# Patient Record
Sex: MALE | Race: WHITE | Employment: OTHER | URBAN - METROPOLITAN AREA
[De-identification: names, ages, dates, MRNs, and addresses within clinical notes are randomized per-mention and may not be internally consistent; named-entity substitution may affect disease eponyms.]

---

## 2018-05-30 ENCOUNTER — OFFICE VISIT (OUTPATIENT)
Dept: PHYSICAL MEDICINE AND REHAB | Age: 83
End: 2018-05-30
Payer: MEDICARE

## 2018-05-30 VITALS — HEIGHT: 66 IN | WEIGHT: 154 LBS | BODY MASS INDEX: 24.75 KG/M2

## 2018-05-30 DIAGNOSIS — R20.0 NUMBNESS AND TINGLING OF BOTH LOWER EXTREMITIES: Primary | ICD-10-CM

## 2018-05-30 DIAGNOSIS — R20.2 NUMBNESS AND TINGLING OF BOTH LOWER EXTREMITIES: Primary | ICD-10-CM

## 2018-05-30 PROCEDURE — 95886 MUSC TEST DONE W/N TEST COMP: CPT | Performed by: PHYSICAL MEDICINE & REHABILITATION

## 2018-05-30 PROCEDURE — 95911 NRV CNDJ TEST 9-10 STUDIES: CPT | Performed by: PHYSICAL MEDICINE & REHABILITATION

## 2018-05-30 PROCEDURE — 99202 OFFICE O/P NEW SF 15 MIN: CPT | Performed by: PHYSICAL MEDICINE & REHABILITATION

## 2018-05-30 RX ORDER — LEVOTHYROXINE SODIUM 0.07 MG/1
TABLET ORAL
Refills: 0 | COMMUNITY
Start: 2018-05-19 | End: 2022-01-01 | Stop reason: SDUPTHER

## 2018-05-30 RX ORDER — LANCETS 33 GAUGE
EACH MISCELLANEOUS
Refills: 3 | COMMUNITY
Start: 2018-03-12 | End: 2020-01-08 | Stop reason: ALTCHOICE

## 2018-05-31 ENCOUNTER — HOSPITAL ENCOUNTER (OUTPATIENT)
Age: 83
Discharge: HOME | End: 2018-05-31
Payer: MEDICARE

## 2018-05-31 DIAGNOSIS — R20.0 NUMBNESS AND TINGLING OF BOTH LOWER EXTREMITIES: ICD-10-CM

## 2018-05-31 DIAGNOSIS — R20.2 NUMBNESS AND TINGLING OF BOTH LOWER EXTREMITIES: ICD-10-CM

## 2018-05-31 LAB
ALBUMIN SERPL-MCNC: 4 G/DL (ref 3.5–5.2)
ALP BLD-CCNC: 41 U/L (ref 40–129)
ALT SERPL-CCNC: 19 U/L (ref 0–40)
ANION GAP SERPL CALCULATED.3IONS-SCNC: 11 MMOL/L (ref 7–16)
AST SERPL-CCNC: 27 U/L (ref 0–39)
BILIRUB SERPL-MCNC: 0.5 MG/DL (ref 0–1.2)
BILIRUBIN URINE: NEGATIVE
BLOOD, URINE: NEGATIVE
BUN BLDV-MCNC: 21 MG/DL (ref 8–23)
CALCIUM SERPL-MCNC: 9.2 MG/DL (ref 8.6–10.2)
CHLORIDE BLD-SCNC: 103 MMOL/L (ref 98–107)
CHOLESTEROL, TOTAL: 139 MG/DL (ref 0–199)
CLARITY: CLEAR
CO2: 27 MMOL/L (ref 22–29)
COLOR: YELLOW
CREAT SERPL-MCNC: 1.2 MG/DL (ref 0.7–1.2)
GFR AFRICAN AMERICAN: >60
GFR NON-AFRICAN AMERICAN: 58 ML/MIN/1.73
GLUCOSE BLD-MCNC: 143 MG/DL (ref 74–109)
GLUCOSE URINE: NEGATIVE MG/DL
HBA1C MFR BLD: 6.9 % (ref 4.8–5.9)
HCT VFR BLD CALC: 38.3 % (ref 37–54)
HDLC SERPL-MCNC: 48 MG/DL
HEMOGLOBIN: 12.7 G/DL (ref 12.5–16.5)
KETONES, URINE: NEGATIVE MG/DL
LDL CHOLESTEROL CALCULATED: 76 MG/DL (ref 0–99)
LEUKOCYTE ESTERASE, URINE: NEGATIVE
MCH RBC QN AUTO: 32.5 PG (ref 26–35)
MCHC RBC AUTO-ENTMCNC: 33.2 % (ref 32–34.5)
MCV RBC AUTO: 98 FL (ref 80–99.9)
MICROALBUMIN UR-MCNC: <12 MG/L
NITRITE, URINE: NEGATIVE
PDW BLD-RTO: 13 FL (ref 11.5–15)
PH UA: 6 (ref 5–9)
PLATELET # BLD: 222 E9/L (ref 130–450)
PMV BLD AUTO: 9.7 FL (ref 7–12)
POTASSIUM SERPL-SCNC: 4.4 MMOL/L (ref 3.5–5)
PROTEIN UA: NEGATIVE MG/DL
RBC # BLD: 3.91 E12/L (ref 3.8–5.8)
SODIUM BLD-SCNC: 141 MMOL/L (ref 132–146)
SPECIFIC GRAVITY UA: 1.01 (ref 1–1.03)
TOTAL PROTEIN: 7.5 G/DL (ref 6.4–8.3)
TRIGL SERPL-MCNC: 75 MG/DL (ref 0–149)
UROBILINOGEN, URINE: 0.2 E.U./DL
VLDLC SERPL CALC-MCNC: 15 MG/DL
WBC # BLD: 7.1 E9/L (ref 4.5–11.5)

## 2018-05-31 PROCEDURE — 82044 UR ALBUMIN SEMIQUANTITATIVE: CPT

## 2018-05-31 PROCEDURE — 85027 COMPLETE CBC AUTOMATED: CPT

## 2018-05-31 PROCEDURE — 36415 COLL VENOUS BLD VENIPUNCTURE: CPT

## 2018-05-31 PROCEDURE — 80061 LIPID PANEL: CPT

## 2018-05-31 PROCEDURE — 83036 HEMOGLOBIN GLYCOSYLATED A1C: CPT

## 2018-05-31 PROCEDURE — 80053 COMPREHEN METABOLIC PANEL: CPT

## 2018-05-31 PROCEDURE — 81003 URINALYSIS AUTO W/O SCOPE: CPT

## 2018-09-06 ENCOUNTER — HOSPITAL ENCOUNTER (OUTPATIENT)
Age: 83
Discharge: HOME | End: 2018-09-06
Payer: MEDICARE

## 2018-09-06 LAB
ANION GAP SERPL CALCULATED.3IONS-SCNC: 9 MMOL/L (ref 7–16)
BUN BLDV-MCNC: 24 MG/DL (ref 8–23)
CALCIUM SERPL-MCNC: 9.2 MG/DL (ref 8.6–10.2)
CHLORIDE BLD-SCNC: 105 MMOL/L (ref 98–107)
CO2: 29 MMOL/L (ref 22–29)
CREAT SERPL-MCNC: 1.4 MG/DL (ref 0.7–1.2)
GFR AFRICAN AMERICAN: 58
GFR NON-AFRICAN AMERICAN: 48 ML/MIN/1.73
GLUCOSE FASTING: 149 MG/DL (ref 74–109)
HBA1C MFR BLD: 6.8 % (ref 4–5.6)
POTASSIUM SERPL-SCNC: 4.9 MMOL/L (ref 3.5–5)
SODIUM BLD-SCNC: 143 MMOL/L (ref 132–146)

## 2018-09-06 PROCEDURE — 80048 BASIC METABOLIC PNL TOTAL CA: CPT

## 2018-09-06 PROCEDURE — 83036 HEMOGLOBIN GLYCOSYLATED A1C: CPT

## 2018-09-06 PROCEDURE — 36415 COLL VENOUS BLD VENIPUNCTURE: CPT

## 2018-12-04 ENCOUNTER — HOSPITAL ENCOUNTER (OUTPATIENT)
Age: 83
Discharge: HOME | End: 2018-12-04
Payer: MEDICARE

## 2018-12-04 LAB
ALBUMIN SERPL-MCNC: 4.2 G/DL (ref 3.5–5.2)
ALP BLD-CCNC: 44 U/L (ref 40–129)
ALT SERPL-CCNC: 17 U/L (ref 0–40)
ANION GAP SERPL CALCULATED.3IONS-SCNC: 11 MMOL/L (ref 7–16)
AST SERPL-CCNC: 25 U/L (ref 0–39)
BILIRUB SERPL-MCNC: 0.5 MG/DL (ref 0–1.2)
BUN BLDV-MCNC: 24 MG/DL (ref 8–23)
CALCIUM SERPL-MCNC: 9.4 MG/DL (ref 8.6–10.2)
CHLORIDE BLD-SCNC: 102 MMOL/L (ref 98–107)
CHOLESTEROL, TOTAL: 149 MG/DL (ref 0–199)
CO2: 27 MMOL/L (ref 22–29)
CREAT SERPL-MCNC: 1.4 MG/DL (ref 0.7–1.2)
GFR AFRICAN AMERICAN: 58
GFR NON-AFRICAN AMERICAN: 48 ML/MIN/1.73
GLUCOSE BLD-MCNC: 126 MG/DL (ref 74–99)
HBA1C MFR BLD: 6.7 % (ref 4–5.6)
HCT VFR BLD CALC: 37.1 % (ref 37–54)
HDLC SERPL-MCNC: 50 MG/DL
HEMOGLOBIN: 12.5 G/DL (ref 12.5–16.5)
LDL CHOLESTEROL CALCULATED: 87 MG/DL (ref 0–99)
MCH RBC QN AUTO: 32.8 PG (ref 26–35)
MCHC RBC AUTO-ENTMCNC: 33.7 % (ref 32–34.5)
MCV RBC AUTO: 97.4 FL (ref 80–99.9)
PDW BLD-RTO: 13.3 FL (ref 11.5–15)
PLATELET # BLD: 223 E9/L (ref 130–450)
PMV BLD AUTO: 9.7 FL (ref 7–12)
POTASSIUM SERPL-SCNC: 4.9 MMOL/L (ref 3.5–5)
RBC # BLD: 3.81 E12/L (ref 3.8–5.8)
SODIUM BLD-SCNC: 140 MMOL/L (ref 132–146)
TOTAL PROTEIN: 7.8 G/DL (ref 6.4–8.3)
TRIGL SERPL-MCNC: 59 MG/DL (ref 0–149)
TSH SERPL DL<=0.05 MIU/L-ACNC: 1.72 UIU/ML (ref 0.27–4.2)
VLDLC SERPL CALC-MCNC: 12 MG/DL
WBC # BLD: 7.7 E9/L (ref 4.5–11.5)

## 2018-12-04 PROCEDURE — 80053 COMPREHEN METABOLIC PANEL: CPT

## 2018-12-04 PROCEDURE — 85027 COMPLETE CBC AUTOMATED: CPT

## 2018-12-04 PROCEDURE — 83036 HEMOGLOBIN GLYCOSYLATED A1C: CPT

## 2018-12-04 PROCEDURE — 84443 ASSAY THYROID STIM HORMONE: CPT

## 2018-12-04 PROCEDURE — 80061 LIPID PANEL: CPT

## 2018-12-04 PROCEDURE — 36415 COLL VENOUS BLD VENIPUNCTURE: CPT

## 2019-03-29 ENCOUNTER — HOSPITAL ENCOUNTER (OUTPATIENT)
Age: 84
Discharge: HOME | End: 2019-03-31
Payer: MEDICARE

## 2019-03-29 ENCOUNTER — HOSPITAL ENCOUNTER (OUTPATIENT)
Dept: GENERAL RADIOLOGY | Age: 84
Discharge: HOME | End: 2019-03-31
Payer: MEDICARE

## 2019-03-29 ENCOUNTER — APPOINTMENT (OUTPATIENT)
Dept: GENERAL RADIOLOGY | Age: 84
End: 2019-03-29
Payer: MEDICARE

## 2019-03-29 DIAGNOSIS — R52 PAIN: ICD-10-CM

## 2019-03-29 PROCEDURE — 72050 X-RAY EXAM NECK SPINE 4/5VWS: CPT

## 2019-03-29 PROCEDURE — 71046 X-RAY EXAM CHEST 2 VIEWS: CPT

## 2019-03-29 PROCEDURE — 72072 X-RAY EXAM THORAC SPINE 3VWS: CPT

## 2019-05-02 ENCOUNTER — HOSPITAL ENCOUNTER (OUTPATIENT)
Age: 84
Discharge: HOME | End: 2019-05-02
Payer: MEDICARE

## 2019-05-02 LAB
ALBUMIN SERPL-MCNC: 3.9 G/DL (ref 3.5–5.2)
ALP BLD-CCNC: 37 U/L (ref 40–129)
ALT SERPL-CCNC: 19 U/L (ref 0–40)
ANION GAP SERPL CALCULATED.3IONS-SCNC: 9 MMOL/L (ref 7–16)
AST SERPL-CCNC: 25 U/L (ref 0–39)
BILIRUB SERPL-MCNC: 0.4 MG/DL (ref 0–1.2)
BUN BLDV-MCNC: 22 MG/DL (ref 8–23)
CALCIUM SERPL-MCNC: 9.3 MG/DL (ref 8.6–10.2)
CHLORIDE BLD-SCNC: 102 MMOL/L (ref 98–107)
CHOLESTEROL, TOTAL: 150 MG/DL (ref 0–199)
CO2: 28 MMOL/L (ref 22–29)
CREAT SERPL-MCNC: 1.3 MG/DL (ref 0.7–1.2)
GFR AFRICAN AMERICAN: >60
GFR NON-AFRICAN AMERICAN: 52 ML/MIN/1.73
GLUCOSE BLD-MCNC: 146 MG/DL (ref 74–99)
HBA1C MFR BLD: 7 % (ref 4–5.6)
HCT VFR BLD CALC: 37.1 % (ref 37–54)
HDLC SERPL-MCNC: 48 MG/DL
HEMOGLOBIN: 12.4 G/DL (ref 12.5–16.5)
LDL CHOLESTEROL CALCULATED: 86 MG/DL (ref 0–99)
MCH RBC QN AUTO: 33.1 PG (ref 26–35)
MCHC RBC AUTO-ENTMCNC: 33.4 % (ref 32–34.5)
MCV RBC AUTO: 98.9 FL (ref 80–99.9)
PDW BLD-RTO: 13.2 FL (ref 11.5–15)
PLATELET # BLD: 202 E9/L (ref 130–450)
PMV BLD AUTO: 9.5 FL (ref 7–12)
POTASSIUM SERPL-SCNC: 4.3 MMOL/L (ref 3.5–5)
RBC # BLD: 3.75 E12/L (ref 3.8–5.8)
SODIUM BLD-SCNC: 139 MMOL/L (ref 132–146)
TOTAL PROTEIN: 7.2 G/DL (ref 6.4–8.3)
TRIGL SERPL-MCNC: 79 MG/DL (ref 0–149)
VLDLC SERPL CALC-MCNC: 16 MG/DL
WBC # BLD: 6.3 E9/L (ref 4.5–11.5)

## 2019-05-02 PROCEDURE — 85027 COMPLETE CBC AUTOMATED: CPT

## 2019-05-02 PROCEDURE — 80061 LIPID PANEL: CPT

## 2019-05-02 PROCEDURE — 80053 COMPREHEN METABOLIC PANEL: CPT

## 2019-05-02 PROCEDURE — 83036 HEMOGLOBIN GLYCOSYLATED A1C: CPT

## 2019-05-02 PROCEDURE — 36415 COLL VENOUS BLD VENIPUNCTURE: CPT

## 2019-08-15 ENCOUNTER — HOSPITAL ENCOUNTER (OUTPATIENT)
Age: 84
Discharge: HOME | End: 2019-08-15
Payer: MEDICARE

## 2019-08-15 LAB
ALBUMIN SERPL-MCNC: 4.1 G/DL (ref 3.5–5.2)
ALP BLD-CCNC: 42 U/L (ref 40–129)
ALT SERPL-CCNC: 18 U/L (ref 0–40)
ANION GAP SERPL CALCULATED.3IONS-SCNC: 11 MMOL/L (ref 7–16)
AST SERPL-CCNC: 25 U/L (ref 0–39)
BILIRUB SERPL-MCNC: 0.4 MG/DL (ref 0–1.2)
BUN BLDV-MCNC: 33 MG/DL (ref 8–23)
CALCIUM SERPL-MCNC: 9.5 MG/DL (ref 8.6–10.2)
CHLORIDE BLD-SCNC: 106 MMOL/L (ref 98–107)
CO2: 26 MMOL/L (ref 22–29)
CREAT SERPL-MCNC: 1.4 MG/DL (ref 0.7–1.2)
FOLATE: >20 NG/ML (ref 4.8–24.2)
GFR AFRICAN AMERICAN: 58
GFR NON-AFRICAN AMERICAN: 48 ML/MIN/1.73
GLUCOSE BLD-MCNC: 151 MG/DL (ref 74–99)
HBA1C MFR BLD: 7.1 % (ref 4–5.6)
HCT VFR BLD CALC: 38.8 % (ref 37–54)
HEMOGLOBIN: 12.8 G/DL (ref 12.5–16.5)
MCH RBC QN AUTO: 33 PG (ref 26–35)
MCHC RBC AUTO-ENTMCNC: 33 % (ref 32–34.5)
MCV RBC AUTO: 100 FL (ref 80–99.9)
PDW BLD-RTO: 13.1 FL (ref 11.5–15)
PLATELET # BLD: 229 E9/L (ref 130–450)
PMV BLD AUTO: 9.9 FL (ref 7–12)
POTASSIUM SERPL-SCNC: 4.8 MMOL/L (ref 3.5–5)
RBC # BLD: 3.88 E12/L (ref 3.8–5.8)
SODIUM BLD-SCNC: 143 MMOL/L (ref 132–146)
TOTAL PROTEIN: 7.6 G/DL (ref 6.4–8.3)
VITAMIN B-12: 673 PG/ML (ref 211–946)
WBC # BLD: 6.1 E9/L (ref 4.5–11.5)

## 2019-08-15 PROCEDURE — 85027 COMPLETE CBC AUTOMATED: CPT

## 2019-08-15 PROCEDURE — 82746 ASSAY OF FOLIC ACID SERUM: CPT

## 2019-08-15 PROCEDURE — 82607 VITAMIN B-12: CPT

## 2019-08-15 PROCEDURE — 80053 COMPREHEN METABOLIC PANEL: CPT

## 2019-08-15 PROCEDURE — 83036 HEMOGLOBIN GLYCOSYLATED A1C: CPT

## 2019-08-15 PROCEDURE — 36415 COLL VENOUS BLD VENIPUNCTURE: CPT

## 2020-01-08 ENCOUNTER — OFFICE VISIT (OUTPATIENT)
Dept: ORTHOPEDIC SURGERY | Age: 85
End: 2020-01-08
Payer: MEDICARE

## 2020-01-08 VITALS — WEIGHT: 155 LBS | BODY MASS INDEX: 24.91 KG/M2 | HEIGHT: 66 IN

## 2020-01-08 PROCEDURE — 99203 OFFICE O/P NEW LOW 30 MIN: CPT | Performed by: ORTHOPAEDIC SURGERY

## 2020-01-08 NOTE — PROGRESS NOTES
Ximena Al is a 80 y.o. male, who presents   Chief Complaint   Patient presents with    New Patient     new patient for right shoulder pain, he denies any injury, PT or injections. He states he has this issue for years, he states the pain is a dull ache. HPI[de-identified] Shoulder pain is been present for quite some time. To some degree is been present for several years. Is been more bothersome in the past several months. Patient had difficulty this summer playing corn hole and toss and the bags forward. Any elevation of his arm causes him pain around the acromial arch area. He has difficulty putting on his jacket. There is been no targeted treatment recently. He had injection in the past that helped some. Allergies; medications; past medical, surgical, family, and social history; and problem list have been reviewed today and updated as indicated in this encounter - see below following Ortho specifics. Musculoskeletal: Skin condition and gross neurovascular function are good in the right upper extremity. The shoulder and AC joints are both stable. Range of motion is guarded above about 90 degrees of elevation. Rotation causes discomfort as well. Manual muscle testing causes discomfort. The cuff seems to be grossly intact but there is pain with resisted testing. This is certainly suggestive of impingement as well as his symptoms. He does have a little pain on palpation anterior adjacent to the long head biceps tendon. Radiologic Studies: Imaging of the right shoulder and 2 views showed significant anterior inferior acromial prominence. The subacromial space otherwise looked good in the glenohumeral contours and spaces were good as well as the [facility] joint. ASSESSMENT:  Javier Hunt was seen today for new patient. Diagnoses and all orders for this visit:    Acute pain of right shoulder  -     XR SHOULDER RIGHT (MIN 2 VIEWS);  Future    Bursitis of right shoulder    Impingement syndrome of right shoulder     Treatment alternatives were reviewed including medical and physical therapies, injections, and surgical options, expected risks benefits and likely outcome of each were discussed in detail, questions asked and answered and understood. We discussed treatment options including physical therapy, injection and surgical debridement. PLAN: Mr. Hawkins Argue to take care of this in a definitive fashion. The surgery would do this. That would be arthroscopic evaluation and treatment. The surgery risk prognosis limitations and any rehab issues were discussed in detail with parent good understanding. We will schedule this as an outpatient procedure pending any necessary approvals. There is no problem list on file for this patient. Past Medical History:   Diagnosis Date    Hypertension        Past Surgical History:   Procedure Laterality Date    BACK SURGERY      JOINT REPLACEMENT      LITHOTRIPSY      TONSILLECTOMY         Current Outpatient Medications   Medication Sig Dispense Refill    levothyroxine (SYNTHROID) 75 MCG tablet TAKE ONE TABLET BY MOUTH EVERY DAY  0    metFORMIN (GLUCOPHAGE) 500 MG tablet TAKE ONE TABLET BY MOUTH EVERY DAY  3    finasteride (PROSCAR) 5 MG tablet Take 5 mg by mouth once a week.  pravastatin (PRAVACHOL) 20 MG tablet Take 20 mg by mouth daily.  potassium citrate (UROCIT-K) 5 MEQ (540 MG) SR tablet Take 5 mEq by mouth 3 times daily (with meals).  aspirin 81 MG EC tablet Take 81 mg by mouth daily.  therapeutic multivitamin-minerals (THERAGRAN-M) tablet Take 1 tablet by mouth daily. No current facility-administered medications for this visit.         No Known Allergies    Social History     Socioeconomic History    Marital status:      Spouse name: None    Number of children: None    Years of education: None    Highest education level: None   Occupational History    None   Social Needs    Financial resource strain: None

## 2020-01-15 ENCOUNTER — ANESTHESIA EVENT (OUTPATIENT)
Dept: OPERATING ROOM | Age: 85
End: 2020-01-15
Payer: MEDICARE

## 2020-01-17 ENCOUNTER — HOSPITAL ENCOUNTER (OUTPATIENT)
Age: 85
Discharge: HOME | End: 2020-01-17
Payer: MEDICARE

## 2020-01-17 LAB
ALBUMIN SERPL-MCNC: 4.1 G/DL (ref 3.5–5.2)
ALP BLD-CCNC: 43 U/L (ref 40–129)
ALT SERPL-CCNC: 20 U/L (ref 0–40)
ANION GAP SERPL CALCULATED.3IONS-SCNC: 10 MMOL/L (ref 7–16)
AST SERPL-CCNC: 23 U/L (ref 0–39)
BILIRUB SERPL-MCNC: 0.4 MG/DL (ref 0–1.2)
BUN BLDV-MCNC: 20 MG/DL (ref 8–23)
CALCIUM SERPL-MCNC: 9.5 MG/DL (ref 8.6–10.2)
CHLORIDE BLD-SCNC: 106 MMOL/L (ref 98–107)
CHOLESTEROL, TOTAL: 143 MG/DL (ref 0–199)
CO2: 27 MMOL/L (ref 22–29)
CREAT SERPL-MCNC: 1.5 MG/DL (ref 0.7–1.2)
GFR AFRICAN AMERICAN: 54
GFR NON-AFRICAN AMERICAN: 44 ML/MIN/1.73
GLUCOSE BLD-MCNC: 153 MG/DL (ref 74–99)
HBA1C MFR BLD: 7.1 % (ref 4–5.6)
HCT VFR BLD CALC: 38.2 % (ref 37–54)
HDLC SERPL-MCNC: 44 MG/DL
HEMOGLOBIN: 12.7 G/DL (ref 12.5–16.5)
LDL CHOLESTEROL CALCULATED: 86 MG/DL (ref 0–99)
MCH RBC QN AUTO: 32.9 PG (ref 26–35)
MCHC RBC AUTO-ENTMCNC: 33.2 % (ref 32–34.5)
MCV RBC AUTO: 99 FL (ref 80–99.9)
PDW BLD-RTO: 13.2 FL (ref 11.5–15)
PLATELET # BLD: 211 E9/L (ref 130–450)
PMV BLD AUTO: 9.8 FL (ref 7–12)
POTASSIUM SERPL-SCNC: 4.9 MMOL/L (ref 3.5–5)
RBC # BLD: 3.86 E12/L (ref 3.8–5.8)
SODIUM BLD-SCNC: 143 MMOL/L (ref 132–146)
TOTAL PROTEIN: 7.6 G/DL (ref 6.4–8.3)
TRIGL SERPL-MCNC: 66 MG/DL (ref 0–149)
VLDLC SERPL CALC-MCNC: 13 MG/DL
WBC # BLD: 7.3 E9/L (ref 4.5–11.5)

## 2020-01-17 PROCEDURE — 85027 COMPLETE CBC AUTOMATED: CPT

## 2020-01-17 PROCEDURE — 83036 HEMOGLOBIN GLYCOSYLATED A1C: CPT

## 2020-01-17 PROCEDURE — 80053 COMPREHEN METABOLIC PANEL: CPT

## 2020-01-17 PROCEDURE — 36415 COLL VENOUS BLD VENIPUNCTURE: CPT

## 2020-01-17 PROCEDURE — 80061 LIPID PANEL: CPT

## 2020-01-23 RX ORDER — ASPIRIN 325 MG
325 TABLET ORAL DAILY
Status: ON HOLD | COMMUNITY
End: 2022-01-01 | Stop reason: HOSPADM

## 2020-01-29 NOTE — ANESTHESIA PRE PROCEDURE
Department of Anesthesiology  Preprocedure Note       Name:  Jennifer Bermeo   Age:  80 y.o.  :  1933                                          MRN:  [id number]         Date:  2020      Surgeon: Daniella Echavarria):  [place] Wilhelm DO    Procedure: ARTHROSCOPIC EXAM AND TREATMENT RIGHT SHOULDER (CPT 68171,99786) (Right )    Medications prior to admission:   Prior to Admission medications    Medication Sig Start Date End Date Taking? Authorizing Provider   aspirin 325 MG tablet Take 325 mg by mouth daily   Yes Historical Provider, MD   levothyroxine (SYNTHROID) 75 MCG tablet TAKE ONE TABLET BY MOUTH EVERY DAY 18   Historical Provider, MD   metFORMIN (GLUCOPHAGE) 500 MG tablet TAKE ONE TABLET BY MOUTH EVERY DAY 18   Historical Provider, MD   finasteride (PROSCAR) 5 MG tablet Take 5 mg by mouth once a week. Historical Provider, MD   pravastatin (PRAVACHOL) 20 MG tablet Take 20 mg by mouth daily. Historical Provider, MD   potassium citrate (UROCIT-K) 5 MEQ (540 MG) SR tablet Take 5 mEq by mouth daily     Historical Provider, MD   therapeutic multivitamin-minerals (THERAGRAN-M) tablet Take 1 tablet by mouth daily. Historical Provider, MD       Current medications:    No current facility-administered medications for this encounter. Current Outpatient Medications   Medication Sig Dispense Refill    aspirin 325 MG tablet Take 325 mg by mouth daily      levothyroxine (SYNTHROID) 75 MCG tablet TAKE ONE TABLET BY MOUTH EVERY DAY  0    metFORMIN (GLUCOPHAGE) 500 MG tablet TAKE ONE TABLET BY MOUTH EVERY DAY  3    finasteride (PROSCAR) 5 MG tablet Take 5 mg by mouth once a week.  pravastatin (PRAVACHOL) 20 MG tablet Take 20 mg by mouth daily.  potassium citrate (UROCIT-K) 5 MEQ (540 MG) SR tablet Take 5 mEq by mouth daily       therapeutic multivitamin-minerals (THERAGRAN-M) tablet Take 1 tablet by mouth daily.            Allergies:  No Known Allergies    Problem List:  There is no problem list on file for this patient. Past Medical History:        Diagnosis Date    Diabetes mellitus ([address]     Hypertension     Thyroid disease        Past Surgical History:        Procedure Laterality Date    BACK SURGERY  2000    fusion  lumbar area,      JOINT REPLACEMENT Bilateral [id number]    knee     LITHOTRIPSY      TONSILLECTOMY         Social History:    Social History     Tobacco Use    Smoking status: Never Smoker    Smokeless tobacco: Never Used   Substance Use Topics    Alcohol use: No                                Counseling given: Not Answered      Vital Signs (Current):   Vitals:    01/23/20 1205   Weight: 155 lb (70.3 kg)   Height: 5' 6\" (1.676 m)                                              BP Readings from Last 3 Encounters:   05/16/16 154/84       NPO Status:  >8.H                                                                               BMI:   Wt Readings from Last 3 Encounters:   01/08/20 155 lb (70.3 kg)   05/30/18 154 lb (69.9 kg)   05/16/16 167 lb 9.6 oz (76 kg)     Body mass index is 25.02 kg/m². CBC:   Lab Results   Component Value Date    WBC 7.3 01/17/2020    RBC 3.86 01/17/2020    HGB 12.7 01/17/2020    HCT 38.2 01/17/2020    MCV 99.0 01/17/2020    RDW 13.2 01/17/2020     01/17/2020       CMP:   Lab Results   Component Value Date     01/17/2020    K 4.9 01/17/2020     01/17/2020    CO2 27 01/17/2020    BUN 20 01/17/2020    CREATININE 1.5 01/17/2020    GFRAA 54 01/17/2020    LABGLOM 44 01/17/2020    GLUCOSE 153 01/17/2020    GLUCOSE 111 03/29/2012    PROT 7.6 01/17/2020    CALCIUM 9.5 01/17/2020    BILITOT 0.4 01/17/2020    ALKPHOS 43 01/17/2020    AST 23 01/17/2020    ALT 20 01/17/2020       POC Tests: No results for input(s): POCGLU, POCNA, POCK, POCCL, POCBUN, POCHEMO, POCHCT in the last 72 hours.     Coags:   Lab Results   Component Value Date    PROTIME 11.8 05/16/2016    INR 1.1 05/16/2016    APTT 29.5 05/16/2016       HCG (If Applicable): No results found for: PREGTESTUR, PREGSERUM, HCG, HCGQUANT     ABGs: No results found for: PHART, PO2ART, MQR0VLX, BOL2LTO, BEART, F3FBQOIB     Type & Screen (If Applicable):  No results found for: LABABO, [address]    Anesthesia Evaluation  Patient summary reviewed no history of anesthetic complications:   Airway: Mallampati: III  TM distance: >3 FB     Mouth opening: > = 3 FB Dental:          Pulmonary: breath sounds clear to auscultation      (-) not a current smoker                           Cardiovascular:    (+) hypertension:, hyperlipidemia      ECG reviewed  Rhythm: regular  Rate: normal           Beta Blocker:  Not on Beta Blocker         Neuro/Psych:   Negative Neuro/Psych ROS              GI/Hepatic/Renal:        (-) no morbid obesity       Endo/Other:    (+) Diabetes, hypothyroidism::., .                 Abdominal:         (-) obese     Vascular: negative vascular ROS. Anesthesia Plan      general and regional     ASA 2     (Medical clearance on chart-reviewed  Desires IScB---explained, consents)  Induction: intravenous. MIPS: Postoperative opioids intended and Prophylactic antiemetics administered. Anesthetic plan and risks discussed with patient. Plan discussed with CRNA. PAT Chart Review:  Chart reviewed per routine on January 29, 2020 at 7:05 AM by Madina Andrea MD.  Above represents information available via shared medical record including previous anesthesia history, drug and allergy history.   Confirmation of above and final plan per Day of Surgery (DOS) anesthesiologist.        Madina Andrea MD   1/29/2020

## 2020-01-30 ENCOUNTER — ANESTHESIA (OUTPATIENT)
Dept: OPERATING ROOM | Age: 85
End: 2020-01-30
Payer: MEDICARE

## 2020-01-30 ENCOUNTER — HOSPITAL ENCOUNTER (OUTPATIENT)
Age: 85
Setting detail: OUTPATIENT SURGERY
Discharge: HOME | End: 2020-01-30
Attending: ORTHOPAEDIC SURGERY | Admitting: ORTHOPAEDIC SURGERY
Payer: MEDICARE

## 2020-01-30 VITALS
HEART RATE: 87 BPM | OXYGEN SATURATION: 97 % | SYSTOLIC BLOOD PRESSURE: 147 MMHG | WEIGHT: 160 LBS | HEIGHT: 66 IN | RESPIRATION RATE: 16 BRPM | BODY MASS INDEX: 25.71 KG/M2 | TEMPERATURE: 97 F | DIASTOLIC BLOOD PRESSURE: 76 MMHG

## 2020-01-30 VITALS
TEMPERATURE: 98.6 F | RESPIRATION RATE: 1 BRPM | DIASTOLIC BLOOD PRESSURE: 91 MMHG | OXYGEN SATURATION: 99 % | SYSTOLIC BLOOD PRESSURE: 155 MMHG

## 2020-01-30 PROBLEM — M75.51 BURSITIS OF RIGHT SHOULDER: Chronic | Status: ACTIVE | Noted: 2020-01-30

## 2020-01-30 PROBLEM — S43.431A TEAR OF RIGHT GLENOID LABRUM: Chronic | Status: ACTIVE | Noted: 2020-01-30

## 2020-01-30 PROBLEM — M75.41 IMPINGEMENT SYNDROME OF RIGHT SHOULDER: Status: ACTIVE | Noted: 2020-01-30

## 2020-01-30 PROBLEM — M75.111 INCOMPLETE TEAR OF RIGHT ROTATOR CUFF: Chronic | Status: ACTIVE | Noted: 2020-01-30

## 2020-01-30 PROBLEM — S46.101A INJURY OF TENDON OF LONG HEAD OF RIGHT BICEPS: Chronic | Status: ACTIVE | Noted: 2020-01-30

## 2020-01-30 LAB — METER GLUCOSE: 135 MG/DL (ref 74–99)

## 2020-01-30 PROCEDURE — 7100000000 HC PACU RECOVERY - FIRST 15 MIN: Performed by: ORTHOPAEDIC SURGERY

## 2020-01-30 PROCEDURE — 2580000003 HC RX 258: Performed by: ANESTHESIOLOGY

## 2020-01-30 PROCEDURE — 3600000014 HC SURGERY LEVEL 4 ADDTL 15MIN: Performed by: ORTHOPAEDIC SURGERY

## 2020-01-30 PROCEDURE — 6360000002 HC RX W HCPCS: Performed by: NURSE ANESTHETIST, CERTIFIED REGISTERED

## 2020-01-30 PROCEDURE — 3600000004 HC SURGERY LEVEL 4 BASE: Performed by: ORTHOPAEDIC SURGERY

## 2020-01-30 PROCEDURE — 7100000011 HC PHASE II RECOVERY - ADDTL 15 MIN: Performed by: ORTHOPAEDIC SURGERY

## 2020-01-30 PROCEDURE — 7100000010 HC PHASE II RECOVERY - FIRST 15 MIN: Performed by: ORTHOPAEDIC SURGERY

## 2020-01-30 PROCEDURE — 29827 SHO ARTHRS SRG RT8TR CUF RPR: CPT | Performed by: ORTHOPAEDIC SURGERY

## 2020-01-30 PROCEDURE — 2720000010 HC SURG SUPPLY STERILE: Performed by: ORTHOPAEDIC SURGERY

## 2020-01-30 PROCEDURE — 3700000001 HC ADD 15 MINUTES (ANESTHESIA): Performed by: ORTHOPAEDIC SURGERY

## 2020-01-30 PROCEDURE — 29826 SHO ARTHRS SRG DECOMPRESSION: CPT | Performed by: ORTHOPAEDIC SURGERY

## 2020-01-30 PROCEDURE — 2580000003 HC RX 258: Performed by: ORTHOPAEDIC SURGERY

## 2020-01-30 PROCEDURE — 6360000002 HC RX W HCPCS: Performed by: ORTHOPAEDIC SURGERY

## 2020-01-30 PROCEDURE — C1713 ANCHOR/SCREW BN/BN,TIS/BN: HCPCS | Performed by: ORTHOPAEDIC SURGERY

## 2020-01-30 PROCEDURE — 82962 GLUCOSE BLOOD TEST: CPT

## 2020-01-30 PROCEDURE — 2709999900 HC NON-CHARGEABLE SUPPLY: Performed by: ORTHOPAEDIC SURGERY

## 2020-01-30 PROCEDURE — 2500000003 HC RX 250 WO HCPCS: Performed by: NURSE ANESTHETIST, CERTIFIED REGISTERED

## 2020-01-30 PROCEDURE — 3700000000 HC ANESTHESIA ATTENDED CARE: Performed by: ORTHOPAEDIC SURGERY

## 2020-01-30 PROCEDURE — 7100000001 HC PACU RECOVERY - ADDTL 15 MIN: Performed by: ORTHOPAEDIC SURGERY

## 2020-01-30 PROCEDURE — 2500000003 HC RX 250 WO HCPCS: Performed by: ORTHOPAEDIC SURGERY

## 2020-01-30 DEVICE — ANCHOR SUT W/ ORTHOCORD KNOTLESS FOR ROT CUF REP VERSALOK: Type: IMPLANTABLE DEVICE | Status: FUNCTIONAL

## 2020-01-30 RX ORDER — GLYCOPYRROLATE 0.2 MG/ML
INJECTION INTRAMUSCULAR; INTRAVENOUS PRN
Status: DISCONTINUED | OUTPATIENT
Start: 2020-01-30 | End: 2020-01-30 | Stop reason: SDUPTHER

## 2020-01-30 RX ORDER — NEOSTIGMINE METHYLSULFATE 1 MG/ML
INJECTION, SOLUTION INTRAVENOUS PRN
Status: DISCONTINUED | OUTPATIENT
Start: 2020-01-30 | End: 2020-01-30 | Stop reason: SDUPTHER

## 2020-01-30 RX ORDER — SODIUM CHLORIDE, SODIUM LACTATE, POTASSIUM CHLORIDE, CALCIUM CHLORIDE 600; 310; 30; 20 MG/100ML; MG/100ML; MG/100ML; MG/100ML
INJECTION, SOLUTION INTRAVENOUS CONTINUOUS
Status: DISCONTINUED | OUTPATIENT
Start: 2020-01-30 | End: 2020-01-30 | Stop reason: HOSPADM

## 2020-01-30 RX ORDER — PROMETHAZINE HYDROCHLORIDE 25 MG/ML
25 INJECTION, SOLUTION INTRAMUSCULAR; INTRAVENOUS
Status: DISCONTINUED | OUTPATIENT
Start: 2020-01-30 | End: 2020-01-30 | Stop reason: HOSPADM

## 2020-01-30 RX ORDER — HYDROMORPHONE HYDROCHLORIDE 1 MG/ML
0.25 INJECTION, SOLUTION INTRAMUSCULAR; INTRAVENOUS; SUBCUTANEOUS EVERY 5 MIN PRN
Status: DISCONTINUED | OUTPATIENT
Start: 2020-01-30 | End: 2020-01-30 | Stop reason: HOSPADM

## 2020-01-30 RX ORDER — ROCURONIUM BROMIDE 10 MG/ML
INJECTION, SOLUTION INTRAVENOUS PRN
Status: DISCONTINUED | OUTPATIENT
Start: 2020-01-30 | End: 2020-01-30 | Stop reason: SDUPTHER

## 2020-01-30 RX ORDER — DEXAMETHASONE SODIUM PHOSPHATE 10 MG/ML
INJECTION, SOLUTION INTRAMUSCULAR; INTRAVENOUS PRN
Status: DISCONTINUED | OUTPATIENT
Start: 2020-01-30 | End: 2020-01-30 | Stop reason: SDUPTHER

## 2020-01-30 RX ORDER — HYDROCODONE BITARTRATE AND ACETAMINOPHEN 5; 325 MG/1; MG/1
2 TABLET ORAL PRN
Status: DISCONTINUED | OUTPATIENT
Start: 2020-01-30 | End: 2020-01-30 | Stop reason: HOSPADM

## 2020-01-30 RX ORDER — MORPHINE SULFATE 2 MG/ML
1 INJECTION, SOLUTION INTRAMUSCULAR; INTRAVENOUS EVERY 5 MIN PRN
Status: DISCONTINUED | OUTPATIENT
Start: 2020-01-30 | End: 2020-01-30 | Stop reason: HOSPADM

## 2020-01-30 RX ORDER — HYDROCODONE BITARTRATE AND ACETAMINOPHEN 5; 325 MG/1; MG/1
1 TABLET ORAL PRN
Status: DISCONTINUED | OUTPATIENT
Start: 2020-01-30 | End: 2020-01-30 | Stop reason: HOSPADM

## 2020-01-30 RX ORDER — FENTANYL CITRATE 50 UG/ML
50 INJECTION, SOLUTION INTRAMUSCULAR; INTRAVENOUS EVERY 5 MIN PRN
Status: DISCONTINUED | OUTPATIENT
Start: 2020-01-30 | End: 2020-01-30 | Stop reason: HOSPADM

## 2020-01-30 RX ORDER — HYDRALAZINE HYDROCHLORIDE 20 MG/ML
5 INJECTION INTRAMUSCULAR; INTRAVENOUS EVERY 10 MIN PRN
Status: DISCONTINUED | OUTPATIENT
Start: 2020-01-30 | End: 2020-01-30 | Stop reason: HOSPADM

## 2020-01-30 RX ORDER — DIPHENHYDRAMINE HYDROCHLORIDE 50 MG/ML
12.5 INJECTION INTRAMUSCULAR; INTRAVENOUS
Status: DISCONTINUED | OUTPATIENT
Start: 2020-01-30 | End: 2020-01-30 | Stop reason: HOSPADM

## 2020-01-30 RX ORDER — LABETALOL HYDROCHLORIDE 5 MG/ML
5 INJECTION, SOLUTION INTRAVENOUS EVERY 10 MIN PRN
Status: DISCONTINUED | OUTPATIENT
Start: 2020-01-30 | End: 2020-01-30 | Stop reason: HOSPADM

## 2020-01-30 RX ORDER — HYDROCODONE BITARTRATE AND ACETAMINOPHEN 5; 325 MG/1; MG/1
1 TABLET ORAL EVERY 4 HOURS PRN
Qty: 40 TABLET | Refills: 0 | Status: SHIPPED | OUTPATIENT
Start: 2020-01-30 | End: 2020-02-06

## 2020-01-30 RX ORDER — FENTANYL CITRATE 50 UG/ML
INJECTION, SOLUTION INTRAMUSCULAR; INTRAVENOUS PRN
Status: DISCONTINUED | OUTPATIENT
Start: 2020-01-30 | End: 2020-01-30 | Stop reason: SDUPTHER

## 2020-01-30 RX ORDER — LIDOCAINE HYDROCHLORIDE 20 MG/ML
INJECTION, SOLUTION INTRAVENOUS PRN
Status: DISCONTINUED | OUTPATIENT
Start: 2020-01-30 | End: 2020-01-30 | Stop reason: SDUPTHER

## 2020-01-30 RX ORDER — ONDANSETRON 2 MG/ML
INJECTION INTRAMUSCULAR; INTRAVENOUS PRN
Status: DISCONTINUED | OUTPATIENT
Start: 2020-01-30 | End: 2020-01-30 | Stop reason: SDUPTHER

## 2020-01-30 RX ORDER — EPHEDRINE SULFATE/0.9% NACL/PF 50 MG/5 ML
SYRINGE (ML) INTRAVENOUS PRN
Status: DISCONTINUED | OUTPATIENT
Start: 2020-01-30 | End: 2020-01-30 | Stop reason: SDUPTHER

## 2020-01-30 RX ORDER — MEPERIDINE HYDROCHLORIDE 50 MG/ML
12.5 INJECTION INTRAMUSCULAR; INTRAVENOUS; SUBCUTANEOUS EVERY 5 MIN PRN
Status: DISCONTINUED | OUTPATIENT
Start: 2020-01-30 | End: 2020-01-30 | Stop reason: HOSPADM

## 2020-01-30 RX ORDER — PROPOFOL 10 MG/ML
INJECTION, EMULSION INTRAVENOUS PRN
Status: DISCONTINUED | OUTPATIENT
Start: 2020-01-30 | End: 2020-01-30 | Stop reason: SDUPTHER

## 2020-01-30 RX ORDER — BUPIVACAINE HYDROCHLORIDE AND EPINEPHRINE 2.5; 5 MG/ML; UG/ML
INJECTION, SOLUTION EPIDURAL; INFILTRATION; INTRACAUDAL; PERINEURAL PRN
Status: DISCONTINUED | OUTPATIENT
Start: 2020-01-30 | End: 2020-01-30 | Stop reason: ALTCHOICE

## 2020-01-30 RX ORDER — OXYCODONE HYDROCHLORIDE AND ACETAMINOPHEN 5; 325 MG/1; MG/1
1 TABLET ORAL EVERY 4 HOURS PRN
Status: DISCONTINUED | OUTPATIENT
Start: 2020-01-30 | End: 2020-01-30 | Stop reason: HOSPADM

## 2020-01-30 RX ADMIN — GLYCOPYRROLATE 0.6 MG: 0.2 INJECTION INTRAMUSCULAR; INTRAVENOUS at 08:35

## 2020-01-30 RX ADMIN — Medication 40 MG: at 07:36

## 2020-01-30 RX ADMIN — Medication 3 MG: at 08:35

## 2020-01-30 RX ADMIN — DEXAMETHASONE SODIUM PHOSPHATE 10 MG: 10 INJECTION, SOLUTION INTRAMUSCULAR; INTRAVENOUS at 07:57

## 2020-01-30 RX ADMIN — FENTANYL CITRATE 50 MCG: 50 INJECTION, SOLUTION INTRAMUSCULAR; INTRAVENOUS at 08:27

## 2020-01-30 RX ADMIN — FENTANYL CITRATE 50 MCG: 50 INJECTION, SOLUTION INTRAMUSCULAR; INTRAVENOUS at 07:33

## 2020-01-30 RX ADMIN — Medication 10 MG: at 07:47

## 2020-01-30 RX ADMIN — CEFAZOLIN 1 G: 1 INJECTION, POWDER, FOR SOLUTION INTRAMUSCULAR; INTRAVENOUS at 07:22

## 2020-01-30 RX ADMIN — SODIUM CHLORIDE, POTASSIUM CHLORIDE, SODIUM LACTATE AND CALCIUM CHLORIDE: 600; 310; 30; 20 INJECTION, SOLUTION INTRAVENOUS at 07:13

## 2020-01-30 RX ADMIN — Medication 10 MG: at 08:07

## 2020-01-30 RX ADMIN — PROPOFOL 180 MG: 10 INJECTION, EMULSION INTRAVENOUS at 07:35

## 2020-01-30 RX ADMIN — FENTANYL CITRATE 50 MCG: 50 INJECTION, SOLUTION INTRAMUSCULAR; INTRAVENOUS at 07:23

## 2020-01-30 RX ADMIN — ONDANSETRON HYDROCHLORIDE 4 MG: 2 INJECTION, SOLUTION INTRAMUSCULAR; INTRAVENOUS at 07:57

## 2020-01-30 RX ADMIN — LIDOCAINE HYDROCHLORIDE 50 MG: 20 INJECTION, SOLUTION INTRAVENOUS at 07:36

## 2020-01-30 ASSESSMENT — PULMONARY FUNCTION TESTS
PIF_VALUE: 20
PIF_VALUE: 20
PIF_VALUE: 19
PIF_VALUE: 19
PIF_VALUE: 0
PIF_VALUE: 1
PIF_VALUE: 2
PIF_VALUE: 2
PIF_VALUE: 20
PIF_VALUE: 21
PIF_VALUE: 20
PIF_VALUE: 20
PIF_VALUE: 19
PIF_VALUE: 20
PIF_VALUE: 2
PIF_VALUE: 20
PIF_VALUE: 19
PIF_VALUE: 20
PIF_VALUE: 21
PIF_VALUE: 18
PIF_VALUE: 20
PIF_VALUE: 20
PIF_VALUE: 19
PIF_VALUE: 20
PIF_VALUE: 20
PIF_VALUE: 0
PIF_VALUE: 20
PIF_VALUE: 4
PIF_VALUE: 20
PIF_VALUE: 20
PIF_VALUE: 21
PIF_VALUE: 20
PIF_VALUE: 19
PIF_VALUE: 19
PIF_VALUE: 0
PIF_VALUE: 20
PIF_VALUE: 0
PIF_VALUE: 5
PIF_VALUE: 20
PIF_VALUE: 0
PIF_VALUE: 20
PIF_VALUE: 17
PIF_VALUE: 20
PIF_VALUE: 0
PIF_VALUE: 20
PIF_VALUE: 19
PIF_VALUE: 0
PIF_VALUE: 0
PIF_VALUE: 19
PIF_VALUE: 13
PIF_VALUE: 21
PIF_VALUE: 0
PIF_VALUE: 19
PIF_VALUE: 20
PIF_VALUE: 0
PIF_VALUE: 21
PIF_VALUE: 17
PIF_VALUE: 18
PIF_VALUE: 19
PIF_VALUE: 2
PIF_VALUE: 6
PIF_VALUE: 20
PIF_VALUE: 20
PIF_VALUE: 19
PIF_VALUE: 20
PIF_VALUE: 19
PIF_VALUE: 1
PIF_VALUE: 19
PIF_VALUE: 2
PIF_VALUE: 0
PIF_VALUE: 20
PIF_VALUE: 37
PIF_VALUE: 20
PIF_VALUE: 19
PIF_VALUE: 18

## 2020-01-30 ASSESSMENT — PAIN SCALES - GENERAL
PAINLEVEL_OUTOF10: 0

## 2020-01-30 ASSESSMENT — LIFESTYLE VARIABLES: SMOKING_STATUS: 0

## 2020-01-30 ASSESSMENT — PAIN DESCRIPTION - DESCRIPTORS: DESCRIPTORS: ACHING;DISCOMFORT

## 2020-01-30 ASSESSMENT — PAIN - FUNCTIONAL ASSESSMENT: PAIN_FUNCTIONAL_ASSESSMENT: 0-10

## 2020-01-30 NOTE — ANESTHESIA POSTPROCEDURE EVALUATION
Department of Anesthesiology  Postprocedure Note    Patient: Reymundo Tanner  MRN: [id number]  YOB: 1933  Date of evaluation: 1/30/2020  Time:  12:29 PM     Procedure Summary     Date:  01/30/20 Room / Location:  [address] [place] [address] 01 / [address] [facility]    Anesthesia Start:  0649 Anesthesia Stop:  0848    Procedure:  ARTHROSCOPIC EXAM AND TREATMENT RIGHT SHOULDER (CPT 75309,63111) RIGHT ROTATOR CUFF REPAIR, SUBACHROMIAL DECOMPRESSION, DEBRIDEMENT OF HUMERAL (Right ) Diagnosis:  (BURSITIS/ IMPINGEMENT RIGHT SHOULDER)    Surgeon:  Dominick Turner DO Responsible Provider:  Zheng Abdul MD    Anesthesia Type:  general, regional ASA Status:  2          Anesthesia Type: general, regional    Tashi Phase I: Tashi Score: 10    Tashi Phase II: Tashi Score: 10    Last vitals: Reviewed and per EMR flowsheets. Anesthesia Post Evaluation    Patient location during evaluation: PACU  Patient participation: complete - patient participated  Level of consciousness: awake and alert  Airway patency: patent  Nausea & Vomiting: no nausea and no vomiting  Complications: no  Cardiovascular status: hemodynamically stable  Respiratory status: room air and spontaneous ventilation  Comments: Good analgesia  and had temporary episode of dizziness in PACU.  Post block precautions given to pt and wife

## 2020-01-30 NOTE — OP NOTE
Operative report        DATE OF PROCEDURE: 1/30/2020     SURGEON: Faisal Galindo    ASSISTANT: None    PREOPERATIVE DIAGNOSIS: Impingement and bursitis right shoulder    POSTOPERATIVE DIAGNOSIS: Same with partial thickness rotator cuff tear, degenerative labral tearing, partial tearing long head biceps tendon    OPERATION: Arthroscopic examination debridement labral tear. Debridement of long head biceps tears, subacromial bursectomy, anterior acromioplasty, rotator cuff repair    ANESTHESIA: General  interscalene block    ESTIMATED BLOOD LOSS: Less than 50 cc    COMPLICATIONS: None    SPECIMENS: Was sent to pathology    HISTORY: The patient is a 80y.o. year old male with history of above preop diagnosis. I explained the risk, benefits, expected outcome, and alternatives to the procedure. Patient understands and is in agreement. PROCEDURE: Patient was brought to the operating room after site side were notified. Adequate general anesthetic was given and an interscalene block was done by anesthesia. Patient was supine. He was then turned to his left lateral cubitus position on the Olymp backpack with careful axillary padding. Right upper extremity was placed in 10 pounds skin traction 45 degrees abduction 20 degrees forward flexion. The shoulder was prepped and draped in sterile fashion. Bony 90 wounds outlined with a skin marker. Posterior lateral portal areas were injected with local anesthetic with epinephrine. The posterior portal was used to place the scope in the glenohumeral joint. There is tearing in the glenoid labrum which appeared degenerative in nature. There is also some undersurface fraying of the long head biceps tendon which was less than 50% breadth in appearance. The subscapularis tendon looked good. There was softening and thinning of the anterior portion of the supraspinatus tendon. The glenohumeral articular surfaces look good and there were no free bodies.     An anterior portal was made after injection of this area. Jerie Ian was introduced and the labrum was debrided as was the long head biceps tendon to get down to more stable fibers. The undersurface of the rotator cuff was debrided slightly as well. A spinal needle was placed through the lateral portal area and through the area of weakening of the cuff and it was extremely thin with very little resistance to the needle prompting more aggressive treatment. The lateral portal was made and the soft area in the cuff was exploited to place a portal here. The shaver was then placed in the joint and the long head biceps was further debrided on the underside and the cuff tear was debrided and it was found to be extremely thin and friable. The footprint was cleaned up in this area as well. The scope was then placed subacromial.  The shaver was used to perform a bursectomy and the undersurface of the acromion was cleaned up as well. A tapered punch hole was made lateral to the tuberosity on the side of the humerus to create a good interference angle. A horizontal Orthocor mattress suture was then placed in the cuff tendon and it was pulled out lateral and the suture tacks threaded through the Versalok anchor which was then placed in the bursa area and then placed in the prepared bone hole. The cuff was pulled over to cover the footprint and the anchor was seated and then deployed with an excellent stable repair noted. Anterior acromioplasty was then performed raising up and flattening the undersurface to eliminate the impingement. The area was then thoroughly irrigated. No further pathology was identified. Excess fluid was expressed and instruments were withdrawn. The wounds were closed with Steri-Strips. Xeroform gauze was placed over the wound. A bulky dressing was then applied and the arm was placed in a sling and swath. Patient's anesthetic was reversed he was taken recovery in stable condition.     GROSS PATHOLOGY: Labral degenerative tearing right shoulder. Long head biceps tendon partial tearing. Partial-thickness tear anterior portion supraspinatus tendon. Subacromial bursal hypertrophy and hyperemia. Anterior inferior acromial prominence. COMPONENTS USED : Versalok anchor x1. Orthocor horizontal mattress suture x1.     Electronically signed by Belinda Mckeon DO on 1/30/20 at 9:12 AM

## 2020-01-30 NOTE — ANESTHESIA PROCEDURE NOTES
Peripheral Block    Patient location during procedure: OR  Start time: 1/30/2020 7:25 AM  End time: 1/30/2020 7:33 AM  Staffing  Anesthesiologist: Jm Norman MD  Performed: anesthesiologist   Preanesthetic Checklist  Completed: patient identified, site marked, surgical consent, pre-op evaluation, timeout performed, IV checked, risks and benefits discussed, monitors and equipment checked, anesthesia consent given, oxygen available and patient being monitored  Peripheral Block  Patient position: supine  Prep: ChloraPrep  Patient monitoring: cardiac monitor, continuous pulse ox, continuous capnometry, frequent blood pressure checks and IV access  Block type: Brachial plexus  Laterality: right  Injection technique: single-shot  Procedures: nerve stimulator  Local infiltration: lidocaine  Infiltration strength: 2 %  Dose: 1 mL  Interscalene  Provider prep: mask and sterile gloves  Local infiltration: lidocaine  Needle  Needle type: combined needle/nerve stimulator   Needle gauge: 22 G  Needle length: 5 cm  Needle localization: nerve stimulator  Needle insertion depth: 1.5 cm  Test dose: negative  Assessment  Injection assessment: negative aspiration for heme and no paresthesia on injection  Paresthesia pain: none  Slow fractionated injection: yes  Hemodynamics: stable  Additional Notes  Ropivacaine 20cc 0.5%  Reason for block: post-op pain management

## 2020-01-30 NOTE — H&P
History and Physical      CHIEF COMPLAINT: Older pain weakness  HISTORY OF PRESENT ILLNESS:        Past Medical History:        Diagnosis Date    Diabetes mellitus ([address]     Hypertension     Thyroid disease      Past Surgical History:        Procedure Laterality Date    BACK SURGERY  2000    fusion  lumbar area,      JOINT REPLACEMENT Bilateral [id number]    knee     LITHOTRIPSY      TONSILLECTOMY       Social History:    TOBACCO:   reports that he has never smoked. He has never used smokeless tobacco.  ETOH:   reports no history of alcohol use. DRUGS:   reports no history of drug use. Family History:   History reviewed. No pertinent family history. Medications Prior to Admission:  Medications Prior to Admission: aspirin 325 MG tablet, Take 325 mg by mouth daily  levothyroxine (SYNTHROID) 75 MCG tablet, TAKE ONE TABLET BY MOUTH EVERY DAY  metFORMIN (GLUCOPHAGE) 500 MG tablet, TAKE ONE TABLET BY MOUTH EVERY DAY  finasteride (PROSCAR) 5 MG tablet, Take 5 mg by mouth once a week. pravastatin (PRAVACHOL) 20 MG tablet, Take 20 mg by mouth daily. potassium citrate (UROCIT-K) 5 MEQ (540 MG) SR tablet, Take 5 mEq by mouth daily   therapeutic multivitamin-minerals (THERAGRAN-M) tablet, Take 1 tablet by mouth daily. Allergies:  Patient has no known allergies.     CONSTITUTIONAL:  negative for  chills and anorexia  HEENT:  negative for  tinnitus  RESPIRATORY:  negative for  dyspnea and cyanosis  CARDIOVASCULAR:  negative for  palpitations, syncope  GASTROINTESTINAL:  negative for vomiting and hematemesis  GENITOURINARY:  negative for hematuria  ENDOCRINE:  negative for tremor  MUSCULOSKELETAL:  positive for  decreased range of motion and muscle weakness  NEUROLOGICAL:  negative for seizures and syncope  BEHAVIOR/PSYCH:  negative for agitated and anxiety    PHYSICAL EXAM:  BP (!) 142/64   Pulse 76   Temp 98 °F (36.7 °C)   Resp 16   Ht 5' 6\" (1.676 m)   Wt 160 lb (72.6 kg)   SpO2 95%   BMI 25.82 kg/m² General appearance:  awake, alert, cooperative, no apparent distress, and appears stated age  Neurologic:  Awake, alert, oriented to name, place and time. Cranial nerves II-XII are grossly intact. Motor is 5 out of 5 bilaterally. Cerebellar finger to nose, heel to shin intact. Sensory is intact.   Babinski down going, Romberg negative, and gait is normal.  Lungs:  No increased work of breathing, good air exchange, clear to auscultation bilaterally, no crackles or wheezing  Heart:  Normal apical impulse, regular rate and rhythm, normal S1 and S2, no S3 or S4, and no murmur noted  Abdomen:  normal bowel sounds  Skin: warm and dry, no rash or erythema  ENT: tympanic membrane, external ear and ear canal normal bilaterally, oropharynx clear and moist with normal mucous membranes  Musculoskeletal: Limited range of motion strength right shoulder    General Labs:  CBC:   Lab Results   Component Value Date    WBC 7.3 01/17/2020    RBC 3.86 01/17/2020    HGB 12.7 01/17/2020    HCT 38.2 01/17/2020    MCV 99.0 01/17/2020    RDW 13.2 01/17/2020     01/17/2020     CMP:    Lab Results   Component Value Date     01/17/2020    K 4.9 01/17/2020     01/17/2020    CO2 27 01/17/2020    BUN 20 01/17/2020    PROT 7.6 01/17/2020     U/A:  No components found for: [place], USPGRAV, UP, Marcela Carbon, UKETONE, UBILI, UBLOOD, Jace, [facility], [place], [facility], [place], [facility], [place], [facility], [place]    Radiology: Impingement    ASSESSMENT AND PLAN:    Arthroscopic exam and treatment      Electronically signed by Tricia Ware DO on 1/30/2020 at 7:07 AM

## 2020-02-10 ENCOUNTER — OFFICE VISIT (OUTPATIENT)
Dept: ORTHOPEDIC SURGERY | Age: 85
End: 2020-02-10

## 2020-02-10 VITALS — WEIGHT: 155 LBS | BODY MASS INDEX: 24.91 KG/M2 | HEIGHT: 66 IN

## 2020-02-10 PROCEDURE — 99024 POSTOP FOLLOW-UP VISIT: CPT | Performed by: ORTHOPAEDIC SURGERY

## 2020-02-10 NOTE — PROGRESS NOTES
multivitamin-minerals (THERAGRAN-M) tablet Take 1 tablet by mouth daily. No current facility-administered medications for this visit.         Patient Active Problem List   Diagnosis    Incomplete tear of right rotator cuff    Long biceps tear    Injury of tendon of long head of right biceps    Tear of right glenoid labrum    Bursitis of right shoulder       Past Medical History:   Diagnosis Date    Diabetes mellitus ([address]     Hypertension     Thyroid disease        Past Surgical History:   Procedure Laterality Date    BACK SURGERY  2000    fusion  lumbar area,      JOINT REPLACEMENT Bilateral [id number]    knee     LITHOTRIPSY      SHOULDER ARTHROSCOPY Right 01/30/2020    with treatment    SHOULDER ARTHROSCOPY Right 1/30/2020    ARTHROSCOPIC EXAM AND TREATMENT RIGHT SHOULDER (CPT 86516,29177) RIGHT ROTATOR CUFF REPAIR, SUBACHROMIAL DECOMPRESSION, DEBRIDEMENT OF HUMERAL performed by Jayda Silva DO at [facility]         No Known Allergies    Social History     Socioeconomic History    Marital status:      Spouse name: None    Number of children: None    Years of education: None    Highest education level: None   Occupational History    None   Social Needs    Financial resource strain: None    Food insecurity:     Worry: None     Inability: None    Transportation needs:     Medical: None     Non-medical: None   Tobacco Use    Smoking status: Never Smoker    Smokeless tobacco: Never Used   Substance and Sexual Activity    Alcohol use: No    Drug use: No    Sexual activity: None   Lifestyle    Physical activity:     Days per week: None     Minutes per session: None    Stress: None   Relationships    Social connections:     Talks on phone: None     Gets together: None     Attends [employer] service: None     Active member of club or organization: None     Attends meetings of clubs or organizations: None     Relationship status: None    Intimate

## 2020-03-02 ENCOUNTER — OFFICE VISIT (OUTPATIENT)
Dept: ORTHOPEDIC SURGERY | Age: 85
End: 2020-03-02

## 2020-03-02 VITALS — HEIGHT: 66 IN | BODY MASS INDEX: 25.23 KG/M2 | WEIGHT: 157 LBS

## 2020-03-02 PROCEDURE — 99024 POSTOP FOLLOW-UP VISIT: CPT | Performed by: ORTHOPAEDIC SURGERY

## 2020-03-02 NOTE — PROGRESS NOTES
for this visit.         Patient Active Problem List   Diagnosis    Incomplete tear of right rotator cuff    Long biceps tear    Injury of tendon of long head of right biceps    Tear of right glenoid labrum    Bursitis of right shoulder       Past Medical History:   Diagnosis Date    Diabetes mellitus ([address]     Hypertension     Thyroid disease        Past Surgical History:   Procedure Laterality Date    BACK SURGERY  2000    fusion  lumbar area,      JOINT REPLACEMENT Bilateral [id number]    knee     LITHOTRIPSY      SHOULDER ARTHROSCOPY Right 01/30/2020    with treatment    SHOULDER ARTHROSCOPY Right 1/30/2020    ARTHROSCOPIC EXAM AND TREATMENT RIGHT SHOULDER (CPT 09451,61728) RIGHT ROTATOR CUFF REPAIR, SUBACHROMIAL DECOMPRESSION, DEBRIDEMENT OF HUMERAL performed by Jayda Silva DO at [facility]         No Known Allergies    Social History     Socioeconomic History    Marital status:      Spouse name: None    Number of children: None    Years of education: None    Highest education level: None   Occupational History    None   Social Needs    Financial resource strain: None    Food insecurity:     Worry: None     Inability: None    Transportation needs:     Medical: None     Non-medical: None   Tobacco Use    Smoking status: Never Smoker    Smokeless tobacco: Never Used   Substance and Sexual Activity    Alcohol use: No    Drug use: No    Sexual activity: None   Lifestyle    Physical activity:     Days per week: None     Minutes per session: None    Stress: None   Relationships    Social connections:     Talks on phone: None     Gets together: None     Attends [employer]al service: None     Active member of club or organization: None     Attends meetings of clubs or organizations: None     Relationship status: None    Intimate partner violence:     Fear of current or ex partner: None     Emotionally abused: None     Physically abused: None     Forced sexual

## 2020-05-01 ENCOUNTER — TELEPHONE (OUTPATIENT)
Dept: ORTHOPEDIC SURGERY | Age: 85
End: 2020-05-01

## 2020-05-15 ENCOUNTER — HOSPITAL ENCOUNTER (OUTPATIENT)
Age: 85
Discharge: HOME | End: 2020-05-17
Payer: MEDICARE

## 2020-05-15 LAB
ANION GAP SERPL CALCULATED.3IONS-SCNC: 16 MMOL/L (ref 7–16)
BUN BLDV-MCNC: 23 MG/DL (ref 8–23)
CALCIUM SERPL-MCNC: 9.7 MG/DL (ref 8.6–10.2)
CHLORIDE BLD-SCNC: 104 MMOL/L (ref 98–107)
CO2: 21 MMOL/L (ref 22–29)
CREAT SERPL-MCNC: 1.4 MG/DL (ref 0.7–1.2)
GFR AFRICAN AMERICAN: 58
GFR NON-AFRICAN AMERICAN: 48 ML/MIN/1.73
GLUCOSE BLD-MCNC: 151 MG/DL (ref 74–99)
HBA1C MFR BLD: 7.2 % (ref 4–5.6)
PARATHYROID HORMONE INTACT: 32 PG/ML (ref 15–65)
POTASSIUM SERPL-SCNC: 4.6 MMOL/L (ref 3.5–5)
SODIUM BLD-SCNC: 141 MMOL/L (ref 132–146)

## 2020-05-15 PROCEDURE — 80048 BASIC METABOLIC PNL TOTAL CA: CPT

## 2020-05-15 PROCEDURE — 83970 ASSAY OF PARATHORMONE: CPT

## 2020-05-15 PROCEDURE — 83036 HEMOGLOBIN GLYCOSYLATED A1C: CPT

## 2020-05-29 ENCOUNTER — OFFICE VISIT (OUTPATIENT)
Dept: ORTHOPEDIC SURGERY | Age: 85
End: 2020-05-29
Payer: MEDICARE

## 2020-05-29 VITALS — BODY MASS INDEX: 25.39 KG/M2 | HEIGHT: 66 IN | WEIGHT: 158 LBS | TEMPERATURE: 98 F

## 2020-05-29 PROCEDURE — 99213 OFFICE O/P EST LOW 20 MIN: CPT | Performed by: ORTHOPAEDIC SURGERY

## 2020-05-29 NOTE — PROGRESS NOTES
Chief Complaint:   Chief Complaint   Patient presents with    Shoulder Pain     f/u right shoulder. patient states hes doing well       Patel Davalos is 4 months postop arthroscopic right shoulder surgery including rotator cuff repair. He is doing very well. He has been doing a lot of exercises at home and also goes to a maintenance exercise facility [place]. He has good range of motion in the shoulder and no pain at this time. Allergies; medications; past medical, surgical, family, and social history; and problem list have been reviewed today and updated as indicated in this encounter seen below. Exam: Active range of motion is excellent in the right shoulder. His glenohumeral and AC joints are stable. Elbow hand and wrist function are good as well. There is no pain on palpation. Passive range of motion is excellent with no pain no apprehension. Radiographs: None    ASSESSMENT:    Ester Keyes was seen today for shoulder pain. Diagnoses and all orders for this visit:    Nontraumatic tear of right rotator cuff, unspecified tear extent    Impingement syndrome of right shoulder    Bursitis of right shoulder        PLAN: Continue with home exercise. He also has been swinging a golf club a little bit which is fine to progress. Will follow as needed. Return if symptoms worsen or fail to improve. Current Outpatient Medications   Medication Sig Dispense Refill    aspirin 325 MG tablet Take 325 mg by mouth daily      levothyroxine (SYNTHROID) 75 MCG tablet TAKE ONE TABLET BY MOUTH EVERY DAY  0    metFORMIN (GLUCOPHAGE) 500 MG tablet TAKE ONE TABLET BY MOUTH EVERY DAY  3    finasteride (PROSCAR) 5 MG tablet Take 5 mg by mouth once a week.  pravastatin (PRAVACHOL) 20 MG tablet Take 20 mg by mouth daily.  potassium citrate (UROCIT-K) 5 MEQ (540 MG) SR tablet Take 5 mEq by mouth daily       therapeutic multivitamin-minerals (THERAGRAN-M) tablet Take 1 tablet by mouth daily.

## 2020-08-13 ENCOUNTER — HOSPITAL ENCOUNTER (OUTPATIENT)
Age: 85
Discharge: HOME | End: 2020-08-15
Payer: MEDICARE

## 2020-08-13 LAB
ALBUMIN SERPL-MCNC: 4.1 G/DL (ref 3.5–5.2)
ALP BLD-CCNC: 39 U/L (ref 40–129)
ALT SERPL-CCNC: 18 U/L (ref 0–40)
ANION GAP SERPL CALCULATED.3IONS-SCNC: 13 MMOL/L (ref 7–16)
AST SERPL-CCNC: 24 U/L (ref 0–39)
BILIRUB SERPL-MCNC: 0.5 MG/DL (ref 0–1.2)
BUN BLDV-MCNC: 27 MG/DL (ref 8–23)
CALCIUM SERPL-MCNC: 9.6 MG/DL (ref 8.6–10.2)
CHLORIDE BLD-SCNC: 102 MMOL/L (ref 98–107)
CHOLESTEROL, TOTAL: 141 MG/DL (ref 0–199)
CO2: 24 MMOL/L (ref 22–29)
CREAT SERPL-MCNC: 1.4 MG/DL (ref 0.7–1.2)
GFR AFRICAN AMERICAN: 58
GFR NON-AFRICAN AMERICAN: 48 ML/MIN/1.73
GLUCOSE BLD-MCNC: 140 MG/DL (ref 74–99)
HBA1C MFR BLD: 7.3 % (ref 4–5.6)
HCT VFR BLD CALC: 38.2 % (ref 37–54)
HDLC SERPL-MCNC: 44 MG/DL
HEMOGLOBIN: 12.5 G/DL (ref 12.5–16.5)
LDL CHOLESTEROL CALCULATED: 82 MG/DL (ref 0–99)
MCH RBC QN AUTO: 32.7 PG (ref 26–35)
MCHC RBC AUTO-ENTMCNC: 32.7 % (ref 32–34.5)
MCV RBC AUTO: 100 FL (ref 80–99.9)
PDW BLD-RTO: 13.4 FL (ref 11.5–15)
PHOSPHORUS: 3.2 MG/DL (ref 2.5–4.5)
PLATELET # BLD: 229 E9/L (ref 130–450)
PMV BLD AUTO: 10.9 FL (ref 7–12)
POTASSIUM SERPL-SCNC: 4.5 MMOL/L (ref 3.5–5)
RBC # BLD: 3.82 E12/L (ref 3.8–5.8)
SODIUM BLD-SCNC: 139 MMOL/L (ref 132–146)
TOTAL PROTEIN: 7.4 G/DL (ref 6.4–8.3)
TRIGL SERPL-MCNC: 75 MG/DL (ref 0–149)
TSH SERPL DL<=0.05 MIU/L-ACNC: 1.5 UIU/ML (ref 0.27–4.2)
VLDLC SERPL CALC-MCNC: 15 MG/DL
WBC # BLD: 6.9 E9/L (ref 4.5–11.5)

## 2020-08-13 PROCEDURE — 84443 ASSAY THYROID STIM HORMONE: CPT

## 2020-08-13 PROCEDURE — 83036 HEMOGLOBIN GLYCOSYLATED A1C: CPT

## 2020-08-13 PROCEDURE — 36415 COLL VENOUS BLD VENIPUNCTURE: CPT

## 2020-08-13 PROCEDURE — 80053 COMPREHEN METABOLIC PANEL: CPT

## 2020-08-13 PROCEDURE — 80061 LIPID PANEL: CPT

## 2020-08-13 PROCEDURE — 85027 COMPLETE CBC AUTOMATED: CPT

## 2020-08-13 PROCEDURE — 84100 ASSAY OF PHOSPHORUS: CPT

## 2021-01-01 ENCOUNTER — HOSPITAL ENCOUNTER (OUTPATIENT)
Age: 86
Discharge: HOME | End: 2021-11-26
Payer: MEDICARE

## 2021-01-01 ENCOUNTER — HOSPITAL ENCOUNTER (OUTPATIENT)
Dept: NUCLEAR MEDICINE | Age: 86
Discharge: HOME | End: 2021-12-01
Payer: MEDICARE

## 2021-01-01 ENCOUNTER — HOSPITAL ENCOUNTER (OUTPATIENT)
Dept: CT IMAGING | Age: 86
Discharge: HOME | End: 2021-11-26
Payer: MEDICARE

## 2021-01-01 ENCOUNTER — HOSPITAL ENCOUNTER (OUTPATIENT)
Dept: NON INVASIVE DIAGNOSTICS | Age: 86
Discharge: HOME | End: 2021-12-01
Payer: MEDICARE

## 2021-01-01 ENCOUNTER — TELEPHONE (OUTPATIENT)
Dept: INTERNAL MEDICINE CLINIC | Age: 86
End: 2021-01-01

## 2021-01-01 DIAGNOSIS — D53.9 MACROCYTIC ANEMIA: ICD-10-CM

## 2021-01-01 DIAGNOSIS — D53.9 MACROCYTIC ANEMIA: Primary | ICD-10-CM

## 2021-01-01 DIAGNOSIS — D64.9 NORMOCYTIC ANEMIA: ICD-10-CM

## 2021-01-01 DIAGNOSIS — R93.89 ABNORMAL CXR: ICD-10-CM

## 2021-01-01 DIAGNOSIS — E11.9 TYPE 2 DIABETES MELLITUS WITHOUT COMPLICATION, WITHOUT LONG-TERM CURRENT USE OF INSULIN (HCC): ICD-10-CM

## 2021-01-01 DIAGNOSIS — R07.9 CHEST PAIN, UNSPECIFIED TYPE: ICD-10-CM

## 2021-01-01 DIAGNOSIS — R06.02 SOB (SHORTNESS OF BREATH): ICD-10-CM

## 2021-01-01 DIAGNOSIS — I10 ESSENTIAL HYPERTENSION: ICD-10-CM

## 2021-01-01 DIAGNOSIS — E03.9 ACQUIRED HYPOTHYROIDISM: ICD-10-CM

## 2021-01-01 DIAGNOSIS — E78.5 DYSLIPIDEMIA: ICD-10-CM

## 2021-01-01 LAB
ALBUMIN SERPL-MCNC: 4.1 G/DL (ref 3.5–5.2)
ALP BLD-CCNC: 40 U/L (ref 40–129)
ALT SERPL-CCNC: 17 U/L (ref 0–40)
ANION GAP SERPL CALCULATED.3IONS-SCNC: 13 MMOL/L (ref 7–16)
AST SERPL-CCNC: 28 U/L (ref 0–39)
BASOPHILS ABSOLUTE: 0 E9/L (ref 0–0.2)
BASOPHILS ABSOLUTE: 0.01 E9/L (ref 0–0.2)
BASOPHILS ABSOLUTE: 0.02 E9/L (ref 0–0.2)
BASOPHILS RELATIVE PERCENT: 0.3 % (ref 0–2)
BASOPHILS RELATIVE PERCENT: 0.3 % (ref 0–2)
BASOPHILS RELATIVE PERCENT: 0.6 % (ref 0–2)
BILIRUB SERPL-MCNC: 0.4 MG/DL (ref 0–1.2)
BUN BLDV-MCNC: 31 MG/DL (ref 6–23)
CALCIUM SERPL-MCNC: 9.6 MG/DL (ref 8.6–10.2)
CHLORIDE BLD-SCNC: 101 MMOL/L (ref 98–107)
CHOLESTEROL, TOTAL: 126 MG/DL (ref 0–199)
CO2: 23 MMOL/L (ref 22–29)
CREAT SERPL-MCNC: 1.5 MG/DL (ref 0.7–1.2)
EOSINOPHILS ABSOLUTE: 0.06 E9/L (ref 0.05–0.5)
EOSINOPHILS ABSOLUTE: 0.06 E9/L (ref 0.05–0.5)
EOSINOPHILS ABSOLUTE: 0.08 E9/L (ref 0.05–0.5)
EOSINOPHILS RELATIVE PERCENT: 1.7 % (ref 0–6)
EOSINOPHILS RELATIVE PERCENT: 1.9 % (ref 0–6)
EOSINOPHILS RELATIVE PERCENT: 2.6 % (ref 0–6)
FERRITIN: 412 NG/ML
FOLATE: >20 NG/ML (ref 4.8–24.2)
GFR AFRICAN AMERICAN: 53
GFR NON-AFRICAN AMERICAN: 44 ML/MIN/1.73
GLUCOSE BLD-MCNC: 182 MG/DL (ref 74–99)
HBA1C MFR BLD: 7.1 % (ref 4–5.6)
HCT VFR BLD CALC: 32.5 % (ref 37–54)
HCT VFR BLD CALC: 32.7 % (ref 37–54)
HCT VFR BLD CALC: 33.3 % (ref 37–54)
HDLC SERPL-MCNC: 39 MG/DL
HEMOGLOBIN: 11 G/DL (ref 12.5–16.5)
HEMOGLOBIN: 11 G/DL (ref 12.5–16.5)
HEMOGLOBIN: 11.3 G/DL (ref 12.5–16.5)
IMMATURE GRANULOCYTES #: 0.01 E9/L
IMMATURE GRANULOCYTES #: 0.01 E9/L
IMMATURE GRANULOCYTES %: 0.3 % (ref 0–5)
IMMATURE GRANULOCYTES %: 0.3 % (ref 0–5)
IMMATURE RETIC FRACT: 15.2 % (ref 2.3–13.4)
IRON SATURATION: 46 % (ref 20–55)
IRON: 121 MCG/DL (ref 59–158)
LDL CHOLESTEROL CALCULATED: 72 MG/DL (ref 0–99)
LV EF: 65 %
LVEF MODALITY: NORMAL
LYMPHOCYTES ABSOLUTE: 1.45 E9/L (ref 1.5–4)
LYMPHOCYTES ABSOLUTE: 1.54 E9/L (ref 1.5–4)
LYMPHOCYTES ABSOLUTE: 1.6 E9/L (ref 1.5–4)
LYMPHOCYTES RELATIVE PERCENT: 40.8 % (ref 20–42)
LYMPHOCYTES RELATIVE PERCENT: 47.7 % (ref 20–42)
LYMPHOCYTES RELATIVE PERCENT: 49.6 % (ref 20–42)
MCH RBC QN AUTO: 35.5 PG (ref 26–35)
MCH RBC QN AUTO: 36.1 PG (ref 26–35)
MCH RBC QN AUTO: 36.6 PG (ref 26–35)
MCHC RBC AUTO-ENTMCNC: 33.6 % (ref 32–34.5)
MCHC RBC AUTO-ENTMCNC: 33.8 % (ref 32–34.5)
MCHC RBC AUTO-ENTMCNC: 33.9 % (ref 32–34.5)
MCV RBC AUTO: 105.5 FL (ref 80–99.9)
MCV RBC AUTO: 106.6 FL (ref 80–99.9)
MCV RBC AUTO: 107.8 FL (ref 80–99.9)
MONOCYTES ABSOLUTE: 0.03 E9/L (ref 0.1–0.95)
MONOCYTES ABSOLUTE: 0.21 E9/L (ref 0.1–0.95)
MONOCYTES ABSOLUTE: 0.22 E9/L (ref 0.1–0.95)
MONOCYTES RELATIVE PERCENT: 0.9 % (ref 2–12)
MONOCYTES RELATIVE PERCENT: 5.9 % (ref 2–12)
MONOCYTES RELATIVE PERCENT: 6.8 % (ref 2–12)
NEUTROPHILS ABSOLUTE: 1.39 E9/L (ref 1.8–7.3)
NEUTROPHILS ABSOLUTE: 1.5 E9/L (ref 1.8–7.3)
NEUTROPHILS ABSOLUTE: 1.8 E9/L (ref 1.8–7.3)
NEUTROPHILS RELATIVE PERCENT: 43 % (ref 43–80)
NEUTROPHILS RELATIVE PERCENT: 47 % (ref 43–80)
NEUTROPHILS RELATIVE PERCENT: 50.7 % (ref 43–80)
PATHOLOGIST REVIEW: NORMAL
PDW BLD-RTO: 13.3 FL (ref 11.5–15)
PDW BLD-RTO: 13.7 FL (ref 11.5–15)
PDW BLD-RTO: 13.8 FL (ref 11.5–15)
PLATELET # BLD: 171 E9/L (ref 130–450)
PLATELET # BLD: 179 E9/L (ref 130–450)
PLATELET # BLD: 192 E9/L (ref 130–450)
PMV BLD AUTO: 10.4 FL (ref 7–12)
PMV BLD AUTO: 10.4 FL (ref 7–12)
PMV BLD AUTO: 9.8 FL (ref 7–12)
POTASSIUM SERPL-SCNC: 4.6 MMOL/L (ref 3.5–5)
RBC # BLD: 3.05 E12/L (ref 3.8–5.8)
RBC # BLD: 3.09 E12/L (ref 3.8–5.8)
RBC # BLD: 3.1 E12/L (ref 3.8–5.8)
RETIC HGB EQUIVALENT: 41.7 PG (ref 28.2–36.6)
RETICULOCYTE ABSOLUTE COUNT: 0.05 E12/L
RETICULOCYTE COUNT PCT: 1.5 % (ref 0.4–1.9)
SARS-COV-2 ANTIBODY, TOTAL: NORMAL
SODIUM BLD-SCNC: 137 MMOL/L (ref 132–146)
T4 FREE: 1.44 NG/DL (ref 0.93–1.7)
TOTAL IRON BINDING CAPACITY: 265 MCG/DL (ref 250–450)
TOTAL PROTEIN: 7.5 G/DL (ref 6.4–8.3)
TRIGL SERPL-MCNC: 77 MG/DL (ref 0–149)
TSH SERPL DL<=0.05 MIU/L-ACNC: 2.78 UIU/ML (ref 0.27–4.2)
VITAMIN B-12: 587 PG/ML (ref 211–946)
VLDLC SERPL CALC-MCNC: 15 MG/DL
WBC # BLD: 3.2 E9/L (ref 4.5–11.5)
WBC # BLD: 3.2 E9/L (ref 4.5–11.5)
WBC # BLD: 3.6 E9/L (ref 4.5–11.5)

## 2021-01-01 PROCEDURE — 3430000000 HC RX DIAGNOSTIC RADIOPHARMACEUTICAL: Performed by: RADIOLOGY

## 2021-01-01 PROCEDURE — 36415 COLL VENOUS BLD VENIPUNCTURE: CPT

## 2021-01-01 PROCEDURE — 6360000002 HC RX W HCPCS: Performed by: FAMILY MEDICINE

## 2021-01-01 PROCEDURE — 78452 HT MUSCLE IMAGE SPECT MULT: CPT

## 2021-01-01 PROCEDURE — 93017 CV STRESS TEST TRACING ONLY: CPT

## 2021-01-01 PROCEDURE — 71250 CT THORAX DX C-: CPT

## 2021-01-01 PROCEDURE — A9500 TC99M SESTAMIBI: HCPCS | Performed by: RADIOLOGY

## 2021-01-01 PROCEDURE — 78452 HT MUSCLE IMAGE SPECT MULT: CPT | Performed by: INTERNAL MEDICINE

## 2021-01-01 PROCEDURE — 86769 SARS-COV-2 COVID-19 ANTIBODY: CPT

## 2021-01-01 PROCEDURE — 85025 COMPLETE CBC W/AUTO DIFF WBC: CPT

## 2021-01-01 RX ORDER — OMEPRAZOLE 20 MG/1
20 CAPSULE, DELAYED RELEASE ORAL
Qty: 30 CAPSULE | Refills: 2 | Status: SHIPPED | OUTPATIENT
Start: 2021-01-01 | End: 2022-01-01 | Stop reason: SDUPTHER

## 2021-01-01 RX ADMIN — REGADENOSON 0.4 MG: 0.08 INJECTION, SOLUTION INTRAVENOUS at 09:26

## 2021-01-01 RX ADMIN — Medication 30 MILLICURIE: at 09:33

## 2021-01-01 RX ADMIN — Medication 10 MILLICURIE: at 07:55

## 2021-02-03 ENCOUNTER — IMMUNIZATION (OUTPATIENT)
Dept: PRIMARY CARE CLINIC | Age: 86
End: 2021-02-03
Payer: MEDICARE

## 2021-02-03 PROCEDURE — 91301 COVID-19, MODERNA VACCINE 100MCG/0.5ML DOSE: CPT | Performed by: NURSE PRACTITIONER

## 2021-02-03 PROCEDURE — 0011A COVID-19, MODERNA VACCINE 100MCG/0.5ML DOSE: CPT | Performed by: NURSE PRACTITIONER

## 2021-03-03 ENCOUNTER — IMMUNIZATION (OUTPATIENT)
Dept: PRIMARY CARE CLINIC | Age: 86
End: 2021-03-03
Payer: MEDICARE

## 2021-03-03 PROCEDURE — 91301 COVID-19, MODERNA VACCINE 100MCG/0.5ML DOSE: CPT | Performed by: NURSE PRACTITIONER

## 2021-03-03 PROCEDURE — 0012A COVID-19, MODERNA VACCINE 100MCG/0.5ML DOSE: CPT | Performed by: NURSE PRACTITIONER

## 2021-08-17 PROBLEM — E11.9 TYPE 2 DIABETES MELLITUS WITHOUT COMPLICATION, WITHOUT LONG-TERM CURRENT USE OF INSULIN (HCC): Status: ACTIVE | Noted: 2021-01-01

## 2021-08-17 PROBLEM — E03.9 ACQUIRED HYPOTHYROIDISM: Status: ACTIVE | Noted: 2021-01-01

## 2021-08-17 PROBLEM — D64.9 NORMOCYTIC ANEMIA: Status: ACTIVE | Noted: 2021-01-01

## 2021-08-17 PROBLEM — I10 ESSENTIAL HYPERTENSION: Status: ACTIVE | Noted: 2021-01-01

## 2021-08-17 PROBLEM — E78.5 DYSLIPIDEMIA: Status: ACTIVE | Noted: 2021-01-01

## 2021-12-01 NOTE — PROCEDURES
[address]                                 PROCEDURE NOTE    PATIENT NAME: Rona Dubon                    :        1933  MED REC NO:   [id number]                            ROOM:  ACCOUNT NO:   [de-identified]                           ADMIT DATE: 2021  PROVIDER:     Kamar Rodriguez DO    DATE OF PROCEDURE:  2021    PROCEDURE PERFORMED:  Lexiscan stress test.    INDICATIONS:  The patient is a very polite and pleasant 80year-old  patient who follows with Dr. Chelo Cameron. Please see the copy of his report. He has been having intermittent chest pressure as well as shortness of  breath. PROCEDURE IN DETAIL:  The patient was brought to the Cardiac Stress Lab  and connected to continuous cardiac monitoring. Resting EKG indicated  normal sinus rhythm. He was administered Lexiscan over 10-15 seconds  followed by injection of Cardiolite. He was placed in recovery at 1  minute. Throughout the examination, he had no unusual symptomatology. There was no ectopy or dysrhythmia identified. There were no ST or  T-wave abnormalities identified. He had physiologic response to both  blood pressure and heart rate. He tolerated the procedure well. There  was no evidence of Lexiscan-induced ischemia and he will now be  transferred to the Imaging Lab for final stress pictures.         Demarcus Franklin DO    D: 2021 9:31:53       T: 2021 12:25:22     [id number]  Job#: [id number]     Doc#: [id number]    CC:

## 2022-01-01 ENCOUNTER — ANESTHESIA (OUTPATIENT)
Dept: ENDOSCOPY | Age: 87
DRG: 280 | End: 2022-01-01
Payer: MEDICARE

## 2022-01-01 ENCOUNTER — APPOINTMENT (OUTPATIENT)
Dept: CT IMAGING | Age: 87
DRG: 280 | End: 2022-01-01
Payer: MEDICARE

## 2022-01-01 ENCOUNTER — HOSPITAL ENCOUNTER (OUTPATIENT)
Dept: CT IMAGING | Age: 87
Discharge: HOME | End: 2022-01-13
Payer: COMMERCIAL

## 2022-01-01 ENCOUNTER — APPOINTMENT (OUTPATIENT)
Dept: GENERAL RADIOLOGY | Age: 87
DRG: 312 | End: 2022-01-01
Attending: INTERNAL MEDICINE
Payer: MEDICARE

## 2022-01-01 ENCOUNTER — ANESTHESIA (OUTPATIENT)
Dept: ENDOSCOPY | Age: 87
DRG: 312 | End: 2022-01-01
Payer: MEDICARE

## 2022-01-01 ENCOUNTER — HOSPITAL ENCOUNTER (OUTPATIENT)
Age: 87
Discharge: HOME | End: 2022-01-13
Payer: COMMERCIAL

## 2022-01-01 ENCOUNTER — HOSPITAL ENCOUNTER (OUTPATIENT)
Dept: INFUSION THERAPY | Age: 87
Discharge: HOME | End: 2022-06-13
Payer: MEDICARE

## 2022-01-01 ENCOUNTER — APPOINTMENT (OUTPATIENT)
Dept: ULTRASOUND IMAGING | Age: 87
DRG: 280 | End: 2022-01-01
Payer: MEDICARE

## 2022-01-01 ENCOUNTER — HOSPITAL ENCOUNTER (OUTPATIENT)
Age: 87
Discharge: HOME | End: 2022-02-11
Payer: MEDICARE

## 2022-01-01 ENCOUNTER — HOSPITAL ENCOUNTER (OUTPATIENT)
Dept: INFUSION THERAPY | Age: 87
Discharge: HOME | DRG: 812 | End: 2022-06-17
Payer: MEDICARE

## 2022-01-01 ENCOUNTER — APPOINTMENT (OUTPATIENT)
Dept: GENERAL RADIOLOGY | Age: 87
DRG: 280 | End: 2022-01-01
Payer: MEDICARE

## 2022-01-01 ENCOUNTER — HOSPITAL ENCOUNTER (OUTPATIENT)
Age: 87
Discharge: HOME | End: 2022-03-28
Payer: MEDICARE

## 2022-01-01 ENCOUNTER — APPOINTMENT (OUTPATIENT)
Dept: INTERVENTIONAL RADIOLOGY/VASCULAR | Age: 87
DRG: 280 | End: 2022-01-01
Payer: MEDICARE

## 2022-01-01 ENCOUNTER — HOSPITAL ENCOUNTER (INPATIENT)
Age: 87
LOS: 1 days | DRG: 812 | End: 2022-06-18
Attending: EMERGENCY MEDICINE
Payer: MEDICARE

## 2022-01-01 ENCOUNTER — APPOINTMENT (OUTPATIENT)
Dept: CT IMAGING | Age: 87
DRG: 312 | End: 2022-01-01
Attending: INTERNAL MEDICINE
Payer: MEDICARE

## 2022-01-01 ENCOUNTER — HOSPITAL ENCOUNTER (OUTPATIENT)
Dept: INFUSION THERAPY | Age: 87
Discharge: HOME | End: 2022-05-19
Payer: MEDICARE

## 2022-01-01 ENCOUNTER — HOSPITAL ENCOUNTER (OUTPATIENT)
Dept: CARDIAC CATH/INVASIVE PROCEDURES | Age: 87
Discharge: HOME | End: 2022-04-19
Payer: MEDICARE

## 2022-01-01 ENCOUNTER — HOSPITAL ENCOUNTER (INPATIENT)
Age: 87
LOS: 6 days | Discharge: HOME | DRG: 312 | End: 2022-06-01
Attending: INTERNAL MEDICINE | Admitting: INTERNAL MEDICINE
Payer: MEDICARE

## 2022-01-01 ENCOUNTER — HOSPITAL ENCOUNTER (OUTPATIENT)
Age: 87
Discharge: HOME | End: 2022-03-26
Payer: MEDICARE

## 2022-01-01 ENCOUNTER — HOSPITAL ENCOUNTER (OUTPATIENT)
Age: 87
Discharge: HOME | End: 2022-01-15
Payer: COMMERCIAL

## 2022-01-01 ENCOUNTER — ANESTHESIA EVENT (OUTPATIENT)
Dept: ENDOSCOPY | Age: 87
DRG: 280 | End: 2022-01-01
Payer: MEDICARE

## 2022-01-01 ENCOUNTER — HOSPITAL ENCOUNTER (OUTPATIENT)
Dept: INFUSION THERAPY | Age: 87
Discharge: HOME | DRG: 812 | End: 2022-06-16
Payer: MEDICARE

## 2022-01-01 ENCOUNTER — OFFICE VISIT (OUTPATIENT)
Dept: ONCOLOGY | Age: 87
End: 2022-01-01

## 2022-01-01 ENCOUNTER — HOSPITAL ENCOUNTER (OUTPATIENT)
Dept: GENERAL RADIOLOGY | Age: 87
Discharge: HOME | End: 2022-02-13
Payer: MEDICARE

## 2022-01-01 ENCOUNTER — HOSPITAL ENCOUNTER (OUTPATIENT)
Dept: INFUSION THERAPY | Age: 87
Discharge: HOME | End: 2022-06-14
Payer: MEDICARE

## 2022-01-01 ENCOUNTER — HOSPITAL ENCOUNTER (OUTPATIENT)
Dept: INFUSION THERAPY | Age: 87
Discharge: HOME | End: 2022-05-26

## 2022-01-01 ENCOUNTER — HOSPITAL ENCOUNTER (OUTPATIENT)
Dept: PULMONOLOGY | Age: 87
Discharge: HOME | End: 2022-01-26
Payer: MEDICARE

## 2022-01-01 ENCOUNTER — HOSPITAL ENCOUNTER (EMERGENCY)
Age: 87
Discharge: HOME | DRG: 312 | End: 2022-05-24
Attending: EMERGENCY MEDICINE
Payer: MEDICARE

## 2022-01-01 ENCOUNTER — ANESTHESIA EVENT (OUTPATIENT)
Dept: ENDOSCOPY | Age: 87
DRG: 312 | End: 2022-01-01
Payer: MEDICARE

## 2022-01-01 ENCOUNTER — HOSPITAL ENCOUNTER (OUTPATIENT)
Dept: NON INVASIVE DIAGNOSTICS | Age: 87
Discharge: HOME | End: 2022-02-11
Payer: MEDICARE

## 2022-01-01 ENCOUNTER — HOSPITAL ENCOUNTER (OUTPATIENT)
Dept: NON INVASIVE DIAGNOSTICS | Age: 87
Discharge: HOME | End: 2022-01-26
Payer: MEDICARE

## 2022-01-01 ENCOUNTER — APPOINTMENT (OUTPATIENT)
Dept: CT IMAGING | Age: 87
DRG: 312 | End: 2022-01-01
Payer: MEDICARE

## 2022-01-01 ENCOUNTER — OFFICE VISIT (OUTPATIENT)
Dept: ONCOLOGY | Age: 87
End: 2022-01-01
Payer: MEDICARE

## 2022-01-01 ENCOUNTER — HOSPITAL ENCOUNTER (OUTPATIENT)
Dept: ULTRASOUND IMAGING | Age: 87
Discharge: HOME | End: 2022-06-10
Payer: MEDICARE

## 2022-01-01 ENCOUNTER — APPOINTMENT (OUTPATIENT)
Dept: CT IMAGING | Age: 87
End: 2022-01-01
Payer: MEDICARE

## 2022-01-01 ENCOUNTER — TELEPHONE (OUTPATIENT)
Dept: OTHER | Facility: CLINIC | Age: 87
End: 2022-01-01

## 2022-01-01 ENCOUNTER — APPOINTMENT (OUTPATIENT)
Dept: GENERAL RADIOLOGY | Age: 87
DRG: 812 | End: 2022-01-01
Payer: MEDICARE

## 2022-01-01 ENCOUNTER — HOSPITAL ENCOUNTER (OUTPATIENT)
Dept: GENERAL RADIOLOGY | Age: 87
Discharge: HOME | End: 2022-01-15
Payer: COMMERCIAL

## 2022-01-01 ENCOUNTER — HOSPITAL ENCOUNTER (OUTPATIENT)
Age: 87
Discharge: HOME | End: 2022-02-13
Payer: MEDICARE

## 2022-01-01 ENCOUNTER — APPOINTMENT (OUTPATIENT)
Dept: MRI IMAGING | Age: 87
DRG: 280 | End: 2022-01-01
Payer: MEDICARE

## 2022-01-01 ENCOUNTER — HOSPITAL ENCOUNTER (OUTPATIENT)
Dept: INFUSION THERAPY | Age: 87
Discharge: HOME | End: 2022-06-09

## 2022-01-01 ENCOUNTER — HOSPITAL ENCOUNTER (OUTPATIENT)
Dept: INFUSION THERAPY | Age: 87
Discharge: HOME | End: 2022-06-15
Payer: MEDICARE

## 2022-01-01 ENCOUNTER — HOSPITAL ENCOUNTER (INPATIENT)
Age: 87
LOS: 9 days | Discharge: NURSING, REHAB OR LONG-TERM CARE | DRG: 280 | End: 2022-04-22
Attending: EMERGENCY MEDICINE | Admitting: INTERNAL MEDICINE
Payer: MEDICARE

## 2022-01-01 ENCOUNTER — APPOINTMENT (OUTPATIENT)
Dept: GENERAL RADIOLOGY | Age: 87
DRG: 312 | End: 2022-01-01
Payer: MEDICARE

## 2022-01-01 ENCOUNTER — HOSPITAL ENCOUNTER (OUTPATIENT)
Dept: GENERAL RADIOLOGY | Age: 87
Discharge: HOME | End: 2022-03-28
Payer: MEDICARE

## 2022-01-01 ENCOUNTER — APPOINTMENT (OUTPATIENT)
Dept: CT IMAGING | Age: 87
DRG: 812 | End: 2022-01-01
Payer: MEDICARE

## 2022-01-01 VITALS
WEIGHT: 161 LBS | HEART RATE: 118 BPM | DIASTOLIC BLOOD PRESSURE: 47 MMHG | HEIGHT: 66 IN | TEMPERATURE: 97.7 F | SYSTOLIC BLOOD PRESSURE: 89 MMHG | OXYGEN SATURATION: 91 % | BODY MASS INDEX: 25.88 KG/M2

## 2022-01-01 VITALS
TEMPERATURE: 97 F | SYSTOLIC BLOOD PRESSURE: 128 MMHG | BODY MASS INDEX: 25.71 KG/M2 | HEART RATE: 102 BPM | HEIGHT: 66 IN | RESPIRATION RATE: 21 BRPM | WEIGHT: 160 LBS | DIASTOLIC BLOOD PRESSURE: 69 MMHG | OXYGEN SATURATION: 95 %

## 2022-01-01 VITALS
TEMPERATURE: 97.6 F | BODY MASS INDEX: 23.3 KG/M2 | SYSTOLIC BLOOD PRESSURE: 118 MMHG | OXYGEN SATURATION: 100 % | WEIGHT: 145 LBS | HEART RATE: 102 BPM | HEIGHT: 66 IN | DIASTOLIC BLOOD PRESSURE: 62 MMHG | RESPIRATION RATE: 23 BRPM

## 2022-01-01 VITALS
SYSTOLIC BLOOD PRESSURE: 107 MMHG | DIASTOLIC BLOOD PRESSURE: 63 MMHG | WEIGHT: 155.8 LBS | HEART RATE: 115 BPM | HEIGHT: 66 IN | BODY MASS INDEX: 25.04 KG/M2 | TEMPERATURE: 98.7 F | OXYGEN SATURATION: 96 %

## 2022-01-01 VITALS
HEART RATE: 115 BPM | TEMPERATURE: 97.7 F | RESPIRATION RATE: 18 BRPM | DIASTOLIC BLOOD PRESSURE: 47 MMHG | SYSTOLIC BLOOD PRESSURE: 101 MMHG | OXYGEN SATURATION: 93 %

## 2022-01-01 VITALS
SYSTOLIC BLOOD PRESSURE: 124 MMHG | DIASTOLIC BLOOD PRESSURE: 61 MMHG | HEART RATE: 96 BPM | OXYGEN SATURATION: 93 % | TEMPERATURE: 98.1 F | RESPIRATION RATE: 16 BRPM

## 2022-01-01 VITALS
DIASTOLIC BLOOD PRESSURE: 57 MMHG | RESPIRATION RATE: 24 BRPM | OXYGEN SATURATION: 93 % | HEART RATE: 96 BPM | TEMPERATURE: 97.8 F | SYSTOLIC BLOOD PRESSURE: 78 MMHG

## 2022-01-01 VITALS
SYSTOLIC BLOOD PRESSURE: 126 MMHG | DIASTOLIC BLOOD PRESSURE: 58 MMHG | OXYGEN SATURATION: 98 % | RESPIRATION RATE: 20 BRPM | TEMPERATURE: 98.1 F | HEART RATE: 98 BPM

## 2022-01-01 VITALS
HEART RATE: 93 BPM | SYSTOLIC BLOOD PRESSURE: 111 MMHG | RESPIRATION RATE: 20 BRPM | DIASTOLIC BLOOD PRESSURE: 54 MMHG | TEMPERATURE: 98.7 F | OXYGEN SATURATION: 100 %

## 2022-01-01 VITALS
OXYGEN SATURATION: 91 % | DIASTOLIC BLOOD PRESSURE: 65 MMHG | RESPIRATION RATE: 17 BRPM | TEMPERATURE: 98 F | HEART RATE: 102 BPM | SYSTOLIC BLOOD PRESSURE: 137 MMHG

## 2022-01-01 VITALS
HEIGHT: 66 IN | BODY MASS INDEX: 25.22 KG/M2 | HEART RATE: 114 BPM | SYSTOLIC BLOOD PRESSURE: 101 MMHG | OXYGEN SATURATION: 92 % | WEIGHT: 156.9 LBS | DIASTOLIC BLOOD PRESSURE: 56 MMHG | TEMPERATURE: 98.7 F

## 2022-01-01 VITALS — DIASTOLIC BLOOD PRESSURE: 72 MMHG | OXYGEN SATURATION: 97 % | SYSTOLIC BLOOD PRESSURE: 133 MMHG

## 2022-01-01 DIAGNOSIS — D64.9 NORMOCYTIC ANEMIA: ICD-10-CM

## 2022-01-01 DIAGNOSIS — R06.02 SOB (SHORTNESS OF BREATH): ICD-10-CM

## 2022-01-01 DIAGNOSIS — R60.0 LOCALIZED EDEMA: Primary | ICD-10-CM

## 2022-01-01 DIAGNOSIS — E03.9 ACQUIRED HYPOTHYROIDISM: ICD-10-CM

## 2022-01-01 DIAGNOSIS — R55 SYNCOPE, UNSPECIFIED SYNCOPE TYPE: ICD-10-CM

## 2022-01-01 DIAGNOSIS — R39.12 BENIGN PROSTATIC HYPERPLASIA WITH WEAK URINARY STREAM: ICD-10-CM

## 2022-01-01 DIAGNOSIS — D69.6 THROMBOCYTOPENIA, UNSPECIFIED (HCC): ICD-10-CM

## 2022-01-01 DIAGNOSIS — D46.9 MDS (MYELODYSPLASTIC SYNDROME) (HCC): Primary | ICD-10-CM

## 2022-01-01 DIAGNOSIS — M25.50 PAIN IN JOINT, MULTIPLE SITES: ICD-10-CM

## 2022-01-01 DIAGNOSIS — D46.9 MDS (MYELODYSPLASTIC SYNDROME) (HCC): ICD-10-CM

## 2022-01-01 DIAGNOSIS — R53.83 FATIGUE, UNSPECIFIED TYPE: ICD-10-CM

## 2022-01-01 DIAGNOSIS — R50.9 FEBRILE ILLNESS: ICD-10-CM

## 2022-01-01 DIAGNOSIS — E27.40 ADRENAL INSUFFICIENCY (HCC): ICD-10-CM

## 2022-01-01 DIAGNOSIS — N18.30 STAGE 3 CHRONIC KIDNEY DISEASE, UNSPECIFIED WHETHER STAGE 3A OR 3B CKD (HCC): ICD-10-CM

## 2022-01-01 DIAGNOSIS — R07.89 CHEST TIGHTNESS: ICD-10-CM

## 2022-01-01 DIAGNOSIS — R89.9 ABNORMAL LABORATORY TEST RESULT: ICD-10-CM

## 2022-01-01 DIAGNOSIS — R07.9 CHEST PAIN, UNSPECIFIED TYPE: ICD-10-CM

## 2022-01-01 DIAGNOSIS — E86.0 DEHYDRATION: Primary | ICD-10-CM

## 2022-01-01 DIAGNOSIS — J84.10 PULMONARY FIBROSIS (HCC): ICD-10-CM

## 2022-01-01 DIAGNOSIS — E11.9 TYPE 2 DIABETES MELLITUS WITHOUT COMPLICATION, WITHOUT LONG-TERM CURRENT USE OF INSULIN (HCC): ICD-10-CM

## 2022-01-01 DIAGNOSIS — R35.1 NOCTURIA: ICD-10-CM

## 2022-01-01 DIAGNOSIS — N40.1 BENIGN PROSTATIC HYPERPLASIA WITH WEAK URINARY STREAM: ICD-10-CM

## 2022-01-01 DIAGNOSIS — M54.50 CHRONIC BILATERAL LOW BACK PAIN WITHOUT SCIATICA: ICD-10-CM

## 2022-01-01 DIAGNOSIS — D53.9 MACROCYTIC ANEMIA: ICD-10-CM

## 2022-01-01 DIAGNOSIS — D61.818 PANCYTOPENIA (HCC): ICD-10-CM

## 2022-01-01 DIAGNOSIS — N17.9 AKI (ACUTE KIDNEY INJURY) (HCC): ICD-10-CM

## 2022-01-01 DIAGNOSIS — R06.02 SHORTNESS OF BREATH: ICD-10-CM

## 2022-01-01 DIAGNOSIS — E11.65 TYPE 2 DIABETES MELLITUS WITH HYPERGLYCEMIA, WITHOUT LONG-TERM CURRENT USE OF INSULIN (HCC): ICD-10-CM

## 2022-01-01 DIAGNOSIS — N17.9 ACUTE RENAL FAILURE, UNSPECIFIED ACUTE RENAL FAILURE TYPE (HCC): ICD-10-CM

## 2022-01-01 DIAGNOSIS — I21.4 NSTEMI (NON-ST ELEVATED MYOCARDIAL INFARCTION) (HCC): Primary | ICD-10-CM

## 2022-01-01 DIAGNOSIS — N18.30 TYPE 2 DIABETES MELLITUS WITH STAGE 3 CHRONIC KIDNEY DISEASE, WITHOUT LONG-TERM CURRENT USE OF INSULIN, UNSPECIFIED WHETHER STAGE 3A OR 3B CKD (HCC): ICD-10-CM

## 2022-01-01 DIAGNOSIS — I50.22 CHRONIC SYSTOLIC (CONGESTIVE) HEART FAILURE (HCC): ICD-10-CM

## 2022-01-01 DIAGNOSIS — E11.9 TYPE 2 DIABETES MELLITUS WITHOUT COMPLICATION, WITHOUT LONG-TERM CURRENT USE OF INSULIN (HCC): Primary | ICD-10-CM

## 2022-01-01 DIAGNOSIS — R74.01 TRANSAMINITIS: ICD-10-CM

## 2022-01-01 DIAGNOSIS — R06.02 SHORTNESS OF BREATH: Primary | ICD-10-CM

## 2022-01-01 DIAGNOSIS — I10 ESSENTIAL HYPERTENSION: Primary | ICD-10-CM

## 2022-01-01 DIAGNOSIS — U07.1 COVID: ICD-10-CM

## 2022-01-01 DIAGNOSIS — N18.1 CHRONIC RENAL INSUFFICIENCY, STAGE 1: ICD-10-CM

## 2022-01-01 DIAGNOSIS — G89.29 CHRONIC BILATERAL LOW BACK PAIN WITHOUT SCIATICA: ICD-10-CM

## 2022-01-01 DIAGNOSIS — M62.82 NON-TRAUMATIC RHABDOMYOLYSIS: ICD-10-CM

## 2022-01-01 DIAGNOSIS — R30.0 DYSURIA: ICD-10-CM

## 2022-01-01 DIAGNOSIS — R42 DIZZINESS: ICD-10-CM

## 2022-01-01 DIAGNOSIS — R53.81 MALAISE: ICD-10-CM

## 2022-01-01 DIAGNOSIS — E11.22 TYPE 2 DIABETES MELLITUS WITH STAGE 3 CHRONIC KIDNEY DISEASE, WITHOUT LONG-TERM CURRENT USE OF INSULIN, UNSPECIFIED WHETHER STAGE 3A OR 3B CKD (HCC): ICD-10-CM

## 2022-01-01 DIAGNOSIS — I25.10 CORONARY ARTERY DISEASE INVOLVING NATIVE CORONARY ARTERY OF NATIVE HEART, UNSPECIFIED WHETHER ANGINA PRESENT: ICD-10-CM

## 2022-01-01 DIAGNOSIS — E78.5 DYSLIPIDEMIA: ICD-10-CM

## 2022-01-01 DIAGNOSIS — E87.20 METABOLIC ACIDOSIS: ICD-10-CM

## 2022-01-01 DIAGNOSIS — Z12.5 SCREENING PSA (PROSTATE SPECIFIC ANTIGEN): ICD-10-CM

## 2022-01-01 DIAGNOSIS — I25.9 CARDIAC ISCHEMIA: ICD-10-CM

## 2022-01-01 DIAGNOSIS — R06.09 DYSPNEA ON EXERTION: ICD-10-CM

## 2022-01-01 DIAGNOSIS — R06.02 SOB (SHORTNESS OF BREATH): Primary | ICD-10-CM

## 2022-01-01 DIAGNOSIS — I10 ESSENTIAL HYPERTENSION: ICD-10-CM

## 2022-01-01 DIAGNOSIS — I25.119 CORONARY ARTERY DISEASE INVOLVING NATIVE CORONARY ARTERY OF NATIVE HEART WITH ANGINA PECTORIS (HCC): ICD-10-CM

## 2022-01-01 DIAGNOSIS — R57.9 SHOCK (HCC): Primary | ICD-10-CM

## 2022-01-01 LAB
ABO/RH: NORMAL
ACANTHOCYTES: ABNORMAL
ADENOVIRUS BY PCR: NOT DETECTED
ADENOVIRUS BY PCR: NOT DETECTED
ALBUMIN SERPL-MCNC: 2.3 G/DL (ref 3.5–5.2)
ALBUMIN SERPL-MCNC: 2.4 G/DL (ref 3.5–5.2)
ALBUMIN SERPL-MCNC: 2.5 G/DL (ref 3.5–5.2)
ALBUMIN SERPL-MCNC: 2.8 G/DL (ref 3.5–5.2)
ALBUMIN SERPL-MCNC: 2.9 G/DL (ref 3.5–5.2)
ALBUMIN SERPL-MCNC: 3 G/DL (ref 3.5–5.2)
ALBUMIN SERPL-MCNC: 3 G/DL (ref 3.5–5.2)
ALBUMIN SERPL-MCNC: 3.1 G/DL (ref 3.5–5.2)
ALBUMIN SERPL-MCNC: 3.2 G/DL (ref 3.5–5.2)
ALBUMIN SERPL-MCNC: 3.2 G/DL (ref 3.5–5.2)
ALBUMIN SERPL-MCNC: 3.3 G/DL (ref 3.5–4.7)
ALBUMIN SERPL-MCNC: 3.3 G/DL (ref 3.5–5.2)
ALBUMIN SERPL-MCNC: 3.4 G/DL (ref 3.5–5.2)
ALBUMIN SERPL-MCNC: 3.5 G/DL (ref 3.5–5.2)
ALBUMIN SERPL-MCNC: 3.5 G/DL (ref 3.5–5.2)
ALBUMIN SERPL-MCNC: 3.8 G/DL (ref 3.5–5.2)
ALBUMIN SERPL-MCNC: 3.8 G/DL (ref 3.5–5.2)
ALBUMIN SERPL-MCNC: 4 G/DL (ref 3.5–5.2)
ALBUMIN SERPL-MCNC: 4.1 G/DL (ref 3.5–5.2)
ALBUMIN SERPL-MCNC: 4.4 G/DL (ref 3.5–5.2)
ALLERGEN BARLEY IGE: 29.1 KU/L
ALLERGEN BEEF: 0.41 KU/L
ALLERGEN CABBAGE IGE: 32.6 KU/L
ALLERGEN CARROT IGE: 24.2 KU/L
ALLERGEN CHICKEN IGE: 0.38 KU/L
ALLERGEN CODFISH IGE: 0.81 KU/L
ALLERGEN CORN IGE: 21.1 KU/L
ALLERGEN COW MILK IGE: 0.58 KU/L
ALLERGEN CRAB IGE: 12.3 KU/L
ALLERGEN EGG WHITE IGE: 0.48 KU/L
ALLERGEN GRAPE IGE: 17.4 KU/L
ALLERGEN LETTUCE IGE: 23.5 KU/L
ALLERGEN NAVY BEAN: 25.6 KU/L
ALLERGEN OAT: 26.6 KU/L
ALLERGEN ORANGE IGE: 24.2 KU/L
ALLERGEN PEANUT (F13) IGE: 31.2 KU/L
ALLERGEN PEPPER C. ANNUUM IGE: 24.3 KU/L
ALLERGEN PORK: 0.39 KU/L
ALLERGEN RICE IGE: 34 KU/L
ALLERGEN RYE IGE: 30 KU/L
ALLERGEN SOYBEAN IGE: 24 KU/L
ALLERGEN TOMATO IGE: 30.3 KU/L
ALLERGEN TUNA IGE: 0.31 KU/L
ALLERGEN WHEAT IGE: 29.1 KU/L
ALP BLD-CCNC: 34 U/L (ref 40–129)
ALP BLD-CCNC: 41 U/L (ref 40–129)
ALP BLD-CCNC: 48 U/L (ref 40–129)
ALP BLD-CCNC: 48 U/L (ref 40–129)
ALP BLD-CCNC: 53 U/L (ref 40–129)
ALP BLD-CCNC: 54 U/L (ref 40–129)
ALP BLD-CCNC: 56 U/L (ref 40–129)
ALP BLD-CCNC: 57 U/L (ref 40–129)
ALP BLD-CCNC: 57 U/L (ref 40–129)
ALP BLD-CCNC: 58 U/L (ref 40–129)
ALP BLD-CCNC: 59 U/L (ref 40–129)
ALP BLD-CCNC: 60 U/L (ref 40–129)
ALP BLD-CCNC: 60 U/L (ref 40–129)
ALP BLD-CCNC: 61 U/L (ref 40–129)
ALP BLD-CCNC: 62 U/L (ref 40–129)
ALP BLD-CCNC: 65 U/L (ref 40–129)
ALP BLD-CCNC: 66 U/L (ref 40–129)
ALP BLD-CCNC: 70 U/L (ref 40–129)
ALP BLD-CCNC: 70 U/L (ref 40–129)
ALP BLD-CCNC: 71 U/L (ref 40–129)
ALPHA-1 ANTITRYPSIN: 185 MG/DL (ref 90–200)
ALPHA-1-GLOBULIN: 0.4 G/DL (ref 0.2–0.4)
ALPHA-2-GLOBULIN: 1 G/DL (ref 0.5–1)
ALT SERPL-CCNC: 101 U/L (ref 0–40)
ALT SERPL-CCNC: 115 U/L (ref 0–40)
ALT SERPL-CCNC: 17 U/L (ref 0–40)
ALT SERPL-CCNC: 19 U/L (ref 0–40)
ALT SERPL-CCNC: 20 U/L (ref 0–40)
ALT SERPL-CCNC: 20 U/L (ref 0–40)
ALT SERPL-CCNC: 21 U/L (ref 0–40)
ALT SERPL-CCNC: 22 U/L (ref 0–40)
ALT SERPL-CCNC: 23 U/L (ref 0–40)
ALT SERPL-CCNC: 24 U/L (ref 0–40)
ALT SERPL-CCNC: 27 U/L (ref 0–40)
ALT SERPL-CCNC: 27 U/L (ref 0–40)
ALT SERPL-CCNC: 33 U/L (ref 0–40)
ALT SERPL-CCNC: 34 U/L (ref 0–40)
ALT SERPL-CCNC: 3543 U/L (ref 0–40)
ALT SERPL-CCNC: 40 U/L (ref 0–40)
ALT SERPL-CCNC: 54 U/L (ref 0–40)
ALT SERPL-CCNC: 62 U/L (ref 0–40)
ALT SERPL-CCNC: 71 U/L (ref 0–40)
ALT SERPL-CCNC: 75 U/L (ref 0–40)
ALT SERPL-CCNC: 88 U/L (ref 0–40)
ANION GAP SERPL CALCULATED.3IONS-SCNC: 10 MMOL/L (ref 7–16)
ANION GAP SERPL CALCULATED.3IONS-SCNC: 11 MMOL/L (ref 7–16)
ANION GAP SERPL CALCULATED.3IONS-SCNC: 12 MMOL/L (ref 7–16)
ANION GAP SERPL CALCULATED.3IONS-SCNC: 13 MMOL/L (ref 7–16)
ANION GAP SERPL CALCULATED.3IONS-SCNC: 14 MMOL/L (ref 7–16)
ANION GAP SERPL CALCULATED.3IONS-SCNC: 17 MMOL/L (ref 7–16)
ANION GAP SERPL CALCULATED.3IONS-SCNC: 18 MMOL/L (ref 7–16)
ANION GAP SERPL CALCULATED.3IONS-SCNC: 20 MMOL/L (ref 7–16)
ANION GAP SERPL CALCULATED.3IONS-SCNC: 21 MMOL/L (ref 7–16)
ANION GAP SERPL CALCULATED.3IONS-SCNC: 26 MMOL/L (ref 7–16)
ANION GAP SERPL CALCULATED.3IONS-SCNC: 6 MMOL/L (ref 7–16)
ANION GAP SERPL CALCULATED.3IONS-SCNC: 6 MMOL/L (ref 7–16)
ANION GAP SERPL CALCULATED.3IONS-SCNC: 8 MMOL/L (ref 7–16)
ANION GAP SERPL CALCULATED.3IONS-SCNC: 9 MMOL/L (ref 7–16)
ANION GAP SERPL CALCULATED.3IONS-SCNC: 9 MMOL/L (ref 7–16)
ANISOCYTOSIS: ABNORMAL
ANTI-MITOCHONDRIAL AB, IFA: NEGATIVE
ANTI-NUCLEAR ANTIBODY (ANA): NEGATIVE
ANTIBODY SCREEN: NORMAL
APTT: 24.3 SEC (ref 24.5–35.1)
APTT: 27 SEC (ref 24.5–35.1)
ASPERGILLUS FUMIGATUS #1: NORMAL
ASPERGILLUS FUMIGATUS #6: NORMAL
AST SERPL-CCNC: 121 U/L (ref 0–39)
AST SERPL-CCNC: 14 U/L (ref 0–39)
AST SERPL-CCNC: 15 U/L (ref 0–39)
AST SERPL-CCNC: 15 U/L (ref 0–39)
AST SERPL-CCNC: 16 U/L (ref 0–39)
AST SERPL-CCNC: 17 U/L (ref 0–39)
AST SERPL-CCNC: 18 U/L (ref 0–39)
AST SERPL-CCNC: 19 U/L (ref 0–39)
AST SERPL-CCNC: 20 U/L (ref 0–39)
AST SERPL-CCNC: 21 U/L (ref 0–39)
AST SERPL-CCNC: 22 U/L (ref 0–39)
AST SERPL-CCNC: 28 U/L (ref 0–39)
AST SERPL-CCNC: 32 U/L (ref 0–39)
AST SERPL-CCNC: 3348 U/L (ref 0–39)
AST SERPL-CCNC: 43 U/L (ref 0–39)
AST SERPL-CCNC: 61 U/L (ref 0–39)
AST SERPL-CCNC: 92 U/L (ref 0–39)
ATYPICAL LYMPHOCYTE RELATIVE PERCENT: 2 % (ref 0–4)
ATYPICAL LYMPHOCYTE RELATIVE PERCENT: 2.6 % (ref 0–4)
AUREOBASIDIUM PULLULANS: NORMAL
B.E.: -11.5 MMOL/L (ref -3–3)
BACTERIA: ABNORMAL /HPF
BASOPHILIC STIPPLING: ABNORMAL
BASOPHILS ABSOLUTE: 0 E9/L (ref 0–0.2)
BASOPHILS ABSOLUTE: 0.01 E9/L (ref 0–0.2)
BASOPHILS ABSOLUTE: 0.04 E9/L (ref 0–0.2)
BASOPHILS RELATIVE PERCENT: 0 % (ref 0–2)
BASOPHILS RELATIVE PERCENT: 0.2 % (ref 0–2)
BASOPHILS RELATIVE PERCENT: 0.3 % (ref 0–2)
BASOPHILS RELATIVE PERCENT: 0.3 % (ref 0–2)
BASOPHILS RELATIVE PERCENT: 0.9 % (ref 0–2)
BETA GLOBULIN: 1.1 G/DL (ref 0.8–1.3)
BETA-2 MICROGLOBULIN: 2.8 MG/L (ref 0.6–2.4)
BETA-2 MICROGLOBULIN: 3.6 MG/L (ref 0.6–2.4)
BILIRUB SERPL-MCNC: 0.3 MG/DL (ref 0–1.2)
BILIRUB SERPL-MCNC: 0.4 MG/DL (ref 0–1.2)
BILIRUB SERPL-MCNC: 0.5 MG/DL (ref 0–1.2)
BILIRUB SERPL-MCNC: 0.6 MG/DL (ref 0–1.2)
BILIRUB SERPL-MCNC: 0.7 MG/DL (ref 0–1.2)
BILIRUB SERPL-MCNC: 0.9 MG/DL (ref 0–1.2)
BILIRUB SERPL-MCNC: 1.4 MG/DL (ref 0–1.2)
BILIRUB SERPL-MCNC: 1.9 MG/DL (ref 0–1.2)
BILIRUBIN URINE: NEGATIVE
BLASTS RELATIVE PERCENT: 0.9 % (ref 0–0)
BLOOD BANK DISPENSE STATUS: NORMAL
BLOOD BANK PRODUCT CODE: NORMAL
BLOOD CULTURE, ROUTINE: NORMAL
BLOOD, URINE: ABNORMAL
BLOOD, URINE: NEGATIVE
BLOOD, URINE: NEGATIVE
BORDETELLA PARAPERTUSSIS BY PCR: NOT DETECTED
BORDETELLA PARAPERTUSSIS BY PCR: NOT DETECTED
BORDETELLA PERTUSSIS BY PCR: NOT DETECTED
BORDETELLA PERTUSSIS BY PCR: NOT DETECTED
BPU ID: NORMAL
BUN BLDV-MCNC: 20 MG/DL (ref 6–23)
BUN BLDV-MCNC: 22 MG/DL (ref 6–23)
BUN BLDV-MCNC: 23 MG/DL (ref 6–23)
BUN BLDV-MCNC: 25 MG/DL (ref 6–23)
BUN BLDV-MCNC: 25 MG/DL (ref 6–23)
BUN BLDV-MCNC: 26 MG/DL (ref 6–23)
BUN BLDV-MCNC: 27 MG/DL (ref 6–23)
BUN BLDV-MCNC: 28 MG/DL (ref 6–23)
BUN BLDV-MCNC: 29 MG/DL (ref 6–23)
BUN BLDV-MCNC: 32 MG/DL (ref 6–23)
BUN BLDV-MCNC: 34 MG/DL (ref 6–23)
BUN BLDV-MCNC: 35 MG/DL (ref 6–23)
BUN BLDV-MCNC: 35 MG/DL (ref 6–23)
BUN BLDV-MCNC: 36 MG/DL (ref 6–23)
BUN BLDV-MCNC: 38 MG/DL (ref 6–23)
BUN BLDV-MCNC: 39 MG/DL (ref 6–23)
BUN BLDV-MCNC: 42 MG/DL (ref 6–23)
BUN BLDV-MCNC: 44 MG/DL (ref 6–23)
BUN BLDV-MCNC: 47 MG/DL (ref 6–23)
BUN BLDV-MCNC: 47 MG/DL (ref 6–23)
BUN BLDV-MCNC: 51 MG/DL (ref 6–23)
BUN BLDV-MCNC: 56 MG/DL (ref 6–23)
BURR CELLS: ABNORMAL
C-REACTIVE PROTEIN, HIGH SENSITIVITY: 0.6 MG/L (ref 0–3)
C-REACTIVE PROTEIN: 1.7 MG/DL (ref 0–0.4)
C-REACTIVE PROTEIN: 24.6 MG/DL (ref 0–0.4)
CALCIUM SERPL-MCNC: 10.6 MG/DL (ref 8.6–10.2)
CALCIUM SERPL-MCNC: 7.1 MG/DL (ref 8.6–10.2)
CALCIUM SERPL-MCNC: 7.1 MG/DL (ref 8.6–10.2)
CALCIUM SERPL-MCNC: 7.2 MG/DL (ref 8.6–10.2)
CALCIUM SERPL-MCNC: 7.3 MG/DL (ref 8.6–10.2)
CALCIUM SERPL-MCNC: 7.3 MG/DL (ref 8.6–10.2)
CALCIUM SERPL-MCNC: 7.5 MG/DL (ref 8.6–10.2)
CALCIUM SERPL-MCNC: 7.6 MG/DL (ref 8.6–10.2)
CALCIUM SERPL-MCNC: 7.8 MG/DL (ref 8.6–10.2)
CALCIUM SERPL-MCNC: 7.9 MG/DL (ref 8.6–10.2)
CALCIUM SERPL-MCNC: 8.1 MG/DL (ref 8.6–10.2)
CALCIUM SERPL-MCNC: 8.2 MG/DL (ref 8.6–10.2)
CALCIUM SERPL-MCNC: 8.2 MG/DL (ref 8.6–10.2)
CALCIUM SERPL-MCNC: 8.3 MG/DL (ref 8.6–10.2)
CALCIUM SERPL-MCNC: 8.4 MG/DL (ref 8.6–10.2)
CALCIUM SERPL-MCNC: 8.4 MG/DL (ref 8.6–10.2)
CALCIUM SERPL-MCNC: 8.5 MG/DL (ref 8.6–10.2)
CALCIUM SERPL-MCNC: 8.7 MG/DL (ref 8.6–10.2)
CALCIUM SERPL-MCNC: 8.8 MG/DL (ref 8.6–10.2)
CALCIUM SERPL-MCNC: 9.2 MG/DL (ref 8.6–10.2)
CALCIUM SERPL-MCNC: 9.4 MG/DL (ref 8.6–10.2)
CALCIUM SERPL-MCNC: 9.4 MG/DL (ref 8.6–10.2)
CEA: 12.1 NG/ML (ref 0–5.2)
CERULOPLASMIN: 16 MG/DL (ref 15–30)
CHLAMYDOPHILIA PNEUMONIAE BY PCR: NOT DETECTED
CHLAMYDOPHILIA PNEUMONIAE BY PCR: NOT DETECTED
CHLORIDE BLD-SCNC: 100 MMOL/L (ref 98–107)
CHLORIDE BLD-SCNC: 101 MMOL/L (ref 98–107)
CHLORIDE BLD-SCNC: 102 MMOL/L (ref 98–107)
CHLORIDE BLD-SCNC: 103 MMOL/L (ref 98–107)
CHLORIDE BLD-SCNC: 104 MMOL/L (ref 98–107)
CHLORIDE BLD-SCNC: 105 MMOL/L (ref 98–107)
CHLORIDE BLD-SCNC: 106 MMOL/L (ref 98–107)
CHLORIDE BLD-SCNC: 108 MMOL/L (ref 98–107)
CHLORIDE BLD-SCNC: 88 MMOL/L (ref 98–107)
CHLORIDE BLD-SCNC: 92 MMOL/L (ref 98–107)
CHLORIDE BLD-SCNC: 93 MMOL/L (ref 98–107)
CHLORIDE BLD-SCNC: 95 MMOL/L (ref 98–107)
CHLORIDE BLD-SCNC: 96 MMOL/L (ref 98–107)
CHLORIDE BLD-SCNC: 98 MMOL/L (ref 98–107)
CHLORIDE BLD-SCNC: 98 MMOL/L (ref 98–107)
CHLORIDE BLD-SCNC: 99 MMOL/L (ref 98–107)
CHLORIDE BLD-SCNC: 99 MMOL/L (ref 98–107)
CHOLESTEROL, TOTAL: 110 MG/DL (ref 0–199)
CHOLESTEROL, TOTAL: 70 MG/DL (ref 0–199)
CHOLESTEROL, TOTAL: 75 MG/DL (ref 0–199)
CLARITY: CLEAR
CO2: 14 MMOL/L (ref 22–29)
CO2: 17 MMOL/L (ref 22–29)
CO2: 21 MMOL/L (ref 22–29)
CO2: 22 MMOL/L (ref 22–29)
CO2: 23 MMOL/L (ref 22–29)
CO2: 25 MMOL/L (ref 22–29)
CO2: 25 MMOL/L (ref 22–29)
CO2: 26 MMOL/L (ref 22–29)
CO2: 27 MMOL/L (ref 22–29)
CO2: 28 MMOL/L (ref 22–29)
CO2: 29 MMOL/L (ref 22–29)
CO2: 30 MMOL/L (ref 22–29)
COLOR: YELLOW
COMMENT: NORMAL
CORONAVIRUS 229E BY PCR: NOT DETECTED
CORONAVIRUS 229E BY PCR: NOT DETECTED
CORONAVIRUS HKU1 BY PCR: NOT DETECTED
CORONAVIRUS HKU1 BY PCR: NOT DETECTED
CORONAVIRUS NL63 BY PCR: NOT DETECTED
CORONAVIRUS NL63 BY PCR: NOT DETECTED
CORONAVIRUS OC43 BY PCR: NOT DETECTED
CORONAVIRUS OC43 BY PCR: NOT DETECTED
CORTISOL TOTAL: 23.13 MCG/DL (ref 2.68–18.4)
CORTISOL TOTAL: 40.02 MCG/DL (ref 2.68–18.4)
CORTISOL TOTAL: 9.18 MCG/DL (ref 2.68–18.4)
CREAT SERPL-MCNC: 1.1 MG/DL (ref 0.7–1.2)
CREAT SERPL-MCNC: 1.2 MG/DL (ref 0.7–1.2)
CREAT SERPL-MCNC: 1.3 MG/DL (ref 0.7–1.2)
CREAT SERPL-MCNC: 1.3 MG/DL (ref 0.7–1.2)
CREAT SERPL-MCNC: 1.4 MG/DL (ref 0.7–1.2)
CREAT SERPL-MCNC: 1.5 MG/DL (ref 0.7–1.2)
CREAT SERPL-MCNC: 1.5 MG/DL (ref 0.7–1.2)
CREAT SERPL-MCNC: 1.6 MG/DL (ref 0.7–1.2)
CREAT SERPL-MCNC: 1.7 MG/DL (ref 0.7–1.2)
CREAT SERPL-MCNC: 1.7 MG/DL (ref 0.7–1.2)
CREAT SERPL-MCNC: 1.9 MG/DL (ref 0.7–1.2)
CREAT SERPL-MCNC: 2.3 MG/DL (ref 0.7–1.2)
CREAT SERPL-MCNC: 2.9 MG/DL (ref 0.7–1.2)
CULTURE, BLOOD 2: NORMAL
DELIVERY SYSTEMS: ABNORMAL
DESCRIPTION BLOOD BANK: NORMAL
DEVICE: ABNORMAL
DOHLE BODIES: ABNORMAL
EKG ATRIAL RATE: 102 BPM
EKG ATRIAL RATE: 109 BPM
EKG ATRIAL RATE: 88 BPM
EKG ATRIAL RATE: 95 BPM
EKG P AXIS: 18 DEGREES
EKG P AXIS: 22 DEGREES
EKG P AXIS: 40 DEGREES
EKG P AXIS: 50 DEGREES
EKG P-R INTERVAL: 152 MS
EKG P-R INTERVAL: 156 MS
EKG P-R INTERVAL: 158 MS
EKG P-R INTERVAL: 164 MS
EKG Q-T INTERVAL: 326 MS
EKG Q-T INTERVAL: 340 MS
EKG Q-T INTERVAL: 356 MS
EKG Q-T INTERVAL: 368 MS
EKG QRS DURATION: 88 MS
EKG QRS DURATION: 90 MS
EKG QRS DURATION: 94 MS
EKG QRS DURATION: 96 MS
EKG QTC CALCULATION (BAZETT): 439 MS
EKG QTC CALCULATION (BAZETT): 443 MS
EKG QTC CALCULATION (BAZETT): 445 MS
EKG QTC CALCULATION (BAZETT): 447 MS
EKG R AXIS: 14 DEGREES
EKG R AXIS: 17 DEGREES
EKG R AXIS: 22 DEGREES
EKG R AXIS: 34 DEGREES
EKG T AXIS: 124 DEGREES
EKG T AXIS: 132 DEGREES
EKG T AXIS: 58 DEGREES
EKG T AXIS: 76 DEGREES
EKG VENTRICULAR RATE: 102 BPM
EKG VENTRICULAR RATE: 109 BPM
EKG VENTRICULAR RATE: 88 BPM
EKG VENTRICULAR RATE: 95 BPM
ELECTROPHORESIS: ABNORMAL
EOSINOPHILS ABSOLUTE: 0 E9/L (ref 0.05–0.5)
EOSINOPHILS ABSOLUTE: 0.01 E9/L (ref 0.05–0.5)
EOSINOPHILS ABSOLUTE: 0.02 E9/L (ref 0.05–0.5)
EOSINOPHILS ABSOLUTE: 0.04 E9/L (ref 0.05–0.5)
EOSINOPHILS ABSOLUTE: 0.05 E9/L (ref 0.05–0.5)
EOSINOPHILS ABSOLUTE: 0.06 E9/L (ref 0.05–0.5)
EOSINOPHILS ABSOLUTE: 0.09 E9/L (ref 0.05–0.5)
EOSINOPHILS RELATIVE PERCENT: 0 % (ref 0–6)
EOSINOPHILS RELATIVE PERCENT: 0.2 % (ref 0–6)
EOSINOPHILS RELATIVE PERCENT: 0.3 % (ref 0–6)
EOSINOPHILS RELATIVE PERCENT: 0.6 % (ref 0–6)
EOSINOPHILS RELATIVE PERCENT: 0.9 % (ref 0–6)
EOSINOPHILS RELATIVE PERCENT: 1.7 % (ref 0–6)
EOSINOPHILS RELATIVE PERCENT: 3 % (ref 0–6)
EPITHELIAL CELLS, UA: ABNORMAL /HPF
EPITHELIAL CELLS, UA: ABNORMAL /HPF
FERRITIN: 3037 NG/ML
FIO2: 3
FOLATE: >20 NG/ML (ref 4.8–24.2)
FOLATE: >20 NG/ML (ref 4.8–24.2)
GAMMA GLOBULIN: 2 G/DL (ref 0.7–1.6)
GFR AFRICAN AMERICAN: 25
GFR AFRICAN AMERICAN: 33
GFR AFRICAN AMERICAN: 41
GFR AFRICAN AMERICAN: 46
GFR AFRICAN AMERICAN: 46
GFR AFRICAN AMERICAN: 50
GFR AFRICAN AMERICAN: 53
GFR AFRICAN AMERICAN: 53
GFR AFRICAN AMERICAN: 58
GFR AFRICAN AMERICAN: >60
GFR NON-AFRICAN AMERICAN: 21 ML/MIN/1.73
GFR NON-AFRICAN AMERICAN: 27 ML/MIN/1.73
GFR NON-AFRICAN AMERICAN: 34 ML/MIN/1.73
GFR NON-AFRICAN AMERICAN: 38 ML/MIN/1.73
GFR NON-AFRICAN AMERICAN: 38 ML/MIN/1.73
GFR NON-AFRICAN AMERICAN: 41 ML/MIN/1.73
GFR NON-AFRICAN AMERICAN: 44 ML/MIN/1.73
GFR NON-AFRICAN AMERICAN: 44 ML/MIN/1.73
GFR NON-AFRICAN AMERICAN: 48 ML/MIN/1.73
GFR NON-AFRICAN AMERICAN: 52 ML/MIN/1.73
GFR NON-AFRICAN AMERICAN: 52 ML/MIN/1.73
GFR NON-AFRICAN AMERICAN: 57 ML/MIN/1.73
GFR NON-AFRICAN AMERICAN: >60 ML/MIN/1.73
GLUCOSE BLD-MCNC: 129 MG/DL (ref 74–99)
GLUCOSE BLD-MCNC: 148 MG/DL (ref 74–99)
GLUCOSE BLD-MCNC: 148 MG/DL (ref 74–99)
GLUCOSE BLD-MCNC: 150 MG/DL (ref 74–99)
GLUCOSE BLD-MCNC: 155 MG/DL (ref 74–99)
GLUCOSE BLD-MCNC: 177 MG/DL (ref 74–99)
GLUCOSE BLD-MCNC: 180 MG/DL (ref 74–99)
GLUCOSE BLD-MCNC: 180 MG/DL (ref 74–99)
GLUCOSE BLD-MCNC: 197 MG/DL (ref 74–99)
GLUCOSE BLD-MCNC: 198 MG/DL (ref 74–99)
GLUCOSE BLD-MCNC: 219 MG/DL (ref 74–99)
GLUCOSE BLD-MCNC: 227 MG/DL (ref 74–99)
GLUCOSE BLD-MCNC: 235 MG/DL (ref 74–99)
GLUCOSE BLD-MCNC: 247 MG/DL (ref 74–99)
GLUCOSE BLD-MCNC: 254 MG/DL (ref 74–99)
GLUCOSE BLD-MCNC: 254 MG/DL (ref 74–99)
GLUCOSE BLD-MCNC: 273 MG/DL (ref 74–99)
GLUCOSE BLD-MCNC: 274 MG/DL (ref 74–99)
GLUCOSE BLD-MCNC: 286 MG/DL (ref 74–99)
GLUCOSE BLD-MCNC: 294 MG/DL (ref 74–99)
GLUCOSE BLD-MCNC: 319 MG/DL (ref 74–99)
GLUCOSE BLD-MCNC: 338 MG/DL (ref 74–99)
GLUCOSE BLD-MCNC: 341 MG/DL (ref 74–99)
GLUCOSE BLD-MCNC: 441 MG/DL (ref 74–99)
GLUCOSE BLD-MCNC: 506 MG/DL (ref 74–99)
GLUCOSE BLD-MCNC: 62 MG/DL (ref 74–99)
GLUCOSE BLD-MCNC: 76 MG/DL (ref 74–99)
GLUCOSE URINE: 100 MG/DL
GLUCOSE URINE: 100 MG/DL
GLUCOSE URINE: 250 MG/DL
HAPTOGLOBIN: 262 MG/DL (ref 30–200)
HAV IGM SER IA-ACNC: NORMAL
HBA1C MFR BLD: 7.6 % (ref 4–5.6)
HBA1C MFR BLD: 8.3 % (ref 4–5.6)
HBA1C MFR BLD: 8.5 % (ref 4–5.6)
HBA1C MFR BLD: 8.6 % (ref 4–5.6)
HBA1C MFR BLD: 8.8 % (ref 4–5.6)
HBA1C MFR BLD: 8.9 % (ref 4–5.6)
HBA1C MFR BLD: 8.9 % (ref 4–5.6)
HBA1C MFR BLD: 9.8 % (ref 4–5.6)
HCO3: 13.5 MMOL/L (ref 22–26)
HCT VFR BLD CALC: 21.5 % (ref 37–54)
HCT VFR BLD CALC: 21.6 % (ref 37–54)
HCT VFR BLD CALC: 21.8 % (ref 37–54)
HCT VFR BLD CALC: 22 % (ref 37–54)
HCT VFR BLD CALC: 22.3 % (ref 37–54)
HCT VFR BLD CALC: 22.7 % (ref 37–54)
HCT VFR BLD CALC: 22.8 % (ref 37–54)
HCT VFR BLD CALC: 22.9 % (ref 37–54)
HCT VFR BLD CALC: 23 % (ref 37–54)
HCT VFR BLD CALC: 23.2 % (ref 37–54)
HCT VFR BLD CALC: 23.5 % (ref 37–54)
HCT VFR BLD CALC: 23.6 % (ref 37–54)
HCT VFR BLD CALC: 24.1 % (ref 37–54)
HCT VFR BLD CALC: 24.1 % (ref 37–54)
HCT VFR BLD CALC: 24.3 % (ref 37–54)
HCT VFR BLD CALC: 24.3 % (ref 37–54)
HCT VFR BLD CALC: 24.4 % (ref 37–54)
HCT VFR BLD CALC: 24.4 % (ref 37–54)
HCT VFR BLD CALC: 24.5 % (ref 37–54)
HCT VFR BLD CALC: 24.5 % (ref 37–54)
HCT VFR BLD CALC: 24.7 % (ref 37–54)
HCT VFR BLD CALC: 25 % (ref 37–54)
HCT VFR BLD CALC: 25 % (ref 37–54)
HCT VFR BLD CALC: 25.4 % (ref 37–54)
HCT VFR BLD CALC: 25.5 % (ref 37–54)
HCT VFR BLD CALC: 25.8 % (ref 37–54)
HCT VFR BLD CALC: 25.8 % (ref 37–54)
HCT VFR BLD CALC: 26.3 % (ref 37–54)
HCT VFR BLD CALC: 26.8 % (ref 37–54)
HCT VFR BLD CALC: 26.8 % (ref 37–54)
HCT VFR BLD CALC: 27.8 % (ref 37–54)
HCT VFR BLD CALC: 28.5 % (ref 37–54)
HCT VFR BLD CALC: 28.6 % (ref 37–54)
HCT VFR BLD CALC: 29.6 % (ref 37–54)
HCT VFR BLD CALC: 32.6 % (ref 37–54)
HCT VFR BLD CALC: 33 % (ref 37–54)
HCT VFR BLD CALC: 33.4 % (ref 37–54)
HCT VFR BLD CALC: 35.1 % (ref 37–54)
HDLC SERPL-MCNC: 25 MG/DL
HDLC SERPL-MCNC: 38 MG/DL
HDLC SERPL-MCNC: 46 MG/DL
HEMOGLOBIN: 10.7 G/DL (ref 12.5–16.5)
HEMOGLOBIN: 10.8 G/DL (ref 12.5–16.5)
HEMOGLOBIN: 11 G/DL (ref 12.5–16.5)
HEMOGLOBIN: 11.5 G/DL (ref 12.5–16.5)
HEMOGLOBIN: 7.1 G/DL (ref 12.5–16.5)
HEMOGLOBIN: 7.2 G/DL (ref 12.5–16.5)
HEMOGLOBIN: 7.3 G/DL (ref 12.5–16.5)
HEMOGLOBIN: 7.3 G/DL (ref 12.5–16.5)
HEMOGLOBIN: 7.4 G/DL (ref 12.5–16.5)
HEMOGLOBIN: 7.4 G/DL (ref 12.5–16.5)
HEMOGLOBIN: 7.6 G/DL (ref 12.5–16.5)
HEMOGLOBIN: 7.6 G/DL (ref 12.5–16.5)
HEMOGLOBIN: 7.7 G/DL (ref 12.5–16.5)
HEMOGLOBIN: 7.7 G/DL (ref 12.5–16.5)
HEMOGLOBIN: 7.9 G/DL (ref 12.5–16.5)
HEMOGLOBIN: 7.9 G/DL (ref 12.5–16.5)
HEMOGLOBIN: 8 G/DL (ref 12.5–16.5)
HEMOGLOBIN: 8.1 G/DL (ref 12.5–16.5)
HEMOGLOBIN: 8.2 G/DL (ref 12.5–16.5)
HEMOGLOBIN: 8.3 G/DL (ref 12.5–16.5)
HEMOGLOBIN: 8.3 G/DL (ref 12.5–16.5)
HEMOGLOBIN: 8.4 G/DL (ref 12.5–16.5)
HEMOGLOBIN: 8.5 G/DL (ref 12.5–16.5)
HEMOGLOBIN: 8.5 G/DL (ref 12.5–16.5)
HEMOGLOBIN: 8.8 G/DL (ref 12.5–16.5)
HEMOGLOBIN: 9 G/DL (ref 12.5–16.5)
HEMOGLOBIN: 9.2 G/DL (ref 12.5–16.5)
HEMOGLOBIN: 9.4 G/DL (ref 12.5–16.5)
HEMOGLOBIN: 9.6 G/DL (ref 12.5–16.5)
HEMOGLOBIN: 9.7 G/DL (ref 12.5–16.5)
HEPARIN PF4 ANTIBODY: 0.09 OD
HEPATITIS B CORE IGM ANTIBODY: NORMAL
HEPATITIS B SURFACE ANTIGEN INTERPRETATION: NORMAL
HEPATITIS C ANTIBODY INTERPRETATION: NORMAL
HOMOCYSTEINE: 7.4 UMOL/L (ref 0–15)
HUMAN METAPNEUMOVIRUS BY PCR: NOT DETECTED
HUMAN METAPNEUMOVIRUS BY PCR: NOT DETECTED
HUMAN RHINOVIRUS/ENTEROVIRUS BY PCR: NOT DETECTED
HUMAN RHINOVIRUS/ENTEROVIRUS BY PCR: NOT DETECTED
HYALINE CASTS: ABNORMAL /LPF (ref 0–2)
HYPERSEGMENTED NEUTROPHILS: ABNORMAL
HYPOCHROMIA: ABNORMAL
IGE: 273 KU/L
IGE: 686 IU/ML
IGE: 908 KU/L
IMMATURE GRANULOCYTES #: 0.02 E9/L
IMMATURE GRANULOCYTES #: 0.03 E9/L
IMMATURE GRANULOCYTES #: 0.04 E9/L
IMMATURE GRANULOCYTES %: 0.6 % (ref 0–5)
IMMATURE GRANULOCYTES %: 0.8 % (ref 0–5)
IMMATURE GRANULOCYTES %: 0.9 % (ref 0–5)
IMMATURE RETIC FRACT: 17.5 % (ref 2.3–13.4)
INFLUENZA A BY PCR: NOT DETECTED
INFLUENZA A BY PCR: NOT DETECTED
INFLUENZA B BY PCR: NOT DETECTED
INFLUENZA B BY PCR: NOT DETECTED
INR BLD: 1.2
INR BLD: 1.9
INR BLD: 1.9
INR BLD: 2
INR BLD: 2.1
INR BLD: 2.3
IRON SATURATION: 87 % (ref 20–55)
IRON: 122 MCG/DL (ref 59–158)
KETONES, URINE: 15 MG/DL
KETONES, URINE: NEGATIVE MG/DL
KETONES, URINE: NEGATIVE MG/DL
LACTATE DEHYDROGENASE: 220 U/L (ref 135–225)
LACTATE DEHYDROGENASE: 385 U/L (ref 135–225)
LACTATE DEHYDROGENASE: 470 U/L (ref 135–225)
LACTIC ACID, SEPSIS: 14.9 MMOL/L (ref 0.5–1.9)
LACTIC ACID: 2.1 MMOL/L (ref 0.5–2.2)
LACTIC ACID: 2.5 MMOL/L (ref 0.5–2.2)
LACTIC ACID: 2.6 MMOL/L (ref 0.5–2.2)
LACTIC ACID: 3.1 MMOL/L (ref 0.5–2.2)
LACTIC ACID: 3.3 MMOL/L (ref 0.5–2.2)
LACTIC ACID: 3.5 MMOL/L (ref 0.5–2.2)
LACTIC ACID: 3.6 MMOL/L (ref 0.5–2.2)
LACTIC ACID: 3.9 MMOL/L (ref 0.5–2.2)
LDL CHOLESTEROL CALCULATED: 26 MG/DL (ref 0–99)
LDL CHOLESTEROL CALCULATED: 31 MG/DL (ref 0–99)
LDL CHOLESTEROL CALCULATED: 49 MG/DL (ref 0–99)
LEUKOCYTE ESTERASE, URINE: NEGATIVE
LV EF: 55 %
LV EF: 65 %
LVEF MODALITY: NORMAL
LVEF MODALITY: NORMAL
LYMPHOCYTES ABSOLUTE: 0.65 E9/L (ref 1.5–4)
LYMPHOCYTES ABSOLUTE: 0.71 E9/L (ref 1.5–4)
LYMPHOCYTES ABSOLUTE: 0.73 E9/L (ref 1.5–4)
LYMPHOCYTES ABSOLUTE: 0.78 E9/L (ref 1.5–4)
LYMPHOCYTES ABSOLUTE: 0.88 E9/L (ref 1.5–4)
LYMPHOCYTES ABSOLUTE: 0.99 E9/L (ref 1.5–4)
LYMPHOCYTES ABSOLUTE: 1.02 E9/L (ref 1.5–4)
LYMPHOCYTES ABSOLUTE: 1.03 E9/L (ref 1.5–4)
LYMPHOCYTES ABSOLUTE: 1.04 E9/L (ref 1.5–4)
LYMPHOCYTES ABSOLUTE: 1.04 E9/L (ref 1.5–4)
LYMPHOCYTES ABSOLUTE: 1.1 E9/L (ref 1.5–4)
LYMPHOCYTES ABSOLUTE: 1.13 E9/L (ref 1.5–4)
LYMPHOCYTES ABSOLUTE: 1.13 E9/L (ref 1.5–4)
LYMPHOCYTES ABSOLUTE: 1.17 E9/L (ref 1.5–4)
LYMPHOCYTES ABSOLUTE: 1.18 E9/L (ref 1.5–4)
LYMPHOCYTES ABSOLUTE: 1.26 E9/L (ref 1.5–4)
LYMPHOCYTES ABSOLUTE: 1.33 E9/L (ref 1.5–4)
LYMPHOCYTES ABSOLUTE: 1.35 E9/L (ref 1.5–4)
LYMPHOCYTES ABSOLUTE: 1.45 E9/L (ref 1.5–4)
LYMPHOCYTES ABSOLUTE: 1.48 E9/L (ref 1.5–4)
LYMPHOCYTES ABSOLUTE: 1.55 E9/L (ref 1.5–4)
LYMPHOCYTES ABSOLUTE: 1.56 E9/L (ref 1.5–4)
LYMPHOCYTES ABSOLUTE: 1.6 E9/L (ref 1.5–4)
LYMPHOCYTES ABSOLUTE: 1.63 E9/L (ref 1.5–4)
LYMPHOCYTES ABSOLUTE: 1.71 E9/L (ref 1.5–4)
LYMPHOCYTES ABSOLUTE: 1.85 E9/L (ref 1.5–4)
LYMPHOCYTES ABSOLUTE: 1.91 E9/L (ref 1.5–4)
LYMPHOCYTES ABSOLUTE: 1.93 E9/L (ref 1.5–4)
LYMPHOCYTES ABSOLUTE: 2.81 E9/L (ref 1.5–4)
LYMPHOCYTES RELATIVE PERCENT: 13.2 % (ref 20–42)
LYMPHOCYTES RELATIVE PERCENT: 15.8 % (ref 20–42)
LYMPHOCYTES RELATIVE PERCENT: 16.7 % (ref 20–42)
LYMPHOCYTES RELATIVE PERCENT: 17 % (ref 20–42)
LYMPHOCYTES RELATIVE PERCENT: 18 % (ref 20–42)
LYMPHOCYTES RELATIVE PERCENT: 18 % (ref 20–42)
LYMPHOCYTES RELATIVE PERCENT: 21.2 % (ref 20–42)
LYMPHOCYTES RELATIVE PERCENT: 22 % (ref 20–42)
LYMPHOCYTES RELATIVE PERCENT: 24 % (ref 20–42)
LYMPHOCYTES RELATIVE PERCENT: 24.3 % (ref 20–42)
LYMPHOCYTES RELATIVE PERCENT: 28.3 % (ref 20–42)
LYMPHOCYTES RELATIVE PERCENT: 29.2 % (ref 20–42)
LYMPHOCYTES RELATIVE PERCENT: 29.4 % (ref 20–42)
LYMPHOCYTES RELATIVE PERCENT: 30 % (ref 20–42)
LYMPHOCYTES RELATIVE PERCENT: 33.9 % (ref 20–42)
LYMPHOCYTES RELATIVE PERCENT: 42.1 % (ref 20–42)
LYMPHOCYTES RELATIVE PERCENT: 44 % (ref 20–42)
LYMPHOCYTES RELATIVE PERCENT: 45 % (ref 20–42)
LYMPHOCYTES RELATIVE PERCENT: 46 % (ref 20–42)
LYMPHOCYTES RELATIVE PERCENT: 46.5 % (ref 20–42)
LYMPHOCYTES RELATIVE PERCENT: 47 % (ref 20–42)
LYMPHOCYTES RELATIVE PERCENT: 50.1 % (ref 20–42)
LYMPHOCYTES RELATIVE PERCENT: 51 % (ref 20–42)
LYMPHOCYTES RELATIVE PERCENT: 51.3 % (ref 20–42)
LYMPHOCYTES RELATIVE PERCENT: 52.6 % (ref 20–42)
LYMPHOCYTES RELATIVE PERCENT: 52.7 % (ref 20–42)
LYMPHOCYTES RELATIVE PERCENT: 53.2 % (ref 20–42)
LYMPHOCYTES RELATIVE PERCENT: 55.3 % (ref 20–42)
LYMPHOCYTES RELATIVE PERCENT: 55.7 % (ref 20–42)
Lab: NORMAL
MAGNESIUM: 1.7 MG/DL (ref 1.6–2.6)
MAGNESIUM: 1.8 MG/DL (ref 1.6–2.6)
MAGNESIUM: 1.8 MG/DL (ref 1.6–2.6)
MAGNESIUM: 1.9 MG/DL (ref 1.6–2.6)
MCH RBC QN AUTO: 35.2 PG (ref 26–35)
MCH RBC QN AUTO: 35.3 PG (ref 26–35)
MCH RBC QN AUTO: 35.4 PG (ref 26–35)
MCH RBC QN AUTO: 35.5 PG (ref 26–35)
MCH RBC QN AUTO: 35.6 PG (ref 26–35)
MCH RBC QN AUTO: 35.6 PG (ref 26–35)
MCH RBC QN AUTO: 35.7 PG (ref 26–35)
MCH RBC QN AUTO: 35.8 PG (ref 26–35)
MCH RBC QN AUTO: 35.8 PG (ref 26–35)
MCH RBC QN AUTO: 36 PG (ref 26–35)
MCH RBC QN AUTO: 36.1 PG (ref 26–35)
MCH RBC QN AUTO: 36.4 PG (ref 26–35)
MCH RBC QN AUTO: 36.5 PG (ref 26–35)
MCH RBC QN AUTO: 36.7 PG (ref 26–35)
MCH RBC QN AUTO: 37.4 PG (ref 26–35)
MCH RBC QN AUTO: 37.4 PG (ref 26–35)
MCH RBC QN AUTO: 37.6 PG (ref 26–35)
MCH RBC QN AUTO: 37.7 PG (ref 26–35)
MCH RBC QN AUTO: 37.8 PG (ref 26–35)
MCH RBC QN AUTO: 38.2 PG (ref 26–35)
MCH RBC QN AUTO: 38.6 PG (ref 26–35)
MCH RBC QN AUTO: 40 PG (ref 26–35)
MCH RBC QN AUTO: 40 PG (ref 26–35)
MCHC RBC AUTO-ENTMCNC: 31.7 % (ref 32–34.5)
MCHC RBC AUTO-ENTMCNC: 32.2 % (ref 32–34.5)
MCHC RBC AUTO-ENTMCNC: 32.2 % (ref 32–34.5)
MCHC RBC AUTO-ENTMCNC: 32.4 % (ref 32–34.5)
MCHC RBC AUTO-ENTMCNC: 32.8 % (ref 32–34.5)
MCHC RBC AUTO-ENTMCNC: 32.9 % (ref 32–34.5)
MCHC RBC AUTO-ENTMCNC: 33.1 % (ref 32–34.5)
MCHC RBC AUTO-ENTMCNC: 33.2 % (ref 32–34.5)
MCHC RBC AUTO-ENTMCNC: 33.2 % (ref 32–34.5)
MCHC RBC AUTO-ENTMCNC: 33.5 % (ref 32–34.5)
MCHC RBC AUTO-ENTMCNC: 33.6 % (ref 32–34.5)
MCHC RBC AUTO-ENTMCNC: 33.6 % (ref 32–34.5)
MCHC RBC AUTO-ENTMCNC: 33.7 % (ref 32–34.5)
MCHC RBC AUTO-ENTMCNC: 33.8 % (ref 32–34.5)
MCHC RBC AUTO-ENTMCNC: 33.8 % (ref 32–34.5)
MCHC RBC AUTO-ENTMCNC: 33.9 % (ref 32–34.5)
MCHC RBC AUTO-ENTMCNC: 34.3 % (ref 32–34.5)
MCHC RBC AUTO-ENTMCNC: 35.3 % (ref 32–34.5)
MCV RBC AUTO: 102.1 FL (ref 80–99.9)
MCV RBC AUTO: 104.1 FL (ref 80–99.9)
MCV RBC AUTO: 106.1 FL (ref 80–99.9)
MCV RBC AUTO: 106.5 FL (ref 80–99.9)
MCV RBC AUTO: 107 FL (ref 80–99.9)
MCV RBC AUTO: 107.5 FL (ref 80–99.9)
MCV RBC AUTO: 107.8 FL (ref 80–99.9)
MCV RBC AUTO: 107.9 FL (ref 80–99.9)
MCV RBC AUTO: 108 FL (ref 80–99.9)
MCV RBC AUTO: 108.7 FL (ref 80–99.9)
MCV RBC AUTO: 109.3 FL (ref 80–99.9)
MCV RBC AUTO: 110 FL (ref 80–99.9)
MCV RBC AUTO: 110.1 FL (ref 80–99.9)
MCV RBC AUTO: 110.6 FL (ref 80–99.9)
MCV RBC AUTO: 110.7 FL (ref 80–99.9)
MCV RBC AUTO: 110.7 FL (ref 80–99.9)
MCV RBC AUTO: 110.9 FL (ref 80–99.9)
MCV RBC AUTO: 110.9 FL (ref 80–99.9)
MCV RBC AUTO: 111.2 FL (ref 80–99.9)
MCV RBC AUTO: 111.8 FL (ref 80–99.9)
MCV RBC AUTO: 111.9 FL (ref 80–99.9)
MCV RBC AUTO: 112.4 FL (ref 80–99.9)
MCV RBC AUTO: 112.6 FL (ref 80–99.9)
MCV RBC AUTO: 113 FL (ref 80–99.9)
MCV RBC AUTO: 113.5 FL (ref 80–99.9)
MCV RBC AUTO: 115.4 FL (ref 80–99.9)
MCV RBC AUTO: 117.9 FL (ref 80–99.9)
MCV RBC AUTO: 119.1 FL (ref 80–99.9)
MCV RBC AUTO: 122.1 FL (ref 80–99.9)
METAMYELOCYTES RELATIVE PERCENT: 0.9 % (ref 0–1)
METAMYELOCYTES RELATIVE PERCENT: 1.8 % (ref 0–1)
METAMYELOCYTES RELATIVE PERCENT: 4.4 % (ref 0–1)
METER GLUCOSE: 101 MG/DL (ref 74–99)
METER GLUCOSE: 108 MG/DL (ref 74–99)
METER GLUCOSE: 110 MG/DL (ref 74–99)
METER GLUCOSE: 112 MG/DL (ref 74–99)
METER GLUCOSE: 112 MG/DL (ref 74–99)
METER GLUCOSE: 134 MG/DL (ref 74–99)
METER GLUCOSE: 137 MG/DL (ref 74–99)
METER GLUCOSE: 143 MG/DL (ref 74–99)
METER GLUCOSE: 148 MG/DL (ref 74–99)
METER GLUCOSE: 148 MG/DL (ref 74–99)
METER GLUCOSE: 153 MG/DL (ref 74–99)
METER GLUCOSE: 155 MG/DL (ref 74–99)
METER GLUCOSE: 156 MG/DL (ref 74–99)
METER GLUCOSE: 158 MG/DL (ref 74–99)
METER GLUCOSE: 163 MG/DL (ref 74–99)
METER GLUCOSE: 175 MG/DL (ref 74–99)
METER GLUCOSE: 183 MG/DL (ref 74–99)
METER GLUCOSE: 189 MG/DL (ref 74–99)
METER GLUCOSE: 194 MG/DL (ref 74–99)
METER GLUCOSE: 199 MG/DL (ref 74–99)
METER GLUCOSE: 220 MG/DL (ref 74–99)
METER GLUCOSE: 223 MG/DL (ref 74–99)
METER GLUCOSE: 223 MG/DL (ref 74–99)
METER GLUCOSE: 231 MG/DL (ref 74–99)
METER GLUCOSE: 239 MG/DL (ref 74–99)
METER GLUCOSE: 239 MG/DL (ref 74–99)
METER GLUCOSE: 243 MG/DL (ref 74–99)
METER GLUCOSE: 244 MG/DL (ref 74–99)
METER GLUCOSE: 249 MG/DL (ref 74–99)
METER GLUCOSE: 250 MG/DL (ref 74–99)
METER GLUCOSE: 261 MG/DL (ref 74–99)
METER GLUCOSE: 262 MG/DL (ref 74–99)
METER GLUCOSE: 265 MG/DL (ref 74–99)
METER GLUCOSE: 265 MG/DL (ref 74–99)
METER GLUCOSE: 267 MG/DL (ref 74–99)
METER GLUCOSE: 275 MG/DL (ref 74–99)
METER GLUCOSE: 276 MG/DL (ref 74–99)
METER GLUCOSE: 292 MG/DL (ref 74–99)
METER GLUCOSE: 296 MG/DL (ref 74–99)
METER GLUCOSE: 307 MG/DL (ref 74–99)
METER GLUCOSE: 308 MG/DL (ref 74–99)
METER GLUCOSE: 313 MG/DL (ref 74–99)
METER GLUCOSE: 323 MG/DL (ref 74–99)
METER GLUCOSE: 331 MG/DL (ref 74–99)
METER GLUCOSE: 337 MG/DL (ref 74–99)
METER GLUCOSE: 339 MG/DL (ref 74–99)
METER GLUCOSE: 346 MG/DL (ref 74–99)
METER GLUCOSE: 347 MG/DL (ref 74–99)
METER GLUCOSE: 347 MG/DL (ref 74–99)
METER GLUCOSE: 352 MG/DL (ref 74–99)
METER GLUCOSE: 359 MG/DL (ref 74–99)
METER GLUCOSE: 361 MG/DL (ref 74–99)
METER GLUCOSE: 368 MG/DL (ref 74–99)
METER GLUCOSE: 369 MG/DL (ref 74–99)
METER GLUCOSE: 381 MG/DL (ref 74–99)
METER GLUCOSE: 385 MG/DL (ref 74–99)
METER GLUCOSE: 412 MG/DL (ref 74–99)
METER GLUCOSE: 446 MG/DL (ref 74–99)
METER GLUCOSE: 451 MG/DL (ref 74–99)
METER GLUCOSE: 70 MG/DL (ref 74–99)
METER GLUCOSE: 81 MG/DL (ref 74–99)
METER GLUCOSE: 88 MG/DL (ref 74–99)
METER GLUCOSE: 92 MG/DL (ref 74–99)
METER GLUCOSE: >500 MG/DL (ref 74–99)
METHYLMALONIC ACID: 0.24 UMOL/L (ref 0–0.4)
MICROPOLYSPORA FAENI: NORMAL
MONOCYTES ABSOLUTE: 0.12 E9/L (ref 0.1–0.95)
MONOCYTES ABSOLUTE: 0.14 E9/L (ref 0.1–0.95)
MONOCYTES ABSOLUTE: 0.17 E9/L (ref 0.1–0.95)
MONOCYTES ABSOLUTE: 0.22 E9/L (ref 0.1–0.95)
MONOCYTES ABSOLUTE: 0.23 E9/L (ref 0.1–0.95)
MONOCYTES ABSOLUTE: 0.24 E9/L (ref 0.1–0.95)
MONOCYTES ABSOLUTE: 0.25 E9/L (ref 0.1–0.95)
MONOCYTES ABSOLUTE: 0.26 E9/L (ref 0.1–0.95)
MONOCYTES ABSOLUTE: 0.27 E9/L (ref 0.1–0.95)
MONOCYTES ABSOLUTE: 0.32 E9/L (ref 0.1–0.95)
MONOCYTES ABSOLUTE: 0.36 E9/L (ref 0.1–0.95)
MONOCYTES ABSOLUTE: 0.37 E9/L (ref 0.1–0.95)
MONOCYTES ABSOLUTE: 0.37 E9/L (ref 0.1–0.95)
MONOCYTES ABSOLUTE: 0.38 E9/L (ref 0.1–0.95)
MONOCYTES ABSOLUTE: 0.39 E9/L (ref 0.1–0.95)
MONOCYTES ABSOLUTE: 0.4 E9/L (ref 0.1–0.95)
MONOCYTES ABSOLUTE: 0.44 E9/L (ref 0.1–0.95)
MONOCYTES ABSOLUTE: 0.47 E9/L (ref 0.1–0.95)
MONOCYTES ABSOLUTE: 0.5 E9/L (ref 0.1–0.95)
MONOCYTES ABSOLUTE: 0.51 E9/L (ref 0.1–0.95)
MONOCYTES ABSOLUTE: 0.54 E9/L (ref 0.1–0.95)
MONOCYTES ABSOLUTE: 0.6 E9/L (ref 0.1–0.95)
MONOCYTES ABSOLUTE: 0.61 E9/L (ref 0.1–0.95)
MONOCYTES ABSOLUTE: 0.68 E9/L (ref 0.1–0.95)
MONOCYTES ABSOLUTE: 0.68 E9/L (ref 0.1–0.95)
MONOCYTES ABSOLUTE: 0.7 E9/L (ref 0.1–0.95)
MONOCYTES ABSOLUTE: 0.7 E9/L (ref 0.1–0.95)
MONOCYTES ABSOLUTE: 0.77 E9/L (ref 0.1–0.95)
MONOCYTES ABSOLUTE: 1.25 E9/L (ref 0.1–0.95)
MONOCYTES RELATIVE PERCENT: 10 % (ref 2–12)
MONOCYTES RELATIVE PERCENT: 10.6 % (ref 2–12)
MONOCYTES RELATIVE PERCENT: 11 % (ref 2–12)
MONOCYTES RELATIVE PERCENT: 11.3 % (ref 2–12)
MONOCYTES RELATIVE PERCENT: 11.4 % (ref 2–12)
MONOCYTES RELATIVE PERCENT: 11.4 % (ref 2–12)
MONOCYTES RELATIVE PERCENT: 12 % (ref 2–12)
MONOCYTES RELATIVE PERCENT: 12 % (ref 2–12)
MONOCYTES RELATIVE PERCENT: 12.9 % (ref 2–12)
MONOCYTES RELATIVE PERCENT: 13 % (ref 2–12)
MONOCYTES RELATIVE PERCENT: 13 % (ref 2–12)
MONOCYTES RELATIVE PERCENT: 14 % (ref 2–12)
MONOCYTES RELATIVE PERCENT: 14 % (ref 2–12)
MONOCYTES RELATIVE PERCENT: 15 % (ref 2–12)
MONOCYTES RELATIVE PERCENT: 15 % (ref 2–12)
MONOCYTES RELATIVE PERCENT: 15.8 % (ref 2–12)
MONOCYTES RELATIVE PERCENT: 16.5 % (ref 2–12)
MONOCYTES RELATIVE PERCENT: 18 % (ref 2–12)
MONOCYTES RELATIVE PERCENT: 20.5 % (ref 2–12)
MONOCYTES RELATIVE PERCENT: 24.8 % (ref 2–12)
MONOCYTES RELATIVE PERCENT: 3 % (ref 2–12)
MONOCYTES RELATIVE PERCENT: 3.5 % (ref 2–12)
MONOCYTES RELATIVE PERCENT: 4 % (ref 2–12)
MONOCYTES RELATIVE PERCENT: 4.9 % (ref 2–12)
MONOCYTES RELATIVE PERCENT: 5.3 % (ref 2–12)
MONOCYTES RELATIVE PERCENT: 6.1 % (ref 2–12)
MONOCYTES RELATIVE PERCENT: 7 % (ref 2–12)
MONOCYTES RELATIVE PERCENT: 7.1 % (ref 2–12)
MONOCYTES RELATIVE PERCENT: 9.7 % (ref 2–12)
MRSA CULTURE ONLY: NORMAL
MYCOPLASMA PNEUMONIAE BY PCR: NOT DETECTED
MYCOPLASMA PNEUMONIAE BY PCR: NOT DETECTED
MYELOCYTE PERCENT: 0.9 % (ref 0–0)
MYELOCYTE PERCENT: 1 % (ref 0–0)
MYELOCYTE PERCENT: 1 % (ref 0–0)
MYELOCYTE PERCENT: 1.8 % (ref 0–0)
MYELOCYTE PERCENT: 2 % (ref 0–0)
MYELOPEROXIDASE AB: 6 AU/ML (ref 0–19)
NEUTROPHILS ABSOLUTE: 0.63 E9/L (ref 1.8–7.3)
NEUTROPHILS ABSOLUTE: 0.75 E9/L (ref 1.8–7.3)
NEUTROPHILS ABSOLUTE: 0.87 E9/L (ref 1.8–7.3)
NEUTROPHILS ABSOLUTE: 0.91 E9/L (ref 1.8–7.3)
NEUTROPHILS ABSOLUTE: 0.94 E9/L (ref 1.8–7.3)
NEUTROPHILS ABSOLUTE: 0.96 E9/L (ref 1.8–7.3)
NEUTROPHILS ABSOLUTE: 1.16 E9/L (ref 1.8–7.3)
NEUTROPHILS ABSOLUTE: 1.22 E9/L (ref 1.8–7.3)
NEUTROPHILS ABSOLUTE: 1.29 E9/L (ref 1.8–7.3)
NEUTROPHILS ABSOLUTE: 1.3 E9/L (ref 1.8–7.3)
NEUTROPHILS ABSOLUTE: 1.37 E9/L (ref 1.8–7.3)
NEUTROPHILS ABSOLUTE: 1.46 E9/L (ref 1.8–7.3)
NEUTROPHILS ABSOLUTE: 1.63 E9/L (ref 1.8–7.3)
NEUTROPHILS ABSOLUTE: 1.66 E9/L (ref 1.8–7.3)
NEUTROPHILS ABSOLUTE: 1.82 E9/L (ref 1.8–7.3)
NEUTROPHILS ABSOLUTE: 1.85 E9/L (ref 1.8–7.3)
NEUTROPHILS ABSOLUTE: 2.12 E9/L (ref 1.8–7.3)
NEUTROPHILS ABSOLUTE: 2.25 E9/L (ref 1.8–7.3)
NEUTROPHILS ABSOLUTE: 2.49 E9/L (ref 1.8–7.3)
NEUTROPHILS ABSOLUTE: 2.9 E9/L (ref 1.8–7.3)
NEUTROPHILS ABSOLUTE: 2.93 E9/L (ref 1.8–7.3)
NEUTROPHILS ABSOLUTE: 3.01 E9/L (ref 1.8–7.3)
NEUTROPHILS ABSOLUTE: 3.1 E9/L (ref 1.8–7.3)
NEUTROPHILS ABSOLUTE: 3.64 E9/L (ref 1.8–7.3)
NEUTROPHILS ABSOLUTE: 4.06 E9/L (ref 1.8–7.3)
NEUTROPHILS ABSOLUTE: 4.14 E9/L (ref 1.8–7.3)
NEUTROPHILS ABSOLUTE: 4.48 E9/L (ref 1.8–7.3)
NEUTROPHILS ABSOLUTE: 5.18 E9/L (ref 1.8–7.3)
NEUTROPHILS ABSOLUTE: 5.93 E9/L (ref 1.8–7.3)
NEUTROPHILS RELATIVE PERCENT: 28.2 % (ref 43–80)
NEUTROPHILS RELATIVE PERCENT: 30.4 % (ref 43–80)
NEUTROPHILS RELATIVE PERCENT: 32.5 % (ref 43–80)
NEUTROPHILS RELATIVE PERCENT: 36 % (ref 43–80)
NEUTROPHILS RELATIVE PERCENT: 36 % (ref 43–80)
NEUTROPHILS RELATIVE PERCENT: 37 % (ref 43–80)
NEUTROPHILS RELATIVE PERCENT: 37.2 % (ref 43–80)
NEUTROPHILS RELATIVE PERCENT: 38.4 % (ref 43–80)
NEUTROPHILS RELATIVE PERCENT: 39.3 % (ref 43–80)
NEUTROPHILS RELATIVE PERCENT: 41 % (ref 43–80)
NEUTROPHILS RELATIVE PERCENT: 42 % (ref 43–80)
NEUTROPHILS RELATIVE PERCENT: 42 % (ref 43–80)
NEUTROPHILS RELATIVE PERCENT: 43 % (ref 43–80)
NEUTROPHILS RELATIVE PERCENT: 43 % (ref 43–80)
NEUTROPHILS RELATIVE PERCENT: 44.2 % (ref 43–80)
NEUTROPHILS RELATIVE PERCENT: 45.2 % (ref 43–80)
NEUTROPHILS RELATIVE PERCENT: 55 % (ref 43–80)
NEUTROPHILS RELATIVE PERCENT: 56.9 % (ref 43–80)
NEUTROPHILS RELATIVE PERCENT: 57 % (ref 43–80)
NEUTROPHILS RELATIVE PERCENT: 62.6 % (ref 43–80)
NEUTROPHILS RELATIVE PERCENT: 64.6 % (ref 43–80)
NEUTROPHILS RELATIVE PERCENT: 66 % (ref 43–80)
NEUTROPHILS RELATIVE PERCENT: 69 % (ref 43–80)
NEUTROPHILS RELATIVE PERCENT: 70 % (ref 43–80)
NEUTROPHILS RELATIVE PERCENT: 70 % (ref 43–80)
NEUTROPHILS RELATIVE PERCENT: 72.8 % (ref 43–80)
NEUTROPHILS RELATIVE PERCENT: 79 % (ref 43–80)
NEUTROPHILS RELATIVE PERCENT: 79.8 % (ref 43–80)
NEUTROPHILS RELATIVE PERCENT: 80.7 % (ref 43–80)
NITRITE, URINE: NEGATIVE
NUCLEATED RED BLOOD CELLS: 0.9 /100 WBC
NUCLEATED RED BLOOD CELLS: 1 /100 WBC
NUCLEATED RED BLOOD CELLS: 1.7 /100 WBC
NUCLEATED RED BLOOD CELLS: 1.8 /100 WBC
NUCLEATED RED BLOOD CELLS: 1.8 /100 WBC
NUCLEATED RED BLOOD CELLS: 2 /100 WBC
NUCLEATED RED BLOOD CELLS: 2 /100 WBC
NUCLEATED RED BLOOD CELLS: 2.6 /100 WBC
NUCLEATED RED BLOOD CELLS: 2.6 /100 WBC
NUCLEATED RED BLOOD CELLS: 2.7 /100 WBC
NUCLEATED RED BLOOD CELLS: 3 /100 WBC
NUCLEATED RED BLOOD CELLS: 3.5 /100 WBC
NUCLEATED RED BLOOD CELLS: 3.5 /100 WBC
NUCLEATED RED BLOOD CELLS: 4.4 /100 WBC
NUCLEATED RED BLOOD CELLS: 40 /100 WBC
NUCLEATED RED BLOOD CELLS: 5 /100 WBC
NUCLEATED RED BLOOD CELLS: 5.3 /100 WBC
NUCLEATED RED BLOOD CELLS: 7 /100 WBC
NUCLEATED RED BLOOD CELLS: 8 /100 WBC
O2 SATURATION: 96.8 % (ref 92–98.5)
OCCULT BLOOD DIAGNOSTIC: NORMAL
OCCULT BLOOD SCREENING: NORMAL
OPERATOR ID: 3
OVALOCYTES: ABNORMAL
PARAINFLUENZA VIRUS 1 BY PCR: NOT DETECTED
PARAINFLUENZA VIRUS 1 BY PCR: NOT DETECTED
PARAINFLUENZA VIRUS 2 BY PCR: NOT DETECTED
PARAINFLUENZA VIRUS 2 BY PCR: NOT DETECTED
PARAINFLUENZA VIRUS 3 BY PCR: NOT DETECTED
PARAINFLUENZA VIRUS 3 BY PCR: NOT DETECTED
PARAINFLUENZA VIRUS 4 BY PCR: NOT DETECTED
PARAINFLUENZA VIRUS 4 BY PCR: NOT DETECTED
PATHOLOGIST REVIEW: NORMAL
PATHOLOGIST REVIEW: NORMAL
PCO2 37: 26 MMHG (ref 35–45)
PDW BLD-RTO: 13.2 FL (ref 11.5–15)
PDW BLD-RTO: 13.3 FL (ref 11.5–15)
PDW BLD-RTO: 14 FL (ref 11.5–15)
PDW BLD-RTO: 15 FL (ref 11.5–15)
PDW BLD-RTO: 16.2 FL (ref 11.5–15)
PDW BLD-RTO: 16.5 FL (ref 11.5–15)
PDW BLD-RTO: 17.3 FL (ref 11.5–15)
PDW BLD-RTO: 17.3 FL (ref 11.5–15)
PDW BLD-RTO: 17.8 FL (ref 11.5–15)
PDW BLD-RTO: 18.2 FL (ref 11.5–15)
PDW BLD-RTO: 20.4 FL (ref 11.5–15)
PDW BLD-RTO: 20.6 FL (ref 11.5–15)
PDW BLD-RTO: 20.7 FL (ref 11.5–15)
PDW BLD-RTO: 20.9 FL (ref 11.5–15)
PDW BLD-RTO: 21.2 FL (ref 11.5–15)
PDW BLD-RTO: 21.2 FL (ref 11.5–15)
PDW BLD-RTO: 21.4 FL (ref 11.5–15)
PDW BLD-RTO: 21.4 FL (ref 11.5–15)
PDW BLD-RTO: 21.8 FL (ref 11.5–15)
PDW BLD-RTO: 21.8 FL (ref 11.5–15)
PDW BLD-RTO: 22 FL (ref 11.5–15)
PDW BLD-RTO: 22.1 FL (ref 11.5–15)
PDW BLD-RTO: 22.2 FL (ref 11.5–15)
PDW BLD-RTO: 22.9 FL (ref 11.5–15)
PDW BLD-RTO: 22.9 FL (ref 11.5–15)
PDW BLD-RTO: 23.2 FL (ref 11.5–15)
PDW BLD-RTO: 23.4 FL (ref 11.5–15)
PDW BLD-RTO: ABNORMAL FL (ref 11.5–15)
PDW BLD-RTO: ABNORMAL FL (ref 11.5–15)
PH 37: 7.32 (ref 7.35–7.45)
PH UA: 5.5 (ref 5–9)
PH UA: 6 (ref 5–9)
PH UA: 7 (ref 5–9)
PHOSPHORUS: 1.6 MG/DL (ref 2.5–4.5)
PHOSPHORUS: 1.8 MG/DL (ref 2.5–4.5)
PHOSPHORUS: 2.3 MG/DL (ref 2.5–4.5)
PHOSPHORUS: 2.6 MG/DL (ref 2.5–4.5)
PHOSPHORUS: 2.8 MG/DL (ref 2.5–4.5)
PHOSPHORUS: 3.3 MG/DL (ref 2.5–4.5)
PIGEON SERUM ABS: NORMAL
PLATELET # BLD: 107 E9/L (ref 130–450)
PLATELET # BLD: 111 E9/L (ref 130–450)
PLATELET # BLD: 115 E9/L (ref 130–450)
PLATELET # BLD: 118 E9/L (ref 130–450)
PLATELET # BLD: 130 E9/L (ref 130–450)
PLATELET # BLD: 134 E9/L (ref 130–450)
PLATELET # BLD: 136 E9/L (ref 130–450)
PLATELET # BLD: 138 E9/L (ref 130–450)
PLATELET # BLD: 142 E9/L (ref 130–450)
PLATELET # BLD: 144 E9/L (ref 130–450)
PLATELET # BLD: 146 E9/L (ref 130–450)
PLATELET # BLD: 147 E9/L (ref 130–450)
PLATELET # BLD: 147 E9/L (ref 130–450)
PLATELET # BLD: 150 E9/L (ref 130–450)
PLATELET # BLD: 161 E9/L (ref 130–450)
PLATELET # BLD: 191 E9/L (ref 130–450)
PLATELET # BLD: 191 E9/L (ref 130–450)
PLATELET # BLD: 197 E9/L (ref 130–450)
PLATELET # BLD: 204 E9/L (ref 130–450)
PLATELET # BLD: 242 E9/L (ref 130–450)
PLATELET # BLD: 242 E9/L (ref 130–450)
PLATELET # BLD: 57 E9/L (ref 130–450)
PLATELET # BLD: 73 E9/L (ref 130–450)
PLATELET # BLD: 74 E9/L (ref 130–450)
PLATELET # BLD: 80 E9/L (ref 130–450)
PLATELET # BLD: 90 E9/L (ref 130–450)
PLATELET # BLD: 92 E9/L (ref 130–450)
PLATELET # BLD: 93 E9/L (ref 130–450)
PLATELET # BLD: 95 E9/L (ref 130–450)
PLATELET CONFIRMATION: NORMAL
PMV BLD AUTO: 10.2 FL (ref 7–12)
PMV BLD AUTO: 10.4 FL (ref 7–12)
PMV BLD AUTO: 10.4 FL (ref 7–12)
PMV BLD AUTO: 10.5 FL (ref 7–12)
PMV BLD AUTO: 10.6 FL (ref 7–12)
PMV BLD AUTO: 10.6 FL (ref 7–12)
PMV BLD AUTO: 10.7 FL (ref 7–12)
PMV BLD AUTO: 10.8 FL (ref 7–12)
PMV BLD AUTO: 10.8 FL (ref 7–12)
PMV BLD AUTO: 10.9 FL (ref 7–12)
PMV BLD AUTO: 11 FL (ref 7–12)
PMV BLD AUTO: 11.1 FL (ref 7–12)
PMV BLD AUTO: 11.4 FL (ref 7–12)
PMV BLD AUTO: 11.6 FL (ref 7–12)
PMV BLD AUTO: 11.7 FL (ref 7–12)
PMV BLD AUTO: 11.9 FL (ref 7–12)
PMV BLD AUTO: 11.9 FL (ref 7–12)
PMV BLD AUTO: 12 FL (ref 7–12)
PMV BLD AUTO: 12.2 FL (ref 7–12)
PMV BLD AUTO: 9.9 FL (ref 7–12)
PMV BLD AUTO: 9.9 FL (ref 7–12)
PO2 37: 93.7 MMHG (ref 60–80)
POC SOURCE: ABNORMAL
POIKILOCYTES: ABNORMAL
POLYCHROMASIA: ABNORMAL
POTASSIUM REFLEX MAGNESIUM: 3.5 MMOL/L (ref 3.5–5)
POTASSIUM REFLEX MAGNESIUM: 4.4 MMOL/L (ref 3.5–5)
POTASSIUM REFLEX MAGNESIUM: 5.5 MMOL/L (ref 3.5–5)
POTASSIUM SERPL-SCNC: 3 MMOL/L (ref 3.5–5)
POTASSIUM SERPL-SCNC: 3.3 MMOL/L (ref 3.5–5)
POTASSIUM SERPL-SCNC: 3.4 MMOL/L (ref 3.5–5)
POTASSIUM SERPL-SCNC: 3.5 MMOL/L (ref 3.5–5)
POTASSIUM SERPL-SCNC: 3.6 MMOL/L (ref 3.5–5)
POTASSIUM SERPL-SCNC: 3.6 MMOL/L (ref 3.5–5)
POTASSIUM SERPL-SCNC: 3.7 MMOL/L (ref 3.5–5)
POTASSIUM SERPL-SCNC: 3.7 MMOL/L (ref 3.5–5)
POTASSIUM SERPL-SCNC: 3.9 MMOL/L (ref 3.5–5)
POTASSIUM SERPL-SCNC: 4.1 MMOL/L (ref 3.5–5)
POTASSIUM SERPL-SCNC: 4.2 MMOL/L (ref 3.5–5)
POTASSIUM SERPL-SCNC: 4.4 MMOL/L (ref 3.5–5)
POTASSIUM SERPL-SCNC: 4.5 MMOL/L (ref 3.5–5)
POTASSIUM SERPL-SCNC: 4.6 MMOL/L (ref 3.5–5)
POTASSIUM SERPL-SCNC: 4.7 MMOL/L (ref 3.5–5)
POTASSIUM SERPL-SCNC: 4.8 MMOL/L (ref 3.5–5)
POTASSIUM SERPL-SCNC: 4.8 MMOL/L (ref 3.5–5)
POTASSIUM SERPL-SCNC: 4.9 MMOL/L (ref 3.5–5)
POTASSIUM SERPL-SCNC: 5 MMOL/L (ref 3.5–5)
POTATO, IGE: 29.9 KU/L
PRO-BNP: 1289 PG/ML (ref 0–450)
PRO-BNP: 2708 PG/ML (ref 0–450)
PRO-BNP: 2949 PG/ML (ref 0–450)
PRO-BNP: 408 PG/ML (ref 0–450)
PRO-BNP: 956 PG/ML (ref 0–450)
PROCALCITONIN: 2.9 NG/ML (ref 0–0.08)
PROCALCITONIN: 9.46 NG/ML (ref 0–0.08)
PROSTATE SPECIFIC ANTIGEN: 0.18 NG/ML (ref 0–4)
PROTEIN UA: 100 MG/DL
PROTEIN UA: ABNORMAL MG/DL
PROTEIN UA: NEGATIVE MG/DL
PROTHROMBIN TIME: 13.5 SEC (ref 9.3–12.4)
PROTHROMBIN TIME: 21.7 SEC (ref 9.3–12.4)
PROTHROMBIN TIME: 22.4 SEC (ref 9.3–12.4)
PROTHROMBIN TIME: 23.4 SEC (ref 9.3–12.4)
PROTHROMBIN TIME: 24.1 SEC (ref 9.3–12.4)
PROTHROMBIN TIME: 27 SEC (ref 9.3–12.4)
RBC # BLD: 1.9 E12/L (ref 3.8–5.8)
RBC # BLD: 1.93 E12/L (ref 3.8–5.8)
RBC # BLD: 1.97 E12/L (ref 3.8–5.8)
RBC # BLD: 1.98 E12/L (ref 3.8–5.8)
RBC # BLD: 2.02 E12/L (ref 3.8–5.8)
RBC # BLD: 2.04 E12/L (ref 3.8–5.8)
RBC # BLD: 2.08 E12/L (ref 3.8–5.8)
RBC # BLD: 2.21 E12/L (ref 3.8–5.8)
RBC # BLD: 2.25 E12/L (ref 3.8–5.8)
RBC # BLD: 2.26 E12/L (ref 3.8–5.8)
RBC # BLD: 2.27 E12/L (ref 3.8–5.8)
RBC # BLD: 2.28 E12/L (ref 3.8–5.8)
RBC # BLD: 2.28 E12/L (ref 3.8–5.8)
RBC # BLD: 2.3 E12/L (ref 3.8–5.8)
RBC # BLD: 2.3 E12/L (ref 3.8–5.8)
RBC # BLD: 2.32 E12/L (ref 3.8–5.8)
RBC # BLD: 2.32 E12/L (ref 3.8–5.8)
RBC # BLD: 2.33 E12/L (ref 3.8–5.8)
RBC # BLD: 2.36 E12/L (ref 3.8–5.8)
RBC # BLD: 2.41 E12/L (ref 3.8–5.8)
RBC # BLD: 2.46 E12/L (ref 3.8–5.8)
RBC # BLD: 2.51 E12/L (ref 3.8–5.8)
RBC # BLD: 2.63 E12/L (ref 3.8–5.8)
RBC # BLD: 2.94 E12/L (ref 3.8–5.8)
RBC # BLD: 3 E12/L (ref 3.8–5.8)
RBC # BLD: 3.02 E12/L (ref 3.8–5.8)
RBC # BLD: 3.21 E12/L (ref 3.8–5.8)
RBC UA: ABNORMAL /HPF (ref 0–2)
REPORT: NORMAL
REPORT: NORMAL
RESPIRATORY SYNCYTIAL VIRUS BY PCR: NOT DETECTED
RESPIRATORY SYNCYTIAL VIRUS BY PCR: NOT DETECTED
RETIC HGB EQUIVALENT: 36 PG (ref 28.2–36.6)
RETICULOCYTE ABSOLUTE COUNT: 0.04 E12/L
RETICULOCYTE COUNT PCT: 1.7 % (ref 0.4–1.9)
SARS-COV-2 ANTIBODY, TOTAL: NORMAL
SARS-COV-2, NAAT: NOT DETECTED
SARS-COV-2, NAAT: NOT DETECTED
SARS-COV-2, PCR: NOT DETECTED
SCHISTOCYTES: ABNORMAL
SEDIMENTATION RATE, ERYTHROCYTE: 103 MM/HR (ref 0–15)
SEDIMENTATION RATE, ERYTHROCYTE: 112 MM/HR (ref 0–15)
SEDIMENTATION RATE, ERYTHROCYTE: 48 MM/HR (ref 0–15)
SEDIMENTATION RATE, ERYTHROCYTE: 84 MM/HR (ref 0–15)
SERINE PROTEASE 3 AB: 2 AU/ML (ref 0–19)
SHRIMP: 4.61 KU/L
SMOOTH MUSCLE ANTIBODY: NEGATIVE
SODIUM BLD-SCNC: 129 MMOL/L (ref 132–146)
SODIUM BLD-SCNC: 132 MMOL/L (ref 132–146)
SODIUM BLD-SCNC: 135 MMOL/L (ref 132–146)
SODIUM BLD-SCNC: 136 MMOL/L (ref 132–146)
SODIUM BLD-SCNC: 137 MMOL/L (ref 132–146)
SODIUM BLD-SCNC: 138 MMOL/L (ref 132–146)
SODIUM BLD-SCNC: 139 MMOL/L (ref 132–146)
SODIUM BLD-SCNC: 140 MMOL/L (ref 132–146)
SODIUM BLD-SCNC: 141 MMOL/L (ref 132–146)
SODIUM BLD-SCNC: 143 MMOL/L (ref 132–146)
SODIUM URINE: <20 MMOL/L
SPECIFIC GRAVITY UA: 1.01 (ref 1–1.03)
SPECIFIC GRAVITY UA: 1.02 (ref 1–1.03)
SPECIFIC GRAVITY UA: <=1.005 (ref 1–1.03)
SPHEROCYTES: ABNORMAL
T4 FREE: 1 NG/DL (ref 0.93–1.7)
T4 FREE: 1.11 NG/DL (ref 0.93–1.7)
T4 TOTAL: 4.6 MCG/DL (ref 4.5–11.7)
T4 TOTAL: 5.6 MCG/DL (ref 4.5–11.7)
TARGET CELLS: ABNORMAL
TEAR DROP CELLS: ABNORMAL
THERMOACTINOMYCES VULGARIS #1: NORMAL
THIS TEST SENT TO: NORMAL
TOTAL CK: 1038 U/L (ref 20–200)
TOTAL CK: 1143 U/L (ref 20–200)
TOTAL CK: 256 U/L (ref 20–200)
TOTAL CK: 360 U/L (ref 20–200)
TOTAL IRON BINDING CAPACITY: 140 MCG/DL (ref 250–450)
TOTAL PROTEIN: 5.3 G/DL (ref 6.4–8.3)
TOTAL PROTEIN: 5.5 G/DL (ref 6.4–8.3)
TOTAL PROTEIN: 5.5 G/DL (ref 6.4–8.3)
TOTAL PROTEIN: 5.8 G/DL (ref 6.4–8.3)
TOTAL PROTEIN: 5.8 G/DL (ref 6.4–8.3)
TOTAL PROTEIN: 5.9 G/DL (ref 6.4–8.3)
TOTAL PROTEIN: 6 G/DL (ref 6.4–8.3)
TOTAL PROTEIN: 6.1 G/DL (ref 6.4–8.3)
TOTAL PROTEIN: 6.2 G/DL (ref 6.4–8.3)
TOTAL PROTEIN: 6.2 G/DL (ref 6.4–8.3)
TOTAL PROTEIN: 6.4 G/DL (ref 6.4–8.3)
TOTAL PROTEIN: 6.6 G/DL (ref 6.4–8.3)
TOTAL PROTEIN: 6.9 G/DL (ref 6.4–8.3)
TOTAL PROTEIN: 7.3 G/DL (ref 6.4–8.3)
TOTAL PROTEIN: 7.4 G/DL (ref 6.4–8.3)
TOTAL PROTEIN: 7.6 G/DL (ref 6.4–8.3)
TOTAL PROTEIN: 7.8 G/DL (ref 6.4–8.3)
TOTAL PROTEIN: 8 G/DL (ref 6.4–8.3)
TOTAL PROTEIN: 8 G/DL (ref 6.4–8.3)
TOXIC GRANULATION: ABNORMAL
TOXIC GRANULATION: ABNORMAL
TRIGL SERPL-MCNC: 55 MG/DL (ref 0–149)
TRIGL SERPL-MCNC: 70 MG/DL (ref 0–149)
TRIGL SERPL-MCNC: 77 MG/DL (ref 0–149)
TROPONIN, HIGH SENSITIVITY: 105 NG/L (ref 0–11)
TROPONIN, HIGH SENSITIVITY: 114 NG/L (ref 0–11)
TROPONIN, HIGH SENSITIVITY: 131 NG/L (ref 0–11)
TROPONIN, HIGH SENSITIVITY: 226 NG/L (ref 0–11)
TROPONIN, HIGH SENSITIVITY: 268 NG/L (ref 0–11)
TROPONIN, HIGH SENSITIVITY: 308 NG/L (ref 0–11)
TROPONIN, HIGH SENSITIVITY: 313 NG/L (ref 0–11)
TROPONIN, HIGH SENSITIVITY: 358 NG/L (ref 0–11)
TROPONIN, HIGH SENSITIVITY: 366 NG/L (ref 0–11)
TROPONIN, HIGH SENSITIVITY: 39 NG/L (ref 0–11)
TROPONIN, HIGH SENSITIVITY: 40 NG/L (ref 0–11)
TROPONIN, HIGH SENSITIVITY: 432 NG/L (ref 0–11)
TROPONIN, HIGH SENSITIVITY: 551 NG/L (ref 0–11)
TROPONIN, HIGH SENSITIVITY: 87 NG/L (ref 0–11)
TROPONIN, HIGH SENSITIVITY: 89 NG/L (ref 0–11)
TROPONIN, HIGH SENSITIVITY: 89 NG/L (ref 0–11)
TSH SERPL DL<=0.05 MIU/L-ACNC: 0.69 UIU/ML (ref 0.27–4.2)
TSH SERPL DL<=0.05 MIU/L-ACNC: 0.98 UIU/ML (ref 0.27–4.2)
TSH SERPL DL<=0.05 MIU/L-ACNC: 1.1 UIU/ML (ref 0.27–4.2)
TSH SERPL DL<=0.05 MIU/L-ACNC: 1.68 UIU/ML (ref 0.27–4.2)
TSH SERPL DL<=0.05 MIU/L-ACNC: 2.53 UIU/ML (ref 0.27–4.2)
URINE CULTURE, ROUTINE: NORMAL
URINE CULTURE, ROUTINE: NORMAL
UROBILINOGEN, URINE: 0.2 E.U./DL
VACUOLATED NEUTROPHILS: ABNORMAL
VANCOMYCIN RANDOM: 6.3 MCG/ML (ref 5–40)
VITAMIN B-12: 1165 PG/ML (ref 211–946)
VITAMIN B-12: 914 PG/ML (ref 211–946)
VLDLC SERPL CALC-MCNC: 11 MG/DL
VLDLC SERPL CALC-MCNC: 14 MG/DL
VLDLC SERPL CALC-MCNC: 15 MG/DL
WBC # BLD: 1.7 E9/L (ref 4.5–11.5)
WBC # BLD: 2.1 E9/L (ref 4.5–11.5)
WBC # BLD: 2.3 E9/L (ref 4.5–11.5)
WBC # BLD: 2.3 E9/L (ref 4.5–11.5)
WBC # BLD: 2.4 E9/L (ref 4.5–11.5)
WBC # BLD: 2.8 E9/L (ref 4.5–11.5)
WBC # BLD: 2.9 E9/L (ref 4.5–11.5)
WBC # BLD: 3.3 E9/L (ref 4.5–11.5)
WBC # BLD: 3.4 E9/L (ref 4.5–11.5)
WBC # BLD: 3.5 E9/L (ref 4.5–11.5)
WBC # BLD: 3.5 E9/L (ref 4.5–11.5)
WBC # BLD: 3.6 E9/L (ref 4.5–11.5)
WBC # BLD: 3.6 E9/L (ref 4.5–11.5)
WBC # BLD: 3.7 E9/L (ref 4.5–11.5)
WBC # BLD: 3.8 E9/L (ref 4.5–11.5)
WBC # BLD: 4.3 E9/L (ref 4.5–11.5)
WBC # BLD: 4.6 E9/L (ref 4.5–11.5)
WBC # BLD: 4.7 E9/L (ref 4.5–11.5)
WBC # BLD: 5 E9/L (ref 4.5–11.5)
WBC # BLD: 5.2 E9/L (ref 4.5–11.5)
WBC # BLD: 5.3 E9/L (ref 4.5–11.5)
WBC # BLD: 5.3 E9/L (ref 4.5–11.5)
WBC # BLD: 5.6 E9/L (ref 4.5–11.5)
WBC # BLD: 5.8 E9/L (ref 4.5–11.5)
WBC # BLD: 6 E9/L (ref 4.5–11.5)
WBC # BLD: 6.4 E9/L (ref 4.5–11.5)
WBC # BLD: 7.5 E9/L (ref 4.5–11.5)
WBC UA: ABNORMAL /HPF (ref 0–5)

## 2022-01-01 PROCEDURE — 82962 GLUCOSE BLOOD TEST: CPT

## 2022-01-01 PROCEDURE — 80053 COMPREHEN METABOLIC PANEL: CPT

## 2022-01-01 PROCEDURE — 20220 BONE BIOPSY TROCAR/NDL SUPFC: CPT

## 2022-01-01 PROCEDURE — 2580000003 HC RX 258: Performed by: INTERNAL MEDICINE

## 2022-01-01 PROCEDURE — 72125 CT NECK SPINE W/O DYE: CPT

## 2022-01-01 PROCEDURE — 6370000000 HC RX 637 (ALT 250 FOR IP): Performed by: INTERNAL MEDICINE

## 2022-01-01 PROCEDURE — 99212 OFFICE O/P EST SF 10 MIN: CPT

## 2022-01-01 PROCEDURE — 88184 FLOWCYTOMETRY/ TC 1 MARKER: CPT

## 2022-01-01 PROCEDURE — P9016 RBC LEUKOCYTES REDUCED: HCPCS

## 2022-01-01 PROCEDURE — 97161 PT EVAL LOW COMPLEX 20 MIN: CPT | Performed by: PHYSICAL THERAPIST

## 2022-01-01 PROCEDURE — 36415 COLL VENOUS BLD VENIPUNCTURE: CPT

## 2022-01-01 PROCEDURE — 93306 TTE W/DOPPLER COMPLETE: CPT

## 2022-01-01 PROCEDURE — 6360000002 HC RX W HCPCS: Performed by: INTERNAL MEDICINE

## 2022-01-01 PROCEDURE — 86900 BLOOD TYPING SEROLOGIC ABO: CPT

## 2022-01-01 PROCEDURE — 2580000003 HC RX 258: Performed by: NURSE PRACTITIONER

## 2022-01-01 PROCEDURE — 99232 SBSQ HOSP IP/OBS MODERATE 35: CPT | Performed by: INTERNAL MEDICINE

## 2022-01-01 PROCEDURE — 6370000000 HC RX 637 (ALT 250 FOR IP): Performed by: NURSE PRACTITIONER

## 2022-01-01 PROCEDURE — 85025 COMPLETE CBC W/AUTO DIFF WBC: CPT

## 2022-01-01 PROCEDURE — 83735 ASSAY OF MAGNESIUM: CPT

## 2022-01-01 PROCEDURE — 7100000011 HC PHASE II RECOVERY - ADDTL 15 MIN: Performed by: INTERNAL MEDICINE

## 2022-01-01 PROCEDURE — 82728 ASSAY OF FERRITIN: CPT

## 2022-01-01 PROCEDURE — 99285 EMERGENCY DEPT VISIT HI MDM: CPT

## 2022-01-01 PROCEDURE — 82607 VITAMIN B-12: CPT

## 2022-01-01 PROCEDURE — 82533 TOTAL CORTISOL: CPT

## 2022-01-01 PROCEDURE — 86331 IMMUNODIFFUSION OUCHTERLONY: CPT

## 2022-01-01 PROCEDURE — 71046 X-RAY EXAM CHEST 2 VIEWS: CPT

## 2022-01-01 PROCEDURE — 84484 ASSAY OF TROPONIN QUANT: CPT

## 2022-01-01 PROCEDURE — 93005 ELECTROCARDIOGRAM TRACING: CPT | Performed by: PHYSICIAN ASSISTANT

## 2022-01-01 PROCEDURE — 84443 ASSAY THYROID STIM HORMONE: CPT

## 2022-01-01 PROCEDURE — P9047 ALBUMIN (HUMAN), 25%, 50ML: HCPCS | Performed by: INTERNAL MEDICINE

## 2022-01-01 PROCEDURE — 86022 PLATELET ANTIBODIES: CPT

## 2022-01-01 PROCEDURE — 71045 X-RAY EXAM CHEST 1 VIEW: CPT

## 2022-01-01 PROCEDURE — 83550 IRON BINDING TEST: CPT

## 2022-01-01 PROCEDURE — 07DR3ZX EXTRACTION OF ILIAC BONE MARROW, PERCUTANEOUS APPROACH, DIAGNOSTIC: ICD-10-PCS | Performed by: RADIOLOGY

## 2022-01-01 PROCEDURE — 83615 LACTATE (LD) (LDH) ENZYME: CPT

## 2022-01-01 PROCEDURE — 97116 GAIT TRAINING THERAPY: CPT

## 2022-01-01 PROCEDURE — 86901 BLOOD TYPING SEROLOGIC RH(D): CPT

## 2022-01-01 PROCEDURE — 86850 RBC ANTIBODY SCREEN: CPT

## 2022-01-01 PROCEDURE — 6360000002 HC RX W HCPCS: Performed by: NURSE PRACTITIONER

## 2022-01-01 PROCEDURE — 1200000000 HC SEMI PRIVATE

## 2022-01-01 PROCEDURE — 70450 CT HEAD/BRAIN W/O DYE: CPT

## 2022-01-01 PROCEDURE — 6370000000 HC RX 637 (ALT 250 FOR IP)

## 2022-01-01 PROCEDURE — 0202U NFCT DS 22 TRGT SARS-COV-2: CPT

## 2022-01-01 PROCEDURE — 86481 TB AG RESPONSE T-CELL SUSP: CPT

## 2022-01-01 PROCEDURE — C9113 INJ PANTOPRAZOLE SODIUM, VIA: HCPCS | Performed by: INTERNAL MEDICINE

## 2022-01-01 PROCEDURE — 2580000003 HC RX 258: Performed by: SURGERY

## 2022-01-01 PROCEDURE — 86255 FLUORESCENT ANTIBODY SCREEN: CPT

## 2022-01-01 PROCEDURE — 93010 ELECTROCARDIOGRAM REPORT: CPT | Performed by: INTERNAL MEDICINE

## 2022-01-01 PROCEDURE — 86923 COMPATIBILITY TEST ELECTRIC: CPT

## 2022-01-01 PROCEDURE — 71250 CT THORAX DX C-: CPT

## 2022-01-01 PROCEDURE — C1894 INTRO/SHEATH, NON-LASER: HCPCS

## 2022-01-01 PROCEDURE — 84165 PROTEIN E-PHORESIS SERUM: CPT

## 2022-01-01 PROCEDURE — 76705 ECHO EXAM OF ABDOMEN: CPT

## 2022-01-01 PROCEDURE — 2000000000 HC ICU R&B

## 2022-01-01 PROCEDURE — 0DJD8ZZ INSPECTION OF LOWER INTESTINAL TRACT, VIA NATURAL OR ARTIFICIAL OPENING ENDOSCOPIC: ICD-10-PCS | Performed by: INTERNAL MEDICINE

## 2022-01-01 PROCEDURE — 82232 ASSAY OF BETA-2 PROTEIN: CPT

## 2022-01-01 PROCEDURE — 83605 ASSAY OF LACTIC ACID: CPT

## 2022-01-01 PROCEDURE — 84100 ASSAY OF PHOSPHORUS: CPT

## 2022-01-01 PROCEDURE — 3609017100 HC EGD: Performed by: INTERNAL MEDICINE

## 2022-01-01 PROCEDURE — 85651 RBC SED RATE NONAUTOMATED: CPT

## 2022-01-01 PROCEDURE — 02HV33Z INSERTION OF INFUSION DEVICE INTO SUPERIOR VENA CAVA, PERCUTANEOUS APPROACH: ICD-10-PCS | Performed by: INTERNAL MEDICINE

## 2022-01-01 PROCEDURE — 97530 THERAPEUTIC ACTIVITIES: CPT | Performed by: PHYSICAL THERAPIST

## 2022-01-01 PROCEDURE — 81001 URINALYSIS AUTO W/SCOPE: CPT

## 2022-01-01 PROCEDURE — 76775 US EXAM ABDO BACK WALL LIM: CPT

## 2022-01-01 PROCEDURE — 6370000000 HC RX 637 (ALT 250 FOR IP): Performed by: HOSPITALIST

## 2022-01-01 PROCEDURE — 93005 ELECTROCARDIOGRAM TRACING: CPT | Performed by: INTERNAL MEDICINE

## 2022-01-01 PROCEDURE — 2580000003 HC RX 258: Performed by: HOSPITALIST

## 2022-01-01 PROCEDURE — 6370000000 HC RX 637 (ALT 250 FOR IP): Performed by: UROLOGY

## 2022-01-01 PROCEDURE — 96401 CHEMO ANTI-NEOPL SQ/IM: CPT

## 2022-01-01 PROCEDURE — 82270 OCCULT BLOOD FECES: CPT

## 2022-01-01 PROCEDURE — 85610 PROTHROMBIN TIME: CPT

## 2022-01-01 PROCEDURE — 85018 HEMOGLOBIN: CPT

## 2022-01-01 PROCEDURE — 6360000002 HC RX W HCPCS: Performed by: NURSE ANESTHETIST, CERTIFIED REGISTERED

## 2022-01-01 PROCEDURE — 97116 GAIT TRAINING THERAPY: CPT | Performed by: PHYSICAL THERAPIST

## 2022-01-01 PROCEDURE — 36430 TRANSFUSION BLD/BLD COMPNT: CPT

## 2022-01-01 PROCEDURE — 85014 HEMATOCRIT: CPT

## 2022-01-01 PROCEDURE — 6370000000 HC RX 637 (ALT 250 FOR IP): Performed by: EMERGENCY MEDICINE

## 2022-01-01 PROCEDURE — 2580000003 HC RX 258: Performed by: EMERGENCY MEDICINE

## 2022-01-01 PROCEDURE — 74176 CT ABD & PELVIS W/O CONTRAST: CPT

## 2022-01-01 PROCEDURE — 83540 ASSAY OF IRON: CPT

## 2022-01-01 PROCEDURE — 83036 HEMOGLOBIN GLYCOSYLATED A1C: CPT

## 2022-01-01 PROCEDURE — 72148 MRI LUMBAR SPINE W/O DYE: CPT

## 2022-01-01 PROCEDURE — 87635 SARS-COV-2 COVID-19 AMP PRB: CPT

## 2022-01-01 PROCEDURE — 80061 LIPID PANEL: CPT

## 2022-01-01 PROCEDURE — 2709999900 HC NON-CHARGEABLE SUPPLY

## 2022-01-01 PROCEDURE — 88185 FLOWCYTOMETRY/TC ADD-ON: CPT

## 2022-01-01 PROCEDURE — 82803 BLOOD GASES ANY COMBINATION: CPT

## 2022-01-01 PROCEDURE — 2060000000 HC ICU INTERMEDIATE R&B

## 2022-01-01 PROCEDURE — 36592 COLLECT BLOOD FROM PICC: CPT

## 2022-01-01 PROCEDURE — 94060 EVALUATION OF WHEEZING: CPT

## 2022-01-01 PROCEDURE — 2500000003 HC RX 250 WO HCPCS

## 2022-01-01 PROCEDURE — 97110 THERAPEUTIC EXERCISES: CPT | Performed by: PHYSICAL THERAPIST

## 2022-01-01 PROCEDURE — 3609027000 HC COLONOSCOPY: Performed by: INTERNAL MEDICINE

## 2022-01-01 PROCEDURE — 0DJ08ZZ INSPECTION OF UPPER INTESTINAL TRACT, VIA NATURAL OR ARTIFICIAL OPENING ENDOSCOPIC: ICD-10-PCS | Performed by: INTERNAL MEDICINE

## 2022-01-01 PROCEDURE — 6360000002 HC RX W HCPCS: Performed by: EMERGENCY MEDICINE

## 2022-01-01 PROCEDURE — C9113 INJ PANTOPRAZOLE SODIUM, VIA: HCPCS | Performed by: HOSPITALIST

## 2022-01-01 PROCEDURE — 2500000003 HC RX 250 WO HCPCS: Performed by: NURSE PRACTITIONER

## 2022-01-01 PROCEDURE — G0328 FECAL BLOOD SCRN IMMUNOASSAY: HCPCS

## 2022-01-01 PROCEDURE — 84145 PROCALCITONIN (PCT): CPT

## 2022-01-01 PROCEDURE — 2700000000 HC OXYGEN THERAPY PER DAY

## 2022-01-01 PROCEDURE — A4216 STERILE WATER/SALINE, 10 ML: HCPCS | Performed by: NURSE PRACTITIONER

## 2022-01-01 PROCEDURE — 2709999900 HC NON-CHARGEABLE SUPPLY: Performed by: INTERNAL MEDICINE

## 2022-01-01 PROCEDURE — 82746 ASSAY OF FOLIC ACID SERUM: CPT

## 2022-01-01 PROCEDURE — 82390 ASSAY OF CERULOPLASMIN: CPT

## 2022-01-01 PROCEDURE — 80202 ASSAY OF VANCOMYCIN: CPT

## 2022-01-01 PROCEDURE — 6360000004 HC RX CONTRAST MEDICATION: Performed by: RADIOLOGY

## 2022-01-01 PROCEDURE — 74240 X-RAY XM UPR GI TRC 1CNTRST: CPT

## 2022-01-01 PROCEDURE — 80048 BASIC METABOLIC PNL TOTAL CA: CPT

## 2022-01-01 PROCEDURE — C1713 ANCHOR/SCREW BN/BN,TIS/BN: HCPCS

## 2022-01-01 PROCEDURE — 86769 SARS-COV-2 COVID-19 ANTIBODY: CPT

## 2022-01-01 PROCEDURE — 82550 ASSAY OF CK (CPK): CPT

## 2022-01-01 PROCEDURE — 99214 OFFICE O/P EST MOD 30 MIN: CPT

## 2022-01-01 PROCEDURE — 76942 ECHO GUIDE FOR BIOPSY: CPT

## 2022-01-01 PROCEDURE — A4216 STERILE WATER/SALINE, 10 ML: HCPCS | Performed by: HOSPITALIST

## 2022-01-01 PROCEDURE — 86606 ASPERGILLUS ANTIBODY: CPT

## 2022-01-01 PROCEDURE — 87088 URINE BACTERIA CULTURE: CPT

## 2022-01-01 PROCEDURE — 99233 SBSQ HOSP IP/OBS HIGH 50: CPT | Performed by: INTERNAL MEDICINE

## 2022-01-01 PROCEDURE — 97530 THERAPEUTIC ACTIVITIES: CPT | Performed by: OCCUPATIONAL THERAPIST

## 2022-01-01 PROCEDURE — 83880 ASSAY OF NATRIURETIC PEPTIDE: CPT

## 2022-01-01 PROCEDURE — 96360 HYDRATION IV INFUSION INIT: CPT

## 2022-01-01 PROCEDURE — 2500000003 HC RX 250 WO HCPCS: Performed by: INTERNAL MEDICINE

## 2022-01-01 PROCEDURE — 83090 ASSAY OF HOMOCYSTEINE: CPT

## 2022-01-01 PROCEDURE — 7100000010 HC PHASE II RECOVERY - FIRST 15 MIN: Performed by: INTERNAL MEDICINE

## 2022-01-01 PROCEDURE — 6360000002 HC RX W HCPCS: Performed by: HOSPITALIST

## 2022-01-01 PROCEDURE — 6360000002 HC RX W HCPCS: Performed by: RADIOLOGY

## 2022-01-01 PROCEDURE — 2580000003 HC RX 258: Performed by: NURSE ANESTHETIST, CERTIFIED REGISTERED

## 2022-01-01 PROCEDURE — U0005 INFEC AGEN DETEC AMPLI PROBE: HCPCS

## 2022-01-01 PROCEDURE — 77012 CT SCAN FOR NEEDLE BIOPSY: CPT

## 2022-01-01 PROCEDURE — 96374 THER/PROPH/DIAG INJ IV PUSH: CPT

## 2022-01-01 PROCEDURE — 51798 US URINE CAPACITY MEASURE: CPT

## 2022-01-01 PROCEDURE — 84439 ASSAY OF FREE THYROXINE: CPT

## 2022-01-01 PROCEDURE — 93225 XTRNL ECG REC<48 HRS REC: CPT

## 2022-01-01 PROCEDURE — 82378 CARCINOEMBRYONIC ANTIGEN: CPT

## 2022-01-01 PROCEDURE — A4216 STERILE WATER/SALINE, 10 ML: HCPCS | Performed by: INTERNAL MEDICINE

## 2022-01-01 PROCEDURE — 82272 OCCULT BLD FECES 1-3 TESTS: CPT

## 2022-01-01 PROCEDURE — 85730 THROMBOPLASTIN TIME PARTIAL: CPT

## 2022-01-01 PROCEDURE — 97535 SELF CARE MNGMENT TRAINING: CPT

## 2022-01-01 PROCEDURE — 3700000000 HC ANESTHESIA ATTENDED CARE: Performed by: INTERNAL MEDICINE

## 2022-01-01 PROCEDURE — 97535 SELF CARE MNGMENT TRAINING: CPT | Performed by: OCCUPATIONAL THERAPIST

## 2022-01-01 PROCEDURE — C1769 GUIDE WIRE: HCPCS

## 2022-01-01 PROCEDURE — 84300 ASSAY OF URINE SODIUM: CPT

## 2022-01-01 PROCEDURE — 83921 ORGANIC ACID SINGLE QUANT: CPT

## 2022-01-01 PROCEDURE — 6360000004 HC RX CONTRAST MEDICATION: Performed by: INTERNAL MEDICINE

## 2022-01-01 PROCEDURE — 10009 FNA BX W/CT GDN 1ST LES: CPT

## 2022-01-01 PROCEDURE — 3700000001 HC ADD 15 MINUTES (ANESTHESIA): Performed by: INTERNAL MEDICINE

## 2022-01-01 PROCEDURE — 80074 ACUTE HEPATITIS PANEL: CPT

## 2022-01-01 PROCEDURE — 93454 CORONARY ARTERY ANGIO S&I: CPT

## 2022-01-01 PROCEDURE — 82785 ASSAY OF IGE: CPT

## 2022-01-01 PROCEDURE — 73560 X-RAY EXAM OF KNEE 1 OR 2: CPT

## 2022-01-01 PROCEDURE — 99222 1ST HOSP IP/OBS MODERATE 55: CPT | Performed by: INTERNAL MEDICINE

## 2022-01-01 PROCEDURE — 70486 CT MAXILLOFACIAL W/O DYE: CPT

## 2022-01-01 PROCEDURE — U0003 INFECTIOUS AGENT DETECTION BY NUCLEIC ACID (DNA OR RNA); SEVERE ACUTE RESPIRATORY SYNDROME CORONAVIRUS 2 (SARS-COV-2) (CORONAVIRUS DISEASE [COVID-19]), AMPLIFIED PROBE TECHNIQUE, MAKING USE OF HIGH THROUGHPUT TECHNOLOGIES AS DESCRIBED BY CMS-2020-01-R: HCPCS

## 2022-01-01 PROCEDURE — 97530 THERAPEUTIC ACTIVITIES: CPT

## 2022-01-01 PROCEDURE — 96361 HYDRATE IV INFUSION ADD-ON: CPT

## 2022-01-01 PROCEDURE — 97165 OT EVAL LOW COMPLEX 30 MIN: CPT

## 2022-01-01 PROCEDURE — 87081 CULTURE SCREEN ONLY: CPT

## 2022-01-01 PROCEDURE — 82103 ALPHA-1-ANTITRYPSIN TOTAL: CPT

## 2022-01-01 PROCEDURE — 93005 ELECTROCARDIOGRAM TRACING: CPT | Performed by: EMERGENCY MEDICINE

## 2022-01-01 PROCEDURE — 85045 AUTOMATED RETICULOCYTE COUNT: CPT

## 2022-01-01 PROCEDURE — 84436 ASSAY OF TOTAL THYROXINE: CPT

## 2022-01-01 PROCEDURE — 87040 BLOOD CULTURE FOR BACTERIA: CPT

## 2022-01-01 PROCEDURE — 82947 ASSAY GLUCOSE BLOOD QUANT: CPT

## 2022-01-01 PROCEDURE — 83010 ASSAY OF HAPTOGLOBIN QUANT: CPT

## 2022-01-01 PROCEDURE — 97165 OT EVAL LOW COMPLEX 30 MIN: CPT | Performed by: OCCUPATIONAL THERAPIST

## 2022-01-01 PROCEDURE — 93454 CORONARY ARTERY ANGIO S&I: CPT | Performed by: INTERNAL MEDICINE

## 2022-01-01 PROCEDURE — 36556 INSERT NON-TUNNEL CV CATH: CPT

## 2022-01-01 PROCEDURE — 94729 DIFFUSING CAPACITY: CPT

## 2022-01-01 PROCEDURE — 93226 XTRNL ECG REC<48 HR SCAN A/R: CPT

## 2022-01-01 PROCEDURE — 6360000002 HC RX W HCPCS

## 2022-01-01 PROCEDURE — 86140 C-REACTIVE PROTEIN: CPT

## 2022-01-01 PROCEDURE — 94726 PLETHYSMOGRAPHY LUNG VOLUMES: CPT

## 2022-01-01 PROCEDURE — 86038 ANTINUCLEAR ANTIBODIES: CPT

## 2022-01-01 RX ORDER — ONDANSETRON 2 MG/ML
8 INJECTION INTRAMUSCULAR; INTRAVENOUS
Status: CANCELLED | OUTPATIENT
Start: 2022-01-01

## 2022-01-01 RX ORDER — BLOOD SUGAR DIAGNOSTIC
1 STRIP MISCELLANEOUS 4 TIMES DAILY
Qty: 360 STRIP | Refills: 3 | Status: SHIPPED | OUTPATIENT
Start: 2022-01-01 | End: 2022-01-01

## 2022-01-01 RX ORDER — 0.9 % SODIUM CHLORIDE 0.9 %
1000 INTRAVENOUS SOLUTION INTRAVENOUS ONCE
Status: COMPLETED | OUTPATIENT
Start: 2022-01-01 | End: 2022-01-01

## 2022-01-01 RX ORDER — PREDNISONE 10 MG/1
10 TABLET ORAL EVERY MORNING
Qty: 90 TABLET | Refills: 1 | Status: ON HOLD | OUTPATIENT
Start: 2022-01-01 | End: 2022-01-01 | Stop reason: HOSPADM

## 2022-01-01 RX ORDER — NOREPINEPHRINE BIT/0.9 % NACL 16MG/250ML
1-100 INFUSION BOTTLE (ML) INTRAVENOUS CONTINUOUS
Status: DISCONTINUED | OUTPATIENT
Start: 2022-01-01 | End: 2022-01-01 | Stop reason: HOSPADM

## 2022-01-01 RX ORDER — CARVEDILOL 6.25 MG/1
6.25 TABLET ORAL 2 TIMES DAILY WITH MEALS
Qty: 180 TABLET | Refills: 1 | Status: ON HOLD | OUTPATIENT
Start: 2022-01-01 | End: 2022-01-01 | Stop reason: HOSPADM

## 2022-01-01 RX ORDER — DEXAMETHASONE 6 MG/1
12 TABLET ORAL ONCE
Status: CANCELLED | OUTPATIENT
Start: 2022-01-01 | End: 2022-01-01

## 2022-01-01 RX ORDER — SODIUM CHLORIDE 9 MG/ML
INJECTION, SOLUTION INTRAVENOUS CONTINUOUS PRN
Status: DISCONTINUED | OUTPATIENT
Start: 2022-01-01 | End: 2022-01-01 | Stop reason: SDUPTHER

## 2022-01-01 RX ORDER — MEPERIDINE HYDROCHLORIDE 25 MG/ML
12.5 INJECTION INTRAMUSCULAR; INTRAVENOUS; SUBCUTANEOUS PRN
Status: CANCELLED | OUTPATIENT
Start: 2022-06-20

## 2022-01-01 RX ORDER — COLCHICINE 0.6 MG/1
0.6 TABLET ORAL DAILY
Status: DISCONTINUED | OUTPATIENT
Start: 2022-01-01 | End: 2022-01-01

## 2022-01-01 RX ORDER — HEPARIN SODIUM 5000 [USP'U]/ML
5000 INJECTION, SOLUTION INTRAVENOUS; SUBCUTANEOUS 2 TIMES DAILY
Status: DISCONTINUED | OUTPATIENT
Start: 2022-01-01 | End: 2022-01-01 | Stop reason: HOSPADM

## 2022-01-01 RX ORDER — PANTOPRAZOLE SODIUM 40 MG/1
40 TABLET, DELAYED RELEASE ORAL
Status: DISCONTINUED | OUTPATIENT
Start: 2022-01-01 | End: 2022-01-01 | Stop reason: HOSPADM

## 2022-01-01 RX ORDER — ONDANSETRON 4 MG/1
8 TABLET, FILM COATED ORAL ONCE
Status: CANCELLED | OUTPATIENT
Start: 2022-01-01 | End: 2022-01-01

## 2022-01-01 RX ORDER — DEXAMETHASONE 4 MG/1
12 TABLET ORAL ONCE
Status: DISCONTINUED | OUTPATIENT
Start: 2022-01-01 | End: 2022-01-01

## 2022-01-01 RX ORDER — DEXAMETHASONE 4 MG/1
12 TABLET ORAL ONCE
Status: CANCELLED | OUTPATIENT
Start: 2022-01-01 | End: 2022-01-01

## 2022-01-01 RX ORDER — ONDANSETRON 4 MG/1
4 TABLET, ORALLY DISINTEGRATING ORAL EVERY 6 HOURS PRN
Status: DISCONTINUED | OUTPATIENT
Start: 2022-01-01 | End: 2022-01-01 | Stop reason: HOSPADM

## 2022-01-01 RX ORDER — EPINEPHRINE 1 MG/ML
0.3 INJECTION, SOLUTION, CONCENTRATE INTRAVENOUS PRN
Status: CANCELLED | OUTPATIENT
Start: 2022-01-01

## 2022-01-01 RX ORDER — PREDNISONE 1 MG/1
15 TABLET ORAL EVERY EVENING
Qty: 90 TABLET | Refills: 0 | Status: SHIPPED | OUTPATIENT
Start: 2022-01-01 | End: 2022-01-01

## 2022-01-01 RX ORDER — LEVOTHYROXINE SODIUM 0.07 MG/1
75 TABLET ORAL DAILY
Qty: 30 TABLET | Refills: 3 | Status: SHIPPED | OUTPATIENT
Start: 2022-01-01 | End: 2022-01-01 | Stop reason: SDUPTHER

## 2022-01-01 RX ORDER — DEXTROSE MONOHYDRATE 50 MG/ML
100 INJECTION, SOLUTION INTRAVENOUS PRN
Status: DISCONTINUED | OUTPATIENT
Start: 2022-01-01 | End: 2022-01-01 | Stop reason: HOSPADM

## 2022-01-01 RX ORDER — PANTOPRAZOLE SODIUM 40 MG/10ML
40 INJECTION, POWDER, LYOPHILIZED, FOR SOLUTION INTRAVENOUS 2 TIMES DAILY
Status: DISCONTINUED | OUTPATIENT
Start: 2022-01-01 | End: 2022-01-01

## 2022-01-01 RX ORDER — ALBUTEROL SULFATE 90 UG/1
4 AEROSOL, METERED RESPIRATORY (INHALATION) PRN
Status: CANCELLED | OUTPATIENT
Start: 2022-01-01

## 2022-01-01 RX ORDER — ACETAMINOPHEN 325 MG/1
650 TABLET ORAL
Status: CANCELLED | OUTPATIENT
Start: 2022-01-01

## 2022-01-01 RX ORDER — RANOLAZINE 500 MG/1
500 TABLET, EXTENDED RELEASE ORAL 2 TIMES DAILY
Qty: 60 TABLET | Refills: 3 | Status: SHIPPED | OUTPATIENT
Start: 2022-01-01 | End: 2022-01-01 | Stop reason: SINTOL

## 2022-01-01 RX ORDER — POLYETHYLENE GLYCOL 3350 17 G/17G
17 POWDER, FOR SOLUTION ORAL DAILY PRN
Status: DISCONTINUED | OUTPATIENT
Start: 2022-01-01 | End: 2022-01-01 | Stop reason: HOSPADM

## 2022-01-01 RX ORDER — INSULIN GLARGINE 100 [IU]/ML
25 INJECTION, SOLUTION SUBCUTANEOUS NIGHTLY
Qty: 10 ML | Refills: 3 | COMMUNITY
Start: 2022-01-01

## 2022-01-01 RX ORDER — INSULIN LISPRO 100 [IU]/ML
2 INJECTION, SOLUTION INTRAVENOUS; SUBCUTANEOUS ONCE
Status: COMPLETED | OUTPATIENT
Start: 2022-01-01 | End: 2022-01-01

## 2022-01-01 RX ORDER — SODIUM CHLORIDE 9 MG/ML
5-250 INJECTION, SOLUTION INTRAVENOUS PRN
Status: CANCELLED | OUTPATIENT
Start: 2022-06-20

## 2022-01-01 RX ORDER — POLYETHYLENE GLYCOL 3350, SODIUM CHLORIDE, SODIUM BICARBONATE, POTASSIUM CHLORIDE 420; 11.2; 5.72; 1.48 G/4L; G/4L; G/4L; G/4L
4000 POWDER, FOR SOLUTION ORAL ONCE
Status: COMPLETED | OUTPATIENT
Start: 2022-01-01 | End: 2022-01-01

## 2022-01-01 RX ORDER — INSULIN LISPRO 100 [IU]/ML
8 INJECTION, SOLUTION INTRAVENOUS; SUBCUTANEOUS ONCE
Status: COMPLETED | OUTPATIENT
Start: 2022-01-01 | End: 2022-01-01

## 2022-01-01 RX ORDER — MECOBALAMIN 5000 MCG
10 TABLET,DISINTEGRATING ORAL NIGHTLY
Status: DISCONTINUED | OUTPATIENT
Start: 2022-01-01 | End: 2022-01-01 | Stop reason: HOSPADM

## 2022-01-01 RX ORDER — NICOTINE POLACRILEX 4 MG
15 LOZENGE BUCCAL PRN
Status: DISCONTINUED | OUTPATIENT
Start: 2022-01-01 | End: 2022-01-01 | Stop reason: HOSPADM

## 2022-01-01 RX ORDER — ACETAMINOPHEN 325 MG/1
650 TABLET ORAL ONCE
Status: COMPLETED | OUTPATIENT
Start: 2022-01-01 | End: 2022-01-01

## 2022-01-01 RX ORDER — SODIUM CHLORIDE 9 MG/ML
INJECTION, SOLUTION INTRAVENOUS CONTINUOUS
Status: DISCONTINUED | OUTPATIENT
Start: 2022-01-01 | End: 2022-01-01

## 2022-01-01 RX ORDER — INSULIN GLARGINE 100 [IU]/ML
10 INJECTION, SOLUTION SUBCUTANEOUS NIGHTLY
Status: DISCONTINUED | OUTPATIENT
Start: 2022-01-01 | End: 2022-01-01 | Stop reason: HOSPADM

## 2022-01-01 RX ORDER — MEPERIDINE HYDROCHLORIDE 25 MG/ML
12.5 INJECTION INTRAMUSCULAR; INTRAVENOUS; SUBCUTANEOUS PRN
Status: CANCELLED | OUTPATIENT
Start: 2022-01-01

## 2022-01-01 RX ORDER — MIDODRINE HYDROCHLORIDE 5 MG/1
10 TABLET ORAL ONCE
Status: COMPLETED | OUTPATIENT
Start: 2022-01-01 | End: 2022-01-01

## 2022-01-01 RX ORDER — DIPHENHYDRAMINE HYDROCHLORIDE 50 MG/ML
50 INJECTION INTRAMUSCULAR; INTRAVENOUS
Status: CANCELLED | OUTPATIENT
Start: 2022-01-01

## 2022-01-01 RX ORDER — FAMOTIDINE 10 MG/ML
20 INJECTION, SOLUTION INTRAVENOUS
Status: CANCELLED | OUTPATIENT
Start: 2022-06-21

## 2022-01-01 RX ORDER — INSULIN LISPRO 100 [IU]/ML
INJECTION, SOLUTION INTRAVENOUS; SUBCUTANEOUS
Qty: 5 PEN | Refills: 0 | Status: SHIPPED | OUTPATIENT
Start: 2022-01-01 | End: 2022-01-01

## 2022-01-01 RX ORDER — SODIUM CHLORIDE 0.9 % (FLUSH) 0.9 %
5-40 SYRINGE (ML) INJECTION PRN
Status: CANCELLED | OUTPATIENT
Start: 2022-01-01

## 2022-01-01 RX ORDER — 0.9 % SODIUM CHLORIDE 0.9 %
500 INTRAVENOUS SOLUTION INTRAVENOUS ONCE
Status: COMPLETED | OUTPATIENT
Start: 2022-01-01 | End: 2022-01-01

## 2022-01-01 RX ORDER — FINASTERIDE 5 MG/1
5 TABLET, FILM COATED ORAL DAILY
Status: DISCONTINUED | OUTPATIENT
Start: 2022-01-01 | End: 2022-01-01 | Stop reason: HOSPADM

## 2022-01-01 RX ORDER — SODIUM CHLORIDE 9 MG/ML
INJECTION, SOLUTION INTRAVENOUS CONTINUOUS
Status: CANCELLED | OUTPATIENT
Start: 2022-01-01

## 2022-01-01 RX ORDER — ONDANSETRON 4 MG/1
8 TABLET, FILM COATED ORAL ONCE
Status: CANCELLED | OUTPATIENT
Start: 2022-06-21 | End: 2022-06-21

## 2022-01-01 RX ORDER — CARVEDILOL 6.25 MG/1
6.25 TABLET ORAL 2 TIMES DAILY WITH MEALS
Qty: 60 TABLET | Refills: 3 | Status: SHIPPED | OUTPATIENT
Start: 2022-01-01 | End: 2022-01-01 | Stop reason: SDUPTHER

## 2022-01-01 RX ORDER — HEPARIN SODIUM (PORCINE) LOCK FLUSH IV SOLN 100 UNIT/ML 100 UNIT/ML
500 SOLUTION INTRAVENOUS PRN
Status: CANCELLED | OUTPATIENT
Start: 2022-06-21

## 2022-01-01 RX ORDER — DIPHENHYDRAMINE HYDROCHLORIDE 50 MG/ML
50 INJECTION INTRAMUSCULAR; INTRAVENOUS
Status: CANCELLED | OUTPATIENT
Start: 2022-06-21

## 2022-01-01 RX ORDER — HEPARIN SODIUM 5000 [USP'U]/ML
5000 INJECTION, SOLUTION INTRAVENOUS; SUBCUTANEOUS EVERY 12 HOURS SCHEDULED
Status: DISCONTINUED | OUTPATIENT
Start: 2022-01-01 | End: 2022-01-01 | Stop reason: CLARIF

## 2022-01-01 RX ORDER — MECOBALAMIN 5000 MCG
5 TABLET,DISINTEGRATING ORAL NIGHTLY
Status: DISCONTINUED | OUTPATIENT
Start: 2022-01-01 | End: 2022-01-01

## 2022-01-01 RX ORDER — CARVEDILOL 25 MG/1
25 TABLET ORAL 2 TIMES DAILY WITH MEALS
Status: DISCONTINUED | OUTPATIENT
Start: 2022-01-01 | End: 2022-01-01

## 2022-01-01 RX ORDER — SODIUM CHLORIDE 9 MG/ML
INJECTION, SOLUTION INTRAVENOUS PRN
Status: DISCONTINUED | OUTPATIENT
Start: 2022-01-01 | End: 2022-01-01 | Stop reason: HOSPADM

## 2022-01-01 RX ORDER — PREDNISONE 1 MG/1
5 TABLET ORAL DAILY
Qty: 90 TABLET | Refills: 1 | Status: ON HOLD | OUTPATIENT
Start: 2022-01-01 | End: 2022-01-01 | Stop reason: HOSPADM

## 2022-01-01 RX ORDER — ACETAMINOPHEN 325 MG/1
650 TABLET ORAL EVERY 6 HOURS PRN
Status: DISCONTINUED | OUTPATIENT
Start: 2022-01-01 | End: 2022-01-01 | Stop reason: HOSPADM

## 2022-01-01 RX ORDER — METOPROLOL SUCCINATE 25 MG/1
12.5 TABLET, EXTENDED RELEASE ORAL DAILY
Qty: 30 TABLET | Refills: 2 | Status: SHIPPED | OUTPATIENT
Start: 2022-01-01 | End: 2022-01-01 | Stop reason: SDUPTHER

## 2022-01-01 RX ORDER — PREDNISONE 1 MG/1
TABLET ORAL
Qty: 180 TABLET | Refills: 0 | Status: SHIPPED | OUTPATIENT
Start: 2022-01-01 | End: 2022-01-01 | Stop reason: HOSPADM

## 2022-01-01 RX ORDER — INSULIN GLARGINE 100 [IU]/ML
8 INJECTION, SOLUTION SUBCUTANEOUS 2 TIMES DAILY
Status: DISCONTINUED | OUTPATIENT
Start: 2022-01-01 | End: 2022-01-01

## 2022-01-01 RX ORDER — FAMOTIDINE 10 MG/ML
20 INJECTION, SOLUTION INTRAVENOUS
Status: CANCELLED | OUTPATIENT
Start: 2022-01-01

## 2022-01-01 RX ORDER — PREDNISONE 20 MG/1
20 TABLET ORAL EVERY 24 HOURS
Status: DISCONTINUED | OUTPATIENT
Start: 2022-01-01 | End: 2022-01-01 | Stop reason: HOSPADM

## 2022-01-01 RX ORDER — SODIUM CHLORIDE 9 MG/ML
25 INJECTION, SOLUTION INTRAVENOUS PRN
Status: DISCONTINUED | OUTPATIENT
Start: 2022-01-01 | End: 2022-01-01 | Stop reason: HOSPADM

## 2022-01-01 RX ORDER — ONDANSETRON 4 MG/1
TABLET, FILM COATED ORAL
Status: COMPLETED
Start: 2022-01-01 | End: 2022-01-01

## 2022-01-01 RX ORDER — PANTOPRAZOLE SODIUM 40 MG/1
40 TABLET, DELAYED RELEASE ORAL
Qty: 30 TABLET | Refills: 0 | Status: SHIPPED | OUTPATIENT
Start: 2022-01-01 | End: 2022-01-01 | Stop reason: HOSPADM

## 2022-01-01 RX ORDER — ISOSORBIDE MONONITRATE 30 MG/1
30 TABLET, EXTENDED RELEASE ORAL DAILY
Status: DISCONTINUED | OUTPATIENT
Start: 2022-01-01 | End: 2022-01-01 | Stop reason: HOSPADM

## 2022-01-01 RX ORDER — SODIUM CHLORIDE 0.9 % (FLUSH) 0.9 %
5-40 SYRINGE (ML) INJECTION PRN
Status: CANCELLED | OUTPATIENT
Start: 2022-06-21

## 2022-01-01 RX ORDER — ONDANSETRON 4 MG/1
8 TABLET, FILM COATED ORAL ONCE
Status: COMPLETED | OUTPATIENT
Start: 2022-01-01 | End: 2022-01-01

## 2022-01-01 RX ORDER — SODIUM CHLORIDE 9 MG/ML
5-250 INJECTION, SOLUTION INTRAVENOUS PRN
Status: CANCELLED | OUTPATIENT
Start: 2022-01-01

## 2022-01-01 RX ORDER — FENTANYL CITRATE 0.05 MG/ML
INJECTION, SOLUTION INTRAMUSCULAR; INTRAVENOUS
Status: COMPLETED | OUTPATIENT
Start: 2022-01-01 | End: 2022-01-01

## 2022-01-01 RX ORDER — POTASSIUM CITRATE 10 MEQ/1
10 TABLET, EXTENDED RELEASE ORAL DAILY
Qty: 180 TABLET | Refills: 1 | Status: SHIPPED | OUTPATIENT
Start: 2022-01-01

## 2022-01-01 RX ORDER — POTASSIUM CHLORIDE 20 MEQ/1
10 TABLET, EXTENDED RELEASE ORAL 3 TIMES DAILY
Status: DISCONTINUED | OUTPATIENT
Start: 2022-01-01 | End: 2022-01-01 | Stop reason: HOSPADM

## 2022-01-01 RX ORDER — INSULIN GLARGINE 100 [IU]/ML
15 INJECTION, SOLUTION SUBCUTANEOUS NIGHTLY
Status: DISCONTINUED | OUTPATIENT
Start: 2022-01-01 | End: 2022-01-01

## 2022-01-01 RX ORDER — POTASSIUM CHLORIDE 29.8 MG/ML
20 INJECTION INTRAVENOUS ONCE
Status: COMPLETED | OUTPATIENT
Start: 2022-01-01 | End: 2022-01-01

## 2022-01-01 RX ORDER — PREDNISONE 1 MG/1
5 TABLET ORAL
Status: DISCONTINUED | OUTPATIENT
Start: 2022-01-01 | End: 2022-01-01 | Stop reason: HOSPADM

## 2022-01-01 RX ORDER — DEXAMETHASONE 4 MG/1
12 TABLET ORAL ONCE
Status: COMPLETED | OUTPATIENT
Start: 2022-01-01 | End: 2022-01-01

## 2022-01-01 RX ORDER — SODIUM CHLORIDE 0.9 % (FLUSH) 0.9 %
5-40 SYRINGE (ML) INJECTION EVERY 12 HOURS SCHEDULED
Status: DISCONTINUED | OUTPATIENT
Start: 2022-01-01 | End: 2022-01-01 | Stop reason: HOSPADM

## 2022-01-01 RX ORDER — ATORVASTATIN CALCIUM 20 MG/1
20 TABLET, FILM COATED ORAL DAILY
Qty: 90 TABLET | Refills: 1 | Status: SHIPPED | OUTPATIENT
Start: 2022-01-01 | End: 2022-01-01

## 2022-01-01 RX ORDER — SODIUM CHLORIDE 9 MG/ML
5-40 INJECTION INTRAVENOUS PRN
Status: CANCELLED | OUTPATIENT
Start: 2022-01-01

## 2022-01-01 RX ORDER — HEPARIN SODIUM (PORCINE) LOCK FLUSH IV SOLN 100 UNIT/ML 100 UNIT/ML
500 SOLUTION INTRAVENOUS PRN
Status: CANCELLED | OUTPATIENT
Start: 2022-01-01

## 2022-01-01 RX ORDER — ACETAMINOPHEN 325 MG/1
650 TABLET ORAL
Status: CANCELLED | OUTPATIENT
Start: 2022-06-21

## 2022-01-01 RX ORDER — POLYETHYLENE GLYCOL 3350 17 G/17G
17 POWDER, FOR SOLUTION ORAL DAILY
Status: DISCONTINUED | OUTPATIENT
Start: 2022-01-01 | End: 2022-01-01 | Stop reason: HOSPADM

## 2022-01-01 RX ORDER — LEVOTHYROXINE SODIUM 0.07 MG/1
75 TABLET ORAL DAILY
Status: DISCONTINUED | OUTPATIENT
Start: 2022-01-01 | End: 2022-01-01 | Stop reason: HOSPADM

## 2022-01-01 RX ORDER — FUROSEMIDE 10 MG/ML
20 INJECTION INTRAMUSCULAR; INTRAVENOUS ONCE
Status: COMPLETED | OUTPATIENT
Start: 2022-01-01 | End: 2022-01-01

## 2022-01-01 RX ORDER — SODIUM CHLORIDE 9 MG/ML
INJECTION, SOLUTION INTRAVENOUS PRN
Status: DISCONTINUED | OUTPATIENT
Start: 2022-01-01 | End: 2022-01-01

## 2022-01-01 RX ORDER — POTASSIUM CITRATE 10 MEQ/1
10 TABLET, EXTENDED RELEASE ORAL 2 TIMES DAILY
Qty: 180 TABLET | Refills: 1 | Status: ON HOLD | OUTPATIENT
Start: 2022-01-01 | End: 2022-01-01 | Stop reason: SDUPTHER

## 2022-01-01 RX ORDER — DIPHENHYDRAMINE HYDROCHLORIDE 50 MG/ML
50 INJECTION INTRAMUSCULAR; INTRAVENOUS
Status: CANCELLED | OUTPATIENT
Start: 2022-06-20

## 2022-01-01 RX ORDER — ALBUTEROL SULFATE 90 UG/1
4 AEROSOL, METERED RESPIRATORY (INHALATION) PRN
Status: CANCELLED | OUTPATIENT
Start: 2022-06-20

## 2022-01-01 RX ORDER — ONDANSETRON HYDROCHLORIDE 8 MG/1
8 TABLET, FILM COATED ORAL ONCE
Status: CANCELLED | OUTPATIENT
Start: 2022-01-01 | End: 2022-01-01

## 2022-01-01 RX ORDER — ALBUMIN (HUMAN) 12.5 G/50ML
25 SOLUTION INTRAVENOUS ONCE
Status: COMPLETED | OUTPATIENT
Start: 2022-01-01 | End: 2022-01-01

## 2022-01-01 RX ORDER — DEXTROSE MONOHYDRATE 25 G/50ML
12.5 INJECTION, SOLUTION INTRAVENOUS PRN
Status: DISCONTINUED | OUTPATIENT
Start: 2022-01-01 | End: 2022-01-01 | Stop reason: CLARIF

## 2022-01-01 RX ORDER — SODIUM CHLORIDE 9 MG/ML
INJECTION, SOLUTION INTRAVENOUS EVERY 12 HOURS
Status: DISCONTINUED | OUTPATIENT
Start: 2022-01-01 | End: 2022-01-01 | Stop reason: ALTCHOICE

## 2022-01-01 RX ORDER — PHENAZOPYRIDINE HYDROCHLORIDE 100 MG/1
100 TABLET, FILM COATED ORAL EVERY 6 HOURS PRN
Status: DISCONTINUED | OUTPATIENT
Start: 2022-01-01 | End: 2022-01-01 | Stop reason: HOSPADM

## 2022-01-01 RX ORDER — ATORVASTATIN CALCIUM 40 MG/1
40 TABLET, FILM COATED ORAL NIGHTLY
Status: DISCONTINUED | OUTPATIENT
Start: 2022-01-01 | End: 2022-01-01 | Stop reason: HOSPADM

## 2022-01-01 RX ORDER — INSULIN LISPRO 100 [IU]/ML
0-9 INJECTION, SOLUTION INTRAVENOUS; SUBCUTANEOUS NIGHTLY
Status: DISCONTINUED | OUTPATIENT
Start: 2022-01-01 | End: 2022-01-01 | Stop reason: HOSPADM

## 2022-01-01 RX ORDER — MIDODRINE HYDROCHLORIDE 5 MG/1
10 TABLET ORAL
Status: DISCONTINUED | OUTPATIENT
Start: 2022-01-01 | End: 2022-01-01

## 2022-01-01 RX ORDER — ATORVASTATIN CALCIUM 40 MG/1
40 TABLET, FILM COATED ORAL NIGHTLY
Qty: 30 TABLET | Refills: 3 | Status: SHIPPED | OUTPATIENT
Start: 2022-01-01

## 2022-01-01 RX ORDER — LANCETS 30 GAUGE
1 EACH MISCELLANEOUS 4 TIMES DAILY
Qty: 360 EACH | Refills: 3 | Status: SHIPPED | OUTPATIENT
Start: 2022-01-01 | End: 2022-01-01

## 2022-01-01 RX ORDER — ACETAMINOPHEN 325 MG/1
650 TABLET ORAL EVERY 4 HOURS PRN
Status: CANCELLED | OUTPATIENT
Start: 2022-01-01

## 2022-01-01 RX ORDER — DEXAMETHASONE 4 MG/1
12 TABLET ORAL ONCE
Status: DISCONTINUED | OUTPATIENT
Start: 2022-01-01 | End: 2022-01-01 | Stop reason: HOSPADM

## 2022-01-01 RX ORDER — INSULIN LISPRO 100 [IU]/ML
0-18 INJECTION, SOLUTION INTRAVENOUS; SUBCUTANEOUS
Status: DISCONTINUED | OUTPATIENT
Start: 2022-01-01 | End: 2022-01-01 | Stop reason: HOSPADM

## 2022-01-01 RX ORDER — PROPOFOL 10 MG/ML
INJECTION, EMULSION INTRAVENOUS PRN
Status: DISCONTINUED | OUTPATIENT
Start: 2022-01-01 | End: 2022-01-01 | Stop reason: SDUPTHER

## 2022-01-01 RX ORDER — MIDODRINE HYDROCHLORIDE 5 MG/1
5 TABLET ORAL
Qty: 90 TABLET | Refills: 1 | Status: SHIPPED | OUTPATIENT
Start: 2022-01-01

## 2022-01-01 RX ORDER — SODIUM CHLORIDE 0.9 % (FLUSH) 0.9 %
5-40 SYRINGE (ML) INJECTION PRN
Status: CANCELLED | OUTPATIENT
Start: 2022-06-20

## 2022-01-01 RX ORDER — CARVEDILOL 6.25 MG/1
6.25 TABLET ORAL 2 TIMES DAILY WITH MEALS
Qty: 60 TABLET | Refills: 3 | Status: SHIPPED | OUTPATIENT
Start: 2022-01-01 | End: 2022-01-01 | Stop reason: HOSPADM

## 2022-01-01 RX ORDER — SODIUM CHLORIDE 0.9 % (FLUSH) 0.9 %
5-40 SYRINGE (ML) INJECTION PRN
Status: DISCONTINUED | OUTPATIENT
Start: 2022-01-01 | End: 2022-01-01 | Stop reason: HOSPADM

## 2022-01-01 RX ORDER — ISOSORBIDE MONONITRATE 30 MG/1
30 TABLET, EXTENDED RELEASE ORAL DAILY
Qty: 30 TABLET | Refills: 3 | Status: SHIPPED | OUTPATIENT
Start: 2022-01-01 | End: 2022-01-01

## 2022-01-01 RX ORDER — FUROSEMIDE 40 MG/1
40 TABLET ORAL DAILY
Qty: 90 TABLET | Refills: 1 | Status: ON HOLD | OUTPATIENT
Start: 2022-01-01 | End: 2022-01-01 | Stop reason: HOSPADM

## 2022-01-01 RX ORDER — SODIUM CHLORIDE 9 MG/ML
5-40 INJECTION INTRAVENOUS PRN
Status: CANCELLED | OUTPATIENT
Start: 2022-06-21

## 2022-01-01 RX ORDER — CARVEDILOL 6.25 MG/1
6.25 TABLET ORAL 2 TIMES DAILY WITH MEALS
Status: DISCONTINUED | OUTPATIENT
Start: 2022-01-01 | End: 2022-01-01 | Stop reason: HOSPADM

## 2022-01-01 RX ORDER — NICOTINE POLACRILEX 4 MG
15 LOZENGE BUCCAL PRN
Status: DISCONTINUED | OUTPATIENT
Start: 2022-01-01 | End: 2022-01-01 | Stop reason: SDUPTHER

## 2022-01-01 RX ORDER — MEPERIDINE HYDROCHLORIDE 25 MG/ML
12.5 INJECTION INTRAMUSCULAR; INTRAVENOUS; SUBCUTANEOUS PRN
Status: CANCELLED | OUTPATIENT
Start: 2022-06-21

## 2022-01-01 RX ORDER — ATROPINE SULFATE 0.1 MG/ML
INJECTION INTRAVENOUS
Status: DISCONTINUED
Start: 2022-01-01 | End: 2022-01-01 | Stop reason: HOSPADM

## 2022-01-01 RX ORDER — SODIUM CHLORIDE, SODIUM LACTATE, POTASSIUM CHLORIDE, CALCIUM CHLORIDE 600; 310; 30; 20 MG/100ML; MG/100ML; MG/100ML; MG/100ML
INJECTION, SOLUTION INTRAVENOUS CONTINUOUS
Status: DISCONTINUED | OUTPATIENT
Start: 2022-01-01 | End: 2022-01-01 | Stop reason: ALTCHOICE

## 2022-01-01 RX ORDER — HEPARIN SODIUM (PORCINE) LOCK FLUSH IV SOLN 100 UNIT/ML 100 UNIT/ML
500 SOLUTION INTRAVENOUS PRN
Status: CANCELLED | OUTPATIENT
Start: 2022-06-20

## 2022-01-01 RX ORDER — SODIUM CHLORIDE 0.9 % (FLUSH) 0.9 %
5-40 SYRINGE (ML) INJECTION EVERY 12 HOURS SCHEDULED
Status: CANCELLED | OUTPATIENT
Start: 2022-01-01

## 2022-01-01 RX ORDER — EPINEPHRINE 1 MG/ML
0.3 INJECTION, SOLUTION, CONCENTRATE INTRAVENOUS PRN
Status: CANCELLED | OUTPATIENT
Start: 2022-06-20

## 2022-01-01 RX ORDER — INSULIN LISPRO 100 [IU]/ML
10 INJECTION, SOLUTION INTRAVENOUS; SUBCUTANEOUS ONCE
Status: COMPLETED | OUTPATIENT
Start: 2022-01-01 | End: 2022-01-01

## 2022-01-01 RX ORDER — MIDODRINE HYDROCHLORIDE 5 MG/1
5 TABLET ORAL
Status: DISCONTINUED | OUTPATIENT
Start: 2022-01-01 | End: 2022-01-01

## 2022-01-01 RX ORDER — ALBUTEROL SULFATE 90 UG/1
4 AEROSOL, METERED RESPIRATORY (INHALATION) PRN
Status: CANCELLED | OUTPATIENT
Start: 2022-06-21

## 2022-01-01 RX ORDER — ALBUMIN (HUMAN) 12.5 G/50ML
12.5 SOLUTION INTRAVENOUS ONCE
Status: COMPLETED | OUTPATIENT
Start: 2022-01-01 | End: 2022-01-01

## 2022-01-01 RX ORDER — PANTOPRAZOLE SODIUM 40 MG/1
40 TABLET, DELAYED RELEASE ORAL
Status: DISCONTINUED | OUTPATIENT
Start: 2022-01-01 | End: 2022-01-01

## 2022-01-01 RX ORDER — SODIUM CHLORIDE 9 MG/ML
5-250 INJECTION, SOLUTION INTRAVENOUS PRN
Status: CANCELLED | OUTPATIENT
Start: 2022-06-21

## 2022-01-01 RX ORDER — MORPHINE SULFATE 10 MG/ML
10 INJECTION INTRAVENOUS ONCE
Status: COMPLETED | OUTPATIENT
Start: 2022-01-01 | End: 2022-01-01

## 2022-01-01 RX ORDER — PREDNISONE 1 MG/1
5 TABLET ORAL EVERY 24 HOURS
Status: DISCONTINUED | OUTPATIENT
Start: 2022-01-01 | End: 2022-01-01

## 2022-01-01 RX ORDER — PREDNISONE 10 MG/1
20 TABLET ORAL EVERY 24 HOURS
Status: DISCONTINUED | OUTPATIENT
Start: 2022-01-01 | End: 2022-01-01

## 2022-01-01 RX ORDER — DEXTROSE MONOHYDRATE 50 MG/ML
100 INJECTION, SOLUTION INTRAVENOUS PRN
Status: DISCONTINUED | OUTPATIENT
Start: 2022-01-01 | End: 2022-01-01 | Stop reason: SDUPTHER

## 2022-01-01 RX ORDER — INSULIN GLARGINE 100 [IU]/ML
5 INJECTION, SOLUTION SUBCUTANEOUS ONCE
Status: COMPLETED | OUTPATIENT
Start: 2022-01-01 | End: 2022-01-01

## 2022-01-01 RX ORDER — INSULIN GLARGINE 100 [IU]/ML
20 INJECTION, SOLUTION SUBCUTANEOUS NIGHTLY
Status: DISCONTINUED | OUTPATIENT
Start: 2022-01-01 | End: 2022-01-01 | Stop reason: HOSPADM

## 2022-01-01 RX ORDER — INSULIN GLARGINE 100 [IU]/ML
25 INJECTION, SOLUTION SUBCUTANEOUS NIGHTLY
Status: DISCONTINUED | OUTPATIENT
Start: 2022-01-01 | End: 2022-01-01 | Stop reason: HOSPADM

## 2022-01-01 RX ORDER — INSULIN GLARGINE 100 [IU]/ML
10 INJECTION, SOLUTION SUBCUTANEOUS NIGHTLY
Qty: 5 PEN | Refills: 3 | Status: ON HOLD | OUTPATIENT
Start: 2022-01-01 | End: 2022-01-01 | Stop reason: HOSPADM

## 2022-01-01 RX ORDER — MIDODRINE HYDROCHLORIDE 5 MG/1
5 TABLET ORAL
Status: DISCONTINUED | OUTPATIENT
Start: 2022-01-01 | End: 2022-01-01 | Stop reason: HOSPADM

## 2022-01-01 RX ORDER — ATORVASTATIN CALCIUM 40 MG/1
80 TABLET, FILM COATED ORAL NIGHTLY
Status: DISCONTINUED | OUTPATIENT
Start: 2022-01-01 | End: 2022-01-01

## 2022-01-01 RX ORDER — ASPIRIN 81 MG/1
81 TABLET, CHEWABLE ORAL DAILY
Qty: 30 TABLET | Refills: 3 | Status: SHIPPED | OUTPATIENT
Start: 2022-01-01

## 2022-01-01 RX ORDER — NOREPINEPHRINE BIT/0.9 % NACL 16MG/250ML
1-70 INFUSION BOTTLE (ML) INTRAVENOUS CONTINUOUS
Status: DISCONTINUED | OUTPATIENT
Start: 2022-01-01 | End: 2022-01-01

## 2022-01-01 RX ORDER — INSULIN GLARGINE 100 [IU]/ML
18 INJECTION, SOLUTION SUBCUTANEOUS NIGHTLY
Status: DISCONTINUED | OUTPATIENT
Start: 2022-01-01 | End: 2022-01-01

## 2022-01-01 RX ORDER — METOCLOPRAMIDE HYDROCHLORIDE 5 MG/ML
10 INJECTION INTRAMUSCULAR; INTRAVENOUS ONCE
Status: COMPLETED | OUTPATIENT
Start: 2022-01-01 | End: 2022-01-01

## 2022-01-01 RX ORDER — ACETAMINOPHEN 325 MG/1
650 TABLET ORAL EVERY 4 HOURS PRN
Status: DISCONTINUED | OUTPATIENT
Start: 2022-01-01 | End: 2022-01-01 | Stop reason: HOSPADM

## 2022-01-01 RX ORDER — ONDANSETRON 2 MG/ML
8 INJECTION INTRAMUSCULAR; INTRAVENOUS
Status: CANCELLED | OUTPATIENT
Start: 2022-06-21

## 2022-01-01 RX ORDER — INSULIN GLARGINE 100 [IU]/ML
4 INJECTION, SOLUTION SUBCUTANEOUS 2 TIMES DAILY
Status: DISCONTINUED | OUTPATIENT
Start: 2022-01-01 | End: 2022-01-01

## 2022-01-01 RX ORDER — FAMOTIDINE 10 MG/ML
20 INJECTION, SOLUTION INTRAVENOUS
Status: CANCELLED | OUTPATIENT
Start: 2022-06-20

## 2022-01-01 RX ORDER — INSULIN LISPRO 100 [IU]/ML
15 INJECTION, SOLUTION INTRAVENOUS; SUBCUTANEOUS ONCE
Status: COMPLETED | OUTPATIENT
Start: 2022-01-01 | End: 2022-01-01

## 2022-01-01 RX ORDER — PREDNISONE 1 MG/1
5 TABLET ORAL DAILY
Status: DISCONTINUED | OUTPATIENT
Start: 2022-01-01 | End: 2022-01-01 | Stop reason: HOSPADM

## 2022-01-01 RX ORDER — ACETAMINOPHEN 650 MG/1
650 SUPPOSITORY RECTAL EVERY 6 HOURS PRN
Status: DISCONTINUED | OUTPATIENT
Start: 2022-01-01 | End: 2022-01-01 | Stop reason: HOSPADM

## 2022-01-01 RX ORDER — M-VIT,TX,IRON,MINS/CALC/FOLIC 27MG-0.4MG
1 TABLET ORAL DAILY
Status: DISCONTINUED | OUTPATIENT
Start: 2022-06-19 | End: 2022-01-01 | Stop reason: HOSPADM

## 2022-01-01 RX ORDER — ASPIRIN 81 MG/1
81 TABLET, CHEWABLE ORAL DAILY
Status: DISCONTINUED | OUTPATIENT
Start: 2022-01-01 | End: 2022-01-01 | Stop reason: HOSPADM

## 2022-01-01 RX ORDER — SODIUM CHLORIDE, SODIUM LACTATE, POTASSIUM CHLORIDE, AND CALCIUM CHLORIDE .6; .31; .03; .02 G/100ML; G/100ML; G/100ML; G/100ML
500 INJECTION, SOLUTION INTRAVENOUS ONCE
Status: COMPLETED | OUTPATIENT
Start: 2022-01-01 | End: 2022-01-01

## 2022-01-01 RX ORDER — ONDANSETRON 4 MG/1
4 TABLET, ORALLY DISINTEGRATING ORAL EVERY 8 HOURS PRN
Status: DISCONTINUED | OUTPATIENT
Start: 2022-01-01 | End: 2022-01-01 | Stop reason: HOSPADM

## 2022-01-01 RX ORDER — DEXAMETHASONE 4 MG/1
12 TABLET ORAL ONCE
Status: CANCELLED | OUTPATIENT
Start: 2022-06-20 | End: 2022-06-20

## 2022-01-01 RX ORDER — INSULIN GLARGINE 100 [IU]/ML
10 INJECTION, SOLUTION SUBCUTANEOUS NIGHTLY
Status: DISCONTINUED | OUTPATIENT
Start: 2022-01-01 | End: 2022-01-01

## 2022-01-01 RX ORDER — PANTOPRAZOLE SODIUM 40 MG/10ML
40 INJECTION, POWDER, LYOPHILIZED, FOR SOLUTION INTRAVENOUS DAILY
Status: DISCONTINUED | OUTPATIENT
Start: 2022-01-01 | End: 2022-01-01

## 2022-01-01 RX ORDER — FUROSEMIDE 20 MG/1
20 TABLET ORAL ONCE
Status: COMPLETED | OUTPATIENT
Start: 2022-01-01 | End: 2022-01-01

## 2022-01-01 RX ORDER — INSULIN GLARGINE 100 [IU]/ML
20 INJECTION, SOLUTION SUBCUTANEOUS NIGHTLY
Status: DISCONTINUED | OUTPATIENT
Start: 2022-01-01 | End: 2022-01-01

## 2022-01-01 RX ORDER — SENNA PLUS 8.6 MG/1
1 TABLET ORAL NIGHTLY PRN
Status: DISCONTINUED | OUTPATIENT
Start: 2022-01-01 | End: 2022-01-01 | Stop reason: HOSPADM

## 2022-01-01 RX ORDER — SODIUM CHLORIDE, SODIUM LACTATE, POTASSIUM CHLORIDE, CALCIUM CHLORIDE 600; 310; 30; 20 MG/100ML; MG/100ML; MG/100ML; MG/100ML
INJECTION, SOLUTION INTRAVENOUS CONTINUOUS
Status: DISCONTINUED | OUTPATIENT
Start: 2022-01-01 | End: 2022-01-01

## 2022-01-01 RX ORDER — ASPIRIN 81 MG/1
324 TABLET, CHEWABLE ORAL ONCE
Status: COMPLETED | OUTPATIENT
Start: 2022-01-01 | End: 2022-01-01

## 2022-01-01 RX ORDER — M-VIT,TX,IRON,MINS/CALC/FOLIC 27MG-0.4MG
1 TABLET ORAL DAILY
Status: DISCONTINUED | OUTPATIENT
Start: 2022-01-01 | End: 2022-01-01 | Stop reason: HOSPADM

## 2022-01-01 RX ORDER — INSULIN LISPRO 100 [IU]/ML
0-3 INJECTION, SOLUTION INTRAVENOUS; SUBCUTANEOUS NIGHTLY
Status: DISCONTINUED | OUTPATIENT
Start: 2022-01-01 | End: 2022-01-01

## 2022-01-01 RX ORDER — ONDANSETRON 2 MG/ML
8 INJECTION INTRAMUSCULAR; INTRAVENOUS
Status: CANCELLED | OUTPATIENT
Start: 2022-06-20

## 2022-01-01 RX ORDER — ASPIRIN 325 MG
325 TABLET ORAL ONCE
Status: COMPLETED | OUTPATIENT
Start: 2022-01-01 | End: 2022-01-01

## 2022-01-01 RX ORDER — MIDODRINE HYDROCHLORIDE 5 MG/1
15 TABLET ORAL
Status: DISCONTINUED | OUTPATIENT
Start: 2022-01-01 | End: 2022-01-01

## 2022-01-01 RX ORDER — POTASSIUM BICARBONATE 25 MEQ/1
50 TABLET, EFFERVESCENT ORAL ONCE
Status: DISCONTINUED | OUTPATIENT
Start: 2022-01-01 | End: 2022-01-01 | Stop reason: CLARIF

## 2022-01-01 RX ORDER — PANTOPRAZOLE SODIUM 40 MG/1
40 TABLET, DELAYED RELEASE ORAL
Qty: 30 TABLET | Refills: 1 | Status: SHIPPED | OUTPATIENT
Start: 2022-01-01

## 2022-01-01 RX ORDER — PREDNISONE 1 MG/1
15 TABLET ORAL
Qty: 90 TABLET | Refills: 1 | Status: SHIPPED | OUTPATIENT
Start: 2022-01-01

## 2022-01-01 RX ORDER — SODIUM CHLORIDE 9 MG/ML
INJECTION, SOLUTION INTRAVENOUS CONTINUOUS
Status: CANCELLED | OUTPATIENT
Start: 2022-06-21

## 2022-01-01 RX ORDER — ONDANSETRON 4 MG/1
8 TABLET, FILM COATED ORAL ONCE
Status: CANCELLED | OUTPATIENT
Start: 2022-06-20 | End: 2022-06-20

## 2022-01-01 RX ORDER — MIDODRINE HYDROCHLORIDE 5 MG/1
10 TABLET ORAL
Status: DISCONTINUED | OUTPATIENT
Start: 2022-01-01 | End: 2022-01-01 | Stop reason: HOSPADM

## 2022-01-01 RX ORDER — SODIUM CHLORIDE 9 MG/ML
25 INJECTION, SOLUTION INTRAVENOUS PRN
Status: CANCELLED | OUTPATIENT
Start: 2022-01-01

## 2022-01-01 RX ORDER — RANOLAZINE 500 MG/1
500 TABLET, EXTENDED RELEASE ORAL 2 TIMES DAILY
Status: DISCONTINUED | OUTPATIENT
Start: 2022-01-01 | End: 2022-01-01 | Stop reason: HOSPADM

## 2022-01-01 RX ORDER — SODIUM CHLORIDE 9 MG/ML
5-40 INJECTION INTRAVENOUS PRN
Status: CANCELLED | OUTPATIENT
Start: 2022-06-20

## 2022-01-01 RX ORDER — ISOSORBIDE MONONITRATE 30 MG/1
30 TABLET, EXTENDED RELEASE ORAL DAILY
Qty: 90 TABLET | Refills: 1 | Status: SHIPPED | OUTPATIENT
Start: 2022-01-01

## 2022-01-01 RX ORDER — 0.9 % SODIUM CHLORIDE 0.9 %
1000 INTRAVENOUS SOLUTION INTRAVENOUS ONCE
Status: DISCONTINUED | OUTPATIENT
Start: 2022-01-01 | End: 2022-01-01

## 2022-01-01 RX ORDER — MIDAZOLAM HYDROCHLORIDE 1 MG/ML
INJECTION INTRAMUSCULAR; INTRAVENOUS
Status: COMPLETED | OUTPATIENT
Start: 2022-01-01 | End: 2022-01-01

## 2022-01-01 RX ORDER — ACETAMINOPHEN 500 MG
1000 TABLET ORAL ONCE
Status: COMPLETED | OUTPATIENT
Start: 2022-01-01 | End: 2022-01-01

## 2022-01-01 RX ORDER — FINASTERIDE 5 MG/1
5 TABLET, FILM COATED ORAL DAILY
Qty: 90 TABLET | Refills: 1 | Status: SHIPPED | OUTPATIENT
Start: 2022-01-01

## 2022-01-01 RX ORDER — METOLAZONE 5 MG/1
5 TABLET ORAL DAILY
Qty: 90 TABLET | Refills: 1 | Status: ON HOLD | OUTPATIENT
Start: 2022-01-01 | End: 2022-01-01

## 2022-01-01 RX ORDER — METOPROLOL SUCCINATE 25 MG/1
12.5 TABLET, EXTENDED RELEASE ORAL DAILY
Status: DISCONTINUED | OUTPATIENT
Start: 2022-01-01 | End: 2022-01-01 | Stop reason: HOSPADM

## 2022-01-01 RX ORDER — FINASTERIDE 5 MG/1
5 TABLET, FILM COATED ORAL WEEKLY
Qty: 30 TABLET | Refills: 3 | Status: SHIPPED | OUTPATIENT
Start: 2022-01-01 | End: 2022-01-01 | Stop reason: SDUPTHER

## 2022-01-01 RX ORDER — PREDNISONE 1 MG/1
15 TABLET ORAL
Status: DISCONTINUED | OUTPATIENT
Start: 2022-01-01 | End: 2022-01-01 | Stop reason: HOSPADM

## 2022-01-01 RX ORDER — PREDNISONE 1 MG/1
5 TABLET ORAL EVERY MORNING
Qty: 30 TABLET | Refills: 0 | Status: SHIPPED | OUTPATIENT
Start: 2022-01-01 | End: 2022-01-01

## 2022-01-01 RX ORDER — CARVEDILOL 3.12 MG/1
3.12 TABLET ORAL 2 TIMES DAILY WITH MEALS
Status: DISCONTINUED | OUTPATIENT
Start: 2022-01-01 | End: 2022-01-01

## 2022-01-01 RX ORDER — PRAVASTATIN SODIUM 20 MG
20 TABLET ORAL DAILY
COMMUNITY

## 2022-01-01 RX ORDER — LEVOTHYROXINE SODIUM 0.07 MG/1
75 TABLET ORAL DAILY
Qty: 90 TABLET | Refills: 1 | Status: SHIPPED | OUTPATIENT
Start: 2022-01-01

## 2022-01-01 RX ORDER — INSULIN LISPRO 100 [IU]/ML
5 INJECTION, SOLUTION INTRAVENOUS; SUBCUTANEOUS
Status: DISCONTINUED | OUTPATIENT
Start: 2022-01-01 | End: 2022-01-01 | Stop reason: HOSPADM

## 2022-01-01 RX ORDER — ONDANSETRON 2 MG/ML
4 INJECTION INTRAMUSCULAR; INTRAVENOUS EVERY 6 HOURS PRN
Status: DISCONTINUED | OUTPATIENT
Start: 2022-01-01 | End: 2022-01-01 | Stop reason: HOSPADM

## 2022-01-01 RX ORDER — INSULIN LISPRO 100 [IU]/ML
0-6 INJECTION, SOLUTION INTRAVENOUS; SUBCUTANEOUS
Status: DISCONTINUED | OUTPATIENT
Start: 2022-01-01 | End: 2022-01-01

## 2022-01-01 RX ORDER — INSULIN LISPRO 100 [IU]/ML
5 INJECTION, SOLUTION INTRAVENOUS; SUBCUTANEOUS
COMMUNITY
Start: 2022-01-01

## 2022-01-01 RX ORDER — FUROSEMIDE 20 MG/1
20 TABLET ORAL DAILY
Status: DISCONTINUED | OUTPATIENT
Start: 2022-01-01 | End: 2022-01-01

## 2022-01-01 RX ORDER — FINASTERIDE 5 MG/1
5 TABLET, FILM COATED ORAL DAILY
Status: DISCONTINUED | OUTPATIENT
Start: 2022-06-19 | End: 2022-01-01 | Stop reason: HOSPADM

## 2022-01-01 RX ORDER — INSULIN LISPRO 100 [IU]/ML
0-3 INJECTION, SOLUTION INTRAVENOUS; SUBCUTANEOUS NIGHTLY
Status: DISCONTINUED | OUTPATIENT
Start: 2022-01-01 | End: 2022-01-01 | Stop reason: HOSPADM

## 2022-01-01 RX ORDER — PREDNISONE 10 MG/1
10 TABLET ORAL DAILY
Status: DISCONTINUED | OUTPATIENT
Start: 2022-01-01 | End: 2022-01-01

## 2022-01-01 RX ORDER — PREDNISONE 1 MG/1
5 TABLET ORAL
Qty: 30 TABLET | Refills: 1 | Status: SHIPPED | OUTPATIENT
Start: 2022-01-01

## 2022-01-01 RX ORDER — DEXAMETHASONE 4 MG/1
12 TABLET ORAL ONCE
Status: CANCELLED | OUTPATIENT
Start: 2022-06-21 | End: 2022-06-21

## 2022-01-01 RX ORDER — INSULIN LISPRO 100 [IU]/ML
0-6 INJECTION, SOLUTION INTRAVENOUS; SUBCUTANEOUS
Status: DISCONTINUED | OUTPATIENT
Start: 2022-01-01 | End: 2022-01-01 | Stop reason: HOSPADM

## 2022-01-01 RX ORDER — SODIUM CHLORIDE 9 MG/ML
INJECTION, SOLUTION INTRAVENOUS CONTINUOUS
Status: CANCELLED | OUTPATIENT
Start: 2022-06-20

## 2022-01-01 RX ORDER — ACETAMINOPHEN 325 MG/1
650 TABLET ORAL
Status: CANCELLED | OUTPATIENT
Start: 2022-06-20

## 2022-01-01 RX ORDER — ISOSORBIDE MONONITRATE 30 MG/1
30 TABLET, EXTENDED RELEASE ORAL DAILY
Qty: 30 TABLET | Refills: 3 | Status: SHIPPED | OUTPATIENT
Start: 2022-01-01 | End: 2022-01-01 | Stop reason: SDUPTHER

## 2022-01-01 RX ORDER — MORPHINE SULFATE 4 MG/ML
4 INJECTION, SOLUTION INTRAMUSCULAR; INTRAVENOUS
Status: DISCONTINUED | OUTPATIENT
Start: 2022-01-01 | End: 2022-01-01 | Stop reason: HOSPADM

## 2022-01-01 RX ORDER — INSULIN GLARGINE 100 [IU]/ML
15 INJECTION, SOLUTION SUBCUTANEOUS NIGHTLY
Status: DISCONTINUED | OUTPATIENT
Start: 2022-01-01 | End: 2022-01-01 | Stop reason: HOSPADM

## 2022-01-01 RX ORDER — PREDNISONE 1 MG/1
5 TABLET ORAL EVERY 24 HOURS
Status: DISCONTINUED | OUTPATIENT
Start: 2022-01-01 | End: 2022-01-01 | Stop reason: HOSPADM

## 2022-01-01 RX ORDER — PANTOPRAZOLE SODIUM 40 MG/1
40 TABLET, DELAYED RELEASE ORAL
Qty: 30 TABLET | Refills: 3 | Status: SHIPPED | OUTPATIENT
Start: 2022-01-01 | End: 2022-01-01 | Stop reason: ALTCHOICE

## 2022-01-01 RX ORDER — ASPIRIN 81 MG/1
81 TABLET ORAL DAILY
Qty: 30 TABLET | Refills: 3 | Status: SHIPPED | OUTPATIENT
Start: 2022-01-01 | End: 2022-01-01

## 2022-01-01 RX ORDER — MORPHINE SULFATE 10 MG/ML
INJECTION, SOLUTION INTRAMUSCULAR; INTRAVENOUS
Status: COMPLETED
Start: 2022-01-01 | End: 2022-01-01

## 2022-01-01 RX ORDER — INSULIN GLARGINE 100 [IU]/ML
30 INJECTION, SOLUTION SUBCUTANEOUS ONCE
Status: COMPLETED | OUTPATIENT
Start: 2022-01-01 | End: 2022-01-01

## 2022-01-01 RX ORDER — EPINEPHRINE 1 MG/ML
0.3 INJECTION, SOLUTION, CONCENTRATE INTRAVENOUS PRN
Status: CANCELLED | OUTPATIENT
Start: 2022-06-21

## 2022-01-01 RX ORDER — POLYETHYLENE GLYCOL 3350 17 G/17G
17 POWDER, FOR SOLUTION ORAL DAILY
Qty: 527 G | Refills: 1 | Status: SHIPPED | OUTPATIENT
Start: 2022-01-01

## 2022-01-01 RX ORDER — HEPARIN SODIUM 5000 [USP'U]/ML
5000 INJECTION, SOLUTION INTRAVENOUS; SUBCUTANEOUS EVERY 12 HOURS SCHEDULED
Status: DISCONTINUED | OUTPATIENT
Start: 2022-01-01 | End: 2022-01-01

## 2022-01-01 RX ADMIN — POTASSIUM CHLORIDE 20 MEQ: 29.8 INJECTION, SOLUTION INTRAVENOUS at 10:10

## 2022-01-01 RX ADMIN — INSULIN GLARGINE 10 UNITS: 100 INJECTION, SOLUTION SUBCUTANEOUS at 20:42

## 2022-01-01 RX ADMIN — Medication 15 G: at 05:42

## 2022-01-01 RX ADMIN — INSULIN GLARGINE 20 UNITS: 100 INJECTION, SOLUTION SUBCUTANEOUS at 21:24

## 2022-01-01 RX ADMIN — INSULIN GLARGINE 20 UNITS: 100 INJECTION, SOLUTION SUBCUTANEOUS at 20:22

## 2022-01-01 RX ADMIN — INSULIN GLARGINE 15 UNITS: 100 INJECTION, SOLUTION SUBCUTANEOUS at 20:35

## 2022-01-01 RX ADMIN — SODIUM CHLORIDE 1000 ML: 9 INJECTION, SOLUTION INTRAVENOUS at 11:46

## 2022-01-01 RX ADMIN — FINASTERIDE 5 MG: 5 TABLET, FILM COATED ORAL at 09:25

## 2022-01-01 RX ADMIN — MIDODRINE HYDROCHLORIDE 5 MG: 5 TABLET ORAL at 12:00

## 2022-01-01 RX ADMIN — AZITHROMYCIN MONOHYDRATE 500 MG: 500 INJECTION, POWDER, LYOPHILIZED, FOR SOLUTION INTRAVENOUS at 06:42

## 2022-01-01 RX ADMIN — PREDNISONE 5 MG: 5 TABLET ORAL at 16:43

## 2022-01-01 RX ADMIN — SODIUM CHLORIDE: 9 INJECTION, SOLUTION INTRAVENOUS at 04:31

## 2022-01-01 RX ADMIN — POLYETHYLENE GLYCOL 3350 17 G: 17 POWDER, FOR SOLUTION ORAL at 12:55

## 2022-01-01 RX ADMIN — ASPIRIN 81 MG 81 MG: 81 TABLET ORAL at 11:07

## 2022-01-01 RX ADMIN — PROPOFOL 20 MG: 10 INJECTION, EMULSION INTRAVENOUS at 10:21

## 2022-01-01 RX ADMIN — SODIUM CHLORIDE, PRESERVATIVE FREE 10 ML: 5 INJECTION INTRAVENOUS at 20:22

## 2022-01-01 RX ADMIN — Medication 10 ML: at 09:39

## 2022-01-01 RX ADMIN — INSULIN LISPRO 6 UNITS: 100 INJECTION, SOLUTION INTRAVENOUS; SUBCUTANEOUS at 17:24

## 2022-01-01 RX ADMIN — MIDODRINE HYDROCHLORIDE 5 MG: 5 TABLET ORAL at 18:18

## 2022-01-01 RX ADMIN — SODIUM CHLORIDE 500 ML: 9 INJECTION, SOLUTION INTRAVENOUS at 03:30

## 2022-01-01 RX ADMIN — INSULIN GLARGINE 8 UNITS: 100 INJECTION, SOLUTION SUBCUTANEOUS at 09:54

## 2022-01-01 RX ADMIN — POLYETHYLENE GLYCOL 3350 17 G: 17 POWDER, FOR SOLUTION ORAL at 09:00

## 2022-01-01 RX ADMIN — MIDODRINE HYDROCHLORIDE 5 MG: 5 TABLET ORAL at 16:59

## 2022-01-01 RX ADMIN — PREDNISONE 15 MG: 5 TABLET ORAL at 09:25

## 2022-01-01 RX ADMIN — INSULIN LISPRO 3 UNITS: 100 INJECTION, SOLUTION INTRAVENOUS; SUBCUTANEOUS at 21:13

## 2022-01-01 RX ADMIN — PANTOPRAZOLE SODIUM 40 MG: 40 TABLET, DELAYED RELEASE ORAL at 09:01

## 2022-01-01 RX ADMIN — CARVEDILOL 6.25 MG: 6.25 TABLET, FILM COATED ORAL at 08:38

## 2022-01-01 RX ADMIN — RANOLAZINE 500 MG: 500 TABLET, FILM COATED, EXTENDED RELEASE ORAL at 20:19

## 2022-01-01 RX ADMIN — SODIUM CHLORIDE 1000 ML: 9 INJECTION, SOLUTION INTRAVENOUS at 12:35

## 2022-01-01 RX ADMIN — ONDANSETRON HYDROCHLORIDE 8 MG: 4 TABLET, FILM COATED ORAL at 15:24

## 2022-01-01 RX ADMIN — SODIUM CHLORIDE, POTASSIUM CHLORIDE, SODIUM LACTATE AND CALCIUM CHLORIDE 500 ML: 600; 310; 30; 20 INJECTION, SOLUTION INTRAVENOUS at 10:03

## 2022-01-01 RX ADMIN — CARVEDILOL 6.25 MG: 6.25 TABLET, FILM COATED ORAL at 17:10

## 2022-01-01 RX ADMIN — SODIUM CHLORIDE, PRESERVATIVE FREE 10 ML: 5 INJECTION INTRAVENOUS at 21:06

## 2022-01-01 RX ADMIN — LEVOTHYROXINE SODIUM 75 MCG: 0.07 TABLET ORAL at 06:27

## 2022-01-01 RX ADMIN — MIDODRINE HYDROCHLORIDE 5 MG: 5 TABLET ORAL at 08:50

## 2022-01-01 RX ADMIN — ATORVASTATIN CALCIUM 80 MG: 40 TABLET, FILM COATED ORAL at 20:47

## 2022-01-01 RX ADMIN — INSULIN LISPRO 5 UNITS: 100 INJECTION, SOLUTION INTRAVENOUS; SUBCUTANEOUS at 11:54

## 2022-01-01 RX ADMIN — AZACITIDINE 135 MG: 100 INJECTION, POWDER, LYOPHILIZED, FOR SOLUTION INTRAVENOUS; SUBCUTANEOUS at 15:48

## 2022-01-01 RX ADMIN — ISOSORBIDE MONONITRATE 30 MG: 30 TABLET, EXTENDED RELEASE ORAL at 09:25

## 2022-01-01 RX ADMIN — ATORVASTATIN CALCIUM 40 MG: 40 TABLET, FILM COATED ORAL at 21:10

## 2022-01-01 RX ADMIN — MIDODRINE HYDROCHLORIDE 5 MG: 5 TABLET ORAL at 09:25

## 2022-01-01 RX ADMIN — HYDROCORTISONE SODIUM SUCCINATE 50 MG: 100 INJECTION, POWDER, FOR SOLUTION INTRAMUSCULAR; INTRAVENOUS at 17:05

## 2022-01-01 RX ADMIN — ATORVASTATIN CALCIUM 40 MG: 40 TABLET, FILM COATED ORAL at 21:22

## 2022-01-01 RX ADMIN — SODIUM CHLORIDE 40 MG: 9 INJECTION INTRAMUSCULAR; INTRAVENOUS; SUBCUTANEOUS at 16:45

## 2022-01-01 RX ADMIN — MIDODRINE HYDROCHLORIDE 5 MG: 5 TABLET ORAL at 12:30

## 2022-01-01 RX ADMIN — WATER 1000 MG: 1 INJECTION INTRAMUSCULAR; INTRAVENOUS; SUBCUTANEOUS at 14:30

## 2022-01-01 RX ADMIN — SODIUM CHLORIDE, POTASSIUM CHLORIDE, SODIUM LACTATE AND CALCIUM CHLORIDE: 600; 310; 30; 20 INJECTION, SOLUTION INTRAVENOUS at 12:09

## 2022-01-01 RX ADMIN — PREDNISONE 20 MG: 20 TABLET ORAL at 08:32

## 2022-01-01 RX ADMIN — PREDNISONE 20 MG: 20 TABLET ORAL at 09:06

## 2022-01-01 RX ADMIN — HYDROCORTISONE SODIUM SUCCINATE 50 MG: 100 INJECTION, POWDER, FOR SOLUTION INTRAMUSCULAR; INTRAVENOUS at 13:30

## 2022-01-01 RX ADMIN — INSULIN LISPRO 5 UNITS: 100 INJECTION, SOLUTION INTRAVENOUS; SUBCUTANEOUS at 21:11

## 2022-01-01 RX ADMIN — INSULIN LISPRO 5 UNITS: 100 INJECTION, SOLUTION INTRAVENOUS; SUBCUTANEOUS at 08:02

## 2022-01-01 RX ADMIN — INSULIN LISPRO 3 UNITS: 100 INJECTION, SOLUTION INTRAVENOUS; SUBCUTANEOUS at 16:56

## 2022-01-01 RX ADMIN — SODIUM CHLORIDE 25 ML: 9 INJECTION, SOLUTION INTRAVENOUS at 14:21

## 2022-01-01 RX ADMIN — CEFEPIME HYDROCHLORIDE 1000 MG: 1 INJECTION, POWDER, FOR SOLUTION INTRAMUSCULAR; INTRAVENOUS at 19:34

## 2022-01-01 RX ADMIN — INSULIN LISPRO 7 UNITS: 100 INJECTION, SOLUTION INTRAVENOUS; SUBCUTANEOUS at 20:23

## 2022-01-01 RX ADMIN — INSULIN LISPRO 5 UNITS: 100 INJECTION, SOLUTION INTRAVENOUS; SUBCUTANEOUS at 11:59

## 2022-01-01 RX ADMIN — INSULIN LISPRO 2 UNITS: 100 INJECTION, SOLUTION INTRAVENOUS; SUBCUTANEOUS at 01:30

## 2022-01-01 RX ADMIN — POTASSIUM CHLORIDE 10 MEQ: 20 TABLET, EXTENDED RELEASE ORAL at 12:27

## 2022-01-01 RX ADMIN — MIDODRINE HYDROCHLORIDE 15 MG: 5 TABLET ORAL at 16:44

## 2022-01-01 RX ADMIN — Medication 10 ML: at 21:33

## 2022-01-01 RX ADMIN — INSULIN GLARGINE 20 UNITS: 100 INJECTION, SOLUTION SUBCUTANEOUS at 23:22

## 2022-01-01 RX ADMIN — SODIUM CHLORIDE, PRESERVATIVE FREE 10 ML: 5 INJECTION INTRAVENOUS at 20:36

## 2022-01-01 RX ADMIN — HYDROCORTISONE SODIUM SUCCINATE 50 MG: 100 INJECTION, POWDER, FOR SOLUTION INTRAMUSCULAR; INTRAVENOUS at 18:59

## 2022-01-01 RX ADMIN — INSULIN GLARGINE 25 UNITS: 100 INJECTION, SOLUTION SUBCUTANEOUS at 21:00

## 2022-01-01 RX ADMIN — MIDODRINE HYDROCHLORIDE 5 MG: 5 TABLET ORAL at 09:03

## 2022-01-01 RX ADMIN — INSULIN LISPRO 3 UNITS: 100 INJECTION, SOLUTION INTRAVENOUS; SUBCUTANEOUS at 17:01

## 2022-01-01 RX ADMIN — PROPOFOL 50 MG: 10 INJECTION, EMULSION INTRAVENOUS at 08:21

## 2022-01-01 RX ADMIN — LEVOTHYROXINE SODIUM 75 MCG: 25 TABLET ORAL at 05:40

## 2022-01-01 RX ADMIN — MIDODRINE HYDROCHLORIDE 5 MG: 5 TABLET ORAL at 12:15

## 2022-01-01 RX ADMIN — PERFLUTREN 0.32 MG: 6.52 INJECTION, SUSPENSION INTRAVENOUS at 14:09

## 2022-01-01 RX ADMIN — Medication 10 ML: at 08:50

## 2022-01-01 RX ADMIN — INSULIN LISPRO 15 UNITS: 100 INJECTION, SOLUTION INTRAVENOUS; SUBCUTANEOUS at 13:23

## 2022-01-01 RX ADMIN — PREDNISONE 5 MG: 5 TABLET ORAL at 15:13

## 2022-01-01 RX ADMIN — MORPHINE SULFATE 4 MG: 4 INJECTION, SOLUTION INTRAMUSCULAR; INTRAVENOUS at 14:13

## 2022-01-01 RX ADMIN — ACETAMINOPHEN 650 MG: 325 TABLET ORAL at 15:17

## 2022-01-01 RX ADMIN — INSULIN LISPRO 3 UNITS: 100 INJECTION, SOLUTION INTRAVENOUS; SUBCUTANEOUS at 20:51

## 2022-01-01 RX ADMIN — ONDANSETRON HYDROCHLORIDE 8 MG: 4 TABLET, FILM COATED ORAL at 14:41

## 2022-01-01 RX ADMIN — ONDANSETRON HYDROCHLORIDE 8 MG: 4 TABLET, FILM COATED ORAL at 14:35

## 2022-01-01 RX ADMIN — ONDANSETRON HYDROCHLORIDE 8 MG: 4 TABLET, FILM COATED ORAL at 14:22

## 2022-01-01 RX ADMIN — POTASSIUM & SODIUM PHOSPHATES POWDER PACK 280-160-250 MG 500 MG: 280-160-250 PACK at 11:48

## 2022-01-01 RX ADMIN — SODIUM CHLORIDE, PRESERVATIVE FREE 10 ML: 5 INJECTION INTRAVENOUS at 17:03

## 2022-01-01 RX ADMIN — SODIUM CHLORIDE, PRESERVATIVE FREE 10 ML: 5 INJECTION INTRAVENOUS at 20:47

## 2022-01-01 RX ADMIN — INSULIN LISPRO 3 UNITS: 100 INJECTION, SOLUTION INTRAVENOUS; SUBCUTANEOUS at 08:07

## 2022-01-01 RX ADMIN — SODIUM CHLORIDE, PRESERVATIVE FREE 10 ML: 5 INJECTION INTRAVENOUS at 21:05

## 2022-01-01 RX ADMIN — ACETAMINOPHEN 650 MG: 325 TABLET ORAL at 17:02

## 2022-01-01 RX ADMIN — INSULIN GLARGINE 25 UNITS: 100 INJECTION, SOLUTION SUBCUTANEOUS at 21:24

## 2022-01-01 RX ADMIN — SODIUM CHLORIDE 1000 ML: 9 INJECTION, SOLUTION INTRAVENOUS at 22:41

## 2022-01-01 RX ADMIN — INSULIN LISPRO 5 UNITS: 100 INJECTION, SOLUTION INTRAVENOUS; SUBCUTANEOUS at 08:28

## 2022-01-01 RX ADMIN — DEXAMETHASONE 12 MG: 4 TABLET ORAL at 14:09

## 2022-01-01 RX ADMIN — ATORVASTATIN CALCIUM 40 MG: 40 TABLET, FILM COATED ORAL at 21:03

## 2022-01-01 RX ADMIN — METOPROLOL SUCCINATE 12.5 MG: 25 TABLET, EXTENDED RELEASE ORAL at 18:18

## 2022-01-01 RX ADMIN — INSULIN LISPRO 5 UNITS: 100 INJECTION, SOLUTION INTRAVENOUS; SUBCUTANEOUS at 09:08

## 2022-01-01 RX ADMIN — PROPOFOL 40 MG: 10 INJECTION, EMULSION INTRAVENOUS at 10:07

## 2022-01-01 RX ADMIN — CARVEDILOL 6.25 MG: 6.25 TABLET, FILM COATED ORAL at 08:31

## 2022-01-01 RX ADMIN — LEVOTHYROXINE SODIUM 75 MCG: 0.07 TABLET ORAL at 06:00

## 2022-01-01 RX ADMIN — CEFTRIAXONE SODIUM 1000 MG: 1 INJECTION, POWDER, FOR SOLUTION INTRAMUSCULAR; INTRAVENOUS at 20:35

## 2022-01-01 RX ADMIN — POTASSIUM CHLORIDE 10 MEQ: 20 TABLET, EXTENDED RELEASE ORAL at 12:03

## 2022-01-01 RX ADMIN — INSULIN GLARGINE 25 UNITS: 100 INJECTION, SOLUTION SUBCUTANEOUS at 21:11

## 2022-01-01 RX ADMIN — MIDODRINE HYDROCHLORIDE 15 MG: 5 TABLET ORAL at 12:25

## 2022-01-01 RX ADMIN — INSULIN LISPRO 6 UNITS: 100 INJECTION, SOLUTION INTRAVENOUS; SUBCUTANEOUS at 20:22

## 2022-01-01 RX ADMIN — ISOSORBIDE MONONITRATE 30 MG: 30 TABLET, EXTENDED RELEASE ORAL at 08:50

## 2022-01-01 RX ADMIN — ASPIRIN 325 MG ORAL TABLET 325 MG: 325 PILL ORAL at 13:38

## 2022-01-01 RX ADMIN — INSULIN LISPRO 3 UNITS: 100 INJECTION, SOLUTION INTRAVENOUS; SUBCUTANEOUS at 07:58

## 2022-01-01 RX ADMIN — POTASSIUM CHLORIDE 10 MEQ: 20 TABLET, EXTENDED RELEASE ORAL at 16:56

## 2022-01-01 RX ADMIN — INSULIN GLARGINE 30 UNITS: 100 INJECTION, SOLUTION SUBCUTANEOUS at 21:58

## 2022-01-01 RX ADMIN — CARVEDILOL 6.25 MG: 6.25 TABLET, FILM COATED ORAL at 08:49

## 2022-01-01 RX ADMIN — ISOSORBIDE MONONITRATE 30 MG: 30 TABLET, EXTENDED RELEASE ORAL at 09:03

## 2022-01-01 RX ADMIN — HEPARIN SODIUM 5000 UNITS: 5000 INJECTION INTRAVENOUS; SUBCUTANEOUS at 08:49

## 2022-01-01 RX ADMIN — FINASTERIDE 5 MG: 5 TABLET, FILM COATED ORAL at 09:26

## 2022-01-01 RX ADMIN — SODIUM CHLORIDE 40 MG: 9 INJECTION INTRAMUSCULAR; INTRAVENOUS; SUBCUTANEOUS at 06:11

## 2022-01-01 RX ADMIN — PANTOPRAZOLE SODIUM 40 MG: 40 INJECTION, POWDER, FOR SOLUTION INTRAVENOUS at 08:49

## 2022-01-01 RX ADMIN — METOPROLOL SUCCINATE 12.5 MG: 25 TABLET, EXTENDED RELEASE ORAL at 09:26

## 2022-01-01 RX ADMIN — INSULIN GLARGINE 10 UNITS: 100 INJECTION, SOLUTION SUBCUTANEOUS at 20:23

## 2022-01-01 RX ADMIN — PREDNISONE 15 MG: 5 TABLET ORAL at 09:01

## 2022-01-01 RX ADMIN — PROPOFOL 20 MG: 10 INJECTION, EMULSION INTRAVENOUS at 10:17

## 2022-01-01 RX ADMIN — Medication 10 ML: at 21:05

## 2022-01-01 RX ADMIN — ALBUMIN (HUMAN) 25 G: 0.25 INJECTION, SOLUTION INTRAVENOUS at 09:29

## 2022-01-01 RX ADMIN — ASPIRIN 81 MG 81 MG: 81 TABLET ORAL at 08:12

## 2022-01-01 RX ADMIN — ASPIRIN 81 MG 81 MG: 81 TABLET ORAL at 08:50

## 2022-01-01 RX ADMIN — INSULIN LISPRO 9 UNITS: 100 INJECTION, SOLUTION INTRAVENOUS; SUBCUTANEOUS at 16:58

## 2022-01-01 RX ADMIN — PREDNISONE 5 MG: 5 TABLET ORAL at 18:18

## 2022-01-01 RX ADMIN — FINASTERIDE 5 MG: 5 TABLET, FILM COATED ORAL at 08:50

## 2022-01-01 RX ADMIN — SODIUM CHLORIDE 40 MG: 9 INJECTION INTRAMUSCULAR; INTRAVENOUS; SUBCUTANEOUS at 18:43

## 2022-01-01 RX ADMIN — INSULIN LISPRO 8 UNITS: 100 INJECTION, SOLUTION INTRAVENOUS; SUBCUTANEOUS at 19:08

## 2022-01-01 RX ADMIN — METOPROLOL SUCCINATE 12.5 MG: 25 TABLET, EXTENDED RELEASE ORAL at 07:57

## 2022-01-01 RX ADMIN — AZACITIDINE 135 MG: 100 INJECTION, POWDER, LYOPHILIZED, FOR SOLUTION INTRAVENOUS; SUBCUTANEOUS at 14:51

## 2022-01-01 RX ADMIN — VANCOMYCIN HYDROCHLORIDE 1000 MG: 1 INJECTION, POWDER, LYOPHILIZED, FOR SOLUTION INTRAVENOUS at 09:47

## 2022-01-01 RX ADMIN — SODIUM CHLORIDE, PRESERVATIVE FREE 10 ML: 5 INJECTION INTRAVENOUS at 17:05

## 2022-01-01 RX ADMIN — INSULIN LISPRO 5 UNITS: 100 INJECTION, SOLUTION INTRAVENOUS; SUBCUTANEOUS at 12:21

## 2022-01-01 RX ADMIN — PREDNISONE 5 MG: 5 TABLET ORAL at 20:47

## 2022-01-01 RX ADMIN — ISOSORBIDE MONONITRATE 30 MG: 30 TABLET, EXTENDED RELEASE ORAL at 07:56

## 2022-01-01 RX ADMIN — Medication 10 MG: at 20:19

## 2022-01-01 RX ADMIN — INSULIN LISPRO 6 UNITS: 100 INJECTION, SOLUTION INTRAVENOUS; SUBCUTANEOUS at 21:25

## 2022-01-01 RX ADMIN — Medication 10 ML: at 10:09

## 2022-01-01 RX ADMIN — METOPROLOL SUCCINATE 12.5 MG: 25 TABLET, EXTENDED RELEASE ORAL at 08:50

## 2022-01-01 RX ADMIN — Medication 5 MG: at 23:42

## 2022-01-01 RX ADMIN — HYDROCORTISONE SODIUM SUCCINATE 100 MG: 100 INJECTION, POWDER, FOR SOLUTION INTRAMUSCULAR; INTRAVENOUS at 05:38

## 2022-01-01 RX ADMIN — ONDANSETRON HYDROCHLORIDE 8 MG: 4 TABLET, FILM COATED ORAL at 14:09

## 2022-01-01 RX ADMIN — POTASSIUM CHLORIDE 10 MEQ: 20 TABLET, EXTENDED RELEASE ORAL at 21:02

## 2022-01-01 RX ADMIN — ALBUMIN (HUMAN) 12.5 G: 0.25 INJECTION, SOLUTION INTRAVENOUS at 02:33

## 2022-01-01 RX ADMIN — POTASSIUM CHLORIDE 10 MEQ: 20 TABLET, EXTENDED RELEASE ORAL at 19:53

## 2022-01-01 RX ADMIN — INSULIN LISPRO 5 UNITS: 100 INJECTION, SOLUTION INTRAVENOUS; SUBCUTANEOUS at 17:08

## 2022-01-01 RX ADMIN — SODIUM CHLORIDE: 9 INJECTION, SOLUTION INTRAVENOUS at 10:03

## 2022-01-01 RX ADMIN — INSULIN LISPRO 6 UNITS: 100 INJECTION, SOLUTION INTRAVENOUS; SUBCUTANEOUS at 12:30

## 2022-01-01 RX ADMIN — SODIUM CHLORIDE, POTASSIUM CHLORIDE, SODIUM LACTATE AND CALCIUM CHLORIDE: 600; 310; 30; 20 INJECTION, SOLUTION INTRAVENOUS at 02:47

## 2022-01-01 RX ADMIN — HYDROCORTISONE SODIUM SUCCINATE 25 MG: 100 INJECTION, POWDER, FOR SOLUTION INTRAMUSCULAR; INTRAVENOUS at 18:04

## 2022-01-01 RX ADMIN — MULTIPLE VITAMINS W/ MINERALS TAB 1 TABLET: TAB at 08:50

## 2022-01-01 RX ADMIN — PROPOFOL 20 MG: 10 INJECTION, EMULSION INTRAVENOUS at 10:13

## 2022-01-01 RX ADMIN — Medication 10 ML: at 10:37

## 2022-01-01 RX ADMIN — RANOLAZINE 500 MG: 500 TABLET, FILM COATED, EXTENDED RELEASE ORAL at 11:05

## 2022-01-01 RX ADMIN — SODIUM CHLORIDE, PRESERVATIVE FREE 10 ML: 5 INJECTION INTRAVENOUS at 10:38

## 2022-01-01 RX ADMIN — POTASSIUM CHLORIDE 10 MEQ: 20 TABLET, EXTENDED RELEASE ORAL at 21:10

## 2022-01-01 RX ADMIN — PANTOPRAZOLE SODIUM 40 MG: 40 TABLET, DELAYED RELEASE ORAL at 09:06

## 2022-01-01 RX ADMIN — INSULIN LISPRO 3 UNITS: 100 INJECTION, SOLUTION INTRAVENOUS; SUBCUTANEOUS at 17:08

## 2022-01-01 RX ADMIN — MIDODRINE HYDROCHLORIDE 10 MG: 5 TABLET ORAL at 11:46

## 2022-01-01 RX ADMIN — SODIUM CHLORIDE, PRESERVATIVE FREE 10 ML: 5 INJECTION INTRAVENOUS at 21:17

## 2022-01-01 RX ADMIN — POTASSIUM CHLORIDE 10 MEQ: 20 TABLET, EXTENDED RELEASE ORAL at 23:21

## 2022-01-01 RX ADMIN — INSULIN LISPRO 6 UNITS: 100 INJECTION, SOLUTION INTRAVENOUS; SUBCUTANEOUS at 12:16

## 2022-01-01 RX ADMIN — ISOSORBIDE MONONITRATE 30 MG: 30 TABLET, EXTENDED RELEASE ORAL at 11:05

## 2022-01-01 RX ADMIN — MORPHINE SULFATE 10 MG: 10 INJECTION INTRAVENOUS at 13:30

## 2022-01-01 RX ADMIN — SODIUM CHLORIDE, POTASSIUM CHLORIDE, SODIUM LACTATE AND CALCIUM CHLORIDE: 600; 310; 30; 20 INJECTION, SOLUTION INTRAVENOUS at 03:37

## 2022-01-01 RX ADMIN — INSULIN LISPRO 15 UNITS: 100 INJECTION, SOLUTION INTRAVENOUS; SUBCUTANEOUS at 16:58

## 2022-01-01 RX ADMIN — ACETAMINOPHEN 650 MG: 325 TABLET ORAL at 23:42

## 2022-01-01 RX ADMIN — PROPOFOL 20 MG: 10 INJECTION, EMULSION INTRAVENOUS at 10:10

## 2022-01-01 RX ADMIN — SODIUM CHLORIDE, POTASSIUM CHLORIDE, SODIUM LACTATE AND CALCIUM CHLORIDE: 600; 310; 30; 20 INJECTION, SOLUTION INTRAVENOUS at 13:29

## 2022-01-01 RX ADMIN — WATER 1000 MG: 1 INJECTION INTRAMUSCULAR; INTRAVENOUS; SUBCUTANEOUS at 15:00

## 2022-01-01 RX ADMIN — SODIUM CHLORIDE, POTASSIUM CHLORIDE, SODIUM LACTATE AND CALCIUM CHLORIDE: 600; 310; 30; 20 INJECTION, SOLUTION INTRAVENOUS at 12:36

## 2022-01-01 RX ADMIN — LEVOTHYROXINE SODIUM 75 MCG: 25 TABLET ORAL at 09:37

## 2022-01-01 RX ADMIN — ATORVASTATIN CALCIUM 40 MG: 40 TABLET, FILM COATED ORAL at 23:21

## 2022-01-01 RX ADMIN — MULTIPLE VITAMINS W/ MINERALS TAB 1 TABLET: TAB at 09:25

## 2022-01-01 RX ADMIN — Medication 10 ML: at 09:06

## 2022-01-01 RX ADMIN — Medication 5 MCG/MIN: at 13:16

## 2022-01-01 RX ADMIN — SODIUM CHLORIDE: 9 INJECTION, SOLUTION INTRAVENOUS at 00:21

## 2022-01-01 RX ADMIN — PREDNISONE 15 MG: 5 TABLET ORAL at 07:57

## 2022-01-01 RX ADMIN — PREDNISONE 15 MG: 5 TABLET ORAL at 08:50

## 2022-01-01 RX ADMIN — POTASSIUM CHLORIDE 10 MEQ: 20 TABLET, EXTENDED RELEASE ORAL at 16:44

## 2022-01-01 RX ADMIN — MIDODRINE HYDROCHLORIDE 10 MG: 5 TABLET ORAL at 08:30

## 2022-01-01 RX ADMIN — CARVEDILOL 3.12 MG: 3.12 TABLET, FILM COATED ORAL at 16:54

## 2022-01-01 RX ADMIN — HEPARIN SODIUM 5000 UNITS: 5000 INJECTION INTRAVENOUS; SUBCUTANEOUS at 20:35

## 2022-01-01 RX ADMIN — PREDNISONE 5 MG: 5 TABLET ORAL at 15:00

## 2022-01-01 RX ADMIN — FUROSEMIDE 20 MG: 20 TABLET ORAL at 09:25

## 2022-01-01 RX ADMIN — INSULIN LISPRO 2 UNITS: 100 INJECTION, SOLUTION INTRAVENOUS; SUBCUTANEOUS at 18:04

## 2022-01-01 RX ADMIN — ONDANSETRON 8 MG: 4 TABLET, FILM COATED ORAL at 15:24

## 2022-01-01 RX ADMIN — SODIUM CHLORIDE, PRESERVATIVE FREE 10 ML: 5 INJECTION INTRAVENOUS at 08:15

## 2022-01-01 RX ADMIN — WATER 1000 MG: 1 INJECTION INTRAMUSCULAR; INTRAVENOUS; SUBCUTANEOUS at 14:34

## 2022-01-01 RX ADMIN — ATORVASTATIN CALCIUM 40 MG: 40 TABLET, FILM COATED ORAL at 21:58

## 2022-01-01 RX ADMIN — POTASSIUM CHLORIDE 10 MEQ: 20 TABLET, EXTENDED RELEASE ORAL at 18:43

## 2022-01-01 RX ADMIN — CARVEDILOL 6.25 MG: 6.25 TABLET, FILM COATED ORAL at 17:33

## 2022-01-01 RX ADMIN — POLYETHYLENE GLYCOL 3350, SODIUM CHLORIDE, SODIUM BICARBONATE, POTASSIUM CHLORIDE 4000 ML: 420; 11.2; 5.72; 1.48 POWDER, FOR SOLUTION ORAL at 14:07

## 2022-01-01 RX ADMIN — CEFEPIME HYDROCHLORIDE 1000 MG: 1 INJECTION, POWDER, FOR SOLUTION INTRAMUSCULAR; INTRAVENOUS at 13:15

## 2022-01-01 RX ADMIN — ENOXAPARIN SODIUM 40 MG: 100 INJECTION SUBCUTANEOUS at 00:24

## 2022-01-01 RX ADMIN — LEVOTHYROXINE SODIUM 75 MCG: 0.07 TABLET ORAL at 05:49

## 2022-01-01 RX ADMIN — HEPARIN SODIUM 5000 UNITS: 5000 INJECTION INTRAVENOUS; SUBCUTANEOUS at 10:22

## 2022-01-01 RX ADMIN — SODIUM CHLORIDE, PRESERVATIVE FREE 10 ML: 5 INJECTION INTRAVENOUS at 08:35

## 2022-01-01 RX ADMIN — MIDODRINE HYDROCHLORIDE 5 MG: 5 TABLET ORAL at 12:03

## 2022-01-01 RX ADMIN — SODIUM CHLORIDE, POTASSIUM CHLORIDE, SODIUM LACTATE AND CALCIUM CHLORIDE: 600; 310; 30; 20 INJECTION, SOLUTION INTRAVENOUS at 16:40

## 2022-01-01 RX ADMIN — Medication 5 ML: at 23:18

## 2022-01-01 RX ADMIN — PHENAZOPYRIDINE HYDROCHLORIDE 100 MG: 100 TABLET ORAL at 17:19

## 2022-01-01 RX ADMIN — INSULIN LISPRO 3 UNITS: 100 INJECTION, SOLUTION INTRAVENOUS; SUBCUTANEOUS at 08:17

## 2022-01-01 RX ADMIN — POTASSIUM CHLORIDE 10 MEQ: 20 TABLET, EXTENDED RELEASE ORAL at 16:59

## 2022-01-01 RX ADMIN — FINASTERIDE 5 MG: 5 TABLET, FILM COATED ORAL at 09:01

## 2022-01-01 RX ADMIN — POTASSIUM CHLORIDE 10 MEQ: 20 TABLET, EXTENDED RELEASE ORAL at 11:45

## 2022-01-01 RX ADMIN — INSULIN LISPRO 15 UNITS: 100 INJECTION, SOLUTION INTRAVENOUS; SUBCUTANEOUS at 11:57

## 2022-01-01 RX ADMIN — Medication 10 MG: at 22:28

## 2022-01-01 RX ADMIN — INSULIN LISPRO 12 UNITS: 100 INJECTION, SOLUTION INTRAVENOUS; SUBCUTANEOUS at 16:52

## 2022-01-01 RX ADMIN — INSULIN LISPRO 6 UNITS: 100 INJECTION, SOLUTION INTRAVENOUS; SUBCUTANEOUS at 09:54

## 2022-01-01 RX ADMIN — SODIUM CHLORIDE 500 ML: 9 INJECTION, SOLUTION INTRAVENOUS at 17:01

## 2022-01-01 RX ADMIN — INSULIN LISPRO 5 UNITS: 100 INJECTION, SOLUTION INTRAVENOUS; SUBCUTANEOUS at 17:09

## 2022-01-01 RX ADMIN — MIDODRINE HYDROCHLORIDE 10 MG: 5 TABLET ORAL at 12:36

## 2022-01-01 RX ADMIN — INSULIN LISPRO 5 UNITS: 100 INJECTION, SOLUTION INTRAVENOUS; SUBCUTANEOUS at 11:58

## 2022-01-01 RX ADMIN — CARVEDILOL 6.25 MG: 6.25 TABLET, FILM COATED ORAL at 08:12

## 2022-01-01 RX ADMIN — PREDNISONE 20 MG: 20 TABLET ORAL at 08:50

## 2022-01-01 RX ADMIN — POTASSIUM CHLORIDE 10 MEQ: 20 TABLET, EXTENDED RELEASE ORAL at 12:20

## 2022-01-01 RX ADMIN — PREDNISONE 20 MG: 20 TABLET ORAL at 08:30

## 2022-01-01 RX ADMIN — INSULIN LISPRO 6 UNITS: 100 INJECTION, SOLUTION INTRAVENOUS; SUBCUTANEOUS at 21:28

## 2022-01-01 RX ADMIN — IOHEXOL 50 ML: 240 INJECTION, SOLUTION INTRATHECAL; INTRAVASCULAR; INTRAVENOUS; ORAL at 20:31

## 2022-01-01 RX ADMIN — LEVOTHYROXINE SODIUM 75 MCG: 25 TABLET ORAL at 08:39

## 2022-01-01 RX ADMIN — SODIUM CHLORIDE, POTASSIUM CHLORIDE, SODIUM LACTATE AND CALCIUM CHLORIDE: 600; 310; 30; 20 INJECTION, SOLUTION INTRAVENOUS at 23:30

## 2022-01-01 RX ADMIN — SODIUM CHLORIDE, POTASSIUM CHLORIDE, SODIUM LACTATE AND CALCIUM CHLORIDE: 600; 310; 30; 20 INJECTION, SOLUTION INTRAVENOUS at 06:41

## 2022-01-01 RX ADMIN — ONDANSETRON 8 MG: 4 TABLET, FILM COATED ORAL at 14:35

## 2022-01-01 RX ADMIN — PREDNISONE 5 MG: 5 TABLET ORAL at 16:59

## 2022-01-01 RX ADMIN — LEVOTHYROXINE SODIUM 75 MCG: 25 TABLET ORAL at 08:30

## 2022-01-01 RX ADMIN — INSULIN LISPRO 10 UNITS: 100 INJECTION, SOLUTION INTRAVENOUS; SUBCUTANEOUS at 21:58

## 2022-01-01 RX ADMIN — SODIUM CHLORIDE, PRESERVATIVE FREE 10 ML: 5 INJECTION INTRAVENOUS at 10:19

## 2022-01-01 RX ADMIN — SODIUM CHLORIDE 40 MG: 9 INJECTION INTRAMUSCULAR; INTRAVENOUS; SUBCUTANEOUS at 06:27

## 2022-01-01 RX ADMIN — CARVEDILOL 6.25 MG: 6.25 TABLET, FILM COATED ORAL at 17:16

## 2022-01-01 RX ADMIN — SODIUM CHLORIDE, PRESERVATIVE FREE 10 ML: 5 INJECTION INTRAVENOUS at 09:05

## 2022-01-01 RX ADMIN — INSULIN LISPRO 6 UNITS: 100 INJECTION, SOLUTION INTRAVENOUS; SUBCUTANEOUS at 17:34

## 2022-01-01 RX ADMIN — ATORVASTATIN CALCIUM 40 MG: 40 TABLET, FILM COATED ORAL at 19:53

## 2022-01-01 RX ADMIN — POTASSIUM CHLORIDE 10 MEQ: 20 TABLET, EXTENDED RELEASE ORAL at 11:59

## 2022-01-01 RX ADMIN — COLCHICINE 0.6 MG: 0.6 TABLET, FILM COATED ORAL at 11:44

## 2022-01-01 RX ADMIN — LEVOTHYROXINE SODIUM 75 MCG: 0.07 TABLET ORAL at 06:11

## 2022-01-01 RX ADMIN — HYDROCORTISONE SODIUM SUCCINATE 100 MG: 100 INJECTION, POWDER, FOR SOLUTION INTRAMUSCULAR; INTRAVENOUS at 11:47

## 2022-01-01 RX ADMIN — INSULIN LISPRO 6 UNITS: 100 INJECTION, SOLUTION INTRAVENOUS; SUBCUTANEOUS at 20:42

## 2022-01-01 RX ADMIN — PREDNISONE 5 MG: 5 TABLET ORAL at 17:32

## 2022-01-01 RX ADMIN — SODIUM CHLORIDE, PRESERVATIVE FREE 10 ML: 5 INJECTION INTRAVENOUS at 21:11

## 2022-01-01 RX ADMIN — INSULIN LISPRO 9 UNITS: 100 INJECTION, SOLUTION INTRAVENOUS; SUBCUTANEOUS at 11:57

## 2022-01-01 RX ADMIN — PREDNISONE 5 MG: 5 TABLET ORAL at 17:03

## 2022-01-01 RX ADMIN — MULTIPLE VITAMINS W/ MINERALS TAB 1 TABLET: TAB at 12:30

## 2022-01-01 RX ADMIN — SODIUM CHLORIDE 40 MG: 9 INJECTION INTRAMUSCULAR; INTRAVENOUS; SUBCUTANEOUS at 17:10

## 2022-01-01 RX ADMIN — MIDAZOLAM 0.5 MG: 1 INJECTION INTRAMUSCULAR; INTRAVENOUS at 08:46

## 2022-01-01 RX ADMIN — POLYETHYLENE GLYCOL 3350 17 G: 17 POWDER, FOR SOLUTION ORAL at 10:19

## 2022-01-01 RX ADMIN — ATORVASTATIN CALCIUM 40 MG: 40 TABLET, FILM COATED ORAL at 20:19

## 2022-01-01 RX ADMIN — SODIUM CHLORIDE, PRESERVATIVE FREE 10 ML: 5 INJECTION INTRAVENOUS at 20:19

## 2022-01-01 RX ADMIN — MIDODRINE HYDROCHLORIDE 15 MG: 5 TABLET ORAL at 12:16

## 2022-01-01 RX ADMIN — ASPIRIN 81 MG 324 MG: 81 TABLET ORAL at 23:38

## 2022-01-01 RX ADMIN — HEPARIN SODIUM 5000 UNITS: 5000 INJECTION INTRAVENOUS; SUBCUTANEOUS at 21:05

## 2022-01-01 RX ADMIN — LEVOTHYROXINE SODIUM 75 MCG: 0.07 TABLET ORAL at 06:14

## 2022-01-01 RX ADMIN — INSULIN GLARGINE 18 UNITS: 100 INJECTION, SOLUTION SUBCUTANEOUS at 20:51

## 2022-01-01 RX ADMIN — FUROSEMIDE 20 MG: 10 INJECTION, SOLUTION INTRAMUSCULAR; INTRAVENOUS at 12:15

## 2022-01-01 RX ADMIN — LEVOTHYROXINE SODIUM 75 MCG: 0.07 TABLET ORAL at 06:07

## 2022-01-01 RX ADMIN — LEVOTHYROXINE SODIUM 75 MCG: 25 TABLET ORAL at 09:07

## 2022-01-01 RX ADMIN — INSULIN LISPRO 3 UNITS: 100 INJECTION, SOLUTION INTRAVENOUS; SUBCUTANEOUS at 23:21

## 2022-01-01 RX ADMIN — POLYETHYLENE GLYCOL 3350 17 G: 17 POWDER, FOR SOLUTION ORAL at 07:57

## 2022-01-01 RX ADMIN — CEFTRIAXONE SODIUM 1000 MG: 1 INJECTION, POWDER, FOR SOLUTION INTRAMUSCULAR; INTRAVENOUS at 05:40

## 2022-01-01 RX ADMIN — INSULIN LISPRO 6 UNITS: 100 INJECTION, SOLUTION INTRAVENOUS; SUBCUTANEOUS at 08:04

## 2022-01-01 RX ADMIN — POTASSIUM CHLORIDE 10 MEQ: 20 TABLET, EXTENDED RELEASE ORAL at 21:58

## 2022-01-01 RX ADMIN — INSULIN LISPRO 9 UNITS: 100 INJECTION, SOLUTION INTRAVENOUS; SUBCUTANEOUS at 17:13

## 2022-01-01 RX ADMIN — INSULIN LISPRO 5 UNITS: 100 INJECTION, SOLUTION INTRAVENOUS; SUBCUTANEOUS at 16:56

## 2022-01-01 RX ADMIN — PANTOPRAZOLE SODIUM 40 MG: 40 INJECTION, POWDER, FOR SOLUTION INTRAVENOUS at 09:36

## 2022-01-01 RX ADMIN — POTASSIUM CHLORIDE 10 MEQ: 20 TABLET, EXTENDED RELEASE ORAL at 12:16

## 2022-01-01 RX ADMIN — ISOSORBIDE MONONITRATE 30 MG: 30 TABLET, EXTENDED RELEASE ORAL at 08:12

## 2022-01-01 RX ADMIN — INSULIN LISPRO 3 UNITS: 100 INJECTION, SOLUTION INTRAVENOUS; SUBCUTANEOUS at 09:04

## 2022-01-01 RX ADMIN — INSULIN LISPRO 4 UNITS: 100 INJECTION, SOLUTION INTRAVENOUS; SUBCUTANEOUS at 15:59

## 2022-01-01 RX ADMIN — BARIUM SULFATE 176 G: 960 POWDER, FOR SUSPENSION ORAL at 11:03

## 2022-01-01 RX ADMIN — LEVOTHYROXINE SODIUM 75 MCG: 0.07 TABLET ORAL at 08:13

## 2022-01-01 RX ADMIN — CARVEDILOL 3.12 MG: 3.12 TABLET, FILM COATED ORAL at 09:25

## 2022-01-01 RX ADMIN — PHYTONADIONE 2 MG: 1 INJECTION, EMULSION INTRAMUSCULAR; INTRAVENOUS; SUBCUTANEOUS at 04:08

## 2022-01-01 RX ADMIN — RANOLAZINE 500 MG: 500 TABLET, FILM COATED, EXTENDED RELEASE ORAL at 08:12

## 2022-01-01 RX ADMIN — AZACITIDINE 135 MG: 100 INJECTION, POWDER, LYOPHILIZED, FOR SOLUTION INTRAVENOUS; SUBCUTANEOUS at 15:22

## 2022-01-01 RX ADMIN — POLYETHYLENE GLYCOL 3350 17 G: 17 POWDER, FOR SOLUTION ORAL at 09:25

## 2022-01-01 RX ADMIN — INSULIN LISPRO 18 UNITS: 100 INJECTION, SOLUTION INTRAVENOUS; SUBCUTANEOUS at 12:00

## 2022-01-01 RX ADMIN — RANOLAZINE 500 MG: 500 TABLET, FILM COATED, EXTENDED RELEASE ORAL at 20:47

## 2022-01-01 RX ADMIN — METOPROLOL SUCCINATE 12.5 MG: 25 TABLET, EXTENDED RELEASE ORAL at 09:00

## 2022-01-01 RX ADMIN — CARVEDILOL 6.25 MG: 6.25 TABLET, FILM COATED ORAL at 20:47

## 2022-01-01 RX ADMIN — PREDNISONE 20 MG: 20 TABLET ORAL at 08:38

## 2022-01-01 RX ADMIN — SODIUM CHLORIDE, PRESERVATIVE FREE 10 ML: 5 INJECTION INTRAVENOUS at 20:35

## 2022-01-01 RX ADMIN — MIDODRINE HYDROCHLORIDE 5 MG: 5 TABLET ORAL at 11:46

## 2022-01-01 RX ADMIN — INSULIN LISPRO 3 UNITS: 100 INJECTION, SOLUTION INTRAVENOUS; SUBCUTANEOUS at 09:08

## 2022-01-01 RX ADMIN — MIDODRINE HYDROCHLORIDE 5 MG: 5 TABLET ORAL at 16:56

## 2022-01-01 RX ADMIN — LEVOTHYROXINE SODIUM 75 MCG: 0.07 TABLET ORAL at 06:42

## 2022-01-01 RX ADMIN — FENTANYL CITRATE 25 MCG: 50 INJECTION INTRAMUSCULAR; INTRAVENOUS at 08:46

## 2022-01-01 RX ADMIN — INSULIN LISPRO 6 UNITS: 100 INJECTION, SOLUTION INTRAVENOUS; SUBCUTANEOUS at 11:52

## 2022-01-01 RX ADMIN — FINASTERIDE 5 MG: 5 TABLET, FILM COATED ORAL at 07:56

## 2022-01-01 RX ADMIN — PREDNISONE 5 MG: 5 TABLET ORAL at 16:56

## 2022-01-01 RX ADMIN — AZITHROMYCIN MONOHYDRATE 500 MG: 500 INJECTION, POWDER, LYOPHILIZED, FOR SOLUTION INTRAVENOUS at 09:50

## 2022-01-01 RX ADMIN — HYDROCORTISONE SODIUM SUCCINATE 50 MG: 100 INJECTION, POWDER, FOR SOLUTION INTRAMUSCULAR; INTRAVENOUS at 12:05

## 2022-01-01 RX ADMIN — PANTOPRAZOLE SODIUM 40 MG: 40 TABLET, DELAYED RELEASE ORAL at 06:00

## 2022-01-01 RX ADMIN — ACETAMINOPHEN 1000 MG: 500 TABLET ORAL at 22:48

## 2022-01-01 RX ADMIN — AZACITIDINE 135 MG: 100 INJECTION, POWDER, LYOPHILIZED, FOR SOLUTION INTRAVENOUS; SUBCUTANEOUS at 14:29

## 2022-01-01 RX ADMIN — Medication 5 MCG/MIN: at 10:07

## 2022-01-01 RX ADMIN — Medication 10 MG: at 22:29

## 2022-01-01 RX ADMIN — INSULIN LISPRO 2 UNITS: 100 INJECTION, SOLUTION INTRAVENOUS; SUBCUTANEOUS at 02:15

## 2022-01-01 RX ADMIN — AZACITIDINE 135 MG: 100 INJECTION, POWDER, LYOPHILIZED, FOR SOLUTION INTRAVENOUS; SUBCUTANEOUS at 14:35

## 2022-01-01 RX ADMIN — RANOLAZINE 500 MG: 500 TABLET, FILM COATED, EXTENDED RELEASE ORAL at 08:50

## 2022-01-01 RX ADMIN — VANCOMYCIN HYDROCHLORIDE 1250 MG: 10 INJECTION, POWDER, LYOPHILIZED, FOR SOLUTION INTRAVENOUS at 10:00

## 2022-01-01 RX ADMIN — INSULIN LISPRO 5 UNITS: 100 INJECTION, SOLUTION INTRAVENOUS; SUBCUTANEOUS at 21:25

## 2022-01-01 RX ADMIN — PANTOPRAZOLE SODIUM 40 MG: 40 TABLET, DELAYED RELEASE ORAL at 06:14

## 2022-01-01 RX ADMIN — INSULIN LISPRO 5 UNITS: 100 INJECTION, SOLUTION INTRAVENOUS; SUBCUTANEOUS at 05:06

## 2022-01-01 RX ADMIN — ACETAMINOPHEN 650 MG: 325 TABLET ORAL at 09:23

## 2022-01-01 RX ADMIN — SODIUM CHLORIDE: 9 INJECTION, SOLUTION INTRAVENOUS at 08:07

## 2022-01-01 RX ADMIN — Medication 10 MG: at 23:50

## 2022-01-01 RX ADMIN — CEFEPIME HYDROCHLORIDE 1000 MG: 1 INJECTION, POWDER, FOR SOLUTION INTRAMUSCULAR; INTRAVENOUS at 09:51

## 2022-01-01 RX ADMIN — MIDODRINE HYDROCHLORIDE 5 MG: 5 TABLET ORAL at 16:44

## 2022-01-01 RX ADMIN — PANTOPRAZOLE SODIUM 40 MG: 40 TABLET, DELAYED RELEASE ORAL at 08:13

## 2022-01-01 RX ADMIN — MULTIPLE VITAMINS W/ MINERALS TAB 1 TABLET: TAB at 09:01

## 2022-01-01 RX ADMIN — MIDODRINE HYDROCHLORIDE 5 MG: 5 TABLET ORAL at 07:57

## 2022-01-01 RX ADMIN — SODIUM PHOSPHATE, MONOBASIC, MONOHYDRATE 30 MMOL: 276; 142 INJECTION, SOLUTION INTRAVENOUS at 06:08

## 2022-01-01 RX ADMIN — HYDROCORTISONE SODIUM SUCCINATE 25 MG: 100 INJECTION, POWDER, FOR SOLUTION INTRAMUSCULAR; INTRAVENOUS at 06:29

## 2022-01-01 RX ADMIN — LEVOTHYROXINE SODIUM 75 MCG: 25 TABLET ORAL at 10:14

## 2022-01-01 RX ADMIN — INSULIN LISPRO 15 UNITS: 100 INJECTION, SOLUTION INTRAVENOUS; SUBCUTANEOUS at 07:53

## 2022-01-01 RX ADMIN — MIDODRINE HYDROCHLORIDE 15 MG: 5 TABLET ORAL at 09:36

## 2022-01-01 RX ADMIN — INSULIN LISPRO 4 UNITS: 100 INJECTION, SOLUTION INTRAVENOUS; SUBCUTANEOUS at 08:52

## 2022-01-01 RX ADMIN — POTASSIUM CHLORIDE 20 MEQ: 29.8 INJECTION, SOLUTION INTRAVENOUS at 09:11

## 2022-01-01 RX ADMIN — INSULIN LISPRO 1 UNITS: 100 INJECTION, SOLUTION INTRAVENOUS; SUBCUTANEOUS at 20:34

## 2022-01-01 RX ADMIN — INSULIN LISPRO 4 UNITS: 100 INJECTION, SOLUTION INTRAVENOUS; SUBCUTANEOUS at 12:00

## 2022-01-01 RX ADMIN — INSULIN GLARGINE 10 UNITS: 100 INJECTION, SOLUTION SUBCUTANEOUS at 21:24

## 2022-01-01 RX ADMIN — SODIUM CHLORIDE, PRESERVATIVE FREE 10 ML: 5 INJECTION INTRAVENOUS at 20:38

## 2022-01-01 RX ADMIN — MIDODRINE HYDROCHLORIDE 15 MG: 5 TABLET ORAL at 10:19

## 2022-01-01 RX ADMIN — INSULIN LISPRO 8 UNITS: 100 INJECTION, SOLUTION INTRAVENOUS; SUBCUTANEOUS at 16:45

## 2022-01-01 RX ADMIN — MIDODRINE HYDROCHLORIDE 10 MG: 5 TABLET ORAL at 03:59

## 2022-01-01 RX ADMIN — MULTIPLE VITAMINS W/ MINERALS TAB 1 TABLET: TAB at 07:56

## 2022-01-01 RX ADMIN — INSULIN LISPRO 5 UNITS: 100 INJECTION, SOLUTION INTRAVENOUS; SUBCUTANEOUS at 11:33

## 2022-01-01 RX ADMIN — METOCLOPRAMIDE 10 MG: 5 INJECTION, SOLUTION INTRAMUSCULAR; INTRAVENOUS at 21:11

## 2022-01-01 RX ADMIN — PREDNISONE 5 MG: 5 TABLET ORAL at 14:39

## 2022-01-01 RX ADMIN — Medication 10 ML: at 20:36

## 2022-01-01 RX ADMIN — INSULIN LISPRO 15 UNITS: 100 INJECTION, SOLUTION INTRAVENOUS; SUBCUTANEOUS at 12:16

## 2022-01-01 RX ADMIN — PANTOPRAZOLE SODIUM 40 MG: 40 TABLET, DELAYED RELEASE ORAL at 08:38

## 2022-01-01 RX ADMIN — MIDODRINE HYDROCHLORIDE 5 MG: 5 TABLET ORAL at 12:20

## 2022-01-01 RX ADMIN — PREDNISONE 5 MG: 5 TABLET ORAL at 20:35

## 2022-01-01 RX ADMIN — INSULIN LISPRO 3 UNITS: 100 INJECTION, SOLUTION INTRAVENOUS; SUBCUTANEOUS at 08:52

## 2022-01-01 RX ADMIN — MIDODRINE HYDROCHLORIDE 5 MG: 5 TABLET ORAL at 12:27

## 2022-01-01 RX ADMIN — HYDROCORTISONE SODIUM SUCCINATE 50 MG: 100 INJECTION, POWDER, FOR SOLUTION INTRAMUSCULAR; INTRAVENOUS at 00:15

## 2022-01-01 RX ADMIN — INSULIN LISPRO 1 UNITS: 100 INJECTION, SOLUTION INTRAVENOUS; SUBCUTANEOUS at 12:17

## 2022-01-01 RX ADMIN — MIDODRINE HYDROCHLORIDE 15 MG: 5 TABLET ORAL at 17:05

## 2022-01-01 RX ADMIN — HEPARIN SODIUM 5000 UNITS: 5000 INJECTION INTRAVENOUS; SUBCUTANEOUS at 21:31

## 2022-01-01 RX ADMIN — MIDODRINE HYDROCHLORIDE 15 MG: 5 TABLET ORAL at 18:01

## 2022-01-01 RX ADMIN — MORPHINE SULFATE 10 MG: 10 INJECTION, SOLUTION INTRAMUSCULAR; INTRAVENOUS at 13:30

## 2022-01-01 RX ADMIN — INSULIN GLARGINE 5 UNITS: 100 INJECTION, SOLUTION SUBCUTANEOUS at 00:08

## 2022-01-01 RX ADMIN — PANTOPRAZOLE SODIUM 40 MG: 40 TABLET, DELAYED RELEASE ORAL at 06:07

## 2022-01-01 RX ADMIN — HYDROCORTISONE SODIUM SUCCINATE 50 MG: 100 INJECTION, POWDER, FOR SOLUTION INTRAMUSCULAR; INTRAVENOUS at 06:38

## 2022-01-01 RX ADMIN — INSULIN LISPRO 2 UNITS: 100 INJECTION, SOLUTION INTRAVENOUS; SUBCUTANEOUS at 21:02

## 2022-01-01 RX ADMIN — MIDODRINE HYDROCHLORIDE 5 MG: 5 TABLET ORAL at 16:54

## 2022-01-01 RX ADMIN — INSULIN LISPRO 3 UNITS: 100 INJECTION, SOLUTION INTRAVENOUS; SUBCUTANEOUS at 11:13

## 2022-01-01 RX ADMIN — PREDNISONE 5 MG: 5 TABLET ORAL at 08:12

## 2022-01-01 RX ADMIN — PANTOPRAZOLE SODIUM 40 MG: 40 TABLET, DELAYED RELEASE ORAL at 09:05

## 2022-01-01 RX ADMIN — CEFEPIME HYDROCHLORIDE 1000 MG: 1 INJECTION, POWDER, FOR SOLUTION INTRAMUSCULAR; INTRAVENOUS at 09:48

## 2022-01-01 RX ADMIN — INSULIN LISPRO 9 UNITS: 100 INJECTION, SOLUTION INTRAVENOUS; SUBCUTANEOUS at 08:28

## 2022-01-01 RX ADMIN — INSULIN LISPRO 2 UNITS: 100 INJECTION, SOLUTION INTRAVENOUS; SUBCUTANEOUS at 00:08

## 2022-01-01 RX ADMIN — MIDODRINE HYDROCHLORIDE 5 MG: 5 TABLET ORAL at 17:02

## 2022-01-01 RX ADMIN — Medication 16 G: at 05:29

## 2022-01-01 RX ADMIN — INSULIN GLARGINE 4 UNITS: 100 INJECTION, SOLUTION SUBCUTANEOUS at 21:02

## 2022-01-01 RX ADMIN — PANTOPRAZOLE SODIUM 40 MG: 40 INJECTION, POWDER, FOR SOLUTION INTRAVENOUS at 02:33

## 2022-01-01 RX ADMIN — POTASSIUM BICARBONATE 40 MEQ: 782 TABLET, EFFERVESCENT ORAL at 09:25

## 2022-01-01 RX ADMIN — POTASSIUM CHLORIDE 10 MEQ: 20 TABLET, EXTENDED RELEASE ORAL at 21:22

## 2022-01-01 RX ADMIN — SODIUM CHLORIDE, PRESERVATIVE FREE 10 ML: 5 INJECTION INTRAVENOUS at 08:55

## 2022-01-01 RX ADMIN — SODIUM CHLORIDE 1000 ML: 9 INJECTION, SOLUTION INTRAVENOUS at 08:46

## 2022-01-01 RX ADMIN — POTASSIUM CHLORIDE 10 MEQ: 20 TABLET, EXTENDED RELEASE ORAL at 17:03

## 2022-01-01 RX ADMIN — INSULIN LISPRO 3 UNITS: 100 INJECTION, SOLUTION INTRAVENOUS; SUBCUTANEOUS at 08:53

## 2022-01-01 RX ADMIN — INSULIN LISPRO 5 UNITS: 100 INJECTION, SOLUTION INTRAVENOUS; SUBCUTANEOUS at 17:03

## 2022-01-01 RX ADMIN — SODIUM CHLORIDE 1000 ML: 9 INJECTION, SOLUTION INTRAVENOUS at 20:06

## 2022-01-01 RX ADMIN — CARVEDILOL 6.25 MG: 6.25 TABLET, FILM COATED ORAL at 20:34

## 2022-01-01 RX ADMIN — PREDNISONE 15 MG: 5 TABLET ORAL at 08:17

## 2022-01-01 RX ADMIN — MIDODRINE HYDROCHLORIDE 15 MG: 5 TABLET ORAL at 08:48

## 2022-01-01 ASSESSMENT — PAIN SCALES - GENERAL
PAINLEVEL_OUTOF10: 0
PAINLEVEL_OUTOF10: 7
PAINLEVEL_OUTOF10: 0
PAINLEVEL_OUTOF10: 3
PAINLEVEL_OUTOF10: 0
PAINLEVEL_OUTOF10: 3
PAINLEVEL_OUTOF10: 0

## 2022-01-01 ASSESSMENT — ENCOUNTER SYMPTOMS
WHEEZING: 0
SHORTNESS OF BREATH: 0
VOMITING: 0
NAUSEA: 0
CHEST TIGHTNESS: 0
SORE THROAT: 0
TACHYPNEA: 1
COUGH: 0
BACK PAIN: 0
VOMITING: 0
NAUSEA: 0
EYE REDNESS: 0
ABDOMINAL PAIN: 0
SHORTNESS OF BREATH: 1
BACK PAIN: 1
DIARRHEA: 0
ABDOMINAL PAIN: 0

## 2022-01-01 ASSESSMENT — PAIN DESCRIPTION - DESCRIPTORS: DESCRIPTORS: DISCOMFORT;ACHING;DULL

## 2022-01-01 ASSESSMENT — LIFESTYLE VARIABLES
SMOKING_STATUS: 0
SMOKING_STATUS: 0

## 2022-01-01 ASSESSMENT — PAIN DESCRIPTION - LOCATION: LOCATION: FACE

## 2022-02-02 NOTE — PROCEDURES
[address]                               PULMONARY FUNCTION    PATIENT NAME: Cristy Stanley                    :        1933  MED REC NO:   [id number]                            ROOM:  ACCOUNT NO:   [de-identified]                           ADMIT DATE: 2022  PROVIDER:     Patrick Kc DO    DATE OF PROCEDURE:  2022    This is a pulmonary function interpretation by Noemi Welsh DO,  pulmonologist, interpreting an outpatient pulmonary function study  requested by the attending physician, Dr. Jefry Zayas. DEMOGRAPHICS:  The patient is 80years of age. He is a  male  and his height is 5 feet 6 inches(66 inches), weight 144 pounds. Body  mass index of 23, considered to be ideal.    REASON FOR THE PULMONARY FUNCTION STUDY:  To evaluate his exertional  dyspnea and this is with a history of pulmonary fibrosis. QUESTION:  Has he ever had a pulmonary function? ANSWER:  No.    MEDICATION ALLERGIES:  He has no known drug allergies. MEDICATIONS:  The medications that he is presently on; for his  cardiovascular disposition, he is on Tenormin, Pravachol; and for his  prostate, he is on Proscar; for his GI disposition, he is on Prilosec;  and with regards to his thyroid disorder, he is on Synthroid; and also  on colchicine and prednisone. He is on Janumet for his diabetes. QUESTION:  Was he in a chronic stable state? ANSWER:  Yes. He has had no previous community respiratory infections  over the last month that would influence the interpretation of this  study. PULMONARY SYMPTOMS:  COUGH:  He has a cough that is productive of a white character sputum. It is small amount and it has been noted chronically. No evidence of  hemoptysis with the cough. WHEEZING:  He denies wheezing.   SHORTNESS OF BREATH:  He states that he has had SOB primarily with  activities above a mild intensity nature or even mild intensity that is  extended will result in breathlessness. CONSTITUTIONAL SYMPTOMS:  He has had weight loss and that is important  along with his pulmonary disorder of the pulmonary fibrosis. SMOKING HISTORY:  He states he was never a smoker. ATOPIC HISTORY:  He is nonatopic. PET EXPOSURE:  He denies any pets that he is exposed to. ENVIRONMENTAL FACTORS:  Humidity wise, he denies that the cold weather,  hot weather or humid weather influences his breathing. OCCUPATIONAL HISTORY:  He has had a vocation as a teacher, not exposed to any  heightened inhalational dust.    REVIEW OF SYSTEMS:  The patient has had arthritis and probably  osteoarthritis, but one has to exclude connective tissue disorders with  this degree of pulmonary fibrosis. OTHER CONDITIONS:  Thyroid disease and diabetes in his history along  with cardiovascular history. Now he states that when he walks for a long period of time, his chest  does ache and he does notice his heartbeat increasing to a tachycardia  of about 137 beats per minute. One would be also looking at evaluating  his oxygen saturation with a personal pulse oximeter as well. HOME ENVIRONMENT:  He lives in a house with gas heat. SLEEP:  He sleeps on one pillow. FAMILY HISTORY:  There is no heart disease or lung disease that he is  aware of.    TB HISTORY:  Unremarkable. PULMONARY FUNCTION STUDY:  SPIROMETRY:  His predicted vital capacity for his age of 80 and his  height of 5 feet 6 inches would be a vital capacity on his forced  expiratory maneuver of 3.1 liters. He did 2.5 liters, 80% and after  bronchodilator in, maybe a 100 mL improvement, but there is some  borderline mild restrictive mechanics. Now the flow mechanics were  preserved and we can appreciate. CHEST CAGE MECHANICS:  They are relatively well preserved as well.   STATIC LUNG VOLUME:  We can appreciate a little reduction in his  inspiratory capacity to 2.3 liters, 80% of predicted, so we do have  restrictive mechanics and the gas transfer is moderately reduced down to  53%. AIRWAY RESISTANCE:  It is normal as is airway conductance normal.  FLOW VOLUME CURVE:  It is a normal flow curve and so this would indicate  no flow impairment and good quality reproducible study. IMPRESSION:  So looking at the patient here and with his chest CT  demonstrating considerable pulmonary fibrosis, initially starting in the  bases of the lungs and progressing up to the outer lung fields  bilaterally to the apices, this would be considered severe pulmonary  fibrosis. He does have mild restrictive mechanics and moderate gas  transfer, so this would be defined as underlying pulmonary fibrosis,  unspecified with mild ventilatory impairment and moderately severe gas  transfer impairment and under the category of interstitial lung disease  i.e. pulmonary fibrosis, unspecified. RECOMMENDATIONS:  My recommendations on [place] is the considerations of  antifibrotic therapy and that is a strong consideration here with  regards to his symptoms and the progression on chest CT noted here  today. Thank you for allowing me to co-participate with the patient here today.         Jonathan Fu DO    D: 02/01/2022 15:54:46       T: 02/01/2022 15:59:00     [id number]  Job#: [id number]     Doc#: [id number]    CC:

## 2022-02-19 NOTE — PROCEDURES
[facility] [address]                                 HOLTER MONITOR    PATIENT NAME: Lauro Hannon                    :        1933  MED REC NO:   [id number]                            ROOM:  ACCOUNT NO:   [de-identified]                           ADMIT DATE: 2022  PROVIDER:     Yared Maynard DO    HOLTER MONITOR 47-HOURS AND 59-MINUTES    DATE OF STUDY:  2022    DATE OF SCANNIN2022    Holter monitor was obtained for a total duration of 47 hours and 59  minutes, showed the underlying rhythm to be fairly regular. The MN  interval was normal.  QRS complex was not prolonged. Ventricular rate  was anywhere between  beats per minute with the average being a  sinus mechanism at 76 beats per minute. Noted throughout the tracing  was 7 PVCs. There were no bigeminy, couplets, or ventricular  tachycardia. 60 PACs were noted. There was no evidence of any atrial  fibrillation or flutter. There were no pauses. No first, second or  third-degree heart block noted. Baseline artifact was present. Nonspecific ST-T abnormality was present. The diary did have several  entries, but these do not correlate to any significant dysrhythmia.         [address],     D: 2022 17:54:25       T: 2022 17:56:52     [email or web address]  Job#: [id number]     Doc#: [id number]    CC:

## 2022-04-13 PROBLEM — I21.4 NSTEMI (NON-ST ELEVATED MYOCARDIAL INFARCTION) (HCC): Status: ACTIVE | Noted: 2022-01-01

## 2022-04-13 NOTE — LETTER
[place] [employer] Medicaid  CERTIFICATION OF NECESSITY  FOR NON-EMERGENCY TRANSPORTATION   BY [facility]      Individual Information   1. Name: John Mitchell 2. [place] Medicaid Billing Number:   3. Address: [address] [place] [address]      Transportation Provider Information   4. Provider Name: [facility]    5. [place] Medicaid Provider Number:  National Provider Identifier (NPI):      Certification  7. Criteria:  During transport, this individual requires:  [x] Medical treatment or continuous     supervision by an EMT. [x] The administration or regulation of oxygen by another person. [] Supervised protective restraint. 8. Period Beginning Date: 4/19/2022   9. Length  [x] Not more than 1 day(s)  [] One Year     Additional Information Relevant to Certification   10. Comments or Explanations, If Necessary or Appropriate     Transportation to [facility] for a heart catheterization. Hospital to hospital transportation. Certifying Practitioner Information   11. Name of Practitioner: Dr. Deng Coburn   12. [place] Medicaid Provider Number, If Applicable:  [address] Provider Identifier (NPI):      Signature Information   14. Date of Signature: 4/19/2022 15. Name of Person Signing: Josee Puente RN   75.  Signature and Professional Designation: Electronically signed by Josee Puente RN on 4/19/2022 at 9:32 AM       [facility] [id number]  Rev. 7/2015

## 2022-04-13 NOTE — ED PROVIDER NOTES
Chief complaint: Fatigue, shortness of breath, leg weakness and numbness      HPI:  4/14/22, Time: 12:01 AM EDT    HPI           Felicity Darby is a 80 y.o. male presenting to the ED for fatigue, shortness of breath, leg weakness and numbness. The history is obtained from the patient, patient's family as well as the patient's medical record. The patient is presenting to the emergency department the chief complaint of fatigue, shortness of breath, leg weakness and numbness. Since receiving the Covid booster the patient has had multiple chronic complaints such as worsening shortness of breath and fatigue. He has been noted to have increasing pulmonary fibrosis. He was started on prednisone with some improvement. He states that he still does have shortness of breath which is worse with activity and exertion. Over the last 2 days he has had increasing fatigue. He reports he was sitting at home in a chair was unable to get out of the chair for 2 days secondary to leg weakness and numbness. This is moderate in severity. Nothing makes it better. Nothing makes worse. No treatment prior to arrival.  He does have chronic lumbar back pain which he states has worsened over the last 2 days. He does admit to urinary incontinence and states he was not able to tell he was urinating while he was sitting in the chair. He denies any saddle anesthesias. ROS:   Review of Systems   Constitutional: Positive for fatigue. Negative for chills and fever. HENT: Negative for congestion. Eyes: Negative for redness. Respiratory: Positive for shortness of breath. Cardiovascular: Negative for chest pain. Gastrointestinal: Negative for abdominal pain, nausea and vomiting. Genitourinary: Negative for dysuria. Musculoskeletal: Positive for back pain. Negative for arthralgias. Skin: Negative for rash. Neurological: Positive for weakness and numbness. Negative for light-headedness.    Psychiatric/Behavioral: Negative for confusion. All other systems reviewed and are negative.      --------------------------------------------- PAST HISTORY ---------------------------------------------  Past Medical History:  has a past medical history of Diabetes mellitus ([facility] [address] H/O cardiovascular stress test, History of Holter monitoring, Hypertension, and Thyroid disease. Past Surgical History:  has a past surgical history that includes joint replacement (Bilateral, [id number]); back surgery (2000); Tonsillectomy; Lithotripsy; Shoulder arthroscopy (Right, 01/30/2020); and Shoulder arthroscopy (Right, 1/30/2020). Social History:  reports that he has never smoked. He has never used smokeless tobacco. He reports that he does not drink alcohol and does not use drugs. Family History: family history includes Diabetes in his father; No Known Problems in his mother. The patients home medications have been reviewed. Allergies: No known allergies    ---------------------------------------------------PHYSICAL EXAM--------------------------------------  Constitutional/General: Alert and oriented x3, well appearing, non toxic in NAD  Head: Normocephalic and atraumatic  Mouth: Oropharynx clear, handling secretions, no trismus  Neck: Supple, full ROM,  Pulmonary: Mildly coarse breath sounds, no rales, rhonchi or wheezing  Cardiovascular:  Regular rate. Regular rhythm. No murmurs  Chest: no chest wall tenderness  Abdomen: Soft. Non tender. Non distended. No rebound, guarding, or rigidity. No pulsatile masses appreciated. : Stool light brown guaiac negative  Musculoskeletal: Moves all extremities x 4. Warm and well perfused, no clubbing, cyanosis, or edema. Capillary refill <3 seconds  Skin: warm and dry. No rashes.    Neurologic: GCS 15, cranial nerves II to XII grossly intact bilaterally there are 5 out of 5 muscle strength of the bilateral upper and lower extremities  Psych: Normal Affect    -------------------------------------------------- RESULTS -------------------------------------------------  I have personally reviewed all laboratory and imaging results for this patient. Results are listed below.      LABS:  Results for orders placed or performed during the hospital encounter of 04/13/22   CBC with Auto Differential   Result Value Ref Range    WBC 7.5 4.5 - 11.5 E9/L    RBC 2.25 (L) 3.80 - 5.80 E12/L    Hemoglobin 9.0 (L) 12.5 - 16.5 g/dL    Hematocrit 26.8 (L) 37.0 - 54.0 %    .1 (H) 80.0 - 99.9 fL    MCH 40.0 (H) 26.0 - 35.0 pg    MCHC 33.6 32.0 - 34.5 %    RDW 16.5 (H) 11.5 - 15.0 fL    Platelets 503 423 - 280 E9/L    MPV 10.6 7.0 - 12.0 fL    Neutrophils % 79.0 43.0 - 80.0 %    Lymphocytes % 18.0 (L) 20.0 - 42.0 %    Monocytes % 3.0 2.0 - 12.0 %    Eosinophils % 0.0 0.0 - 6.0 %    Basophils % 0.0 0.0 - 2.0 %    Neutrophils Absolute 5.93 1.80 - 7.30 E9/L    Lymphocytes Absolute 1.35 (L) 1.50 - 4.00 E9/L    Monocytes Absolute 0.22 0.10 - 0.95 E9/L    Eosinophils Absolute 0.00 (L) 0.05 - 0.50 E9/L    Basophils Absolute 0.00 0.00 - 0.20 E9/L    nRBC 1.0 /100 WBC    Anisocytosis 1+    Comprehensive Metabolic Panel w/ Reflex to MG   Result Value Ref Range    Sodium 140 132 - 146 mmol/L    Potassium reflex Magnesium 4.4 3.5 - 5.0 mmol/L    Chloride 98 98 - 107 mmol/L    CO2 21 (L) 22 - 29 mmol/L    Anion Gap 21 (H) 7 - 16 mmol/L    Glucose 274 (H) 74 - 99 mg/dL    BUN 51 (H) 6 - 23 mg/dL    CREATININE 2.3 (H) 0.7 - 1.2 mg/dL    GFR Non-African American 27 >=60 mL/min/1.73    GFR African American 33     Calcium 8.8 8.6 - 10.2 mg/dL    Total Protein 7.4 6.4 - 8.3 g/dL    Albumin 3.5 3.5 - 5.2 g/dL    Total Bilirubin 1.4 (H) 0.0 - 1.2 mg/dL    Alkaline Phosphatase 48 40 - 129 U/L     (H) 0 - 40 U/L     (H) 0 - 39 U/L   Troponin   Result Value Ref Range    Troponin, High Sensitivity 551 (H) 0 - 11 ng/L   CK   Result Value Ref Range    Total CK 1,143 (H) 20 - 200 U/L Urinalysis   Result Value Ref Range    Color, UA Yellow Straw/Yellow    Clarity, UA Clear Clear    Glucose, Ur 250 (A) Negative mg/dL    Bilirubin Urine Negative Negative    Ketones, Urine 15 (A) Negative mg/dL    Specific Gravity, UA 1.025 1.005 - 1.030    Blood, Urine LARGE (A) Negative    pH, UA 5.5 5.0 - 9.0    Protein,  (A) Negative mg/dL    Urobilinogen, Urine 0.2 <2.0 E.U./dL    Nitrite, Urine Negative Negative    Leukocyte Esterase, Urine Negative Negative   Lactic Acid   Result Value Ref Range    Lactic Acid 3.9 (HH) 0.5 - 2.2 mmol/L   T4   Result Value Ref Range    T4, Total 5.6 4.5 - 11.7 mcg/dL   Brain Natriuretic Peptide   Result Value Ref Range    Pro-BNP 2,949 (H) 0 - 450 pg/mL   Troponin   Result Value Ref Range    Troponin, High Sensitivity 432 (H) 0 - 11 ng/L   Reticulocytes   Result Value Ref Range    Retic Ct Pct 1.7 0.4 - 1.9 %    Retic Ct Abs 0.035 E12/L    Immature Retic Fract 17.5 (H) 2.3 - 13.4 %    Hematocrit 24.1 (L) 37.0 - 54.0 %    Retic HGB Equivalent 36.0 28.2 - 36.6 pg   Lactic Acid   Result Value Ref Range    Lactic Acid 3.3 (H) 0.5 - 2.2 mmol/L   Microscopic Urinalysis   Result Value Ref Range    WBC, UA 0-1 0 - 5 /HPF    RBC, UA 1-3 0 - 2 /HPF    Epithelial Cells, UA RARE /HPF    Bacteria, UA RARE (A) None Seen /HPF   EKG 12 Lead   Result Value Ref Range    Ventricular Rate 102 BPM    Atrial Rate 102 BPM    P-R Interval 158 ms    QRS Duration 88 ms    Q-T Interval 340 ms    QTc Calculation (Bazett) 443 ms    P Axis 22 degrees    R Axis 14 degrees    T Axis 124 degrees       RADIOLOGY:  Interpreted by Radiologist.  CT CHEST WO CONTRAST   Final Result   Progression of moderate interstitial lung disease, which demonstrates a UIP   pattern. MRI LUMBAR SPINE WO CONTRAST   Final Result   Multilevel degenerative changes, most notably at L4-5 and L5-S1.   Stable   grade 2 anterolisthesis of L4 on L5 and grade 1 anterolisthesis of L5 on S1.   Multilevel foraminal and lateral recess stenoses are not significantly   changed since the previous exam.      Development of endplate degenerative changes at L2-3. This is a new finding. XR CHEST PORTABLE   Final Result   Marked hypoventilation. Otherwise, no acute process seen. EKG:  This EKG is signed and interpreted by me. Sinus tachycardia rate of 102, ST segment elevation in lead III, ST segment depression in leads I and aVL, there is T wave inversion in V2 as well as V3, this is new compared to patient's prior EKG  Interpreted by me      ------------------------- NURSING NOTES AND VITALS REVIEWED ---------------------------   The nursing notes within the ED encounter and vital signs as below have been reviewed by myself. BP (!) 101/52   Pulse 96   Temp 100.3 °F (37.9 °C) (Oral)   Resp 20   Wt 155 lb (70.3 kg)   SpO2 94%   BMI 25.02 kg/m²   Oxygen Saturation Interpretation: Normal    The patients available past medical records and past encounters were reviewed. ------------------------------ ED COURSE/MEDICAL DECISION MAKING----------------------  Medications   glucose (GLUTOSE) 40 % oral gel 15 g (has no administration in time range)   glucagon (rDNA) injection 1 mg (has no administration in time range)   dextrose 5 % solution (has no administration in time range)   dextrose bolus (hypoglycemia) 10% 125 mL (has no administration in time range)     Or   dextrose bolus (hypoglycemia) 10% 250 mL (has no administration in time range)   enoxaparin (LOVENOX) injection 0.5 mg/kg (has no administration in time range)   0.9 % sodium chloride bolus (0 mLs IntraVENous Stopped 4/13/22 2213)   0.9 % sodium chloride bolus (1,000 mLs IntraVENous New Bag 4/13/22 2241)   acetaminophen (TYLENOL) tablet 1,000 mg (1,000 mg Oral Given 4/13/22 2248)   aspirin chewable tablet 324 mg (324 mg Oral Given 4/13/22 2338)             Medical Decision Making:   I, Dr. Di Nascimento am the primary physician of record.     Daron Mohs is a 80 y.o. male who presents to the ED for fatigue, weakness, shortness of breath and back pain. Patient did have initial EKG demonstrating concerning for MI. I did discuss the case with Dr. Cuco Martinez he states that the patient is not having any current chest pain and has Q waves inferior leads no acute cardiac catheterization at this time. the patient did have urinary incontinence as well as back pain and bilateral leg numbness so MRI of lumbar spine was ordered no evidence of cauda equina. Patient did have labs which were reviewed. He was found to have anemia with hemoglobin at 9.0, guaiacs negative stool. Patient with elevated CK and acute kidney injury concerning for rhabdomyolysis for which he was given IV fluids. Patient did have initial troponin elevated at 551 which did downtrend to 432. I did discuss case with Dr. Caroline Chau the patient was given aspirin as well as Lovenox 0.5 mg/kg. The patient will be admitted for further treatment and evaluation. Re-Evaluations/Consultations:             ED Course as of 04/14/22 0001 Wed Apr 13, 2022 2009 Spoke with Dr. Cuco Martinez I will send him the EKGs. [MT]   2017 Spoke with Dr. Cuco Martinez. He did review the EKG. He does see the Q waves in inferior leads and states that no plan for intervention at this time as patient does not currently have chest pain and his symptoms have been going on chronically. He states the patient does need admitted and further evaluated. [MT]   [id number] Spoke with bed coordinator and no beds available on sixth floor.  [MT]      ED Course User Index  [MT] Shireen Alexandra,                This patient's ED course included: History, physical examination, reevaluation prior to disposition, labs, imaging, telemetry monitoring, EKG, IVF, cardiology consultation    Critical care:  Critical Care: Please note that the withdrawal or failure to initiate urgent interventions for this patient would likely result in a life threatening deterioration or permanent disability. Accordingly this patient received 32 minutes of critical care time, excluding separately billable procedures. This patient has remained hemodynamically stable during their ED course. Counseling: The emergency provider has spoken with the patient and discussed todays results, in addition to providing specific details for the plan of care and counseling regarding the diagnosis and prognosis. Questions are answered at this time and they are agreeable with the plan.       --------------------------------- IMPRESSION AND DISPOSITION ---------------------------------    IMPRESSION  1. NSTEMI (non-ST elevated myocardial infarction) ([facility] [address]    2. BURAK (acute kidney injury) ([facility] [address]    3. Non-traumatic rhabdomyolysis    4. Febrile illness    5. Shortness of breath    6. Chronic bilateral low back pain without sciatica        DISPOSITION  Disposition: Admit to telemetry  Patient condition is stable        NOTE: This report was transcribed using voice recognition software.  Every effort was made to ensure accuracy; however, inadvertent computerized transcription errors may be present         Logan Calvin DO  04/14/22 0013

## 2022-04-13 NOTE — Clinical Note
Discharge Plan[de-identified] Other/[facility])   Telemetry/Cardiac Monitoring Required?: Yes

## 2022-04-13 NOTE — ED NOTES
Patient states they have been too weak to walk for the past 2 days.      Vanessa Augustin, RN  04/13/22 6049

## 2022-04-14 NOTE — CONSULTS
GI CONSULT NOTE    [facility]  Dr. Sherryll Lefort, M.D., Dr. Kerrie Kumar M.D., Dr. Murphy Felton D.O., Dr. Bushra Ngo M.D., Dr. Krishna Hudson D.O. Ty Jarrett D.O., GI fellow      Date:9:25 AM 4/14/2022    Denver Shock Prest  80 y.o.  male    HPI:    42-year-old male with PMHx of diabetes mellitus with chronic peripheral neuropathy, interstitial lung disease, hypertension, hypothyroidism, and paroxysmal A. fib who presented to the ED with worsening weakness and fatigue in the preceding 2 days with subsequent work-up in ED revealing acute blood loss anemia as well as a NSTEMI. Initial labs in ED revealed a hemoglobin of 9 with previous records from 2/11/2022 revealed a hemoglobin of 10.8. Repeat H&H was remarkable for hemoglobin of 7.1. Patient also noted to have an INR of 2.1 but denies any anticoagulation. Patient denies any abdominal pain, nausea, vomiting, melena, hematochezia, hemoptysis, hematemesis, hematuria. Last colonoscopy: Over 5 years ago with Dr. Karine Byrd.   Patient unable to recall details of findings of the exam  Last EGD: Denies previous    PAST MEDICAL Hx:  Past Medical History:   Diagnosis Date    Diabetes mellitus ([address]     H/O cardiovascular stress test 12/01/2021    Lexiscan    History of Holter monitoring 02/11/2022    Hypertension     Thyroid disease        PAST SURGICAL Hx:   Past Surgical History:   Procedure Laterality Date    BACK SURGERY  2000    fusion  lumbar area,      JOINT REPLACEMENT Bilateral [id number]    knee     LITHOTRIPSY      SHOULDER ARTHROSCOPY Right 01/30/2020    with treatment    SHOULDER ARTHROSCOPY Right 1/30/2020    ARTHROSCOPIC EXAM AND TREATMENT RIGHT SHOULDER (CPT 82740,82211) RIGHT ROTATOR CUFF REPAIR, SUBACHROMIAL DECOMPRESSION, DEBRIDEMENT OF HUMERAL performed by Parth Song DO at [address] Hx:  Family History   Problem Relation Age of Onset    No Known Problems Mother     Diabetes Father        HOME MEDICATIONS:  Prior to Admission medications    Medication Sig Start Date End Date Taking? Authorizing Provider   omeprazole (PRILOSEC) 20 MG delayed release capsule Take 1 capsule by mouth every morning (before breakfast) 4/5/22   Orlin Goode DO   SITagliptin (JANUVIA) 100 MG tablet Take 1 tablet by mouth daily 3/30/22   Orlin Goode DO   ONETOUCH ULTRA strip 1 each by Other route 4 times daily As needed. 2/13/22 5/14/22  Evangelist Oh Maynor, DO   Lancets (ONETOUCH DELICA PLUS ISLDOM12O) MISC 1 strip by Other route 4 times daily 2/13/22 5/14/22  Evangelist Mcguire DO   colchicine (COLCRYS) 0.6 MG tablet Take 1 tablet by mouth daily 2/7/22   Caitlyn Ramsey,    levothyroxine (SYNTHROID) 75 MCG tablet Take 1 tablet by mouth Daily 2/7/22   Orlin Goode DO   atenolol (TENORMIN) 25 MG tablet Take 1 tablet by mouth daily 1/26/22   Orlin Goode DO   pravastatin (PRAVACHOL) 20 MG tablet Take 1 tablet by mouth daily 1/21/22   Orlin Goode DO   predniSONE (DELTASONE) 10 MG tablet Take 1 tablet by mouth 3 times daily (with meals)  Patient taking differently: Take 10 mg by mouth 3 times daily (with meals) Down to 1 tablet in the AM and 0.5 tablet in the Evening 1/14/22 4/14/22  Orlin Goode DO   potassium citrate (UROCIT-K) 10 MEQ (1080 MG) extended release tablet TAKE ONE TABLET BY MOUTH EVERY DAY 8/5/21   Historical Provider, MD   aspirin 325 MG tablet Take 325 mg by mouth daily    Historical Provider, MD   metFORMIN (GLUCOPHAGE) 500 MG tablet TAKE ONE TABLET BY MOUTH EVERY DAY 5/8/18   Historical Provider, MD   finasteride (PROSCAR) 5 MG tablet Take 5 mg by mouth once a week. Historical Provider, MD   therapeutic multivitamin-minerals (THERAGRAN-M) tablet Take 1 tablet by mouth daily.       Historical Provider, MD       ALLERGIES:  No known allergies    SOCIAL Hx:  Social History     Socioeconomic History    Marital status:      Spouse name: Not on file    Number of children: Not on file    Years of education: Not on file    Highest education level: Not on file   Occupational History    Not on file   Tobacco Use    Smoking status: Never Smoker    Smokeless tobacco: Never Used   Vaping Use    Vaping Use: Never used   Substance and Sexual Activity    Alcohol use: No    Drug use: No    Sexual activity: Not on file   Other Topics Concern    Not on file   Social History Narrative    Not on file     Social Determinants of Health     Financial Resource Strain: Low Risk     Difficulty of Paying Living Expenses: Not hard at all   Food Insecurity: No Food Insecurity    Worried About [facility] in the Last Year: Never true    [facility] in the Last Year: Never true   Transportation Needs:     Lack of Transportation (Medical): Not on file    Lack of Transportation (Non-Medical):  Not on file   Physical Activity:     Days of Exercise per Week: Not on file    Minutes of Exercise per Session: Not on file   Stress:     Feeling of Stress : Not on file   Social Connections:     Frequency of Communication with Friends and Family: Not on file    Frequency of Social Gatherings with Friends and Family: Not on file    Attends Congregation Services: Not on file    Active Member of [address] [place] [address] or Organizations: Not on file    Attends Club or Organization Meetings: Not on file    Marital Status: Not on file   Intimate Partner Violence:     Fear of Current or Ex-Partner: Not on file    Emotionally Abused: Not on file    Physically Abused: Not on file    Sexually Abused: Not on file   Housing Stability:     Unable to Pay for Housing in the Last Year: Not on file    Number of [place] in the Last Year: Not on file    Unstable Housing in the Last Year: Not on file       PE:  BP (!) 87/49   Pulse 82   Temp 98.4 °F (36.9 °C) (Bladder)   Resp 29   Ht 5' 5.98\" (1.676 m)   Wt 154 lb 8 oz (70.1 kg)   SpO2 93%   BMI 24.95 kg/m²     General: A&Ox 3, friendly, NAD  HEENT: Atraumatic, symmetric, no anterior/posterior lymphadenopathy, moist mucous membranes, PERRL, EOM intact, fair dentition. Pulm: CTAB, Neg w/r/r, normal chest expansion, no crackles noted  Cardio: RRR, neg m/r/g, nl S1 and S2, no extra heart sounds  Abd.: soft, NT, ND, BS+, no G/R, no HSM  Ext: +2/4 pulse Dp and radial b/l, LE and UE ROM intact,   Skin: No lesions, excoriations, petechiae, or ecchymoses noted    Neuro: normal sensation throughout, DTRs patellar, tricept, and bicept 2/4 b/l and equal, normal muscle strength throughout. DATA:     Lab Results   Component Value Date    WBC 6.4 04/14/2022    RBC 1.90 04/14/2022    HGB 7.6 04/14/2022    HCT 23.2 04/14/2022    .1 04/14/2022    MCH 40.0 04/14/2022    MCHC 32.8 04/14/2022    RDW 16.2 04/14/2022     04/14/2022    MPV 10.6 04/14/2022     Lab Results   Component Value Date     04/14/2022    K 3.6 04/14/2022    K 4.4 04/13/2022     04/14/2022    CO2 17 04/14/2022    BUN 47 04/14/2022    CREATININE 1.9 04/14/2022    CALCIUM 7.1 04/14/2022    PROT 5.8 04/14/2022    LABALBU 2.8 04/14/2022    LABALBU 4.4 03/29/2012    BILITOT 0.9 04/14/2022    ALKPHOS 34 04/14/2022    AST 92 04/14/2022    ALT 75 04/14/2022     No results found for: LIPASE  No results found for: AMYLASE      ASSESSMENT/PLAN:  1. Acute blood loss anemia, unspecified  Macrocytic  Patient hemodynamically stable without any clinical evidence of an overt or brisk GI bleed  Currently broad differential but cannot exclude any upper or lower GI source  Anemia panel  Serial H&H  Type and cross, please keep 2 units on hold at all times. Recommend restrictive transfusion parameters, will defer to primary  [facility] cardiology evaluation and clearance to discuss endoscopic evaluation  Recommend vitamin K  Continue supportive care      2. NSTEMI  Cardiology consulted      3.   Elevated liver enzymes  R factor 6.6: Hepatocellular  Right upper quadrant ultrasound  Hepatic panel      4. Septic shock  Possibly secondary to left diabetic foot wound  Per primary      5. BURAK  Per primary          Other medical issues managed on this admission:  · Hypertension  · Hyperlipidemia  · Hypothyroidism  · Type 2 diabetes mellitus with chronic peripheral neuropathy  · Interstitial lung disease      Will discuss with attending     Amaya Holley DO  4/14/2022  9:26 AM      Patient seen and independently examined. Pertinent notes and lab work reviewed. Discussed with Dr. Alicia Arias with physical exam and A&P. Discussed with patient all questions answered - agreeable with the plan as delineated. Thank you for the opportunity to see this patient in consultation.     Kasandra Hernandez MD  4/14/2022  12:48 PM

## 2022-04-14 NOTE — PROGRESS NOTES
Pharmacy Consultation Note  (Antibiotic Dosing and Monitoring)    Initial consult date: 4/14/22  Consulting physician/provider: MIK Cardenas-CNP  Drug: Vancomycin  Indication: Bloodstream infection    Age/  Gender Height Weight IBW  Allergy Information   88 y.o./male 5' 5.98\" (167.6 cm) 155 lb (70.3 kg)     Ideal body weight: 63.8 kg (140 lb 9.2 oz)  Adjusted ideal body weight: 66.3 kg (146 lb 2.3 oz)   No known allergies      Renal Function:  Recent Labs     04/13/22 2008 04/14/22  0444   BUN 51* 47*   CREATININE 2.3* 1.9*       Intake/Output Summary (Last 24 hours) at 4/14/2022 1303  Last data filed at 4/14/2022 1058  Gross per 24 hour   Intake 2737.98 ml   Output 250 ml   Net 2487.98 ml       Vancomycin Monitoring:  Trough:  No results for input(s): VANCOTROUGH in the last 72 hours. Random:  No results for input(s): VANCORANDOM in the last 72 hours. Vancomycin Administration Times:  Recent vancomycin administrations                   vancomycin (VANCOCIN) 1,000 mg in dextrose 5 % 250 mL IVPB (mg) 1,000 mg New Bag 04/14/22 0947                Assessment:  · Patient is a 80 y.o. male who has been initiated on vancomycin  · Estimated Creatinine Clearance: 24 mL/min (A) (based on SCr of 1.9 mg/dL (H)).   · To dose vancomycin, pharmacy will be utilizing dosing based off of levels because of patient's renal impairment/insufficiency    Plan:  · Patient received vancomycin 1000 mg x 1 dose 4/14/22 @ 09:47 am  · Will check vancomycin level tomorrow am  · Will continue to monitor renal function   · Clinical pharmacy to follow      GIDEON Chase [facility] 4/14/2022 1:03 PM

## 2022-04-14 NOTE — CONSULTS
Hollis and Елена Macdonald M.D. Patient Name: Briana Barton  YOB: 1933  PCP: Gurpreet Hansen DO   Referring Provider:      Reason for Consultation:   Chief Complaint   Patient presents with    Extremity Weakness     WEAKNESS, INCONTINENCE STARTING TODAY        History of Present Illness:  80years old male with history of pulmonary fibrosis, diabetes mellitus, coronary artery disease came to the ER complaining of fatigue and shortness of breath for the past few days. Admitted to ICU with septic shock, NSTEMI, rhabdomyolysis. On antibiotics. Levophed has been tapered off. I was consulted for possible hemolysis. Hemoglobin was normal up until last year. Over the past few months his hemoglobin has dropped to his current level of 7.6. MCV has increased in the same timeframe and is 122 right now. Bilirubin is 0.9. , absolute reticulocyte count was 35,000. Total white count 6.4 with normal differential and platelet count of 891,899. B12 folate were normal.  Peripheral smear evaluation from yesterday showed dysplastic neutrophils and less than 1% blasts. CT chest showed worsening of his interstitial lung disease pattern. CT abdomen did not show any acute findings. Patient reports feeling somewhat better. Has been afebrile. Is awake alert oriented. Denies any blood in stools or black tarry stools. Review of systems: Over 10 systems were reviewed and all were negative except as mentioned above.     Diagnostic Data:     Past Medical History:   Diagnosis Date    Diabetes mellitus ([address]     H/O cardiovascular stress test 12/01/2021    Lexiscan    History of Holter monitoring 02/11/2022    Hypertension     Thyroid disease        Patient Active Problem List    Diagnosis Date Noted    NSTEMI (non-ST elevated myocardial infarction) ([address] 04/13/2022    Essential hypertension 08/17/2021    Acquired hypothyroidism 08/17/2021  Dyslipidemia 08/17/2021    Type 2 diabetes mellitus without complication, without long-term current use of insulin ([facility] 08/17/2021    Normocytic anemia 08/17/2021    Incomplete tear of right rotator cuff 01/30/2020    Long biceps tear 01/30/2020    Injury of tendon of long head of right biceps 01/30/2020    Tear of right glenoid labrum 01/30/2020    Bursitis of right shoulder 01/30/2020        Past Surgical History:   Procedure Laterality Date    BACK SURGERY  2000    fusion  lumbar area,      JOINT REPLACEMENT Bilateral 4629,41920    knee     LITHOTRIPSY      SHOULDER ARTHROSCOPY Right 01/30/2020    with treatment    SHOULDER ARTHROSCOPY Right 1/30/2020    ARTHROSCOPIC EXAM AND TREATMENT RIGHT SHOULDER (CPT 47969,96874) RIGHT ROTATOR CUFF REPAIR, SUBACHROMIAL DECOMPRESSION, DEBRIDEMENT OF HUMERAL performed by VIRGINIA Oconnell DO at [facility]         Family History  Family History   Problem Relation Age of Onset    No Known Problems Mother     Diabetes Father        Social History  Social History     Socioeconomic History    Marital status:      Spouse name: Not on file    Number of children: Not on file    Years of education: Not on file    Highest education level: Not on file   Occupational History    Not on file   Tobacco Use    Smoking status: Never Smoker    Smokeless tobacco: Never Used   Vaping Use    Vaping Use: Never used   Substance and Sexual Activity    Alcohol use: No    Drug use: No    Sexual activity: Not on file   Other Topics Concern    Not on file   Social History Narrative    Not on file     Social Determinants of Health     Financial Resource Strain: Low Risk     Difficulty of Paying Living Expenses: Not hard at all   Food Insecurity: No Food Insecurity    Worried About Running Out of Food in the Last Year: Never true    Jessica of Food in the Last Year: Never true   Transportation Needs:     Lack of Transportation (Medical):  Not on file    Lack of Transportation (Non-Medical): Not on file   Physical Activity:     Days of Exercise per Week: Not on file    Minutes of Exercise per Session: Not on file   Stress:     Feeling of Stress : Not on file   Social Connections:     Frequency of Communication with Friends and Family: Not on file    Frequency of Social Gatherings with Friends and Family: Not on file    Attends [employer] Services: Not on file    Active Member of [address] [place] [address] or Organizations: Not on file    Attends Club or Organization Meetings: Not on file    Marital Status: Not on file   Intimate Partner Violence:     Fear of Current or Ex-Partner: Not on file    Emotionally Abused: Not on file    Physically Abused: Not on file    Sexually Abused: Not on file   Housing Stability:     Unable to Pay for Housing in the Last Year: Not on file    Number of [place] in the Last Year: Not on file    Unstable Housing in the Last Year: Not on file        TOBACCO:   reports that he has never smoked. He has never used smokeless tobacco.  ETOH:   reports no history of alcohol use. Home Medications  Prior to Admission medications    Medication Sig Start Date End Date Taking? Authorizing Provider   omeprazole (PRILOSEC) 20 MG delayed release capsule Take 1 capsule by mouth every morning (before breakfast) 4/5/22   Orlin Goode DO   SITagliptin (JANUVIA) 100 MG tablet Take 1 tablet by mouth daily 3/30/22   Orlin Goode DO   ONETOUCH ULTRA strip 1 each by Other route 4 times daily As needed.  2/13/22 5/14/22  Ashwini Mcguire DO   Lancets (ONETOUCH DELICA PLUS [id number]) MISC 1 strip by Other route 4 times daily 2/13/22 5/14/22  Ashwini Mcguire DO   colchicine (COLCRYS) 0.6 MG tablet Take 1 tablet by mouth daily 2/7/22   Katrin Medici, DO   levothyroxine (SYNTHROID) 75 MCG tablet Take 1 tablet by mouth Daily 2/7/22   Orlin Goode DO   atenolol (TENORMIN) 25 MG tablet Take 1 tablet by mouth daily 1/26/22   Orlin DO Rupa   pravastatin (PRAVACHOL) 20 MG tablet Take 1 tablet by mouth daily 1/21/22   Orlin Goode DO   predniSONE (DELTASONE) 10 MG tablet Take 1 tablet by mouth 3 times daily (with meals)  Patient taking differently: Take 10 mg by mouth 3 times daily (with meals) Down to 1 tablet in the AM and 0.5 tablet in the Evening 1/14/22 4/14/22  Orlin Goode DO   potassium citrate (UROCIT-K) 10 MEQ (1080 MG) extended release tablet TAKE ONE TABLET BY MOUTH EVERY DAY 8/5/21   Historical Provider, MD   aspirin 325 MG tablet Take 325 mg by mouth daily    Historical Provider, MD   metFORMIN (GLUCOPHAGE) 500 MG tablet TAKE ONE TABLET BY MOUTH EVERY DAY 5/8/18   Historical Provider, MD   finasteride (PROSCAR) 5 MG tablet Take 5 mg by mouth once a week. Historical Provider, MD   therapeutic multivitamin-minerals (THERAGRAN-M) tablet Take 1 tablet by mouth daily. Historical Provider, MD       Allergies  Allergies   Allergen Reactions    No Known Allergies            Objective  /62   Pulse 81   Temp 98.5 °F (36.9 °C) (Oral)   Resp 24   Ht 5' 6\" (1.676 m)   Wt 154 lb 8 oz (70.1 kg)   SpO2 92%   BMI 24.94 kg/m²     Physical Exam:   Performance Status:  General: AAO to person, place, time, in no acute distress, pleasant. On 2 L oxygen via nasal cannula. Head and neck : PERRLA, EOMI . Sclera non icteric. Oropharynx : Clear  Neck: no JVD,  no adenopathy, no carotid bruit  LYMPHATICS : No peripheral lymphadenopathy. Heart: Regular rate and regular rhythm, no murmur  Lungs: Clear to auscultation   Extremities: No edema,no cyanosis, no clubbing. Abdomen: Soft, non-tender;no masses, no organomegaly  Skin:  No rash. Neurologic:Cranial nerves grossly intact. No focal motor or sensory deficits .     Recent Laboratory Data-   Lab Results   Component Value Date    WBC 6.4 04/14/2022    HGB 8.5 (L) 04/14/2022    HCT 25.4 (L) 04/14/2022    .1 (H) 04/14/2022     (L) 04/14/2022 LYMPHOPCT 15.8 (L) 04/14/2022    RBC 1.90 (L) 04/14/2022    MCH 40.0 (H) 04/14/2022    MCHC 32.8 04/14/2022    RDW 16.2 (H) 04/14/2022    NEUTOPHILPCT 80.7 (H) 04/14/2022    MONOPCT 3.5 04/14/2022    BASOPCT 0.2 04/14/2022    NEUTROABS 5.18 04/14/2022    LYMPHSABS 1.02 (L) 04/14/2022    MONOSABS 0.26 04/14/2022    EOSABS 0.00 (L) 04/14/2022    BASOSABS 0.00 04/14/2022       Lab Results   Component Value Date     04/14/2022    K 3.6 04/14/2022     04/14/2022    CO2 17 (L) 04/14/2022    BUN 47 (H) 04/14/2022    CREATININE 1.9 (H) 04/14/2022    GLUCOSE 338 (H) 04/14/2022    CALCIUM 7.1 (L) 04/14/2022    PROT 5.8 (L) 04/14/2022    LABALBU 2.8 (L) 04/14/2022    BILITOT 0.9 04/14/2022    ALKPHOS 34 (L) 04/14/2022    AST 92 (H) 04/14/2022    ALT 75 (H) 04/14/2022    LABGLOM 34 04/14/2022    GFRAA 41 04/14/2022       Lab Results   Component Value Date    IRON 121 11/08/2021    TIBC 265 11/08/2021    FERRITIN 412 11/08/2021           Radiology-    XR CHEST PORTABLE   Preliminary Result   Deep line catheter placed on the left tip in the SVC. Increased pulmonary   vascularity again seen bilaterally with no evidence of pneumothorax. CT ABDOMEN PELVIS WO CONTRAST Additional Contrast? None   Final Result   No acute intra-abdominal or pelvic findings. Bilateral nonobstructing renal calculi. Colonic diverticulosis, without diverticulitis. Additional findings as above. CT CHEST WO CONTRAST   Final Result   Progression of moderate interstitial lung disease, which demonstrates a UIP   pattern. MRI LUMBAR SPINE WO CONTRAST   Final Result   Multilevel degenerative changes, most notably at L4-5 and L5-S1. Stable   grade 2 anterolisthesis of L4 on L5 and grade 1 anterolisthesis of L5 on S1.   Multilevel foraminal and lateral recess stenoses are not significantly   changed since the previous exam.      Development of endplate degenerative changes at L2-3. This is a new finding. XR CHEST PORTABLE   Final Result   Marked hypoventilation. Otherwise, no acute process seen. US GALLBLADDER RUQ    (Results Pending)         ASSESSMENT/PLAN :  80years old male with history of pulmonary fibrosis, diabetes mellitus, coronary artery disease came to the ER with worsening fatigue, admitted to ICU with septic shock, NSTEMI, rhabdomyolysis. I have been consulted for possible hemolysis. Patient had normal hemoglobin and MCV up until last year. Over the past few months his hemoglobin has dropped along with an increase in MCV. Current hemoglobin is 7 with an MCV of 122. Dysplastic neutrophils and less than 1% blasts were noted on peripheral smear evaluation. Finding suggestive of MDS. Discussed with the patient and family including Dr. Kimberly Barraza. We will get a bone marrow biopsy. Normal bilirubin and normal absolute reticulocyte count of 35,000 suggests absence of hemolysis. Recent B12 folate were normal.  Being treated for septic shock. Levophed is being tapered off. On antibiotics. Has been afebrile. Elevated cardiac markers: Cardiology has been consulted for an NSTEMI. GI consulted for possible GI bleed. Denies any blood in stool or black tarry stools. Iron panel ordered. We will continue to follow. Thank you for the consult.         Electronically signed by Tyler Og MD on 4/14/2022 at 3:48 PM

## 2022-04-14 NOTE — PROGRESS NOTES
Physical Therapy  Room #: IC03/IC03-01  Date: 2022       Patient Name: Holly Martinez  : 1933      MRN: [id number]     Patient unavailable for physical therapy eval due to unavailable/not appropriate per nursing due to examining patient and asked for patient to be checked on in afternoon. Patient not available in afternoon due to ultrasound. Will attempt PT evaluation at a later time. Thank you.        Karolyn Alfaro, Student Physical Therapist

## 2022-04-14 NOTE — CONSULTS
Comprehensive Nutrition Assessment    Type and Reason for Visit:  Initial,Consult    Nutrition Recommendations/Plan: Glucerna BID    Nutrition Assessment:  Pt admits w/ SOB, Weakness w/ H/o DM , Peripheral Neuropathy. Pt tolerating diet w/ fair PO intakes. Will add ONS BID for nutr'l support    Malnutrition Assessment:  Malnutrition Status: At risk for malnutrition (Comment)    Context:  Acute Illness     Findings of the 6 clinical characteristics of malnutrition:  Energy Intake:  7 - 50% or less of estimated energy requirements for 5 or more days  Weight Loss:  No significant weight loss     Body Fat Loss:  No significant body fat loss     Muscle Mass Loss:  No significant muscle mass loss    Fluid Accumulation:  No significant fluid accumulation     Strength:  Not Performed    Estimated Daily Nutrient Needs:  Energy (kcal):  6532-4551; Weight Used for Energy Requirements:  Current     Protein (g):  70-80gm pro (x1.0-1.2gm/kg (monitor renal status)); Weight Used for Protein Requirements:  Current            Nutrition Related Findings:  A/ox4, abd WDL, no edema, +BS, +I/O +2.4L, BGL elevated, renal labs elevated      Wounds:  None       Current Nutrition Therapies:    ADULT DIET; Regular; 4 carb choices (60 gm/meal)  Diet NPO Exceptions are: Sips of Water with Meds  ADULT ORAL NUTRITION SUPPLEMENT; Breakfast, Dinner; Diabetic Oral Supplement    Anthropometric Measures:  · Height: 5' 6\" (167.6 cm)  · Current Body Weight: 154 lb (69.9 kg) (4/14 bed)   · Usual Body Weight:  (Inaccurate wts per EMR review: No method)     · Ideal Body Weight: 142 lbs; % Ideal Body Weight 108.5 %   · BMI: 24.9  · Adjusted Body Weight:  ; No Adjustment   · BMI Categories: Normal Weight (BMI 18.5-24. 9)       Nutrition Diagnosis:   · Inadequate oral intake related to early satiety as evidenced by intake 51-75%      Nutrition Interventions:   Food and/or Nutrient Delivery:  Continue Current Diet,Start Oral Nutrition Supplement  Nutrition Education/Counseling:  Education not indicated   Coordination of Nutrition Care:  Continue to monitor while inpatient    Goals:  Consume >75% meals/ONS       Nutrition Monitoring and Evaluation:       Food/Nutrient Intake Outcomes:  Food and Nutrient Intake,Supplement Intake  Physical Signs/Symptoms Outcomes:  Biochemical Data,Fluid Status or Edema,Nutrition Focused Physical Findings,Skin,Weight     Discharge Planning:     Too soon to determine     Electronically signed by Lizzy Smith RD, LD on 4/14/22 at 2:24 PM EDT    Contact: Yoshi Kay

## 2022-04-14 NOTE — H&P
Department of Internal Medicine  History and Physical    PCP: Hina Zaldivar DO   Admitting Physician: Dr. Edelmira Anderson  Consultants:  Date of Service: 4/13/2022    CHIEF COMPLAINT:  Weakness and fatigue     HISTORY OF PRESENT ILLNESS:    Patient is 80-year-old female presents to the ED due to increased weakness and fatigue. Patient has had progressive decline since November/December of last year following his COVID-19 booster shot. He has had pretty significant work-up for his issues. According to daughter who was present at bedside patient was doing fine about a year ago. He was walking and golfing and working in the garden's. However since late last year he has been having progressive decline. He has become more weak and fatigued. He has been having issues with shortness of breath. He had stress test  with suspicion for three-vessel disease. He also had PFTs which showed evidence of pulmonary fibrosis. However over the last 2 days he has been even more weak. He has not been able to get up on his own. He has been essentially bedbound. He currently denies any chest pain. He does admit to some chest heaviness. Otherwise he denies any nausea or emesis. Denies any fever or chills. He denies any melena or any blood in his stools. 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 He denies any loss of sensation. He denies melena or blood in stools.      PAST MEDICAL Hx:  Past Medical History:   Diagnosis Date    Diabetes mellitus ([facility] [address]     H/O cardiovascular stress test 12/01/2021    Lexiscan    History of Holter monitoring 02/11/2022    Hypertension     Thyroid disease        PAST SURGICAL Hx:   Past Surgical History:   Procedure Laterality Date    BACK SURGERY  2000    fusion  lumbar area,      JOINT REPLACEMENT Bilateral [id number]    knee     LITHOTRIPSY      SHOULDER ARTHROSCOPY Right 01/30/2020    with treatment    SHOULDER ARTHROSCOPY Right 1/30/2020    ARTHROSCOPIC EXAM AND TREATMENT RIGHT SHOULDER (CPT 30132,90390) RIGHT ROTATOR CUFF REPAIR, SUBACHROMIAL DECOMPRESSION, DEBRIDEMENT OF HUMERAL performed by VIRGINIA Watt DO at [address] Hx:  Family History   Problem Relation Age of Onset    No Known Problems Mother     Diabetes Father        HOME MEDICATIONS:  Prior to Admission medications    Medication Sig Start Date End Date Taking? Authorizing Provider   omeprazole (PRILOSEC) 20 MG delayed release capsule Take 1 capsule by mouth every morning (before breakfast) 4/5/22   Orlin Goode DO   SITagliptin (JANUVIA) 100 MG tablet Take 1 tablet by mouth daily 3/30/22   Orlin Goode DO   ONETOUCH ULTRA strip 1 each by Other route 4 times daily As needed.  2/13/22 5/14/22  Evangelist Mcguire, DO   Lancets (ONETOUCH DELICA PLUS [id number]) MISC 1 strip by Other route 4 times daily 2/13/22 5/14/22  Evangelist Mcguire DO   colchicine (COLCRYS) 0.6 MG tablet Take 1 tablet by mouth daily 2/7/22   Caitlyn Ramsey DO   levothyroxine (SYNTHROID) 75 MCG tablet Take 1 tablet by mouth Daily 2/7/22   Orlin Goode DO   atenolol (TENORMIN) 25 MG tablet Take 1 tablet by mouth daily 1/26/22   Orlin Goode DO   pravastatin (PRAVACHOL) 20 MG tablet Take 1 tablet by mouth daily 1/21/22   Orlin Goode DO   predniSONE (DELTASONE) 10 MG tablet Take 1 tablet by mouth 3 times daily (with meals)  Patient taking differently: Take 10 mg by mouth 3 times daily (with meals) Down to 1 tablet in the AM and 0.5 tablet in the Evening 1/14/22 4/14/22  Orlin Goode DO   potassium citrate (UROCIT-K) 10 MEQ (1080 MG) extended release tablet TAKE ONE TABLET BY MOUTH EVERY DAY 8/5/21   Historical Provider, MD   aspirin 325 MG tablet Take 325 mg by mouth daily    Historical Provider, MD   metFORMIN (GLUCOPHAGE) 500 MG tablet TAKE ONE TABLET BY MOUTH EVERY DAY 5/8/18   Historical Provider, MD   finasteride (PROSCAR) 5 MG tablet Take 5 mg by mouth once a week.    Historical Provider, MD   therapeutic multivitamin-minerals [facility]) tablet Take 1 tablet by mouth daily. Historical Provider, MD       ALLERGIES:  No known allergies    SOCIAL Hx:  Social History     Socioeconomic History    Marital status:      Spouse name: Not on file    Number of children: Not on file    Years of education: Not on file    Highest education level: Not on file   Occupational History    Not on file   Tobacco Use    Smoking status: Never Smoker    Smokeless tobacco: Never Used   Vaping Use    Vaping Use: Never used   Substance and Sexual Activity    Alcohol use: No    Drug use: No    Sexual activity: Not on file   Other Topics Concern    Not on file   Social History Narrative    Not on file     Social Determinants of Health     Financial Resource Strain: Low Risk     Difficulty of Paying Living Expenses: Not hard at all   Food Insecurity: No Food Insecurity    Worried About [address] in the Last Year: Never true    [address] [employer] in the Last Year: Never true   Transportation Needs:     Lack of Transportation (Medical): Not on file    Lack of Transportation (Non-Medical):  Not on file   Physical Activity:     Days of Exercise per Week: Not on file    Minutes of Exercise per Session: Not on file   Stress:     Feeling of Stress : Not on file   Social Connections:     Frequency of Communication with Friends and Family: Not on file    Frequency of Social Gatherings with Friends and Family: Not on file    Attends Religion Services: Not on file    Active Member of Clubs or Organizations: Not on file    Attends Club or Organization Meetings: Not on file    Marital Status: Not on file   Intimate Partner Violence:     Fear of Current or Ex-Partner: Not on file    Emotionally Abused: Not on file    Physically Abused: Not on file    Sexually Abused: Not on file   Housing Stability:     Unable to Pay for Housing in the Last Year: Not on file  Number of Places Lived in the Last Year: Not on file    Unstable Housing in the Last Year: Not on file       ROS: Positive in bold  General:   Denies chills, fatigue, fever, malaise, night sweats or weight loss    Psychological:   Denies anxiety, disorientation or hallucinations    ENT:    Denies epistaxis, headaches, vertigo or visual changes    Cardiovascular:   Denies any chest pain, irregular heartbeats, or palpitations. No paroxysmal nocturnal dyspnea. Respiratory:   Denies shortness of breath, coughing, sputum production, hemoptysis, or wheezing. No orthopnea. Gastrointestinal:   Denies nausea, vomiting, diarrhea, or constipation. Denies any abdominal pain. Denies change in bowel habits or stools. Genito-Urinary:    Denies any urgency, frequency, hematuria. Voiding without difficulty. Musculoskeletal:   Denies joint pain, joint stiffness, joint swelling or muscle pain    Neurology:    Denies any headache or focal neurological deficits. No weakness or paresthesia. Derm:    Denies any rashes, ulcers, or excoriations. Denies bruising. Extremities:   Denies any lower extremity swelling or edema. PHYSICAL EXAM: Abnormal findings noted  VITALS:  Vitals:    04/13/22 1859   BP: (!) 104/56   Pulse: 106   Resp: 16   Temp:    SpO2: 94%         CONSTITUTIONAL:    Awake, alert, cooperative, no apparent distress, and appears stated age    EYES:     EOMI, sclera clear, conjunctiva normal    ENT:    Normocephalic, atraumatic,  External ears without lesions. NECK:    Supple, symmetrical, trachea midline,no JVD    HEMATOLOGIC/LYMPHATICS:    No cervical lymphadenopathy and no supraclavicular lymphadenopathy    LUNGS:    Symmetric.  No increased work of breathing, good air exchange, clear to auscultation bilaterally, no wheezes, rhonchi, or rales,     CARDIOVASCULAR:    Normal apical impulse, regular rate and rhythm, normal S1 and S2, no S3 or S4, and no murmur noted    ABDOMEN:     soft, non-distended, non-tender    MUSCULOSKELETAL:    There is no redness, warmth, or swelling of the joints. NEUROLOGIC:    Awake, alert, oriented to name, place and time. SKIN:    No bruising or bleeding. No redness, warmth, or swelling    EXTREMITIES:    Peripheral pulses present. No edema, cyanosis, or swelling. LINES/CATHETERS     LABORATORY DATA:  CBC with Differential:    Lab Results   Component Value Date    WBC 7.5 04/13/2022    RBC 2.25 04/13/2022    HGB 9.0 04/13/2022    HCT 26.8 04/13/2022     04/13/2022    .1 04/13/2022    MCH 40.0 04/13/2022    MCHC 33.6 04/13/2022    RDW 16.5 04/13/2022    NRBC 1.0 04/13/2022    SEGSPCT 48 07/06/2013    LYMPHOPCT 18.0 04/13/2022    MONOPCT 3.0 04/13/2022    MYELOPCT 1.8 02/11/2022    BASOPCT 0.0 04/13/2022    MONOSABS 0.22 04/13/2022    LYMPHSABS 1.35 04/13/2022    EOSABS 0.00 04/13/2022    BASOSABS 0.00 04/13/2022     CMP:    Lab Results   Component Value Date     04/13/2022    K 4.4 04/13/2022    CL 98 04/13/2022    CO2 21 04/13/2022    BUN 51 04/13/2022    CREATININE 2.3 04/13/2022    GFRAA 33 04/13/2022    LABGLOM 27 04/13/2022    GLUCOSE 274 04/13/2022    GLUCOSE 111 03/29/2012    PROT 7.4 04/13/2022    LABALBU 3.5 04/13/2022    LABALBU 4.4 03/29/2012    CALCIUM 8.8 04/13/2022    BILITOT 1.4 04/13/2022    ALKPHOS 48 04/13/2022     04/13/2022     04/13/2022       ASSESSMENT/PLAN:  1. Acute on chronic anemia  2. NSTEMI  3. Rhabdomyolysis  4. Lactic acidosis   5. BURAK on CKD  6. Grade 1 anterolisthesis of L5 on S1  7. Grade 2 anterolisthesis of L4 on L5  8. Stable multilevel foraminal lateral recess stenosis  9. Transaminitis  10. Non Insulin dependent diabetes mellitus  11. Pulmonary fibrosis   12. Mild pulmonary hypertension  13. Hypertension  14. Hypothyroidism    Patient presented with acute on chronic weakness.   Patient has been having a progressive decline since November/December of last year after receiving COVID-19 booster. He has been chronically short of breath with mild exertion. Work-up in the ED revealed NSTEMI. He was found to have renal insufficiency. Cardiology consulted. CT chest did not show pneumonia. However we will assess for underlying infection with blood and urine cultures. PT OT will be consulted. Social work consulted. Patient did have acute drop in H&H. Patient was hypotensive. Patient placed on Protonix twice daily. Patient will be given 1 unit PRBC. And GI will be consulted. Critical care consulted and patient accepted to ICU.       Hazel Blank DO  10:14 PM  4/13/2022    Electronically signed by Hazel Blank DO on 4/13/22 at 10:14 PM EDT

## 2022-04-14 NOTE — CONSULTS
[facility] ([facility]) Physicians        CARDIOLOGY CONSULT                         PATIENT SEEN IN CONSULT FOR: Elevated Troponin    Hospital Day: 2     Samson Bowen is a 80year old patient previously not known to [facility] Cardiology     HISTORY OF PRESENT ILLNESS:   Patient  is a 80 y. o. male with history of HTN, PAF, Diabetes, Pulmonary fibrosis presented to the ED for fatigue, shortness of breath, leg weakness and numbness.  The history is obtained from the patient, patient's family as well as the patient's medical record.  The patient is presented to the emergency department the chief complaint of fatigue, shortness of breath, leg weakness and numbness.  Since receiving the COVID booster in November, the patient has had worsening shortness of breath and fatigue.  He has been noted to have increasing pulmonary fibrosis.  He was started on prednisone with some improvement.  He states that he still does have shortness of breath which is worse with activity and exertion.  Over the last 2 days he has had increasing fatigue.  He reports he was sitting at home in a chair was unable to get out of the chair for 2 days secondary to leg weakness and numbness.  This is moderate in severity.  Nothing makes it better.  Nothing makes worse.  No treatment prior to arrival. [facility] does have chronic lumbar back pain which he states has worsened over the last 2 days. [facility] does admit to urinary incontinence and states he was not able to tell he was urinating while he was sitting in the chair.  He denies any saddle anesthesias. CT chest without contrast shows progression of moderate and interstitial lung disease with UIP pattern. MRI of lumbar spine showed degenerative changes and multilevel spinal stenosis. Seen by Pulmonary/critical care. On inotropic support for low BP. Cardiology consulted for elevated Troponin. He denies any chest apin; No heart racing or syncope;   Few weeks ago had some pain in neck and shoulder area - \"like a  shaped'. Compliant with medications. ROS: Review of rest of 10 systems negative except as mentioned above    PAST MEDICAL Hx:  Past Medical History        Past Medical History:   Diagnosis Date    Diabetes mellitus ([address]      H/O cardiovascular stress test 12/01/2021     Lexiscan    History of Holter monitoring 02/11/2022    Hypertension      Thyroid disease              PAST SURGICAL Hx:   Past Surgical History         Past Surgical History:   Procedure Laterality Date    BACK SURGERY   2000     fusion  lumbar area,      JOINT REPLACEMENT Bilateral [id number]     knee     LITHOTRIPSY        SHOULDER ARTHROSCOPY Right 01/30/2020     with treatment    SHOULDER ARTHROSCOPY Right 1/30/2020     ARTHROSCOPIC EXAM AND TREATMENT RIGHT SHOULDER (CPT 12725,79409) RIGHT ROTATOR CUFF REPAIR, SUBACHROMIAL DECOMPRESSION, DEBRIDEMENT OF HUMERAL performed by Maximo Meckel, DO at [address] Hx:  Family History         Family History   Problem Relation Age of Onset    No Known Problems Mother      Diabetes Father              HOME MEDICATIONS:  Home Medications   Prior to Admission medications    Medication Sig Start Date End Date Taking? Authorizing Provider   omeprazole (PRILOSEC) 20 MG delayed release capsule Take 1 capsule by mouth every morning (before breakfast) 4/5/22     Orlin Goode DO   SITagliptin (JANUVIA) 100 MG tablet Take 1 tablet by mouth daily 3/30/22     Orlin Goode DO   ONETOUCH ULTRA strip 1 each by Other route 4 times daily As needed.  2/13/22 5/14/22   Leobardo Mcguire DO   Lancets (ONETOUCH DELICA PLUS [id number]) MISC 1 strip by Other route 4 times daily 2/13/22 5/14/22   Leobardo Mcguire DO   colchicine (COLCRYS) 0.6 MG tablet Take 1 tablet by mouth daily 2/7/22     Orlin Goode DO   levothyroxine (SYNTHROID) 75 MCG tablet Take 1 tablet by mouth Daily 2/7/22     Orlin Goode DO   atenolol (TENORMIN) 25 MG tablet Take 1 tablet by mouth daily 1/26/22     Orlin Goode DO   pravastatin (PRAVACHOL) 20 MG tablet Take 1 tablet by mouth daily 1/21/22     Orlin Goode DO   predniSONE (DELTASONE) 10 MG tablet Take 1 tablet by mouth 3 times daily (with meals)  Patient taking differently: Take 10 mg by mouth 3 times daily (with meals) Down to 1 tablet in the AM and 0.5 tablet in the Evening 1/14/22 4/14/22   Orlin Goode DO   potassium citrate (UROCIT-K) 10 MEQ (1080 MG) extended release tablet TAKE ONE TABLET BY MOUTH EVERY DAY 8/5/21     Historical Provider, MD   aspirin 325 MG tablet Take 325 mg by mouth daily       Historical Provider, MD   metFORMIN (GLUCOPHAGE) 500 MG tablet TAKE ONE TABLET BY MOUTH EVERY DAY 5/8/18     Historical Provider, MD   finasteride (PROSCAR) 5 MG tablet Take 5 mg by mouth once a week.       Historical Provider, MD   therapeutic multivitamin-minerals (THERAGRAN-M) tablet Take 1 tablet by mouth daily.         Historical Provider, MD            ALLERGIES:  No known allergies     SOCIAL Hx:  Reviewed    Diagnostics:       Telemetry:  Reviewed    12 lead EKG:  Reviewed; Inferior Mi with likely posterior extension, [facility]      Echo 1/26/2022   Summary   Left ventricular size is grossly normal.   No evidence of left ventricular mass or thrombus noted. Normal left ventricular wall thickness. Ejection fraction is visually estimated at 65%. No regional wall motion abnormalities seen. Normal sized left atrium. Interatrial septum appears intact. Physiologic and/or trace mitral regurgitation is present. RVSP is 34.15 mmHg. Pulmonary hypertension is mild . Physiologic and/or trace tricuspid regurgitation. Regular rhythm. Stress test 12/1/2021    Impression       The myocardial perfusion imaging was normal.       Overall left ventricular systolic function was normal, LVEF 65%.        Low risk myocardial perfusion study.       Compared to previous study from April 16, 2020 which showed no   ischemia, EF 63%.       Ludivina Gonzalez MD, [facility]         Intake/Output Summary (Last 24 hours) at 4/14/2022 0916  Last data filed at 4/14/2022 0904  Gross per 24 hour   Intake 1128.04 ml   Output 250 ml   Net 878.04 ml       Labs:   CBC:   Recent Labs     04/13/22 2008 04/13/22  2218 04/14/22  0145 04/14/22  0444   WBC 7.5  --   --  6.4   HGB 9.0*   < > 7.1* 7.6*   HCT 26.8*   < > 22.0* 23.2*     --   --  111*    < > = values in this interval not displayed. BMP:   Recent Labs     04/13/22 2008 04/14/22 0444    137   K 4.4 3.6   CO2 21* 17*   BUN 51* 47*   CREATININE 2.3* 1.9*   LABGLOM 27 34   CALCIUM 8.8 7.1*     Mag:   Recent Labs     04/14/22  0444   MG 1.7     Phos:   Recent Labs     04/14/22  0444   PHOS 3.3     TSH:   Recent Labs     04/14/22  0444   TSH 1.100     HgA1c:     BNP: No results for input(s): BNP in the last 72 hours. PT/INR:   Recent Labs     04/14/22 0145   PROTIME 24.1*   INR 2.1     APTT:No results for input(s): APTT in the last 72 hours.   CARDIAC ENZYMES:  Recent Labs     04/13/22 2008 04/14/22 0444   CKTOTAL 1,143* 1,038*     FASTING LIPID PANEL:  Lab Results   Component Value Date    CHOL 70 04/14/2022    HDL 25 04/14/2022    LDLCALC 31 04/14/2022    TRIG 70 04/14/2022     LIVER PROFILE:  Recent Labs     04/13/22 2008 04/14/22 0444   * 92*   * 75*   LABALBU 3.5 2.8*       Current Inpatient Medications:   colchicine  0.6 mg Oral Daily    levothyroxine  75 mcg Oral Daily    pantoprazole  40 mg IntraVENous BID    sodium chloride flush  5-40 mL IntraVENous 2 times per day    insulin lispro  0-6 Units SubCUTAneous Q4H    hydrocortisone sodium succinate PF  50 mg IntraVENous Q6H    vancomycin  1,000 mg IntraVENous Once    azithromycin  500 mg IntraVENous Q24H    cefepime (MAXIPIME) 1000 mg IVPB extended (mini-bag)  1,000 mg IntraVENous Q12H       IV Infusions (if any):   sodium chloride      sodium chloride  sodium chloride      norepinephrine      dextrose           PHYSICAL EXAM:     CONSTITUTIONAL:   BP (!) 87/49   Pulse 82   Temp 98.4 °F (36.9 °C) (Bladder)   Resp 29   Ht 5' 5.98\" (1.676 m)   Wt 154 lb 8 oz (70.1 kg)   SpO2 93%   BMI 24.95 kg/m²   Pulse  Av.5  Min: 82  Max: 572  Systolic (74KZS), JGS:90 , Min:75 , CCA:668    Diastolic (05LKL), FZL:04, Min:45, Max:59    In general, this is a well developed, well nourished who appears stated age, awake, alert, no apparent distress  HEENT: eyes -conjunctivae pink,  Throat - Oral mucosa moist.   Neck-  no stridor,no carotid bruit. no jugular venous distention   RESPIRATORY: Chest symmetrical.  No accessory muscle use. Lung auscultation - clear to auscultation except few rhonchi  CARDIOVASCULAR:     Heart Palpation - no palpable thrills  Heart Ausculation - Regular rate and rhythm, 2/6 systolic murmur, No s3 or rub. No lower extremity edema, Distal pulses fair; no cyanosis. Abrasion to right lower leg. ABDOMEN: Soft, nontender,  Bowel sounds present. MS: n/a  : Deferred  Rectal Exam: Deferred  SKIN: warm and dry s   NEURO / PSYCH: oriented to person, place        ASSESSMENT/PLAN:     NSTEMI - Denies CP; EKG and Echo findings suggest inferior MI. No ASA/Anticaogualtion due to Anemia and poss GI bleed; No BB due to low BP; No statin due to elevated LFTs and advanced age; 2D Echo showed inferior wall motion abnormality with LV EF 55%. Nonischemic stress test in Dec 2021. Consider Left Heart Cath ONCE his renal Fn and H/H acceptable, and no signs of GI bleed or active infection. Tentatively [facility] scheduled for Monday. Hypotension - wean inotropic support as his BP tolerates    PAF - Diagnosed in 2022 - On Eliquis; Holter in 2022 showed no A Fib and due to his Anemia, taken off [facility] [address].     Acute on chronic Anemia - GI on case, Transfuse as needed    Possible Rhabdomyolysis - Monitor CPKs and Renal  Fn    Possible GI bleed - GI on case, Ok for endoscopy    BURAK/CKD - Better, Monitor renal Fn    Elevated LFTs - GI on case    Type 2 diabetes - Per Dr Charla Severance    Left diabetic foot wound    Interstitial lung disease - On O2    Hx of Hypertension - Currently hypotensive    Hypothyroidism - On HRT              Above d/w him and his daughter.     D/w Dr Charla Severance  Electronically signed by Agueda Downing MD on 4/14/2022 at 4:45 PM    [facility] ([facility]) Cardiology

## 2022-04-14 NOTE — PROGRESS NOTES
Pulmonary/Critical Care Consult Note    CHIEF COMPLAINT: Shortness of breath, fatigue, bilateral leg weakness and numbness    HISTORY OF PRESENT ILLNESS:   Patient  is a 80 y.o. male presenting to the ED for fatigue, shortness of breath, leg weakness and numbness. The history is obtained from the patient, patient's family as well as the patient's medical record. The patient is presenting to the emergency department the chief complaint of fatigue, shortness of breath, leg weakness and numbness. Since receiving the Covid booster the patient has had multiple chronic complaints such as worsening shortness of breath and fatigue. He has been noted to have increasing pulmonary fibrosis. He was started on prednisone with some improvement. He states that he still does have shortness of breath which is worse with activity and exertion. Over the last 2 days he has had increasing fatigue. He reports he was sitting at home in a chair was unable to get out of the chair for 2 days secondary to leg weakness and numbness. This is moderate in severity. Nothing makes it better. Nothing makes worse. No treatment prior to arrival.  He does have chronic lumbar back pain which he states has worsened over the last 2 days. He does admit to urinary incontinence and states he was not able to tell he was urinating while he was sitting in the chair. He denies any saddle anesthesias. CT chest without contrast shows progression of moderate and interstitial lung disease with UIP pattern. MRI of lumbar spine showed degenerative changes and multilevel spinal stenosis. Daily progress:  April 14, 2022: Patient is awake but he is extremely weak and tired. His blood pressure is borderline low and will be started on Levophed. He has no fever, chills, or rigors. He is currently on room air.      ALLERGY:  No known allergies    FAMILY HISTORY:  Family History   Problem Relation Age of Onset    No Known Problems Mother     Diabetes Father        SOCIAL HISTORY:  Social History     Socioeconomic History    Marital status:      Spouse name: Not on file    Number of children: Not on file    Years of education: Not on file    Highest education level: Not on file   Occupational History    Not on file   Tobacco Use    Smoking status: Never Smoker    Smokeless tobacco: Never Used   Vaping Use    Vaping Use: Never used   Substance and Sexual Activity    Alcohol use: No    Drug use: No    Sexual activity: Not on file   Other Topics Concern    Not on file   Social History Narrative    Not on file     Social Determinants of Health     Financial Resource Strain: Low Risk     Difficulty of Paying Living Expenses: Not hard at all   Food Insecurity: No Food Insecurity    Worried About [address] in the Last Year: Never true    [address]  [facility] in the Last Year: Never true   Transportation Needs:     Lack of Transportation (Medical): Not on file    Lack of Transportation (Non-Medical):  Not on file   Physical Activity:     Days of Exercise per Week: Not on file    Minutes of Exercise per Session: Not on file   Stress:     Feeling of Stress : Not on file   Social Connections:     Frequency of Communication with Friends and Family: Not on file    Frequency of Social Gatherings with Friends and Family: Not on file    Attends [employer] Services: Not on file    Active Member of [address] [place] [address] or Organizations: Not on file    Attends Club or Organization Meetings: Not on file    Marital Status: Not on file   Intimate Partner Violence:     Fear of Current or Ex-Partner: Not on file    Emotionally Abused: Not on file    Physically Abused: Not on file    Sexually Abused: Not on file   Housing Stability:     Unable to Pay for Housing in the Last Year: Not on file    Number of [place] in the Last Year: Not on file    Unstable Housing in the Last Year: Not on file       MEDICAL HISTORY:  Past Medical History:   Diagnosis Date  Diabetes mellitus ([address]     H/O cardiovascular stress test 12/01/2021    Lexiscan    History of Holter monitoring 02/11/2022    Hypertension     Thyroid disease        MEDICATIONS:   colchicine  0.6 mg Oral Daily    levothyroxine  75 mcg Oral Daily    pantoprazole  40 mg IntraVENous BID    sodium chloride flush  5-40 mL IntraVENous 2 times per day    insulin lispro  0-6 Units SubCUTAneous Q4H    hydrocortisone sodium succinate PF  50 mg IntraVENous Q6H    vancomycin  1,000 mg IntraVENous Once    azithromycin  500 mg IntraVENous Q24H    cefepime (MAXIPIME) 1000 mg IVPB extended (mini-bag)  1,000 mg IntraVENous Q12H    lactated ringers bolus  500 mL IntraVENous Once      sodium chloride      sodium chloride      sodium chloride      norepinephrine      dextrose       sodium chloride, sodium chloride flush, sodium chloride, ondansetron **OR** ondansetron, polyethylene glycol, acetaminophen **OR** acetaminophen, perflutren lipid microspheres, glucose, glucagon (rDNA), dextrose, dextrose bolus (hypoglycemia) **OR** dextrose bolus (hypoglycemia)    REVIEW OF SYSTEMS:  Constitutional: Denies fever, weight loss, night sweats, and fatigue  Skin: Denies pigmentation, dark lesions, reports left foot wound  HEENT: Denies hearing loss, tinnitus, ear drainage, epistaxis, sore throat, and hoarseness. Cardiovascular: Denies palpitations, chest pain, and chest pressure. Respiratory: Denies cough, dyspnea at rest, hemoptysis, apnea, and choking.   Gastrointestinal: Denies nausea, vomiting, poor appetite, diarrhea, heartburn or reflux  Genitourinary: Denies dysuria, frequency, urgency or hematuria  Musculoskeletal: Denies myalgias, reports bilateral leg weakness and numbness, and denies bone pain  Neurological: Denies dizziness, vertigo, headache, and focal weakness  Psychological: Denies anxiety and depression  Endocrine: Denies heat intolerance and cold intolerance  Hematopoietic/Lymphatic: Denies bleeding problems and blood transfusions    PHYSICAL EXAM:  Vitals:    04/14/22 0700   BP: (!) 87/49   Pulse: 82   Resp: 29   Temp:    SpO2: 93%           O2 Device: None (Room air)    Constitutional: No fever, chills, diaphoresis, very pleasant  HEENT: No head lesions, PERRL, EOMI, dry mouth without lesions, no nasal lesions, no cervical adenopathy palpated   Respiratory: Lungs with equal breath sounds bilaterally, no adventitious sounds auscultated, no accessory muscle use   CV: Regular rate, no murmurs, JVD, no leg edema   Abdomen: Soft, non tender, + bowel sounds, no lesions   Skin: Pale, poor skin turgor, no rash, capillary refill <2 seconds, left diabetic foot wound with grossly intact skin to the plantar surface medially, minimal erythema, no lymphangitis  Extremities: Muscular strength 4/4 in 4 limbs, moves 4 limbs spontaneously, distal pulses present   Neurology: Awake and alert, follows commands, moves 4 limbs on command and spontaneously, equal sensation, no dysmetria, neck is supple, no meningitic signs present.         LABS:  WBC   Date Value Ref Range Status   04/14/2022 6.4 4.5 - 11.5 E9/L Final   04/13/2022 7.5 4.5 - 11.5 E9/L Final   03/30/2022 3.6 (L) 4.5 - 11.5 E9/L Final     Hemoglobin   Date Value Ref Range Status   04/14/2022 7.6 (L) 12.5 - 16.5 g/dL Final   04/14/2022 7.1 (L) 12.5 - 16.5 g/dL Final   04/13/2022 9.0 (L) 12.5 - 16.5 g/dL Final     Hematocrit   Date Value Ref Range Status   04/14/2022 23.2 (L) 37.0 - 54.0 % Final   04/14/2022 22.0 (L) 37.0 - 54.0 % Final   04/13/2022 24.1 (L) 37.0 - 54.0 % Final     MCV   Date Value Ref Range Status   04/14/2022 122.1 (H) 80.0 - 99.9 fL Final   04/13/2022 119.1 (H) 80.0 - 99.9 fL Final   03/30/2022 117.9 (H) 80.0 - 99.9 fL Final     Platelets   Date Value Ref Range Status   04/14/2022 111 (L) 130 - 450 E9/L Final   04/13/2022 191 130 - 450 E9/L Final   03/30/2022 197 130 - 450 E9/L Final     Sodium   Date Value Ref Range Status   04/14/2022 137 132 - 146 mmol/L Final   04/13/2022 140 132 - 146 mmol/L Final   03/30/2022 143 132 - 146 mmol/L Final     Potassium   Date Value Ref Range Status   04/14/2022 3.6 3.5 - 5.0 mmol/L Final   03/30/2022 4.6 3.5 - 5.0 mmol/L Final   02/11/2022 4.4 3.5 - 5.0 mmol/L Final     Potassium reflex Magnesium   Date Value Ref Range Status   04/13/2022 4.4 3.5 - 5.0 mmol/L Final     Chloride   Date Value Ref Range Status   04/14/2022 102 98 - 107 mmol/L Final   04/13/2022 98 98 - 107 mmol/L Final   03/30/2022 102 98 - 107 mmol/L Final     CO2   Date Value Ref Range Status   04/14/2022 17 (L) 22 - 29 mmol/L Final   04/13/2022 21 (L) 22 - 29 mmol/L Final   03/30/2022 21 (L) 22 - 29 mmol/L Final     BUN   Date Value Ref Range Status   04/14/2022 47 (H) 6 - 23 mg/dL Final   04/13/2022 51 (H) 6 - 23 mg/dL Final   03/30/2022 38 (H) 6 - 23 mg/dL Final     CREATININE   Date Value Ref Range Status   04/14/2022 1.9 (H) 0.7 - 1.2 mg/dL Final   04/13/2022 2.3 (H) 0.7 - 1.2 mg/dL Final   03/30/2022 1.7 (H) 0.7 - 1.2 mg/dL Final     Glucose   Date Value Ref Range Status   04/14/2022 338 (H) 74 - 99 mg/dL Final   04/13/2022 274 (H) 74 - 99 mg/dL Final   03/30/2022 219 (H) 74 - 99 mg/dL Final   03/29/2012 111 (H) 70 - 110 mg/dL Final   12/06/2011 107 70 - 110 mg/dL Final   06/06/2011 109 70 - 110 mg/dL Final     Calcium   Date Value Ref Range Status   04/14/2022 7.1 (L) 8.6 - 10.2 mg/dL Final   04/13/2022 8.8 8.6 - 10.2 mg/dL Final   03/30/2022 9.2 8.6 - 10.2 mg/dL Final     Total Protein   Date Value Ref Range Status   04/14/2022 5.8 (L) 6.4 - 8.3 g/dL Final   04/13/2022 7.4 6.4 - 8.3 g/dL Final   03/30/2022 6.9 6.4 - 8.3 g/dL Final     Albumin   Date Value Ref Range Status   04/14/2022 2.8 (L) 3.5 - 5.2 g/dL Final   04/13/2022 3.5 3.5 - 5.2 g/dL Final   03/30/2022 3.8 3.5 - 5.2 g/dL Final   03/29/2012 4.4 3.2 - 4.8 g/dL Final   12/06/2011 4.2 3.2 - 4.8 g/dL Final   06/06/2011 4.6 3.2 - 4.8 g/dL Final     Total Bilirubin   Date Value Ref Range Status 04/14/2022 0.9 0.0 - 1.2 mg/dL Final   04/13/2022 1.4 (H) 0.0 - 1.2 mg/dL Final   03/30/2022 0.4 0.0 - 1.2 mg/dL Final     Alkaline Phosphatase   Date Value Ref Range Status   04/14/2022 34 (L) 40 - 129 U/L Final   04/13/2022 48 40 - 129 U/L Final   03/30/2022 48 40 - 129 U/L Final     AST   Date Value Ref Range Status   04/14/2022 92 (H) 0 - 39 U/L Final   04/13/2022 121 (H) 0 - 39 U/L Final   03/30/2022 22 0 - 39 U/L Final     ALT   Date Value Ref Range Status   04/14/2022 75 (H) 0 - 40 U/L Final   04/13/2022 101 (H) 0 - 40 U/L Final   03/30/2022 24 0 - 40 U/L Final     GFR Non-   Date Value Ref Range Status   04/14/2022 34 >=60 mL/min/1.73 Final     Comment:     Chronic Kidney Disease: less than 60 ml/min/1.73 sq.m. Kidney Failure: less than 15 ml/min/1.73 sq.m. Results valid for patients 18 years and older. 04/13/2022 27 >=60 mL/min/1.73 Final     Comment:     Chronic Kidney Disease: less than 60 ml/min/1.73 sq.m. Kidney Failure: less than 15 ml/min/1.73 sq.m. Results valid for patients 18 years and older. 03/30/2022 38 >=60 mL/min/1.73 Final     Comment:     Chronic Kidney Disease: less than 60 ml/min/1.73 sq.m. Kidney Failure: less than 15 ml/min/1.73 sq.m. Results valid for patients 18 years and older. GFR    Date Value Ref Range Status   04/14/2022 41  Final   04/13/2022 33  Final   03/30/2022 46  Final     Magnesium   Date Value Ref Range Status   04/14/2022 1.7 1.6 - 2.6 mg/dL Final     Phosphorus   Date Value Ref Range Status   04/14/2022 3.3 2.5 - 4.5 mg/dL Final   08/13/2020 3.2 2.5 - 4.5 mg/dL Final     No results for input(s): PH, PO2, PCO2, HCO3, BE, O2SAT in the last 72 hours. RADIOLOGY:  XR CHEST PORTABLE   Preliminary Result   Deep line catheter placed on the left tip in the SVC. Increased pulmonary   vascularity again seen bilaterally with no evidence of pneumothorax.          CT ABDOMEN PELVIS WO CONTRAST Additional Contrast? None   Final Result   No acute intra-abdominal or pelvic findings. Bilateral nonobstructing renal calculi. Colonic diverticulosis, without diverticulitis. Additional findings as above. CT CHEST WO CONTRAST   Final Result   Progression of moderate interstitial lung disease, which demonstrates a UIP   pattern. MRI LUMBAR SPINE WO CONTRAST   Final Result   Multilevel degenerative changes, most notably at L4-5 and L5-S1. Stable   grade 2 anterolisthesis of L4 on L5 and grade 1 anterolisthesis of L5 on S1.   Multilevel foraminal and lateral recess stenoses are not significantly   changed since the previous exam.      Development of endplate degenerative changes at L2-3. This is a new finding. XR CHEST PORTABLE   Final Result   Marked hypoventilation. Otherwise, no acute process seen. IMPRESSION:  1. Septic shock possibly secondary to left diabetic foot wound  2. NSTEMI  3. Suspected adrenal insufficiency secondary to recent steroid use  4. Macrocytic normochromic anemia ( ?  Myelodysplastic syndrome)  5. Myositis (?  Related to statin)  6. Possible GI bleed  7. BURAK on CKD  8. Transaminitis  9. Type 2 diabetes with hyperglycemia  10. Left diabetic foot wound  11. Interstitial lung disease (UIP)  12. Hypertension  13. Hyperlipidemia  14. Hypothyroidism    PLAN:  1. Fluid resuscitation. Wean Levophed to keep map above 70.  2. Albumin 25 g IV x1  3. Levophed drip as needed to keep MAP greater than 70  4. Vancomycin, cefepime, and azithromycin  5. S/P Transfusion of 1 unit packed red blood cells  6. Cardiology consulted  7. Echocardiogram  8. Cortisol level is within normal  9. Hydrocortisone  50 mg IV every 6 hours  10. CT abdomen pelvis without contrast with nephrolithiasis but no other acute abnormality  11. Stool for occult blood  12. GI following  13. DVT prophylaxis with heparin subcu   14. SCDs  15. Protonix 40 mg IV every 24 hours  16.  Avoid nephrotoxic medications, pharmacy to dose vancomycin  17. Strict intake and output  18. LD  19. Hematology/oncology consult possible hemolytic anemia/ ? MDS  20. Cardiac telemetry  21. Optimize nutrition. 22. OT and PT eval and treat  23. Ultrasound gallbladder    ATTESTATION:  ICU Staff Physician note of personal involvement in Care  As the attending physician, I certify that I personally reviewed the patients history and personally examined the patient to confirm the physical findings described above,  And that I reviewed the relevant imaging studies and available reports. I also discussed the differential diagnosis and all of the proposed management plans with the patient and individuals accompanying the patient to this visit. They had the opportunity to ask questions about the proposed management plans and to have those questions answered. This patient has a high probability of sudden, clinically significant deterioration, which requires the highest level of physician preparedness to intervene urgently. I managed/supervised life or organ supporting interventions that required frequent physician assessment. I devoted my full attention to the direct care of this patient for the amount of time indicated below. Time I spent with the family or surrogate(s) is included only if the patient was incapable of providing the necessary information or participating in medical decisions  Time devoted to teaching and to any procedures I billed separately is not included.     CRITICAL CARE TIME:  47 minutes    Electronically signed by Denise Cobos MD on 4/14/2022 at 9:51 AM

## 2022-04-14 NOTE — ACP (ADVANCE CARE PLANNING)
Advance Care Planning   Healthcare Decision Maker:    Primary Decision Maker: Bobby Weaver [facility] - [phone]      Electronically signed by Jose Centeno RN-BC on 4/14/2022 at 8:28 AM

## 2022-04-14 NOTE — PROGRESS NOTES
OT SESSION ATTEMPT     Date:2022  Patient Name: Iven Peabody  MRN: [id number]  : 1933  Room: [place] [address]     Attempted OT session this date:    [] unavailable due to other medical staff currently with pt   [x] unavailable per nursing staff recommendation-requesting to attempt again in the PM    [] Unavailable per nursing staff secondary to lab / radiology results    [] pt declined due to ____. Benefits of participation in therapy reviewed with pt.    [] off unit   [] Other:     Will reattempt OT evaluation and/or treatment at a later time.     Mounika Díaz OTR/L; [id number]

## 2022-04-14 NOTE — CONSULTS
Pulmonary/Critical Care Consult Note    CHIEF COMPLAINT: Shortness of breath, fatigue, bilateral leg weakness and numbness    HISTORY OF PRESENT ILLNESS:   Patient  is a 80 y.o. male presenting to the ED for fatigue, shortness of breath, leg weakness and numbness. The history is obtained from the patient, patient's family as well as the patient's medical record. The patient is presenting to the emergency department the chief complaint of fatigue, shortness of breath, leg weakness and numbness. Since receiving the Covid booster the patient has had multiple chronic complaints such as worsening shortness of breath and fatigue. He has been noted to have increasing pulmonary fibrosis. He was started on prednisone with some improvement. He states that he still does have shortness of breath which is worse with activity and exertion. Over the last 2 days he has had increasing fatigue. He reports he was sitting at home in a chair was unable to get out of the chair for 2 days secondary to leg weakness and numbness. This is moderate in severity. Nothing makes it better. Nothing makes worse. No treatment prior to arrival.  He does have chronic lumbar back pain which he states has worsened over the last 2 days. He does admit to urinary incontinence and states he was not able to tell he was urinating while he was sitting in the chair. He denies any saddle anesthesias. CT chest without contrast shows progression of moderate and interstitial lung disease with UIP pattern. MRI of lumbar spine showed degenerative changes and multilevel spinal stenosis.     ALLERGY:  No known allergies    FAMILY HISTORY:  Family History   Problem Relation Age of Onset    No Known Problems Mother     Diabetes Father        SOCIAL HISTORY:  Social History     Socioeconomic History    Marital status:      Spouse name: Not on file    Number of children: Not on file    Years of education: Not on file    Highest education level: Not on file   Occupational History    Not on file   Tobacco Use    Smoking status: Never Smoker    Smokeless tobacco: Never Used   Vaping Use    Vaping Use: Never used   Substance and Sexual Activity    Alcohol use: No    Drug use: No    Sexual activity: Not on file   Other Topics Concern    Not on file   Social History Narrative    Not on file     Social Determinants of Health     Financial Resource Strain: Low Risk     Difficulty of Paying Living Expenses: Not hard at all   Food Insecurity: No Food Insecurity    Worried About [address] in the Last Year: Never true    [facility] in the Last Year: Never true   Transportation Needs:     Lack of Transportation (Medical): Not on file    Lack of Transportation (Non-Medical):  Not on file   Physical Activity:     Days of Exercise per Week: Not on file    Minutes of Exercise per Session: Not on file   Stress:     Feeling of Stress : Not on file   Social Connections:     Frequency of Communication with Friends and Family: Not on file    Frequency of Social Gatherings with Friends and Family: Not on file    Attends [employer] Services: Not on file    Active Member of [address] [place] [address] or Organizations: Not on file    Attends Club or Organization Meetings: Not on file    Marital Status: Not on file   Intimate Partner Violence:     Fear of Current or Ex-Partner: Not on file    Emotionally Abused: Not on file    Physically Abused: Not on file    Sexually Abused: Not on file   Housing Stability:     Unable to Pay for Housing in the Last Year: Not on file    Number of [place] in the Last Year: Not on file    Unstable Housing in the Last Year: Not on file       MEDICAL HISTORY:  Past Medical History:   Diagnosis Date    Diabetes mellitus ([facility] [address]     H/O cardiovascular stress test 12/01/2021    Lexiscan    History of Holter monitoring 02/11/2022    Hypertension     Thyroid disease        MEDICATIONS:   colchicine  0.6 mg Oral Daily  levothyroxine  75 mcg Oral Daily    predniSONE  10 mg Oral Daily    insulin lispro  0-6 Units SubCUTAneous TID     insulin lispro  0-3 Units SubCUTAneous Nightly    pantoprazole  40 mg IntraVENous BID    albumin human  12.5 g IntraVENous Once    sodium chloride flush  5-40 mL IntraVENous 2 times per day    sodium chloride  500 mL IntraVENous Once    phytonadione (VITAMIN K)  IVPB  2 mg IntraVENous Once    midodrine  10 mg Oral Once      sodium chloride      sodium chloride      sodium chloride      dextrose       perflutren lipid microspheres, sodium chloride, sodium chloride flush, sodium chloride, ondansetron **OR** ondansetron, polyethylene glycol, acetaminophen **OR** acetaminophen, glucose, glucagon (rDNA), dextrose, dextrose bolus (hypoglycemia) **OR** dextrose bolus (hypoglycemia)    REVIEW OF SYSTEMS:  Constitutional: Denies fever, weight loss, night sweats, and fatigue  Skin: Denies pigmentation, dark lesions, reports left foot wound  HEENT: Denies hearing loss, tinnitus, ear drainage, epistaxis, sore throat, and hoarseness. Cardiovascular: Denies palpitations, chest pain, and chest pressure. Respiratory: Denies cough, dyspnea at rest, hemoptysis, apnea, and choking.   Gastrointestinal: Denies nausea, vomiting, poor appetite, diarrhea, heartburn or reflux  Genitourinary: Denies dysuria, frequency, urgency or hematuria  Musculoskeletal: Denies myalgias, reports bilateral leg weakness and numbness, and denies bone pain  Neurological: Denies dizziness, vertigo, headache, and focal weakness  Psychological: Denies anxiety and depression  Endocrine: Denies heat intolerance and cold intolerance  Hematopoietic/Lymphatic: Denies bleeding problems and blood transfusions    PHYSICAL EXAM:  Vitals:    04/14/22 0245   BP: (!) 84/55   Pulse: 96   Resp: 28   Temp: 98.8 °F (37.1 °C)   SpO2: 93%           O2 Device: None (Room air)    Constitutional: No fever, chills, diaphoresis, very pleasant  HEENT: No head lesions, PERRL, EOMI, dry mouth without lesions, no nasal lesions, no cervical adenopathy palpated   Respiratory: Lungs with equal breath sounds bilaterally, no adventitious sounds auscultated, no accessory muscle use   CV: Regular rate, no murmurs, JVD, no leg edema   Abdomen: Soft, non tender, + bowel sounds, no lesions   Skin: Pale, poor skin turgor, no rash, capillary refill <2 seconds, left diabetic foot wound with grossly intact skin to the plantar surface medially, minimal erythema, no lymphangitis  Extremities: Muscular strength 4/4 in 4 limbs, moves 4 limbs spontaneously, distal pulses present   Neurology: Awake and alert, follows commands, moves 4 limbs on command and spontaneously, equal sensation, no dysmetria, neck is supple, no meningitic signs present.         LABS:  WBC   Date Value Ref Range Status   04/13/2022 7.5 4.5 - 11.5 E9/L Final   03/30/2022 3.6 (L) 4.5 - 11.5 E9/L Final   02/11/2022 5.3 4.5 - 11.5 E9/L Final     Hemoglobin   Date Value Ref Range Status   04/14/2022 7.1 (L) 12.5 - 16.5 g/dL Final   04/13/2022 9.0 (L) 12.5 - 16.5 g/dL Final   03/30/2022 9.6 (L) 12.5 - 16.5 g/dL Final     Hematocrit   Date Value Ref Range Status   04/14/2022 22.0 (L) 37.0 - 54.0 % Final   04/13/2022 24.1 (L) 37.0 - 54.0 % Final   04/13/2022 26.8 (L) 37.0 - 54.0 % Final     MCV   Date Value Ref Range Status   04/13/2022 119.1 (H) 80.0 - 99.9 fL Final   03/30/2022 117.9 (H) 80.0 - 99.9 fL Final   02/11/2022 110.9 (H) 80.0 - 99.9 fL Final     Platelets   Date Value Ref Range Status   04/13/2022 191 130 - 450 E9/L Final   03/30/2022 197 130 - 450 E9/L Final   02/11/2022 147 130 - 450 E9/L Final     Sodium   Date Value Ref Range Status   04/13/2022 140 132 - 146 mmol/L Final   03/30/2022 143 132 - 146 mmol/L Final   02/11/2022 140 132 - 146 mmol/L Final     Potassium   Date Value Ref Range Status   03/30/2022 4.6 3.5 - 5.0 mmol/L Final   02/11/2022 4.4 3.5 - 5.0 mmol/L Final   01/24/2022 5.0 3.5 - 5.0 mmol/L Final     Potassium reflex Magnesium   Date Value Ref Range Status   04/13/2022 4.4 3.5 - 5.0 mmol/L Final     Chloride   Date Value Ref Range Status   04/13/2022 98 98 - 107 mmol/L Final   03/30/2022 102 98 - 107 mmol/L Final   02/11/2022 101 98 - 107 mmol/L Final     CO2   Date Value Ref Range Status   04/13/2022 21 (L) 22 - 29 mmol/L Final   03/30/2022 21 (L) 22 - 29 mmol/L Final   02/11/2022 26 22 - 29 mmol/L Final     BUN   Date Value Ref Range Status   04/13/2022 51 (H) 6 - 23 mg/dL Final   03/30/2022 38 (H) 6 - 23 mg/dL Final   02/11/2022 27 (H) 6 - 23 mg/dL Final     CREATININE   Date Value Ref Range Status   04/13/2022 2.3 (H) 0.7 - 1.2 mg/dL Final   03/30/2022 1.7 (H) 0.7 - 1.2 mg/dL Final   02/11/2022 1.4 (H) 0.7 - 1.2 mg/dL Final     Glucose   Date Value Ref Range Status   04/13/2022 274 (H) 74 - 99 mg/dL Final   03/30/2022 219 (H) 74 - 99 mg/dL Final   02/11/2022 273 (H) 74 - 99 mg/dL Final   03/29/2012 111 (H) 70 - 110 mg/dL Final   12/06/2011 107 70 - 110 mg/dL Final   06/06/2011 109 70 - 110 mg/dL Final     Calcium   Date Value Ref Range Status   04/13/2022 8.8 8.6 - 10.2 mg/dL Final   03/30/2022 9.2 8.6 - 10.2 mg/dL Final   02/11/2022 9.4 8.6 - 10.2 mg/dL Final     Total Protein   Date Value Ref Range Status   04/13/2022 7.4 6.4 - 8.3 g/dL Final   03/30/2022 6.9 6.4 - 8.3 g/dL Final   02/11/2022 7.6 6.4 - 8.3 g/dL Final     Albumin   Date Value Ref Range Status   04/13/2022 3.5 3.5 - 5.2 g/dL Final   03/30/2022 3.8 3.5 - 5.2 g/dL Final   02/11/2022 4.1 3.5 - 5.2 g/dL Final   03/29/2012 4.4 3.2 - 4.8 g/dL Final   12/06/2011 4.2 3.2 - 4.8 g/dL Final   06/06/2011 4.6 3.2 - 4.8 g/dL Final     Total Bilirubin   Date Value Ref Range Status   04/13/2022 1.4 (H) 0.0 - 1.2 mg/dL Final   03/30/2022 0.4 0.0 - 1.2 mg/dL Final   02/11/2022 0.5 0.0 - 1.2 mg/dL Final     Alkaline Phosphatase   Date Value Ref Range Status   04/13/2022 48 40 - 129 U/L Final   03/30/2022 48 40 - 129 U/L Final   02/11/2022 62 40 - 129 U/L Final     AST   Date Value Ref Range Status   04/13/2022 121 (H) 0 - 39 U/L Final   03/30/2022 22 0 - 39 U/L Final   02/11/2022 17 0 - 39 U/L Final     ALT   Date Value Ref Range Status   04/13/2022 101 (H) 0 - 40 U/L Final   03/30/2022 24 0 - 40 U/L Final   02/11/2022 27 0 - 40 U/L Final     GFR Non-   Date Value Ref Range Status   04/13/2022 27 >=60 mL/min/1.73 Final     Comment:     Chronic Kidney Disease: less than 60 ml/min/1.73 sq.m. Kidney Failure: less than 15 ml/min/1.73 sq.m. Results valid for patients 18 years and older. 03/30/2022 38 >=60 mL/min/1.73 Final     Comment:     Chronic Kidney Disease: less than 60 ml/min/1.73 sq.m. Kidney Failure: less than 15 ml/min/1.73 sq.m. Results valid for patients 18 years and older. 02/11/2022 48 >=60 mL/min/1.73 Final     Comment:     Chronic Kidney Disease: less than 60 ml/min/1.73 sq.m. Kidney Failure: less than 15 ml/min/1.73 sq.m. Results valid for patients 18 years and older. GFR    Date Value Ref Range Status   04/13/2022 33  Final   03/30/2022 46  Final   02/11/2022 58  Final     No results found for: MG  Phosphorus   Date Value Ref Range Status   08/13/2020 3.2 2.5 - 4.5 mg/dL Final     No results for input(s): PH, PO2, PCO2, HCO3, BE, O2SAT in the last 72 hours. RADIOLOGY:  CT CHEST WO CONTRAST   Final Result   Progression of moderate interstitial lung disease, which demonstrates a UIP   pattern. MRI LUMBAR SPINE WO CONTRAST   Final Result   Multilevel degenerative changes, most notably at L4-5 and L5-S1. Stable   grade 2 anterolisthesis of L4 on L5 and grade 1 anterolisthesis of L5 on S1.   Multilevel foraminal and lateral recess stenoses are not significantly   changed since the previous exam.      Development of endplate degenerative changes at L2-3. This is a new finding. XR CHEST PORTABLE   Final Result   Marked hypoventilation.   Otherwise, no acute process seen. IMPRESSION:  1. Septic shock possibly secondary to left diabetic foot wound  2. NSTEMI  3. Possible adrenal insufficiency secondary to recent steroid use  4. Macrocytic normochromic anemia  5. Rhabdomyolysis   6. Possible GI bleed  7. BURAK  8. Transaminitis  9. Type 2 diabetes with hyperglycemia  10. Left diabetic foot wound  11. Interstitial lung disease  12. Hypertension  13. Hyperlipidemia  14. Hypothyroidism    PLAN:  1. Insert central line if blood pressure unresponsive to fluids and PRBCs  2. Albumin 25 g IV x1  3. Levophed drip as needed to keep MAP greater than 65  4. Vancomycin, cefepime, and azithromycin  5. Transfuse 1 unit packed red blood cells  6. Cardiology consulted  7. Echocardiogram  8. Cortisol level  9. Hydrocortisone 100 mg IV x1, then 50 mg IV every 6 hours  10. CT abdomen pelvis without contrast  11. Stool for occult blood  12. GI following  13. Hold anticoagulation for now  14. SCDs  15. Protonix 40 mg IV every 12  16. Avoid nephrotoxic medications, pharmacy to dose vancomycin  17. Strict intake and output  18. LDH  19. Hematology/oncology consult possible hemolytic anemia  20. Cardiac telemetry  21. SCDs  22. OT and PT eval and treat    ATTESTATION:  ICU Staff Physician note of personal involvement in Care  As the attending physician, I certify that I personally reviewed the patients history and personally examined the patient to confirm the physical findings described above,  And that I reviewed the relevant imaging studies and available reports. I also discussed the differential diagnosis and all of the proposed management plans with the patient and individuals accompanying the patient to this visit. They had the opportunity to ask questions about the proposed management plans and to have those questions answered.      This patient has a high probability of sudden, clinically significant deterioration, which requires the highest level of physician preparedness to intervene urgently. I managed/supervised life or organ supporting interventions that required frequent physician assessment. I devoted my full attention to the direct care of this patient for the amount of time indicated below. Time I spent with the family or surrogate(s) is included only if the patient was incapable of providing the necessary information or participating in medical decisions  Time devoted to teaching and to any procedures I billed separately is not included.     CRITICAL CARE TIME:  47 minutes    Electronically signed by MIK Goetz CNP on 4/14/2022 at 3:20 AM

## 2022-04-14 NOTE — PROCEDURES
Ann Romo is a 80 y.o. male patient. 1. NSTEMI (non-ST elevated myocardial infarction) ([facility]    2. BURAK (acute kidney injury) ([facility]    3. Non-traumatic rhabdomyolysis    4. Febrile illness    5. Shortness of breath    6. Chronic bilateral low back pain without sciatica      Past Medical History:   Diagnosis Date    Diabetes mellitus ([facility]     H/O cardiovascular stress test 12/01/2021    Lexiscan    History of Holter monitoring 02/11/2022    Hypertension     Thyroid disease      Blood pressure (!) 87/49, pulse 82, temperature 98.4 °F (36.9 °C), temperature source Bladder, resp. rate 29, height 5' 5.98\" (1.676 m), weight 154 lb 8 oz (70.1 kg), SpO2 93 %. Central Line    Date/Time: 4/14/2022 7:45 AM  Performed by: MIK Diego CNP  Authorized by: MIK Diego CNP   Consent: Written consent obtained. Risks and benefits: risks, benefits and alternatives were discussed  Consent given by: patient  Patient understanding: patient states understanding of the procedure being performed  Patient consent: the patient's understanding of the procedure matches consent given  Patient identity confirmed: verbally with patient and arm band  Time out: Immediately prior to procedure a \"time out\" was called to verify the correct patient, procedure, equipment, support staff and site/side marked as required.   Indications: vascular access  Anesthesia: local infiltration    Anesthesia:  Local Anesthetic: lidocaine 1% without epinephrine  Anesthetic total: 5 mL    Sedation:  Patient sedated: no    Preparation: skin prepped with ChloraPrep  Skin prep agent dried: skin prep agent completely dried prior to procedure  Sterile barriers: all five maximum sterile barriers used - cap, mask, sterile gown, sterile gloves, and large sterile sheet  Hand hygiene: hand hygiene performed prior to central venous catheter insertion  Location details: left internal jugular  Patient position: Trendelenburg  Catheter type: triple lumen  Catheter size: 7 Fr  Ultrasound guidance: yes  Sterile ultrasound techniques: sterile gel and sterile probe covers were used  Number of attempts: 1  Successful placement: yes  Post-procedure: line sutured and dressing applied  Assessment: blood return through all ports,  placement verified by x-ray and no pneumothorax on x-ray  Patient tolerance: patient tolerated the procedure well with no immediate complications          Frankie Meier, MIK - CNP  4/14/2022

## 2022-04-14 NOTE — PLAN OF CARE
Problem: Skin Integrity:  Goal: Will show no infection signs and symptoms  Description: Will show no infection signs and symptoms  4/14/2022 0741 by Luz Ochoa RN  Outcome: Met This Shift     Problem: Skin Integrity:  Goal: Absence of new skin breakdown  Description: Absence of new skin breakdown  4/14/2022 0741 by Luz Ochoa RN  Outcome: Met This Shift

## 2022-04-14 NOTE — CARE COORDINATION
4/14/2022 CM transition of care: ICU,Pt sitting up in bed finished his meal and is sleepy/weak during our interview. He received blood, TLC. Pt lives at home alone. Has been fully independent prior to feeling weak from the Covid-19 booster. Pt is a  [place]- has [employer] coverage. Cm following for discharge planning needs.  Electronically signed by GILL Moffett on 4/14/2022 at 8:33 AM

## 2022-04-14 NOTE — PROGRESS NOTES
Internal Medicine Progress Note    [facility]    Toña Angel. Dez Correia., F.A.C.OAngelinaI. Andriy Fox D.O., JAYNEOAngelinaI. NEVAEH Rubio, MSN, APRN, NP-C  Darnell Garcias. Nan Staples, MSN, APRN-CNP     Primary Care Physician: Larry Amaya DO   Admitting Physician:  Alex Leonard DO  Admission date and time: 4/13/2022  6:50 PM    Room:  [place] [address]  Admitting diagnosis: Shortness of breath [R06.02]  NSTEMI (non-ST elevated myocardial infarction) ([facility] [address] [I21.4]  [facility] [N17.9]  Febrile illness [R50.9]  Non-traumatic rhabdomyolysis [M62.82]  Chronic bilateral low back pain without sciatica [M54.50, G89.29]    Patient Name: Iven Peabody  MRN: [id number]    Date of Service: 4/14/2022     Subjective:  Clement Pryor is a 80 y.o. male who was seen and examined today,4/14/2022, at the bedside. Patient seem to be somewhat improved at the present time. Patient was transferred to ICU due to persistent hypotension. Patient is responding to fluid administration. Hemoglobin has dropped and is currently being transfused. Complain of generalized achiness and fatigue. Denies shortness of breath and O2 sat on room air is 92%. Denies fever chills. Complain of generalized weakness and fatigue        Review of System:   Constitutional:   Denies fever or chills, weight loss or gain. Complains of generalized weakness and fatigue  HEENT:   Denies ear pain, sore throat, sinus or eye problems. Cardiovascular:   Denies any chest pain, irregular heartbeats, or palpitations. Respiratory:   Shortness of breath with activity. Denies productive cough  Gastrointestinal:   Denies nausea, vomiting, diarrhea, or constipation. Denies any abdominal pain. Genitourinary:    Denies any urgency, frequency, hematuria.   Morales catheter  Extremities:   Mild low back pain and generalized weakness  Neurology:    Denies any headache or focal neurological deficits, Denies generalized weakness or memory difficulty. Psch:   Denies being anxious or depressed. Musculoskeletal:    Denies  myalgias, joint complaint. Mild low back pain  Integumentary:   Denies any rashes, ulcers, or excoriations. Denies bruising. Hematologic/Lymphatic:  Denies bruising or bleeding. Physical Exam:  No intake/output data recorded. Intake/Output Summary (Last 24 hours) at 4/14/2022 0723  Last data filed at 4/14/2022 0100  Gross per 24 hour   Intake 240 ml   Output 250 ml   Net -10 ml   I/O last 3 completed shifts: In: 240 [P.O.:240]  Out: 250 [Urine:250]  Patient Vitals for the past 96 hrs (Last 3 readings):   Weight   04/14/22 0356 154 lb 8 oz (70.1 kg)   04/14/22 0100 154 lb 15.7 oz (70.3 kg)   04/13/22 2359 155 lb (70.3 kg)     Vital Signs:   Blood pressure (!) 84/46, pulse 85, temperature 98.4 °F (36.9 °C), temperature source Bladder, resp. rate 27, height 5' 5.98\" (1.676 m), weight 154 lb 8 oz (70.1 kg), SpO2 95 %. General appearance:  Alert, responsive, oriented to person, place, and time. Appears ill head:  Normocephalic. No masses, lesions or tenderness. Eyes:  PERRLA. EOMI. Sclera clear. Buccal mucosa moist.  ENT:  Ears normal. Mucosa dry  Neck:    Supple. Trachea midline. No thyromegaly. No JVD. No bruits. Heart:    Rhythm regular. Rate controlled. 1/6 systolic murmurs. Lungs:    Fibrotic rales at the bases  Abdomen:   Soft. Non-tender. Non-distended. Bowel sounds positive. No organomegaly or masses. No pain on palpation. Extremities:    Peripheral pulses present. No peripheral edema. No ulcers. No cyanosis. No clubbing. Abrasion noted over the anterior surface of her right lower leg. Redness to left heel  Neurologic:    Alert x 3. No focal deficit. Cranial nerves grossly intact. Generalized weakness  Psych:   Behavior is normal. Mood appears normal. Speech is not rapid and/or pressured. Musculoskeletal:   Spine ROM normal. Muscular strength intact.  Gait not assessed. Integumentary:  No rashes  Skin normal color and texture.   Genitalia/Breast:  Morales catheter    Medication:  Scheduled Meds:   colchicine  0.6 mg Oral Daily    levothyroxine  75 mcg Oral Daily    pantoprazole  40 mg IntraVENous BID    sodium chloride flush  5-40 mL IntraVENous 2 times per day    insulin lispro  0-6 Units SubCUTAneous Q4H    hydrocortisone sodium succinate PF  50 mg IntraVENous Q6H    vancomycin  1,000 mg IntraVENous Once    azithromycin  500 mg IntraVENous Q24H    cefepime (MAXIPIME) 1000 mg IVPB extended (mini-bag)  1,000 mg IntraVENous Q12H    sodium chloride  1,000 mL IntraVENous Once     Continuous Infusions:   sodium chloride      sodium chloride 75 mL/hr at 04/14/22 0431    sodium chloride      sodium chloride      dextrose         Objective Data:  CBC with Differential:    Lab Results   Component Value Date    WBC 6.4 04/14/2022    RBC 1.90 04/14/2022    HGB 8.5 04/14/2022    HCT 25.4 04/14/2022     04/14/2022    .1 04/14/2022    MCH 40.0 04/14/2022    MCHC 32.8 04/14/2022    RDW 16.2 04/14/2022    NRBC 5.3 04/14/2022    SEGSPCT 48 07/06/2013    LYMPHOPCT 15.8 04/14/2022    MONOPCT 3.5 04/14/2022    MYELOPCT 1.8 02/11/2022    BASOPCT 0.2 04/14/2022    MONOSABS 0.26 04/14/2022    LYMPHSABS 1.02 04/14/2022    EOSABS 0.00 04/14/2022    BASOSABS 0.00 04/14/2022     CMP:    Lab Results   Component Value Date     04/14/2022    K 3.6 04/14/2022    K 4.4 04/13/2022     04/14/2022    CO2 17 04/14/2022    BUN 47 04/14/2022    CREATININE 1.9 04/14/2022    GFRAA 41 04/14/2022    LABGLOM 34 04/14/2022    GLUCOSE 338 04/14/2022    GLUCOSE 111 03/29/2012    PROT 5.8 04/14/2022    LABALBU 2.8 04/14/2022    LABALBU 4.4 03/29/2012    CALCIUM 7.1 04/14/2022    BILITOT 0.9 04/14/2022    ALKPHOS 34 04/14/2022    AST 92 04/14/2022    ALT 75 04/14/2022              Assessment:     · Shock probably septic in origin possibility of left diabetic foot versus abrasion right leg--rule out secondary adrenal insufficiency  · Elevated troponin possibly secondary to demand ischemia versus non-ST elevated myocardial infarction  · Abnormal CAT scan lungs showing usual interstitial pneumonia  · Macrocytic anemia undetermined etiology rule out autoimmune  · Acute on chronic renal insufficiency  · Type 2 diabetes mellitus without acidosis complicated by lack of steroid administration  · Essential hypertension historically  · Hyperlipidemia on Pravachol  · Hypothyroidism on Synthroid replacement  · Chronic low back pain with status post decompression with lumbar stenosis  · Asymptomatic cholelithiasis  · Elevated transaminases undetermined etiology              Plan:      · The patient blood pressure seems to be improving. Exact etiology is uncertain at this time suggesting possibly septic shock versus adrenal insufficiency. Blood culture has been obtained broad-spectrum antibiotic has been instituted. ID consult  · Cortisol level has been checked and the patient has received stress dose hydrocortisone  · Elevated troponin is present. We will trend opponent . Cardiology has been consulted. Check echocardiogram for LV function  · Abnormal CAT scan of the lung is present dating back to December 2021 suggesting usual interstitial pneumonia or pulmonary fibrosis. Recent PFTs did confirm restrictive pattern. The patient has been on tapering dose of steroids and has also been on low-dose colchicine  · Macrocytic anemia is present possibly suggesting autoimmune. Cannot exclude bleeding. Consider myelodysplastic syndrome or remote bone marrow suppression secondary to colchicine. Hematology consult requested  · Monitor blood sugar with as needed coverage of insulin  · GI consult for possible endoscopy  · Ultrasound of the gallbladder  · Follow renal function  · Recent MRI of the back did not show any evidence of cauda equina  · Elevated CPKs present. Will hold Pravachol.   · Continue Synthroid replacement. Check TSH and T4  · Patient has been fully vaccinated for COVID including booster. We will check film array. Also COVID antibodies      Greater than 60  minutes of critical care time was spent with the patient. This includes chart review, , and discussion with those consultants involved in the patient's care. More than 50% of my  time was spent at the bedside counseling/coordinating care with the patient and/or family with face to face contact. This time was spent reviewing notes and laboratory data as well as instructing and counseling the patient. Time I spent with the family or surrogate(s) is included only if the patient was incapable of providing the necessary information or participating in medical decisions. I also discussed the differential diagnosis and all of the proposed management plans with the patient and individuals accompanying the patient. Karina Conte requires this high level of physician care and nursing on the ICU unit due the complexity of decision management and chance of rapid decline or death. Continued cardiac monitoring and higher level of nursing are required. I am readily available for any further decision-making and intervention.      Ibrahima Mcguire DO, F.A.C.O.I.  4/14/2022  7:23 AM

## 2022-04-15 NOTE — PROGRESS NOTES
Pharmacy Consultation Note  (Antibiotic Dosing and Monitoring)    Initial consult date: 4/14/22  Consulting physician/provider: JB Chaudhary  Drug: Vancomycin  Indication: Bloodstream infection    Age/  Gender Height Weight IBW  Allergy Information   88 y.o./male 5' 5.98\" (167.6 cm) 155 lb (70.3 kg)     Ideal body weight: 63.8 kg (140 lb 10.5 oz)  Adjusted ideal body weight: 66.3 kg (146 lb 3.1 oz)   No known allergies      Renal Function:  Recent Labs     04/13/22 2008 04/14/22  0444 04/15/22  0613   BUN 51* 47* 35*   CREATININE 2.3* 1.9* 1.5*       Intake/Output Summary (Last 24 hours) at 4/15/2022 1255  Last data filed at 4/15/2022 0750  Gross per 24 hour   Intake 2103.69 ml   Output 1450 ml   Net 653.69 ml       Vancomycin Monitoring:  Trough:  No results for input(s): VANCOTROUGH in the last 72 hours. Random:    Recent Labs     04/15/22  0613   VANCORANDOM 6.3       Recent vancomycin administrations                   vancomycin (VANCOCIN) 1,250 mg in dextrose 5 % 250 mL IVPB (mg) 1,250 mg New Bag 04/15/22 1000    vancomycin (VANCOCIN) 1,000 mg in dextrose 5 % 250 mL IVPB (mg) 1,000 mg New Bag 04/14/22 0947                  Assessment:  · Patient is a 80 y.o. male who has been initiated on vancomycin  Estimated Creatinine Clearance: 31 mL/min (A) (based on SCr of 1.5 mg/dL (H)). · To dose vancomycin, pharmacy will be utilizing [employer] calculation software for goal AUC/LOUISE 400-600 mg/L-hr    Plan:  · Patient renal function back to baseline, will start vancomycin 750 mg every 24 hours tomorrow (est. AUC/LOUISE 448 mg/L-hr  · Patient received vancomycin 1250 mg x 1 dose 4/15/22 @ 10:00 am  · Will check vancomycin level when needed  · Will continue to monitor renal function   · Clinical pharmacy to follow      Angelo Goodpasture, PharmD, 4/77/195131:65 PM    **ADDENDUM: Vancomycin IV has been discontinued. Pharmacy will sign off on the consult. Please re-consult if needed.     Shelby Briscoe, ShelleyD 4/15/2022 9:27 PM

## 2022-04-15 NOTE — PLAN OF CARE
Problem: Skin Integrity:  Goal: Will show no infection signs and symptoms  Description: Will show no infection signs and symptoms  4/14/2022 2012 by Raissa Yoder RN  Outcome: Met This Shift  4/14/2022 0741 by Imer Brown RN  Outcome: Met This Shift  4/14/2022 0739 by Leena Orellana RN  Outcome: Met This Shift  Goal: Absence of new skin breakdown  Description: Absence of new skin breakdown  4/14/2022 2012 by Raissa Yoder RN  Outcome: Met This Shift  4/14/2022 0741 by Imer Brown RN  Outcome: Met This Shift  4/14/2022 0739 by Leena Orellana RN  Outcome: Met This Shift     Problem: Falls - Risk of:  Goal: Will remain free from falls  Description: Will remain free from falls  Outcome: Met This Shift  Goal: Absence of physical injury  Description: Absence of physical injury  Outcome: Met This Shift     Problem: Inadequate oral food/beverage intake (NI-2.1)  Goal: Food and/or Nutrient Delivery  Description: Individualized approach for food/nutrient provision.   4/14/2022 1423 by Brett Bryan RD, LD  Outcome: Met This Shift     Problem: Cardiac:  Goal: Ability to maintain an adequate cardiac output will improve  Description: Ability to maintain an adequate cardiac output will improve  Outcome: Met This Shift  Goal: Hemodynamic stability will improve  Description: Hemodynamic stability will improve  Outcome: Met This Shift     Problem: Fluid Volume:  Goal: Ability to achieve and maintain adequate urine output will improve  Description: Ability to achieve and maintain adequate urine output will improve  Outcome: Met This Shift     Problem: Respiratory:  Goal: Respiratory status will improve  Description: Respiratory status will improve  Outcome: Met This Shift

## 2022-04-15 NOTE — PROGRESS NOTES
[facility]  [address], [address]         Date:4/15/2022                                                   Patient Name: Daron Mohs     MRN: [id number]     : 1933     Room: [place] [address]       Evaluating OT: Eugenia Law OTR/L; [id number]       Referring Provider and Orders/Date:   OT eval and treat Start: 22, End: 22, ONE TIME, Standing Count: 1 OccurrencesDANIELLE DO     Diagnosis:   1. NSTEMI (non-ST elevated myocardial infarction) ([facility] [address]    2. BURAK (acute kidney injury) ([facility] [address]    3. Non-traumatic rhabdomyolysis    4. Febrile illness    5. Shortness of breath    6.  Chronic bilateral low back pain without sciatica         Pertinent Medical History:        Past Medical History:   Diagnosis Date    Diabetes mellitus ([facility] [address]     H/O cardiovascular stress test 2021    Lexiscan    History of blood transfusion     History of Holter monitoring 2022    Hyperlipidemia     Hypertension     Lower back pain     Thyroid disease           Past Surgical History:   Procedure Laterality Date    BACK SURGERY      fusion  lumbar area,      JOINT REPLACEMENT Bilateral [id number]    knee     LITHOTRIPSY      SHOULDER ARTHROSCOPY Right 2020    with treatment    SHOULDER ARTHROSCOPY Right 2020    ARTHROSCOPIC EXAM AND TREATMENT RIGHT SHOULDER (CPT 77358,14377) RIGHT ROTATOR CUFF REPAIR, SUBACHROMIAL DECOMPRESSION, DEBRIDEMENT OF HUMERAL performed by Margaret Lin DO at [facility]         Precautions:  Fall Risk, looney, 2L    Recommended placement: IRF    Assessment of current deficits     [x] Functional mobility  [x]ADLs  [x] Strength               []Cognition     [x] Functional transfers   [x] IADLs         [x] Safety Awareness   [x]Endurance     [] Fine Coordination              [x] Balance      [] Vision/perception []Sensation      [x]Gross Motor Coordination  [] ROM  [] Delirium                   [] Motor Control     OT PLAN OF CARE   OT POC based on physician orders, patient diagnosis and results of clinical assessment    Frequency/Duration 1-3 days/wk for 2 weeks PRN   Specific OT Treatment Interventions to include:   * Instruction/training on adapted ADL techniques and AE recommendations to increase functional independence within precautions       * Training on energy conservation strategies, correct breathing pattern and techniques to improve independence/tolerance for self-care routine  * Functional transfer/mobility training/DME recommendations for increased independence, safety, and fall prevention  * Patient/Family education to increase follow through with safety techniques and functional independence  * Recommendation of environmental modifications for increased safety with functional transfers/mobility and ADLs  * Therapeutic exercise to improve motor endurance, ROM, and functional strength for ADLs/functional transfers  * Therapeutic activities to facilitate/challenge dynamic balance, stand tolerance for increased safety and independence with ADLs  * Therapeutic activities to facilitate gross/fine motor skills for increased independence with ADLs  * Neuro-muscular re-education: facilitation of righting/equilibrium reactions, midline orientation, scapular stability/mobility, normalization of muscle tone, and facilitation of volitional active controled movement  * Positioning to improve skin integrity, interaction with environment and functional independence     Recommended Adaptive Equipment/DME: TBD      Home Living: Pt lives at home alone. Can have 1st floor set up with 2 small 4\" steps down and 1 step up into the home without rails. Does go up a standard flight of steps for laundry and bumps basket up. Does have rails. Social support can be provided at [place].       Bathroom setup: walk in shower with built in seat and grab bars; standard toilet with grab bars    DME owned: cane, crutches, ww, standard walker, rollator     Prior Level of Function: indep with ADLs , indep with IADLs; ambulated indep   Driving: yes   Occupation: retired   Enjoys: fishing, golfing, gardening, exercise    Pain Level: none  Cognition: A&O: 4/4; Follows 3 step directions   Memory:  Intact   Sequencing:  Intact   Problem solving:  Intact   Judgement/safety:  Intact    AM-PAC Daily Activity Inpatient   How much help for putting on and taking off regular lower body clothing?: Total  How much help for Bathing?: A Lot  How much help for Toileting?: Total  How much help for putting on and taking off regular upper body clothing?: A Lot  How much help for taking care of personal grooming?: A Little  How much help for eating meals?: A Little  AM-PAC Inpatient Daily Activity Raw Score: 12  AM-PAC Inpatient ADL T-Scale Score : 30.6  ADL Inpatient CMS 0-100% Score: 66.57  ADL Inpatient CMS G-Code Modifier : CL    Functional Assessment:     Initial Eval Status  Date: 4/15/2022   Treatment Status  Date: STGs = LTGs  Time frame: 10-14 days   Feeding Supervision with simulation. Possible assist with containers. NPO on eval.   Indep   Grooming Stand by Assist from sitting up in chair with face/hand wash, oral care and applying chapstick. Independent    UB Dressing Maximal Assist with gown management sitting EOB with assist for IV and lines  Stand by Assist    LB Dressing Dependent to doff and don socks from supine with fatigue noted  Moderate Assist    Bathing Moderate Assist overall with assist for LB and extended time. Stand by Assist    Toileting Dependent with looney management  Stand by Assist    Bed Mobility  Supine to sit: Moderate Assist   With extended time and some dizziness reported.    Supine to sit: Stand by Assist   Sit to supine: Stand by Assist    Functional Transfers Moderate Assist from lower surface of chair and min A from elevated surface of bed with some loss of balance initially. Declining walker, but recommended for safety and increased indep next session. Stand by Assist    Functional Mobility Minimal Assist with hand held assist for short distance from bed to arm chair with pt sitting impulsively due to fatigue and dizziness. Stand by Assist    Balance Sitting:     Static:  Fair+    Dynamic:fair  Standing: fair-  Sitting:     Static:  good    Dynamic:fair+  Standing: fair   Activity Tolerance Vitals with activity:2L 106/60 HR 75; 115/73 HR 72 97%; up in chair 106/66 HR 75 93%  Sitting EOB for 5min period and standing for 1min average. Increase standing tolerance for >4min with stable vital signs for carry over into toileting, functional tranfers and indep in ADLs   Visual/  Perceptual Glasses: reading, Present; WFL    Reports change in vision since admission: No     NA     Hand Dominance  [x] Right  [] Left    AROM (PROM) Strength Additional Info:  Goal:   RUE  WFL 4-/5 good  and fair FMC/dexterity noted during ADL tasks  Opposition [x] Intact [] Impaired  Finger to nose [x] Intact [] Impaired 5/5MMT generally for carry over into self care, functional transfers and functional mobility with AD. LUE WFL 4-/5 good  and fair FMC/dexterity noted during ADL tasks  Opposition [x] Intact [] Impaired  Finger to nose [x] Intact [] Impaired 5/5MMT generally for carry over into self care, functional transfers and functional mobility with AD. Hearing: [place]  Sensation:  No c/o numbness or tingling   Tone: WFL   Edema: none    Comments: Upon arrival patient supine. Pt required max A for most UB ADLs and dep A LB ADLs tasks. Limited with mod A for standing during LB ADLs and functional transfers. maxThe biggest barriers reflect that of functional transfers, functional mobility, UB/LB ADLs, cognition, activity tolerance, balance, safety and strengthening.  At end of session, patient sitting up in chair with call light and phone within reach, all lines and tubes intact. Overall patient demonstrated decreased independence and safety during completion of ADL/functional transfer/mobility tasks compared to PLOF. Nursing updated on pt position and status following OT eval. Pt would benefit from continued skilled OT to increase safety and independence with completion of ADL/IADL tasks for functional independence and quality of life. Treatment: OT treatment provided this date includes:   Instruction, education and training on safe facilitation and adapted techniques for completion of ADLs. These include neuromuscular reeducation to facilitate balance/righting reactions,safe functional transfer techniques, proper positioning/alignment to improve interaction with environment and overall function and on adapted techniques/work simplification for completion of ADLs. Education provided on hand/feet placement with bed rails, arm chair and body mechanics for fall prevention. Cues for energy conservation and safety for in the home at [place], including modifications and DME. Extended time to complete all tasks, including skilled monitoring of patient's response during treatment session and vital signs. Prior to and at the end of session, environmental modifications / line management completed for patients safety and efficiency of treatment session. See above for further details. Rehab Potential: Good for established goals     Patient / Family Goal: [id number]      Patient and/or family were instructed on functional diagnosis, prognosis/goals and OT plan of care. Demonstrated good understanding. Eval Complexity: Low  · History: Brief review of medical records and additional review of physical, cognitive, or psychosocial history related to current functional performance  · Exam: 3+ performance deficits  · Assistance/Modification: Mod assistance or modifications required to perform tasks. May have comorbidities that affect occupational performance.     Time In: 1051  Time Out: 1135  Total Treatment Time: 24    Min Units   OT Eval Low 97165  x  1   OT Eval Medium 90705      OT Eval High 74434      OT Re-Eval [id number]       Therapeutic Ex 09720       Therapeutic Activities 10741  11 1    ADL/Self Care 22659  13 1    Orthotic Management 45117       Manual [id number]     Neuro Re-Ed 63938       Non-Billable Time          Evaluation Time additionally includes thorough review of current medical information, gathering information on past medical history/social history and prior level of function, interpretation of standardized testing/informal observation of tasks, assessment of data and development of plan of care and goals.             Marcelina Adams OTR/L; [id number]

## 2022-04-15 NOTE — ANESTHESIA PRE PROCEDURE
Department of Anesthesiology  Preprocedure Note       Name:  Liliane Bazzi   Age:  80 y.o.  :  1933                                          MRN:  [id number]         Date:  4/15/2022      Surgeon: Haylie Greene):  Krishna Hudson DO    Procedure: EGD ESOPHAGOGASTRODUODENOSCOPY (N/A )    Medications prior to admission:   Prior to Admission medications    Medication Sig Start Date End Date Taking? Authorizing Provider   omeprazole (PRILOSEC) 20 MG delayed release capsule Take 1 capsule by mouth every morning (before breakfast) 22   Orlin Goode DO   SITagliptin (JANUVIA) 100 MG tablet Take 1 tablet by mouth daily 3/30/22   Orlin Goode, DO   ONETOUCH ULTRA strip 1 each by Other route 4 times daily As needed. 22  Ibrahima Mcguire, DO   Lancets (ONETOUCH DELICA PLUS [id number]) MISC 1 strip by Other route 4 times daily 22  Ibrahima Mcguire DO   colchicine (COLCRYS) 0.6 MG tablet Take 1 tablet by mouth daily 22   Hina Overcast, DO   levothyroxine (SYNTHROID) 75 MCG tablet Take 1 tablet by mouth Daily 22   Orlin Goode DO   atenolol (TENORMIN) 25 MG tablet Take 1 tablet by mouth daily 22   Orlin Goode DO   pravastatin (PRAVACHOL) 20 MG tablet Take 1 tablet by mouth daily 22   Orlin Goode DO   potassium citrate (UROCIT-K) 10 MEQ (1080 MG) extended release tablet TAKE ONE TABLET BY MOUTH EVERY DAY 21   Historical Provider, MD   aspirin 325 MG tablet Take 325 mg by mouth daily    Historical Provider, MD   metFORMIN (GLUCOPHAGE) 500 MG tablet TAKE ONE TABLET BY MOUTH EVERY DAY 18   Historical Provider, MD   finasteride (PROSCAR) 5 MG tablet Take 5 mg by mouth once a week. Historical Provider, MD   therapeutic multivitamin-minerals (THERAGRAN-M) tablet Take 1 tablet by mouth daily. Historical Provider, MD       Current medications:    No current facility-administered medications for this visit.      No current outpatient medications on file.      Facility-Administered Medications Ordered in Other Visits   Medication Dose Route Frequency Provider Last Rate Last Admin    hydrocortisone sodium succinate PF (SOLU-CORTEF) injection 25 mg  25 mg IntraVENous Q12H Rangel Wren MD        [START ON 4/16/2022] vancomycin (VANCOCIN) 750 mg in dextrose 5 % 250 mL IVPB  750 mg IntraVENous Q24H MIK Crane CNP        insulin glargine (LANTUS) injection vial 15 Units  15 Units SubCUTAneous Nightly Leobardo Mcguire DO        levothyroxine (SYNTHROID) tablet 75 mcg  75 mcg Oral Daily Max Macdonald, DO   75 mcg at 04/15/22 4991    sodium chloride flush 0.9 % injection 5-40 mL  5-40 mL IntraVENous 2 times per day Max Macdonald, DO   10 mL at 04/15/22 4215    sodium chloride flush 0.9 % injection 5-40 mL  5-40 mL IntraVENous PRN Max Macdonald, DO   10 mL at 04/14/22 2111    0.9 % sodium chloride infusion   IntraVENous PRN Max Macdonald, DO        ondansetron (ZOFRAN-ODT) disintegrating tablet 4 mg  4 mg Oral Q8H PRN Max Macdonald, DO        Or    ondansetron (ZOFRAN) injection 4 mg  4 mg IntraVENous Q6H PRN Max Macdonald, DO        polyethylene glycol (GLYCOLAX) packet 17 g  17 g Oral Daily PRN aMx Macdonald, DO        acetaminophen (TYLENOL) tablet 650 mg  650 mg Oral Q6H PRN Max Macdonald, DO   650 mg at 04/14/22 1702    Or    acetaminophen (TYLENOL) suppository 650 mg  650 mg Rectal Q6H PRN Max Macdonald, DO        azithromycin (ZITHROMAX) 500 mg in D5W 250ml Vial Mate  500 mg IntraVENous Q24H MIK Crane CNP   Stopped at 04/15/22 0750    cefepime (MAXIPIME) 1000 mg IVPB extended (mini-bag)  1,000 mg IntraVENous Q12H MIK Crane CNP 12.5 mL/hr at 04/15/22 1305 Rate Verify at 04/15/22 1305    0.9 % sodium chloride infusion   IntraVENous Q12H MIK Crane CNP   Stopped at 04/15/22 0230    glucagon (rDNA) injection 1 mg  1 mg IntraMUSCular PRN Charissa Hinds MD Servando        midodrine (PROAMATINE) tablet 15 mg  15 mg Oral TID  Robert Woo MD   15 mg at 04/15/22 1225    heparin (porcine) injection 5,000 Units  5,000 Units SubCUTAneous BID Robert Woo MD   5,000 Units at 04/14/22 2105    pantoprazole (PROTONIX) injection 40 mg  40 mg IntraVENous Daily Robert Woo MD   40 mg at 04/15/22 0258    lactated ringers infusion   IntraVENous Continuous Han Varner MD Servando 100 mL/hr at 04/15/22 1329 New Bag at 04/15/22 1329    glucose (GLUTOSE) 40 % oral gel 15 g  15 g Oral PRN Robert Woo MD        dextrose 5 % solution  100 mL/hr IntraVENous PRDEBORAH Woo MD        dextrose bolus (hypoglycemia) 10% 125 mL  125 mL IntraVENous PRDEBORAH Woo MD        Or    dextrose bolus (hypoglycemia) 10% 250 mL  250 mL IntraVENous PRDEBORAH Woo MD        insulin lispro (HUMALOG) injection vial 0-12 Units  0-12 Units SubCUTAneous TID WC Chaparro Patterson APRN - CNP   6 Units at 04/15/22 1230    insulin lispro (HUMALOG) injection vial 0-6 Units  0-6 Units SubCUTAneous Nightly Chaparro Leonardo APRN - CNP   2 Units at 04/14/22 2102       Allergies:     Allergies   Allergen Reactions    No Known Allergies        Problem List:    Patient Active Problem List   Diagnosis Code    Incomplete tear of right rotator cuff M75.111    Long biceps tear M75.41    Injury of tendon of long head of right biceps S46.101A    Tear of right glenoid labrum S43.431A    Bursitis of right shoulder M75.51    Essential hypertension I10    Acquired hypothyroidism E03.9    Dyslipidemia E78.5    Type 2 diabetes mellitus without complication, without long-term current use of insulin (HCC) E11.9    Normocytic anemia D64.9    NSTEMI (non-ST elevated myocardial infarction) ([facility] I21.4       Past Medical History:        Diagnosis Date    Diabetes mellitus ([facility] [address]     H/O cardiovascular stress test 12/01/2021    Lexiscan    History of blood transfusion     History of Holter monitoring 02/11/2022    Hyperlipidemia     Hypertension     Lower back pain     Thyroid disease        Past Surgical History:        Procedure Laterality Date    BACK SURGERY  2000    fusion  lumbar area,      JOINT REPLACEMENT Bilateral [id number]    knee     LITHOTRIPSY      SHOULDER ARTHROSCOPY Right 01/30/2020    with treatment    SHOULDER ARTHROSCOPY Right 1/30/2020    ARTHROSCOPIC EXAM AND TREATMENT RIGHT SHOULDER (CPT 37619,22255) RIGHT ROTATOR CUFF REPAIR, SUBACHROMIAL DECOMPRESSION, DEBRIDEMENT OF HUMERAL performed by VIRGINIA Arora, DO at [address] History:    Social History     Tobacco Use    Smoking status: Never Smoker    Smokeless tobacco: Never Used   Substance Use Topics    Alcohol use: No                                Counseling given: Not Answered      Vital Signs (Current): There were no vitals filed for this visit.                                            BP Readings from Last 3 Encounters:   04/15/22 (!) 115/59   02/23/22 100/60   01/26/22 128/70       NPO Status:  >8.H                                                                               BMI:   Wt Readings from Last 3 Encounters:   04/14/22 154 lb 8 oz (70.1 kg)   02/23/22 148 lb (67.1 kg)   01/26/22 124 lb (56.2 kg)     There is no height or weight on file to calculate BMI.    CBC:   Lab Results   Component Value Date    WBC 5.6 04/15/2022    RBC 2.04 04/15/2022    HGB 7.7 04/15/2022    HCT 22.8 04/15/2022    .8 04/15/2022    RDW 18.2 04/15/2022     04/15/2022       CMP:   Lab Results   Component Value Date     04/15/2022    K 3.6 04/15/2022    K 4.4 04/13/2022     04/15/2022    CO2 21 04/15/2022    BUN 35 04/15/2022    CREATININE 1.5 04/15/2022    GFRAA 53 04/15/2022    LABGLOM 44 04/15/2022    GLUCOSE 247 04/15/2022    GLUCOSE 111 03/29/2012    PROT 5.3 04/15/2022    CALCIUM 7.1 04/15/2022    BILITOT 0.5 04/15/2022    ALKPHOS 41 04/15/2022    AST 61 04/15/2022     04/15/2022       POC Tests: No results for input(s): POCGLU, POCNA, POCK, POCCL, POCBUN, POCHEMO, POCHCT in the last 72 hours. Coags:   Lab Results   Component Value Date    PROTIME 22.4 04/14/2022    INR 1.9 04/14/2022    APTT 27.0 04/14/2022       HCG (If Applicable): No results found for: PREGTESTUR, PREGSERUM, HCG, HCGQUANT     ABGs: No results found for: PHART, PO2ART, HEX4MQZ, SXX6OIT, BEART, W1TNMCAM     Type & Screen (If Applicable):  No results found for: [facility]    Anesthesia Evaluation  Patient summary reviewed no history of anesthetic complications:   Airway: Mallampati: III  TM distance: >3 FB     Mouth opening: > = 3 FB Dental:          Pulmonary: breath sounds clear to auscultation      (-) not a current smoker                           Cardiovascular:    (+) hypertension:, past MI:, hyperlipidemia      ECG reviewed  Rhythm: regular  Rate: normal           Beta Blocker:  Not on Beta Blocker         Neuro/Psych:   Negative Neuro/Psych ROS              GI/Hepatic/Renal:        (-) no morbid obesity       Endo/Other:    (+) DiabetesType II DM, , hypothyroidism, blood dyscrasia: anemia:., electrolyte abnormalities (elevated BUN/Cr), .                  ROS comment: Septic shock this admission Abdominal:         (-) obese       Vascular: negative vascular ROS. Other Findings:               Anesthesia Plan      MAC     ASA 4       Induction: intravenous. Anesthetic plan and risks discussed with patient. Plan discussed with CRNA. PAT Chart Review:  Chart reviewed per routine on January 29, 2020 at 7:05 AM by Jenniffer Umana MD.  Above represents information available via shared medical record including previous anesthesia history, drug and allergy history.   Confirmation of above and final plan per Day of Surgery (DOS) anesthesiologist.        Jenniffer Umana MD 4/15/2022

## 2022-04-15 NOTE — PROGRESS NOTES
Patient came down to special procedures for bone marrow biopsy and aspiration. Patient taken to Cat Scan. Patient positioned prone on Cat Scan table with O2 and monitoring devices attached. Patient scanned and images reviewed by Dr Mauricio Mcclure     Patient prepped, draped and secured     Emotional support given. 0846 moderate sedation given: 0.5 mg IV Versed                                                     25 mcg IV Fentanyl    0847 Procedure start /60 82 22 96% on 2 liters nasal canula    With the guidance of Cat Scan, needle inserted and core biopsy taken by Dr. Mauricio Mcclure     Patient re-scanned and images reviewed by Layne Staples    Puncture site cleansed, one suture and dermabond was used to close puncuture site. dry dressing applied of multiple 4 x 4 and elastic tape to right lower back     0857 Procedure completed /56 81 17 94% on 2 liters nasal canula     Biopsy sample taken to laboratory.      Nurse to nurse given to Thu Mondragon RN    Patient transported back to the floor

## 2022-04-15 NOTE — PLAN OF CARE
Problem: Skin Integrity:  Goal: Will show no infection signs and symptoms  Description: Will show no infection signs and symptoms  Outcome: Met This Shift  Goal: Absence of new skin breakdown  Description: Absence of new skin breakdown  Outcome: Met This Shift     Problem: Falls - Risk of:  Goal: Will remain free from falls  Description: Will remain free from falls  Outcome: Met This Shift  Goal: Absence of physical injury  Description: Absence of physical injury  Outcome: Met This Shift     Problem: Cardiac:  Goal: Ability to maintain an adequate cardiac output will improve  Description: Ability to maintain an adequate cardiac output will improve  Outcome: Met This Shift  Goal: Hemodynamic stability will improve  Description: Hemodynamic stability will improve  Outcome: Met This Shift     Problem: Fluid Volume:  Goal: Ability to achieve and maintain adequate urine output will improve  Description: Ability to achieve and maintain adequate urine output will improve  Outcome: Met This Shift     Problem: Respiratory:  Goal: Respiratory status will improve  Description: Respiratory status will improve  Outcome: Met This Shift

## 2022-04-15 NOTE — PROGRESS NOTES
Internal Medicine Progress Note    [facility]    Parker Gutierrez. Lily Mercado., ERICKSON.A.EULOGIOOAngelinaI. Cailin Ordaz D.O., F.A.C.O.I. Alger Dakins, D.O. Soco Sweeney, MSN, APRN, NP-C  Jcarlos Lyon. Beronica Estrada, MSN, APRN-CNP     Primary Care Physician: Alger Dakins, DO   Admitting Physician:  Herb Sharp DO  Admission date and time: 4/13/2022  6:50 PM    Room:  [place] [address]  Admitting diagnosis: Shortness of breath [R06.02]  NSTEMI (non-ST elevated myocardial infarction) ([facility] [address] [I21.4]  [facility]) [facility] [N17.9]  Febrile illness [R50.9]  Non-traumatic rhabdomyolysis [M62.82]  Chronic bilateral low back pain without sciatica [M54.50, G89.29]    Patient Name: Marni Adam  MRN: [id number]    Date of Service: 4/15/2022     Subjective:  Funmilayo Nuñez is a 80 y.o. male who was seen and examined today,4/15/2022, at the bedside. The patient has shown significant improvement since yesterday. The patient has received blood transfusion and has been seen by hematology. He has underwent a bone marrow biopsy with aspiration earlier this morning and tolerated the procedure well. He is currently n.p.o. in anticipation of upper GI panendoscopy scheduled with subsequent. He has worked with physical therapy and did ambulate in the room and did extremely well. He is still wearing oxygen at 2 L O2 sat is satisfactory with only some mild desaturated noted while sleeping and with activity. He currently denies any chest pain. Plan for cardiac catheterization on Monday.  have been involved for discharge planning to [facility]    No family member present      Review of System:   Constitutional:   Denies fever or chills, weight loss or gain. Weakness seem to be improving and fatigue  HEENT:   Denies ear pain, sore throat, sinus or eye problems. Wearing supplemental O2  Cardiovascular:   Denies any chest pain, irregular heartbeats, or palpitations.    Respiratory:   Improved shortness of breath. Still present with activity  Gastrointestinal:   Denies nausea, vomiting, diarrhea, or constipation. Denies any abdominal pain. Genitourinary:    Denies any urgency, frequency, hematuria. Morales catheter  Extremities:   Some low back soreness with improved weakness  Neurology:    Denies any headache or focal neurological deficits, Denies generalized weakness or memory difficulty. Psch:   Denies being anxious or depressed. Musculoskeletal:    Denies  myalgias, joint complaint. Mild low back pain  Integumentary:   Denies any rashes, ulcers, or excoriations. Denies bruising. Hematologic/Lymphatic:  Denies bruising or bleeding. Physical Exam:  I/O this shift:  In: 1437.5 [I.V.:792.5; IV Piggyback:645]  Out: 450 [Urine:450]    Intake/Output Summary (Last 24 hours) at 4/15/2022 1441  Last data filed at 4/15/2022 1425  Gross per 24 hour   Intake 2779.93 ml   Output 1150 ml   Net 1629.93 ml   I/O last 3 completed shifts: In: 4370.4 [P.O.:900; I.V.:2606.4; IV Piggyback:864]  Out: 1700 [Urine:1700]  Patient Vitals for the past 96 hrs (Last 3 readings):   Weight   04/14/22 0356 154 lb 8 oz (70.1 kg)   04/14/22 0100 154 lb 15.7 oz (70.3 kg)   04/13/22 2359 155 lb (70.3 kg)     Vital Signs:   Blood pressure (!) 121/53, pulse 65, temperature 98.6 °F (37 °C), temperature source Bladder, resp. rate 15, height 5' 6\" (1.676 m), weight 154 lb 8 oz (70.1 kg), SpO2 96 %. General appearance:  Alert, responsive, oriented to person, place, and time. Appearance much improved today in better spirits and more animated  Head:  Normocephalic. No masses, lesions or tenderness. Eyes:  PERRLA. EOMI. Sclera clear. Buccal mucosa moist.  ENT:  Ears normal. Mucosa dry wearing nasal cannula  Neck:    Supple. Trachea midline. No thyromegaly. No JVD. No bruits. Heart:    Rhythm regular. Rate controlled. 1/6 systolic murmurs. Lungs:    Fibrotic rales at the bases  Abdomen:   Soft. Non-tender. Non-distended. Bowel sounds positive. No organomegaly or masses. No pain on palpation. Extremities:    Peripheral pulses present. No peripheral edema. No ulcers. No cyanosis. No clubbing. Abrasion noted over the anterior surface of her right lower leg. Neurologic:    Alert x 3. No focal deficit. Cranial nerves grossly intact. Improved weakness  Psych:   Behavior is normal. Mood appears normal. Speech is not rapid and/or pressured. Musculoskeletal:   Spine ROM normal. Muscular strength intact. Gait not assessed. Integumentary:  No rashes  Skin normal color and texture.   Genitalia/Breast:  Morales catheter    Medication:  Scheduled Meds:   hydrocortisone sodium succinate PF  25 mg IntraVENous Q12H    [START ON 4/16/2022] vancomycin  750 mg IntraVENous Q24H    insulin glargine  15 Units SubCUTAneous Nightly    levothyroxine  75 mcg Oral Daily    sodium chloride flush  5-40 mL IntraVENous 2 times per day    azithromycin  500 mg IntraVENous Q24H    cefepime (MAXIPIME) 1000 mg IVPB extended (mini-bag)  1,000 mg IntraVENous Q12H    midodrine  15 mg Oral TID WC    heparin (porcine)  5,000 Units SubCUTAneous BID    pantoprazole  40 mg IntraVENous Daily    insulin lispro  0-12 Units SubCUTAneous TID WC    insulin lispro  0-6 Units SubCUTAneous Nightly     Continuous Infusions:   sodium chloride      sodium chloride 25 mL (04/15/22 1421)    lactated ringers 100 mL/hr at 04/15/22 1425    dextrose         Objective Data:  CBC with Differential:    Lab Results   Component Value Date    WBC 5.6 04/15/2022    RBC 2.04 04/15/2022    HGB 7.7 04/15/2022    HCT 22.8 04/15/2022     04/15/2022    .8 04/15/2022    MCH 37.7 04/15/2022    MCHC 33.8 04/15/2022    RDW 18.2 04/15/2022    NRBC 2.6 04/15/2022    SEGSPCT 48 07/06/2013    LYMPHOPCT 13.2 04/15/2022    MONOPCT 7.0 04/15/2022    MYELOPCT 1.8 02/11/2022    BASOPCT 0.2 04/15/2022    MONOSABS 0.39 04/15/2022    LYMPHSABS 0.73 04/15/2022    EOSABS 0.00 04/15/2022    BASOSABS 0.00 04/15/2022     CMP:    Lab Results   Component Value Date     04/15/2022    K 3.6 04/15/2022    K 4.4 04/13/2022     04/15/2022    CO2 21 04/15/2022    BUN 35 04/15/2022    CREATININE 1.5 04/15/2022    GFRAA 53 04/15/2022    LABGLOM 44 04/15/2022    GLUCOSE 247 04/15/2022    GLUCOSE 111 03/29/2012    PROT 5.3 04/15/2022    LABALBU 2.3 04/15/2022    LABALBU 4.4 03/29/2012    CALCIUM 7.1 04/15/2022    BILITOT 0.5 04/15/2022    ALKPHOS 41 04/15/2022    AST 61 04/15/2022     04/15/2022              Assessment:     · Shock probably septic in origin possibility of left diabetic foot versus abrasion right leg--rule out secondary adrenal insufficiency--significantly improved  · Elevated troponin possibly secondary to demand ischemia versus non-ST elevated myocardial infarction  · Abnormal CAT scan lungs showing usual interstitial pneumonia  · Macrocytic anemia undetermined etiology rule out autoimmune  · Acute on chronic renal insufficiency  · Type 2 diabetes mellitus without acidosis complicated by lack of steroid administration  · Essential hypertension historically  · Hyperlipidemia on Pravachol  · Hypothyroidism on Synthroid replacement  · Chronic low back pain with status post decompression with lumbar stenosis  · Asymptomatic cholelithiasis  · Elevated transaminases undetermined etiology              Plan:      · The patient blood pressure has responded dramatically and I do suspect possible adrenal insufficiency was a contributing factor. Hydrocortisone has been decreased and the patient is on currently midodrine. I will suggest switching him over to prednisone 20 mg p.o. daily starting tomorrow since he has been on steroids since December 2021  · Hemoglobin hematocrit appear to be stable. Continue to monitor. Upper GI panendoscopy today.   Bone marrow and aspiration has been completed  · Discontinue colchicine due to possible contributing myelosuppression  · Increase Lantus to 15 units at bedtime. Continue to monitor blood sugars  · Continue work with physical therapy. Social service for discharge planning to [place]  · CPK and troponin decreasing. Plan for cardiac catheterization on Monday  · We will check IgE level and beta-2 microglobulin is probably had been elevated  · Blood cultures so far negative. Consider discontinuing antibiotic therapy      Greater than 40  minutes of critical care time was spent with the patient. This includes chart review, , and discussion with those consultants involved in the patient's care. More than 50% of my  time was spent at the bedside counseling/coordinating care with the patient and/or family with face to face contact. This time was spent reviewing notes and laboratory data as well as instructing and counseling the patient. Time I spent with the family or surrogate(s) is included only if the patient was incapable of providing the necessary information or participating in medical decisions. I also discussed the differential diagnosis and all of the proposed management plans with the patient and individuals accompanying the patient. Tamir Cobos requires this high level of physician care and nursing on the ICU unit due the complexity of decision management and chance of rapid decline or death. Continued cardiac monitoring and higher level of nursing are required. I am readily available for any further decision-making and intervention.      Evangelist Mcguire DO, F.A.C.O.I.  4/15/2022  2:41 PM

## 2022-04-15 NOTE — H&P
Patient seen and examined. Pertinent notes/labs reviewed. Please H&H but without overt bleed  Cardiology input appreciated -okay for endoscopy  H&P reviewed -no new complaints except as described above. Vitals:    04/15/22 1100   BP: 115/73   Pulse: 74   Resp: 29   Temp:    SpO2: 95%     Plan for EGD later today. Above discussed with the patient all questions answered to his satisfaction. He is agreeable with the plan as delineated and wishes to proceed with procedure as described.     Henry Dhillon MD

## 2022-04-15 NOTE — PROGRESS NOTES
Physical Therapy Initial Evaluation/Plan of Care    Room #:  IC03/IC03-01  Patient Name: Sumaya Sanderson  YOB: 1933  MRN: [id number]    Date of Service: 4/15/2022     Tentative placement recommendation: Inpatient Rehab  Equipment recommendation: To be determined      Evaluating Physical Therapist: Carolyn Beltrán, PT #[id number]      Specific Provider Orders/Date/Referring Provider :  04/14/22 0100   PT eval and treat Start: 04/14/22 0100, End: 04/14/22 0100, ONE TIME, Standing Count: 1 Occurrences, R    Ingrid El DO      Admitting Diagnosis:   Shortness of breath [R06.02]  NSTEMI (non-ST elevated myocardial infarction) ([facility] [address] [I21.4]  BURAK (acute kidney injury) ([facility] [address] [N17.9]  Febrile illness [R50.9]  Non-traumatic rhabdomyolysis [M62.82]  Chronic bilateral low back pain without sciatica [M54.50, G89.29]     fatigue, shortness of breath, leg weakness and numbness.   progressive decline since November/December of last year following his COVID-19 booster shot    Surgery: none  Visit Diagnoses       Codes    BURAK (acute kidney injury) ([facility] [address]     N17.9    Non-traumatic rhabdomyolysis     M62.82    Febrile illness     R50.9    Shortness of breath     R06.02    Chronic bilateral low back pain without sciatica     M54.50, G89.29          Patient Active Problem List   Diagnosis    Incomplete tear of right rotator cuff    Long biceps tear    Injury of tendon of long head of right biceps    Tear of right glenoid labrum    Bursitis of right shoulder    Essential hypertension    Acquired hypothyroidism    Dyslipidemia    Type 2 diabetes mellitus without complication, without long-term current use of insulin ([facility])    Normocytic anemia    NSTEMI (non-ST elevated myocardial infarction) ([facility] [address]        ASSESSMENT of Current Deficits Patient exhibits decreased strength, balance and endurance impairing functional mobility, transfers, gait , gait distance and tolerance to activity increased shortness of breath and decreased O2 saturation during gait, improved with rest and cues purse lip breathing. Motivated and pleasant. Patient requires continued skilled physical therapy to address concerns listed above for increased safety and function at discharge. PHYSICAL THERAPY  PLAN OF CARE       Physical therapy plan of care is established based on physician order,  patient diagnosis and clinical assessment    Current Treatment Recommendations:    -Standing Balance: Perform strengthening exercises in standing to promote motor control with or without upper extremity support   -Transfers: Provide instruction on proper hand and foot position for adequate transfer of weight onto lower extremities and use of gait device if needed and Cues for hand placement, technique and safety. Provide stabilization to prevent fall   -Gait: Gait training, Standing activities to improve: base of support, weight shift, weight bearing  and Pregait training to emphasize:   Alea, Device control, Upright and Safety   -Endurance: Utilize Supervised activities to increase level of endurance to allow for safe functional mobility including transfers and gait  and Use graduated activities to promote good breathing techniques and provide support and education to maximize respiratory function    PT long term treatment goals are located in below grid    Patient and or family understand(s) diagnosis, prognosis, and plan of care. Frequency of treatments: Patient will be seen  daily.          Prior Level of Function: Patient ambulated independently    Rehab Potential: good   - for baseline    Past medical history:   Past Medical History:   Diagnosis Date    Diabetes mellitus ([address]     H/O cardiovascular stress test 12/01/2021    Lexiscan    History of blood transfusion     History of Holter monitoring 02/11/2022    Hyperlipidemia     Hypertension     Lower back pain     Thyroid disease      Past Surgical History:   Procedure Laterality Date    BACK SURGERY  2000    fusion  lumbar area,      JOINT REPLACEMENT Bilateral [id number]    knee     LITHOTRIPSY      SHOULDER ARTHROSCOPY Right 01/30/2020    with treatment    SHOULDER ARTHROSCOPY Right 1/30/2020    ARTHROSCOPIC EXAM AND TREATMENT RIGHT SHOULDER (CPT 40416,85415) RIGHT ROTATOR CUFF REPAIR, SUBACHROMIAL DECOMPRESSION, DEBRIDEMENT OF HUMERAL performed by Vic Londono DO at [facility]:    Precautions: Up with assistance, falls ,   chronic lumbar back pain, urinary incontinence     Social history: Patient lives alone in a two story home resides first  with 2 + 1+2 steps  to enter without [employer] in shower grab bars, built in shower chair    Equipment owned: Cane, [place], Rollator, Standard Walker and Crutches,       [address] [facility]   How much difficulty turning over in bed?: A Little  How much difficulty sitting down on / standing up from a chair with arms?: A Little  How much difficulty moving from lying on back to sitting on side of bed?: A Little  How much help from another person moving to and from a bed to a chair?: A Little  How much help from another person needed to walk in hospital room?: A Little  How much help from another person for climbing 3-5 steps with a railing?: A Lot  AM-PAC Inpatient Mobility Raw Score : 17  AM-PAC Inpatient T-Scale Score : 42.13  Mobility Inpatient CMS 0-100% Score: 50.57  Mobility Inpatient CMS G-Code Modifier : CK    Nursing cleared patient for PT evaluation. The admitting diagnosis and active problem list as listed above have been reviewed prior to the initiation of this evaluation. OBJECTIVE;   Initial Evaluation  Date: 4/15/2022 Treatment Date:     Short Term/ Long Term   Goals   Was pt agreeable to Eval/treatment?  Yes  To be met in 5 days   Pain level   0/10  0     Bed Mobility    Rolling: Not assessed patient in chair      Rolling: Minimal assist of 1    Supine to sit: Minimal assist of 1    Sit to supine: Minimal assist of 1    Scooting: Minimal assist of 1     Transfers Sit to stand: Minimal assist of 1 Cues for hand placement and safety especially with reaching back for chair  Sit to stand: Supervision      Ambulation    30 feet using  wheeled walker with Minimal assist of 1   for walker control and balance and cues for safety, pacing and O2 line management   50 feet using  wheeled walker with Supervision     Stair negotiation: ascended and descended   Not assessed          ROM Within functional limits    Increase range of motion 10% of affected joints    Strength BUE:  refer to OT eval  RLE:  3+/5  LLE:  3+/5  Increase strength in affected mm groups by 1/3 grade   Balance Sitting EOB:  not assessed    Dynamic Standing:  fair wheeled walker   Sitting EOB:  good    Dynamic Standing: fair +wheeled walker      Patient is Alert & Oriented x person, place, time and situation and follows directions    Sensation:  Patient  reports numbness bilateral feet-chronic      Edema:  no   Endurance: fair  -    Vitals: 2 liters nasal cannula   Blood Pressure at rest 106/66 Blood Pressure during session 111/72   Heart Rate at rest 68 Heart Rate during session 75-94   SPO2 at rest 95%  SPO2 during session 88% cues purse lip breathing improved 902% < 1 min      Patient education  Patient educated on role of Physical Therapy, risks of immobility, safety and plan of care,  importance of mobility while in hospital , purse lip breathing, ankle pumps, quad set and glut set for edema control, blood clot prevention and O2 line management and safety      Patient response to education:   Pt verbalized understanding Pt demonstrated skill Pt requires further education in this area   Yes Partial Yes      Treatment:  Patient practiced and was instructed/facilitated in the following treatment: Patient in chair, ambulated in room,  stood x 2 for endurance/strength.  performed exercises.       Therapeutic Exercises:  ankle pumps, heel raises, long arc quad and seated marching  x 15-20 reps. At end of session, patient in chair with   call light and phone within reach,  all lines and tubes intact, nursing notified. Patient would benefit from continued skilled Physical Therapy to improve functional independence and quality of life. Patient's/ family goals   get stronger    Time in  1136  Time out  1206    Total Treatment Time  10 minutes    Evaluation time includes thorough review of current medical information, gathering information on past medical history/social history and prior level of function, completion of standardized testing/informal observation of tasks, assessment of data, and development of Plan of care and goals.      CPT codes:  Low Complexity PT evaluation (78848)  Therapeutic activities (64083)   10 minutes  1 unit(s)    Charu Perdomo, PT

## 2022-04-15 NOTE — PROGRESS NOTES
[facility]        CARDIOLOGY                 INPATIENT PROGRESS NOTE          PATIENT SEEN IN FOLLOW UP FOR: NSTEMI    Hospital Day: 3     Briana Barton [facility] 80year [facility] SUBJECTIVE: Denies any Cp or SOB, Had bone marrow biopsy this morning, having Endosocpy today evening; 'I am hungry\"    ROS: Review of rest of 10 systems negative except as mentioned above    OBJECTIVE: No acute distress. See Assessment     Diagnostics:       Telemetry: Reviewed           Intake/Output Summary (Last 24 hours) at 4/15/2022 1617  Last data filed at 4/15/2022 1425  Gross per 24 hour   Intake 2779.93 ml   Output 1150 ml   Net 1629.93 ml       Labs:   CBC:   Recent Labs     04/14/22  0444 04/14/22  1151 04/15/22  0000 04/15/22  0613   WBC 6.4  --   --  5.6   HGB 7.6*   < > 8.2* 7.7*   HCT 23.2*   < > 24.3* 22.8*   *  --   --  107*    < > = values in this interval not displayed. BMP:   Recent Labs     04/14/22  0444 04/15/22  0613    138   K 3.6 3.6   CO2 17* 21*   BUN 47* 35*   CREATININE 1.9* 1.5*   LABGLOM 34 44   CALCIUM 7.1* 7.1*     Mag:   Recent Labs     04/14/22  0444   MG 1.7     Phos:   Recent Labs     04/14/22  0444   PHOS 3.3     TSH:   Recent Labs     04/14/22  0444   TSH 1.100     HgA1c:     BNP: No results for input(s): BNP in the last 72 hours.   PT/INR:   Recent Labs     04/14/22  1151 04/14/22  1800   PROTIME 27.0* 22.4*   INR 2.3 1.9     APTT:  Recent Labs     04/14/22  1800   APTT 27.0     CARDIAC ENZYMES:  Recent Labs     04/13/22  2008 04/14/22  0444 04/15/22  0613   CKTOTAL 1,143* 1,038* 256*     FASTING LIPID PANEL:  Lab Results   Component Value Date    CHOL 70 04/14/2022    HDL 25 04/14/2022    LDLCALC 31 04/14/2022    TRIG 70 04/14/2022     LIVER PROFILE:  Recent Labs     04/14/22  0444 04/15/22  0613   AST 92* 61*   ALT 75* 115*   LABALBU 2.8* 2.3*       Current Inpatient Medications:   hydrocortisone sodium succinate PF  25 mg IntraVENous Q12H    insulin glargine  15 Units SubCUTAneous Nightly    levothyroxine  75 mcg Oral Daily    sodium chloride flush  5-40 mL IntraVENous 2 times per day    midodrine  15 mg Oral TID     heparin (porcine)  5,000 Units SubCUTAneous BID    pantoprazole  40 mg IntraVENous Daily    insulin lispro  0-12 Units SubCUTAneous TID     insulin lispro  0-6 Units SubCUTAneous Nightly       IV Infusions (if any):   sodium chloride      sodium chloride 25 mL (04/15/22 1421)    lactated ringers 100 mL/hr at 04/15/22 1425    dextrose           PHYSICAL EXAM:     CONSTITUTIONAL:   BP (!) 108/57   Pulse 74   Temp 97.8 °F (36.6 °C) (Bladder)   Resp 22   Ht 5' 6\" (1.676 m)   Wt 154 lb 8 oz (70.1 kg)   SpO2 91%   BMI 24.94 kg/m²   Pulse  Av.8  Min: 61  Max: 85  Systolic (26ADB), CCB:632 , Min:87 , RNR:515    Diastolic (34SXV), ZOK:09, Min:48, Max:73        In general, this is a well developed, well nourished who appears stated age, awake, alert, no apparent distress  HEENT: eyes -conjunctivae pink,  Throat - Oral mucosa moist.   Neck-  no stridor,no carotid bruit. no jugular venous distention   RESPIRATORY: Chest symmetrical.  No accessory muscle use. Lung auscultation - clear to auscultation except few rhonchi  CARDIOVASCULAR:     Heart Palpation - no palpable thrills  Heart Ausculation - Regular rate and rhythm, 2/6 systolic murmur, No s3 or rub. No lower extremity edema, Distal pulses fair; no cyanosis. Abrasion to right lower leg. ABDOMEN: Soft, nontender,  Bowel sounds present. MS: n/a  : Deferred  Rectal Exam: Deferred  SKIN: warm and dry s   NEURO / PSYCH: oriented to person, place                                          ASSESSMENT/PLAN:      NSTEMI - Denies CP; EKG and Echo findings suggest inferior MI. No ASA/Anticaogualtion due to Anemia and poss GI bleed;  No BB due to low BP; No statin due to elevated LFTs and advanced age; 2D Echo showed inferior wall motion abnormality with LV EF 55%. Nonischemic stress test in Dec 2021. Consider Left Heart Cath when his renal Fn and H/H acceptable, and no signs of GI bleed or active infection. [facility] scheduled for Monday, pending his H/H and renal Fn. Needs Anemia w/u especially r/o any GI bleed prior to his Cardiac cath in case he needs dual antiplatelet therapy.     Hypotension - Off inotropic support.      PAF - Diagnosed in Jan 2022 - Was on Eliquis;  Off Eliquis since Holter in Feb 2022 showed no A Fib and due to his Anemia.     Acute on chronic Anemia - GI on case, Transfuse as needed; had Bone marrow bx today     Possible Rhabdomyolysis - Monitor CPKs and Renal  Fn     Possible GI bleed - GI on case, Ok for endoscopy     BURAK/CKD - Better, Monitor renal Fn     Elevated LFTs - GI on case     Type 2 diabetes - Per Dr Karen Jefferson     Left diabetic foot wound     Interstitial lung disease - On O2     Hx of Hypertension - Currently hypotensive     Hypothyroidism - On HRT               d/w Dr Karen Jefferson this morning.     Above d/w him    Electronically signed by Gurjit Woods MD on 4/15/2022 at 4:17 PM  [facility]) Cardiology

## 2022-04-15 NOTE — ACP (ADVANCE CARE PLANNING)
4-15-Cm note: ( covid negative 4-14) SS consult noted for [facility]. Placed referral to [place], they will follow for PT/OT recommendations, pt is off unit at this time for a Bone Marrow Biopsy. Precert required if [facility] accepts. Pt will need a BD MAX covid test prior to dc ,no need to wait for results. CM/SS will follow .  Electronically signed by Ami Kisre RN on 4/15/2022 at 9:38 AM

## 2022-04-15 NOTE — PROGRESS NOTES
Pulmonary/Critical Care Consult Note    CHIEF COMPLAINT: Shortness of breath, fatigue, bilateral leg weakness and numbness    HISTORY OF PRESENT ILLNESS:   Patient  is a 80 y.o. male presenting to the ED for fatigue, shortness of breath, leg weakness and numbness. The history is obtained from the patient, patient's family as well as the patient's medical record. The patient is presenting to the emergency department the chief complaint of fatigue, shortness of breath, leg weakness and numbness. Since receiving the Covid booster the patient has had multiple chronic complaints such as worsening shortness of breath and fatigue. He has been noted to have increasing pulmonary fibrosis. He was started on prednisone with some improvement. He states that he still does have shortness of breath which is worse with activity and exertion. Over the last 2 days he has had increasing fatigue. He reports he was sitting at home in a chair was unable to get out of the chair for 2 days secondary to leg weakness and numbness. This is moderate in severity. Nothing makes it better. Nothing makes worse. No treatment prior to arrival.  He does have chronic lumbar back pain which he states has worsened over the last 2 days. He does admit to urinary incontinence and states he was not able to tell he was urinating while he was sitting in the chair. He denies any saddle anesthesias. CT chest without contrast shows progression of moderate and interstitial lung disease with UIP pattern. MRI of lumbar spine showed degenerative changes and multilevel spinal stenosis. Daily progress:  April 14, 2022: Patient is awake but he is extremely weak and tired. His blood pressure is borderline low and will be started on Levophed. He has no fever, chills, or rigors. He is currently on room air. April 15, 2022: Patient has been weaned off Levophed. Lactate has normalized. Creatinine is improving, urine output has improved. Patient is scheduled for bone marrow biopsy today. He has no fever, chills, rigors. ALLERGY:  No known allergies    FAMILY HISTORY:  Family History   Problem Relation Age of Onset    No Known Problems Mother     Diabetes Father        SOCIAL HISTORY:  Social History     Socioeconomic History    Marital status:      Spouse name: Not on file    Number of children: Not on file    Years of education: Not on file    Highest education level: Not on file   Occupational History    Not on file   Tobacco Use    Smoking status: Never Smoker    Smokeless tobacco: Never Used   Vaping Use    Vaping Use: Never used   Substance and Sexual Activity    Alcohol use: No    Drug use: No    Sexual activity: Not on file   Other Topics Concern    Not on file   Social History Narrative    Not on file     Social Determinants of Health     Financial Resource Strain: Low Risk     Difficulty of Paying Living Expenses: Not hard at all   Food Insecurity: No Food Insecurity    Worried About [employer] [employer] in the Last Year: Never true    [facility] in the Last Year: Never true   Transportation Needs:     Lack of Transportation (Medical): Not on file    Lack of Transportation (Non-Medical):  Not on file   Physical Activity:     Days of Exercise per Week: Not on file    Minutes of Exercise per Session: Not on file   Stress:     Feeling of Stress : Not on file   Social Connections:     Frequency of Communication with Friends and Family: Not on file    Frequency of Social Gatherings with Friends and Family: Not on file    Attends Buddhism Services: Not on file    Active Member of Clubs or Organizations: Not on file    Attends Club or Organization Meetings: Not on file    Marital Status: Not on file   Intimate Partner Violence:     Fear of Current or Ex-Partner: Not on file    Emotionally Abused: Not on file    Physically Abused: Not on file    Sexually Abused: Not on file   Housing Stability:     Unable to Pay for Housing in the Last Year: Not on file    Number of Places Lived in the Last Year: Not on file    Unstable Housing in the Last Year: Not on file       MEDICAL HISTORY:  Past Medical History:   Diagnosis Date    Diabetes mellitus ([address]     H/O cardiovascular stress test 12/01/2021    Lexiscan    History of blood transfusion     History of Holter monitoring 02/11/2022    Hyperlipidemia     Hypertension     Lower back pain     Thyroid disease        MEDICATIONS:   hydrocortisone sodium succinate PF  50 mg IntraVENous Q8H    vancomycin  1,250 mg IntraVENous Once    levothyroxine  75 mcg Oral Daily    sodium chloride flush  5-40 mL IntraVENous 2 times per day    azithromycin  500 mg IntraVENous Q24H    cefepime (MAXIPIME) 1000 mg IVPB extended (mini-bag)  1,000 mg IntraVENous Q12H    midodrine  15 mg Oral TID WC    heparin (porcine)  5,000 Units SubCUTAneous BID    pantoprazole  40 mg IntraVENous Daily    vancomycin (VANCOCIN) intermittent dosing (placeholder)   Other RX Placeholder    insulin glargine  4 Units SubCUTAneous BID    insulin lispro  0-12 Units SubCUTAneous TID WC    insulin lispro  0-6 Units SubCUTAneous Nightly      sodium chloride      sodium chloride Stopped (04/15/22 0230)    norepinephrine Stopped (04/14/22 1701)    lactated ringers 100 mL/hr at 04/15/22 0750    dextrose       sodium chloride flush, sodium chloride, ondansetron **OR** ondansetron, polyethylene glycol, acetaminophen **OR** acetaminophen, glucagon (rDNA), glucose, dextrose, dextrose bolus (hypoglycemia) **OR** dextrose bolus (hypoglycemia)    REVIEW OF SYSTEMS:  Constitutional: Denies fever, weight loss, night sweats, and fatigue  Skin: Denies pigmentation, dark lesions, reports left foot wound  HEENT: Denies hearing loss, tinnitus, ear drainage, epistaxis, sore throat, and hoarseness. Cardiovascular: Denies palpitations, chest pain, and chest pressure.   Respiratory: Denies cough, dyspnea at rest, hemoptysis, apnea, and choking. Gastrointestinal: Denies nausea, vomiting, poor appetite, diarrhea, heartburn or reflux  Genitourinary: Denies dysuria, frequency, urgency or hematuria  Musculoskeletal: Denies myalgias, reports bilateral leg weakness and numbness, and denies bone pain  Neurological: Denies dizziness, vertigo, headache, and focal weakness  Psychological: Denies anxiety and depression  Endocrine: Denies heat intolerance and cold intolerance  Hematopoietic/Lymphatic: Denies bleeding problems and blood transfusions    PHYSICAL EXAM:  Vitals:    04/15/22 0800   BP: (!) 104/55   Pulse: 76   Resp: 27   Temp: 99 °F (37.2 °C)   SpO2: 92%        O2 Flow Rate (L/min): 2 L/min  O2 Device: Nasal cannula    Constitutional: No fever, chills, diaphoresis, very pleasant  HEENT: No head lesions, PERRL, EOMI, dry mouth without lesions, no nasal lesions, no cervical adenopathy palpated   Respiratory: Lungs with equal breath sounds bilaterally, no adventitious sounds auscultated, no accessory muscle use   CV: Regular rate, no murmurs, JVD, no leg edema   Abdomen: Soft, non tender, + bowel sounds, no lesions   Skin: Pale, poor skin turgor, no rash, capillary refill <2 seconds, left diabetic foot wound with grossly intact skin to the plantar surface medially, minimal erythema, no lymphangitis  Extremities: Muscular strength 4/4 in 4 limbs, moves 4 limbs spontaneously, distal pulses present   Neurology: Awake and alert, follows commands, moves 4 limbs on command and spontaneously, equal sensation, no dysmetria, neck is supple, no meningitic signs present.         LABS:  WBC   Date Value Ref Range Status   04/15/2022 5.6 4.5 - 11.5 E9/L Final   04/14/2022 6.4 4.5 - 11.5 E9/L Final   04/13/2022 7.5 4.5 - 11.5 E9/L Final     Hemoglobin   Date Value Ref Range Status   04/15/2022 7.7 (L) 12.5 - 16.5 g/dL Final   04/15/2022 8.2 (L) 12.5 - 16.5 g/dL Final   04/14/2022 8.5 (L) 12.5 - 16.5 g/dL Final     Hematocrit   Date Value Ref Range Status   04/15/2022 22.8 (L) 37.0 - 54.0 % Final   04/15/2022 24.3 (L) 37.0 - 54.0 % Final   04/14/2022 25.4 (L) 37.0 - 54.0 % Final     MCV   Date Value Ref Range Status   04/15/2022 111.8 (H) 80.0 - 99.9 fL Final   04/14/2022 122.1 (H) 80.0 - 99.9 fL Final   04/13/2022 119.1 (H) 80.0 - 99.9 fL Final     Platelets   Date Value Ref Range Status   04/15/2022 107 (L) 130 - 450 E9/L Final   04/14/2022 111 (L) 130 - 450 E9/L Final   04/13/2022 191 130 - 450 E9/L Final     Sodium   Date Value Ref Range Status   04/15/2022 138 132 - 146 mmol/L Final   04/14/2022 137 132 - 146 mmol/L Final   04/13/2022 140 132 - 146 mmol/L Final     Potassium   Date Value Ref Range Status   04/15/2022 3.6 3.5 - 5.0 mmol/L Final   04/14/2022 3.6 3.5 - 5.0 mmol/L Final   03/30/2022 4.6 3.5 - 5.0 mmol/L Final     Potassium reflex Magnesium   Date Value Ref Range Status   04/13/2022 4.4 3.5 - 5.0 mmol/L Final     Chloride   Date Value Ref Range Status   04/15/2022 106 98 - 107 mmol/L Final   04/14/2022 102 98 - 107 mmol/L Final   04/13/2022 98 98 - 107 mmol/L Final     CO2   Date Value Ref Range Status   04/15/2022 21 (L) 22 - 29 mmol/L Final   04/14/2022 17 (L) 22 - 29 mmol/L Final   04/13/2022 21 (L) 22 - 29 mmol/L Final     BUN   Date Value Ref Range Status   04/15/2022 35 (H) 6 - 23 mg/dL Final   04/14/2022 47 (H) 6 - 23 mg/dL Final   04/13/2022 51 (H) 6 - 23 mg/dL Final     CREATININE   Date Value Ref Range Status   04/15/2022 1.5 (H) 0.7 - 1.2 mg/dL Final   04/14/2022 1.9 (H) 0.7 - 1.2 mg/dL Final   04/13/2022 2.3 (H) 0.7 - 1.2 mg/dL Final     Glucose   Date Value Ref Range Status   04/15/2022 247 (H) 74 - 99 mg/dL Final   04/14/2022 338 (H) 74 - 99 mg/dL Final   04/13/2022 274 (H) 74 - 99 mg/dL Final   03/29/2012 111 (H) 70 - 110 mg/dL Final   12/06/2011 107 70 - 110 mg/dL Final   06/06/2011 109 70 - 110 mg/dL Final     Calcium   Date Value Ref Range Status   04/15/2022 7.1 (L) 8.6 - 10.2 mg/dL Final   04/14/2022 7.1 (L) 8.6 - 10.2 mg/dL Final   04/13/2022 8.8 8.6 - 10.2 mg/dL Final     Total Protein   Date Value Ref Range Status   04/15/2022 5.3 (L) 6.4 - 8.3 g/dL Final   04/14/2022 5.8 (L) 6.4 - 8.3 g/dL Final   04/13/2022 7.4 6.4 - 8.3 g/dL Final     Albumin   Date Value Ref Range Status   04/15/2022 2.3 (L) 3.5 - 5.2 g/dL Final   04/14/2022 2.8 (L) 3.5 - 5.2 g/dL Final   04/13/2022 3.5 3.5 - 5.2 g/dL Final   03/29/2012 4.4 3.2 - 4.8 g/dL Final   12/06/2011 4.2 3.2 - 4.8 g/dL Final   06/06/2011 4.6 3.2 - 4.8 g/dL Final     Total Bilirubin   Date Value Ref Range Status   04/15/2022 0.5 0.0 - 1.2 mg/dL Final   04/14/2022 0.9 0.0 - 1.2 mg/dL Final   04/13/2022 1.4 (H) 0.0 - 1.2 mg/dL Final     Alkaline Phosphatase   Date Value Ref Range Status   04/15/2022 41 40 - 129 U/L Final   04/14/2022 34 (L) 40 - 129 U/L Final   04/13/2022 48 40 - 129 U/L Final     AST   Date Value Ref Range Status   04/15/2022 61 (H) 0 - 39 U/L Final   04/14/2022 92 (H) 0 - 39 U/L Final   04/13/2022 121 (H) 0 - 39 U/L Final     ALT   Date Value Ref Range Status   04/15/2022 115 (H) 0 - 40 U/L Final   04/14/2022 75 (H) 0 - 40 U/L Final   04/13/2022 101 (H) 0 - 40 U/L Final     GFR Non-   Date Value Ref Range Status   04/15/2022 44 >=60 mL/min/1.73 Final     Comment:     Chronic Kidney Disease: less than 60 ml/min/1.73 sq.m. Kidney Failure: less than 15 ml/min/1.73 sq.m. Results valid for patients 18 years and older. 04/14/2022 34 >=60 mL/min/1.73 Final     Comment:     Chronic Kidney Disease: less than 60 ml/min/1.73 sq.m. Kidney Failure: less than 15 ml/min/1.73 sq.m. Results valid for patients 18 years and older. 04/13/2022 27 >=60 mL/min/1.73 Final     Comment:     Chronic Kidney Disease: less than 60 ml/min/1.73 sq.m. Kidney Failure: less than 15 ml/min/1.73 sq.m. Results valid for patients 18 years and older.        GFR    Date Value Ref Range Status   04/15/2022 53  Final   04/14/2022 41  Final   04/13/2022 33  Final     Magnesium   Date Value Ref Range Status   04/14/2022 1.7 1.6 - 2.6 mg/dL Final     Phosphorus   Date Value Ref Range Status   04/14/2022 3.3 2.5 - 4.5 mg/dL Final   08/13/2020 3.2 2.5 - 4.5 mg/dL Final     No results for input(s): PH, PO2, PCO2, HCO3, BE, O2SAT in the last 72 hours. RADIOLOGY:  US GALLBLADDER RUQ   Final Result   No evidence of cholelithiasis or acute cholecystitis. No biliary ductal   dilatation. XR CHEST PORTABLE   Final Result   Deep line catheter placed on the left tip in the SVC. Increased pulmonary   vascularity again seen bilaterally with no evidence of pneumothorax. CT ABDOMEN PELVIS WO CONTRAST Additional Contrast? None   Final Result   No acute intra-abdominal or pelvic findings. Bilateral nonobstructing renal calculi. Colonic diverticulosis, without diverticulitis. Additional findings as above. CT CHEST WO CONTRAST   Final Result   Progression of moderate interstitial lung disease, which demonstrates a UIP   pattern. MRI LUMBAR SPINE WO CONTRAST   Final Result   Multilevel degenerative changes, most notably at L4-5 and L5-S1. Stable   grade 2 anterolisthesis of L4 on L5 and grade 1 anterolisthesis of L5 on S1.   Multilevel foraminal and lateral recess stenoses are not significantly   changed since the previous exam.      Development of endplate degenerative changes at L2-3. This is a new finding. XR CHEST PORTABLE   Final Result   Marked hypoventilation. Otherwise, no acute process seen. CT BONE MARROW ASPIRATION    (Results Pending)       IMPRESSION:  1. Septic shock possibly secondary to left diabetic foot wound  2. NSTEMI  3. Suspected adrenal insufficiency secondary to recent steroid use  4. Macrocytic normochromic anemia ( ?  Myelodysplastic syndrome)  5. Myositis (?  Related to statin)  6. Possible GI bleed  7. BURAK on CKD  8. Transaminitis  9.  Type 2 diabetes with hyperglycemia  10. Left diabetic foot wound  11. Interstitial lung disease (UIP)  12. Hypertension  13. Hyperlipidemia  14. Hypothyroidism    PLAN:  1. Vancomycin, cefepime, and azithromycin (antibiotics therapy be escalated as per primary MD recommendation)  2. S/P Transfusion of 1 unit packed red blood cells  3. Cardiology consulted  4. Echocardiogram with normal EF with no regional wall motion abnormalities. 5. Cortisol level is within normal  6. Decrease hydrocortisone to 25 twice a day  7. CT abdomen pelvis without contrast with nephrolithiasis but no other acute abnormality  8. Stool for occult blood is pending  9. GI following  10. DVT prophylaxis with heparin subcu   11. SCDs  12. Protonix 40 mg IV every 24 hours  13. Avoid nephrotoxic medications, pharmacy to dose vancomycin  14. Hematology/oncology consult possible hemolytic anemia/ ? MDS  15. Cardiac telemetry  16. Optimize nutrition. 17. OT and PT eval and treat  18. Ultrasound gallbladder    ATTESTATION:  ICU Staff Physician note of personal involvement in Care  As the attending physician, I certify that I personally reviewed the patients history and personally examined the patient to confirm the physical findings described above,  And that I reviewed the relevant imaging studies and available reports. I also discussed the differential diagnosis and all of the proposed management plans with the patient and individuals accompanying the patient to this visit. They had the opportunity to ask questions about the proposed management plans and to have those questions answered. This patient has a high probability of sudden, clinically significant deterioration, which requires the highest level of physician preparedness to intervene urgently. I managed/supervised life or organ supporting interventions that required frequent physician assessment. I devoted my full attention to the direct care of this patient for the amount of time indicated below. Time I spent with the family or surrogate(s) is included only if the patient was incapable of providing the necessary information or participating in medical decisions  Time devoted to teaching and to any procedures I billed separately is not included.     CRITICAL CARE TIME:  47 minutes    Electronically signed by Denise Cobos MD on 4/15/2022 at 8:52 AM

## 2022-04-15 NOTE — PLAN OF CARE
Problem: Falls - Risk of:  Goal: Will remain free from falls  Description: Will remain free from falls  4/15/2022 0044 by Maddison Samano RN  Outcome: Met This Shift

## 2022-04-15 NOTE — PROGRESS NOTES
Blood and Adele Almaraz M.D. Patient Name: Liz De La Torre  YOB: 1933  PCP: James Sandoval DO   Referring Provider:      Reason for Consultation:   Chief Complaint   Patient presents with    Extremity Weakness     WEAKNESS, INCONTINENCE STARTING TODAY        Subjective: For BMBx today. Off pressors. Overall improving    History of Present Illness:  80years old male with history of pulmonary fibrosis, diabetes mellitus, coronary artery disease came to the ER complaining of fatigue and shortness of breath for the past few days. Admitted to ICU with septic shock, NSTEMI, rhabdomyolysis. On antibiotics. Levophed has been tapered off. I was consulted for possible hemolysis. Hemoglobin was normal up until last year. Over the past few months his hemoglobin has dropped to his current level of 7.6. MCV has increased in the same timeframe and is 122 right now. Bilirubin is 0.9. , absolute reticulocyte count was 35,000. Total white count 6.4 with normal differential and platelet count of 137,549. B12 folate were normal.  Peripheral smear evaluation from yesterday showed dysplastic neutrophils and less than 1% blasts. CT chest showed worsening of his interstitial lung disease pattern. CT abdomen did not show any acute findings. Patient reports feeling somewhat better. Has been afebrile. Is awake alert oriented. Denies any blood in stools or black tarry stools. Review of systems: Over 10 systems were reviewed and all were negative except as mentioned above.     Diagnostic Data:     Past Medical History:   Diagnosis Date    Diabetes mellitus ([address]     H/O cardiovascular stress test 12/01/2021    Lexiscan    History of blood transfusion     History of Holter monitoring 02/11/2022    Hyperlipidemia     Hypertension     Lower back pain     Thyroid disease        Patient Active Problem List    Diagnosis Date Noted    [facility]non[facility]ST elevated [facility] 04/13/2022    Essential hypertension 08/17/2021    Acquired hypothyroidism 08/17/2021    Dyslipidemia 08/17/2021    Type 2 diabetes mellitus without complication, without long-term current use of insulin ([facility] 08/17/2021    Normocytic anemia 08/17/2021    Incomplete tear of right rotator cuff 01/30/2020    Long biceps tear 01/30/2020    Injury of tendon of long head of right biceps 01/30/2020    Tear of right glenoid labrum 01/30/2020    Bursitis of right shoulder 01/30/2020        Past Surgical History:   Procedure Laterality Date    BACK SURGERY  2000    fusion  lumbar area,      JOINT REPLACEMENT Bilateral [id number]    knee     LITHOTRIPSY      SHOULDER ARTHROSCOPY Right 01/30/2020    with treatment    SHOULDER ARTHROSCOPY Right 1/30/2020    ARTHROSCOPIC EXAM AND TREATMENT RIGHT SHOULDER (CPT 58895,01668) RIGHT ROTATOR CUFF REPAIR, SUBACHROMIAL DECOMPRESSION, DEBRIDEMENT OF HUMERAL performed by VIRGINIA Ames DO at [facility]         Family History  Family History   Problem Relation Age of Onset    No Known Problems Mother     Diabetes Father        Social History  Social History     Socioeconomic History    Marital status:      Spouse name: Not on file    Number of children: Not on file    Years of education: Not on file    Highest education level: Not on file   Occupational History    Not on file   Tobacco Use    Smoking status: Never Smoker    Smokeless tobacco: Never Used   Vaping Use    Vaping Use: Never used   Substance and Sexual Activity    Alcohol use: No    Drug use: No    Sexual activity: Not on file   Other Topics Concern    Not on file   Social History Narrative    Not on file     Social Determinants of Health     Financial Resource Strain: Low Risk     Difficulty of Paying Living Expenses: Not hard at all   Food Insecurity: No Food Insecurity    Worried About Running Out of Food in the Last Year: Never true   [address] in the Last Year: Never true   Transportation Needs:     Lack of Transportation (Medical): Not on file    Lack of Transportation (Non-Medical): Not on file   Physical Activity:     Days of Exercise per Week: Not on file    Minutes of Exercise per Session: Not on file   Stress:     Feeling of Stress : Not on file   Social Connections:     Frequency of Communication with Friends and Family: Not on file    Frequency of Social Gatherings with Friends and Family: Not on file    Attends Qu[employer] Services: Not on file    Active Member of [address] [place] [address] [employer] [place] or Organizations: Not on file    Attends Club or Organization Meetings: Not on file    Marital Status: Not on file   Intimate Partner Violence:     Fear of Current or Ex-Partner: Not on file    Emotionally Abused: Not on file    Physically Abused: Not on file    Sexually Abused: Not on file   Housing Stability:     Unable to Pay for Housing in the Last Year: Not on file    Number of [place] in the Last Year: Not on file    Unstable Housing in the Last Year: Not on file        TOBACCO:   reports that he has never smoked. He has never used smokeless tobacco.  ETOH:   reports no history of alcohol use. Home Medications  Prior to Admission medications    Medication Sig Start Date End Date Taking? Authorizing Provider   omeprazole (PRILOSEC) 20 MG delayed release capsule Take 1 capsule by mouth every morning (before breakfast) 4/5/22   Orlin Goode DO   SITagliptin (JANUVIA) 100 MG tablet Take 1 tablet by mouth daily 3/30/22   Orlin Goode DO   ONETOUCH ULTRA strip 1 each by Other route 4 times daily As needed.  2/13/22 5/14/22  Sharon Mcguire DO   Lancets [facility] PLUS [id number]) MISC 1 strip by Other route 4 times daily 2/13/22 5/14/22  Sharon Mcguire DO   colchicine (COLCRYS) 0.6 MG tablet Take 1 tablet by mouth daily 2/7/22   Reynold Macias DO   levothyroxine (SYNTHROID) 75 MCG tablet Take 1 tablet by mouth Daily 2/7/22   Orlin Goode DO   atenolol (TENORMIN) 25 MG tablet Take 1 tablet by mouth daily 1/26/22   Orlin Goode DO   pravastatin (PRAVACHOL) 20 MG tablet Take 1 tablet by mouth daily 1/21/22   Orlin Goode DO   potassium citrate (UROCIT-K) 10 MEQ (1080 MG) extended release tablet TAKE ONE TABLET BY MOUTH EVERY DAY 8/5/21   Historical Provider, MD   aspirin 325 MG tablet Take 325 mg by mouth daily    Historical Provider, MD   metFORMIN (GLUCOPHAGE) 500 MG tablet TAKE ONE TABLET BY MOUTH EVERY DAY 5/8/18   Historical Provider, MD   finasteride (PROSCAR) 5 MG tablet Take 5 mg by mouth once a week. Historical Provider, MD   therapeutic multivitamin-minerals (THERAGRAN-M) tablet Take 1 tablet by mouth daily. Historical Provider, MD       Allergies  Allergies   Allergen Reactions    No Known Allergies            Objective  BP (!) 104/55   Pulse 76   Temp 99 °F (37.2 °C) (Bladder)   Resp 27   Ht 5' 6\" (1.676 m)   Wt 154 lb 8 oz (70.1 kg)   SpO2 92%   BMI 24.94 kg/m²     Physical Exam:   Performance Status:  General: AAO to person, place, time, in no acute distress, pleasant. On 2 L oxygen via nasal cannula. Head and neck : PERRLA, EOMI . Sclera non icteric. Oropharynx : Clear  Neck: no JVD,  no adenopathy, no carotid bruit  LYMPHATICS : No peripheral lymphadenopathy. Heart: Regular rate and regular rhythm, no murmur  Lungs: Clear to auscultation   Extremities: No edema,no cyanosis, no clubbing. Abdomen: Soft, non-tender;no masses, no organomegaly  Skin:  No rash. Neurologic:Cranial nerves grossly intact. No focal motor or sensory deficits .     Recent Laboratory Data-   Lab Results   Component Value Date    WBC 5.6 04/15/2022    HGB 7.7 (L) 04/15/2022    HCT 22.8 (L) 04/15/2022    .8 (H) 04/15/2022     (L) 04/15/2022    LYMPHOPCT 13.2 (L) 04/15/2022    RBC 2.04 (L) 04/15/2022    MCH 37.7 (H) 04/15/2022    MCHC 33.8 04/15/2022    RDW 18.2 (H) 04/15/2022    NEUTOPHILPCT 79.8 04/15/2022    MONOPCT 7.0 04/15/2022    BASOPCT 0.2 04/15/2022    NEUTROABS 4.48 04/15/2022    LYMPHSABS 0.73 (L) 04/15/2022    MONOSABS 0.39 04/15/2022    EOSABS 0.00 (L) 04/15/2022    BASOSABS 0.00 04/15/2022       Lab Results   Component Value Date     04/15/2022    K 3.6 04/15/2022     04/15/2022    CO2 21 (L) 04/15/2022    BUN 35 (H) 04/15/2022    CREATININE 1.5 (H) 04/15/2022    GLUCOSE 247 (H) 04/15/2022    CALCIUM 7.1 (L) 04/15/2022    PROT 5.3 (L) 04/15/2022    LABALBU 2.3 (L) 04/15/2022    BILITOT 0.5 04/15/2022    ALKPHOS 41 04/15/2022    AST 61 (H) 04/15/2022     (H) 04/15/2022    LABGLOM 44 04/15/2022    GFRAA 53 04/15/2022       Lab Results   Component Value Date    IRON 122 04/14/2022    TIBC 140 (L) 04/14/2022    FERRITIN 3,037 04/14/2022           Radiology-    US GALLBLADDER RUQ   Final Result   No evidence of cholelithiasis or acute cholecystitis. No biliary ductal   dilatation. XR CHEST PORTABLE   Final Result   Deep line catheter placed on the left tip in the SVC. Increased pulmonary   vascularity again seen bilaterally with no evidence of pneumothorax. CT ABDOMEN PELVIS WO CONTRAST Additional Contrast? None   Final Result   No acute intra-abdominal or pelvic findings. Bilateral nonobstructing renal calculi. Colonic diverticulosis, without diverticulitis. Additional findings as above. CT CHEST WO CONTRAST   Final Result   Progression of moderate interstitial lung disease, which demonstrates a UIP   pattern. MRI LUMBAR SPINE WO CONTRAST   Final Result   Multilevel degenerative changes, most notably at L4-5 and L5-S1. Stable   grade 2 anterolisthesis of L4 on L5 and grade 1 anterolisthesis of L5 on S1.   Multilevel foraminal and lateral recess stenoses are not significantly   changed since the previous exam.      Development of endplate degenerative changes at L2-3. This is a new finding. XR CHEST PORTABLE   Final Result   Marked hypoventilation. Otherwise, no acute process seen. IR BONE MARROW BIOPSY AND ASPIRATION    (Results Pending)         ASSESSMENT/PLAN :  80years old male with history of pulmonary fibrosis, diabetes mellitus, coronary artery disease came to the ER with worsening fatigue, admitted to ICU with septic shock, NSTEMI, rhabdomyolysis. I have been consulted for possible hemolysis. Patient had normal hemoglobin and MCV up until last year. Over the past few months his hemoglobin has dropped along with an increase in MCV. Current hemoglobin is 7 with an MCV of 122. Dysplastic neutrophils and less than 1% blasts were noted on peripheral smear evaluation. Finding suggestive of MDS. Discussed with the patient and family including Dr. Filemon Rosario. We will get a bone marrow biopsy. Normal bilirubin and normal absolute reticulocyte count of 35,000 suggests absence of hemolysis. Recent B12 folate were normal.  Being treated for septic shock. Levophed is being tapered off. On antibiotics. Has been afebrile. Elevated cardiac markers: Cardiology has been consulted for an NSTEMI. GI consulted for possible GI bleed. Denies any blood in stool or black tarry stools. Iron panel ordered. We will continue to follow. 4/15/22  - Hgb lower today at 7.7, . 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  supports this as well  - No evidence of hemolysis. Hapto elevated and bili normal. LDH mildly elevated, likely related to septic shock  - BMBx to evaluate for MDS today  - Trend CBC, transfuse for Hgb <7  - Off levophed  - Renal function improved, Cr 1.5.  Lactate down to 2.1  - Will follow      Electronically signed by Rod Castellanos MD on 4/15/2022 at 9:15 AM

## 2022-04-16 NOTE — PLAN OF CARE
Problem: Skin Integrity:  Goal: Will show no infection signs and symptoms  Description: Will show no infection signs and symptoms  4/16/2022 1549 by Helga Cotter RN  Outcome: Met This Shift  4/16/2022 0349 by Kenyetta Blackman RN  Outcome: Met This Shift  Goal: Absence of new skin breakdown  Description: Absence of new skin breakdown  Outcome: Met This Shift     Problem: Falls - Risk of:  Goal: Will remain free from falls  Description: Will remain free from falls  Outcome: Met This Shift  Goal: Absence of physical injury  Description: Absence of physical injury  4/16/2022 1549 by Helga Cotter RN  Outcome: Met This Shift  4/16/2022 0349 by Kenyetta Blackman RN  Outcome: Met This Shift     Problem: Respiratory:  Goal: Respiratory status will improve  Description: Respiratory status will improve  4/16/2022 1549 by Helga Cotter RN  Outcome: Met This Shift  4/16/2022 0349 by Kenyetta Blackman RN  Outcome: Met This Shift

## 2022-04-16 NOTE — PLAN OF CARE
Problem: Skin Integrity:  Goal: Will show no infection signs and symptoms  Description: Will show no infection signs and symptoms  4/16/2022 0349 by Nacho Torres RN  Outcome: Met This Shift     Problem: Falls - Risk of:  Goal: Absence of physical injury  Description: Absence of physical injury  4/16/2022 0349 by Nacho Torres RN  Outcome: Met This Shift     Problem: Respiratory:  Goal: Respiratory status will improve  Description: Respiratory status will improve  4/16/2022 0349 by Nacho Torres RN  Outcome: Met This Shift

## 2022-04-16 NOTE — PROGRESS NOTES
Internal Medicine Progress Note    [facility]    Venkata Taniya Melendrez, NIRALIAAngelinaCAngelinaOAngelinaIAngelina Zepeda D.O., IOANA Ortega D.O. Trey Ridley, MSN, APRN, NP-C  Fe Villalba. Essence Palomino, MSN, APRN-CNP     Primary Care Physician: Sandra Ortega DO   Admitting Physician:  Kay Dolan DO  Admission date and time: 4/13/2022  6:50 PM    Room:  [place] [address]  Admitting diagnosis: Shortness of breath [R06.02]  NSTEMI (non-ST elevated myocardial infarction) ([facility] [I21.4]  [facility] (acute kidney [facility]) [facility] [N17.9]  Febrile illness [R50.9]  Non-traumatic rhabdomyolysis [M62.82]  Chronic bilateral low back pain without sciatica [M54.50, G89.29]    Patient Name: Jayme Wood  MRN: [id number]    Date of Service: 4/16/2022     Subjective:  Zuly Bolaños is a 80 y.o. male who was seen and examined today,4/16/2022, at the bedside. Patient was going to have endoscopy yesterday but was canceled due to reluctance he of gastroenterology and anesthesia, despite wishes of cardiology. Upper GI and esophagram planned for today. Patient sitting up in chair relates to poor sleeping last evening. Seems slightly more short of breath today with exertion but overall significantly improved since admission. Appetite is good had bowel movement. Discussed discontinuing Morales catheter. Have discussed with the intensivist and will place on Rocephin due to elevated procalcitonin level. No family member present      Review of System:   Constitutional:   Denies fever or chills, weight loss or gain. Weakness seem to be improving and fatigue  HEENT:   Denies ear pain, sore throat, sinus or eye problems. Wearing supplemental O2  Cardiovascular:   Denies any chest pain, irregular heartbeats, or palpitations. Respiratory:   Improved shortness of breath. Still present with activity  Gastrointestinal:   Denies nausea, vomiting, diarrhea, or constipation.   Denies any abdominal pain.  Genitourinary:    Denies any urgency, frequency, hematuria. Morales catheter  Extremities:   Some low back soreness with improved weakness  Neurology:    Denies any headache or focal neurological deficits, Denies generalized weakness or memory difficulty. Psch:   Denies being anxious or depressed. Musculoskeletal:    Denies  myalgias, joint complaint. Mild low back pain  Integumentary:   Denies any rashes, ulcers, or excoriations. Denies bruising. Hematologic/Lymphatic:  Denies bruising or bleeding. Physical Exam:  I/O this shift: In: 573.3 [P.O.:240; I.V.:333.3]  Out: 400 [Urine:400]    Intake/Output Summary (Last 24 hours) at 4/16/2022 1451  Last data filed at 4/16/2022 1300  Gross per 24 hour   Intake 2479.1 ml   Output 1000 ml   Net 1479.1 ml   I/O last 3 completed shifts: In: 4143.3 [P.O.:360; I.V.:3138.3; IV Piggyback:645]  Out: 5036 [[facility]:3569]  Patient Vitals for the past 96 hrs (Last 3 readings):   Weight   04/16/22 0600 162 lb (73.5 kg)   04/14/22 0356 154 lb 8 oz (70.1 kg)   04/14/22 0100 154 lb 15.7 oz (70.3 kg)     Vital Signs:   Blood pressure 120/63, pulse 78, temperature 98.1 °F (36.7 °C), temperature source Core, resp. rate 23, height 5' 6\" (1.676 m), weight 162 lb (73.5 kg), SpO2 91 %. General appearance:  Alert, responsive, oriented to person, place, and time. Appearance much improved today in better spirits and more animated  Head:  Normocephalic. No masses, lesions or tenderness. Eyes:  PERRLA. EOMI. Sclera clear. Buccal mucosa moist.  ENT:  Ears normal. Mucosa dry wearing nasal cannula  Neck:    Supple. Trachea midline. No thyromegaly. No JVD. No bruits. Heart:    Rhythm regular. Rate controlled. 1/6 systolic murmurs. Lungs:    Fibrotic rales at the bases  Abdomen:   Soft. Non-tender. Non-distended. Bowel sounds positive. No organomegaly or masses. No pain on palpation. Extremities:    Peripheral pulses present. No peripheral edema. No ulcers. No cyanosis.  No clubbing. Abrasion noted over the anterior surface of her right lower leg. Neurologic:    Alert x 3. No focal deficit. Cranial nerves grossly intact. Improved weakness  Psych:   Behavior is normal. Mood appears normal. Speech is not rapid and/or pressured. Musculoskeletal:   Spine ROM normal. Muscular strength intact. Gait not assessed. Integumentary:  No rashes  Skin normal color and texture.   Genitalia/Breast:  Morales catheter    Medication:  Scheduled Meds:   predniSONE  5 mg Oral Q24H    predniSONE  20 mg Oral Q24H    cefTRIAXone (ROCEPHIN) IV  1,000 mg IntraVENous Q24H    insulin glargine  15 Units SubCUTAneous Nightly    melatonin  5 mg Oral Nightly    levothyroxine  75 mcg Oral Daily    sodium chloride flush  5-40 mL IntraVENous 2 times per day    midodrine  15 mg Oral TID WC    heparin (porcine)  5,000 Units SubCUTAneous BID    pantoprazole  40 mg IntraVENous Daily    insulin lispro  0-12 Units SubCUTAneous TID WC    insulin lispro  0-6 Units SubCUTAneous Nightly     Continuous Infusions:   sodium chloride      sodium chloride Stopped (04/15/22 2345)    lactated ringers 50 mL/hr at 04/16/22 1300    dextrose         Objective Data:  CBC with Differential:    Lab Results   Component Value Date    WBC 3.8 04/16/2022    RBC 1.98 04/16/2022    HGB 7.4 04/16/2022    HCT 22.3 04/16/2022    PLT 95 04/16/2022    .6 04/16/2022    MCH 37.4 04/16/2022    MCHC 33.2 04/16/2022    RDW 17.8 04/16/2022    NRBC 2.6 04/16/2022    SEGSPCT 48 07/06/2013    METASPCT 4.4 04/16/2022    LYMPHOPCT 16.7 04/16/2022    MONOPCT 6.1 04/16/2022    MYELOPCT 1.8 02/11/2022    BASOPCT 0.3 04/16/2022    MONOSABS 0.23 04/16/2022    LYMPHSABS 0.65 04/16/2022    EOSABS 0.00 04/16/2022    BASOSABS 0.00 04/16/2022     CMP:    Lab Results   Component Value Date     04/16/2022    K 4.1 04/16/2022    K 4.4 04/13/2022     04/16/2022    CO2 22 04/16/2022    BUN 36 04/16/2022    CREATININE 1.4 04/16/2022 GFRAA 58 04/16/2022    LABGLOM 48 04/16/2022    GLUCOSE 294 04/16/2022    GLUCOSE 111 03/29/2012    PROT 5.3 04/16/2022    LABALBU 2.5 04/16/2022    LABALBU 4.4 03/29/2012    CALCIUM 7.2 04/16/2022    BILITOT 0.4 04/16/2022    ALKPHOS 62 04/16/2022    AST 43 04/16/2022    ALT 88 04/16/2022              Assessment:     · Shock probably septic in origin possibility of left diabetic foot versus abrasion right leg--rule out secondary adrenal insufficiency--significantly improved  · Elevated troponin possibly secondary to demand ischemia versus non-ST elevated myocardial infarction  · Abnormal CAT scan lungs showing usual interstitial pneumonia  · Macrocytic anemia undetermined etiology rule out autoimmune  · Acute on chronic renal insufficiency  · Type 2 diabetes mellitus without acidosis complicated by lack of steroid administration  · Essential hypertension historically  · Hyperlipidemia on Pravachol  · Hypothyroidism on Synthroid replacement  · Chronic low back pain with status post decompression with lumbar stenosis  · Asymptomatic cholelithiasis  · Elevated transaminases undetermined etiology              Plan:      · Hemoglobin and hematocrit is slowly trending down. Currently at 7.4. Will perform upper GI esophagram today. Might require endoscopy prior to cardiac catheterization  · Discontinue Morales catheter  · Placed on IV Rocephin. Repeat procalcitonin level. Blood cultures are negative  · Continue work with physical therapy  · Placed on prednisone 20 mg every 8 AM and 5 mg  3 PM  · Melatonin for sleep  · Blood sugar improving  · Renal function stable with creatinine at 1.4 at baseline. Decreased IVs      Greater than 40  minutes of critical care time was spent with the patient. This includes chart review, , and discussion with those consultants involved in the patient's care.      More than 50% of my  time was spent at the bedside counseling/coordinating care with the patient and/or family with face to face contact. This time was spent reviewing notes and laboratory data as well as instructing and counseling the patient. Time I spent with the family or surrogate(s) is included only if the patient was incapable of providing the necessary information or participating in medical decisions. I also discussed the differential diagnosis and all of the proposed management plans with the patient and individuals accompanying the patient. [employer] requires this high level of physician care and nursing on the ICU unit due the complexity of decision management and chance of rapid decline or death. Continued cardiac monitoring and higher level of nursing are required. I am readily available for any further decision-making and intervention.      Venkata Mcguire DO, NIRALIA.C.O.I.  4/16/2022  2:51 PM

## 2022-04-16 NOTE — PLAN OF CARE
Problem: Skin Integrity:  Goal: Will show no infection signs and symptoms  Description: Will show no infection signs and symptoms  4/15/2022 2011 by Shaunna Brunson RN  Outcome: Met This Shift  4/15/2022 1458 by Alejandrina Almendarez RN  Outcome: Met This Shift  Goal: Absence of new skin breakdown  Description: Absence of new skin breakdown  4/15/2022 2011 by Shaunna Brunson RN  Outcome: Met This Shift  4/15/2022 1458 by Alejandrina Almendarez RN  Outcome: Met This Shift     Problem: Falls - Risk of:  Goal: Will remain free from falls  Description: Will remain free from falls  4/15/2022 2011 by Shaunna Brunson RN  Outcome: Met This Shift  4/15/2022 1458 by Alejandrina Almendarez RN  Outcome: Met This Shift  Goal: Absence of physical injury  Description: Absence of physical injury  4/15/2022 2011 by Shaunna Brunson RN  Outcome: Met This Shift  4/15/2022 1458 by Alejandrina Almendarez RN  Outcome: Met This Shift     Problem: Cardiac:  Goal: Ability to maintain an adequate cardiac output will improve  Description: Ability to maintain an adequate cardiac output will improve  4/15/2022 2011 by Shaunna Brunson RN  Outcome: Completed  4/15/2022 1458 by Alejandrina Almendarez RN  Outcome: Met This Shift  Goal: Hemodynamic stability will improve  Description: Hemodynamic stability will improve  4/15/2022 1858 by Shaunna Brunson RN  Outcome: Completed  4/15/2022 1458 by Alejandrina Almendarez RN  Outcome: Met This Shift     Problem: Fluid Volume:  Goal: Ability to achieve and maintain adequate urine output will improve  Description: Ability to achieve and maintain adequate urine output will improve  4/15/2022 2011 by Shaunna Brunson RN  Outcome: Completed  4/15/2022 1458 by Alejandrina Almendarez RN  Outcome: Met This Shift     Problem: Respiratory:  Goal: Respiratory status will improve  Description: Respiratory status will improve  4/15/2022 2011 by Shaunna Brunson RN  Outcome: Met This Shift  4/15/2022 1458 by Alejandrina Almendarez RN  Outcome: Met This Shift

## 2022-04-16 NOTE — PROGRESS NOTES
Pulmonary/Critical Care Consult Note    CHIEF COMPLAINT: Shortness of breath, fatigue, bilateral leg weakness and numbness    HISTORY OF PRESENT ILLNESS:   Patient  is a 80 y.o. male presenting to the ED for fatigue, shortness of breath, leg weakness and numbness. The history is obtained from the patient, patient's family as well as the patient's medical record. The patient is presenting to the emergency department the chief complaint of fatigue, shortness of breath, leg weakness and numbness. Since receiving the Covid booster the patient has had multiple chronic complaints such as worsening shortness of breath and fatigue. He has been noted to have increasing pulmonary fibrosis. He was started on prednisone with some improvement. He states that he still does have shortness of breath which is worse with activity and exertion. Over the last 2 days he has had increasing fatigue. He reports he was sitting at home in a chair was unable to get out of the chair for 2 days secondary to leg weakness and numbness. This is moderate in severity. Nothing makes it better. Nothing makes worse. No treatment prior to arrival.  He does have chronic lumbar back pain which he states has worsened over the last 2 days. He does admit to urinary incontinence and states he was not able to tell he was urinating while he was sitting in the chair. He denies any saddle anesthesias. CT chest without contrast shows progression of moderate and interstitial lung disease with UIP pattern. MRI of lumbar spine showed degenerative changes and multilevel spinal stenosis. Daily progress:  April 14, 2022: Patient is awake but he is extremely weak and tired. His blood pressure is borderline low and will be started on Levophed. He has no fever, chills, or rigors. He is currently on room air. April 15, 2022: Patient has been weaned off Levophed. Lactate has normalized. Creatinine is improving, urine output has improved. Patient is scheduled for bone marrow biopsy today. He has no fever, chills, rigors. April 16, 2022: Patient continues to be on Levophed. Antibiotics were discontinued by primary MD.  He has no fever, chills, rigors. He is undergoing endoscopy today. Fever has subsided. ALLERGY:  No known allergies    FAMILY HISTORY:  Family History   Problem Relation Age of Onset    No Known Problems Mother     Diabetes Father        SOCIAL HISTORY:  Social History     Socioeconomic History    Marital status:      Spouse name: Not on file    Number of children: Not on file    Years of education: Not on file    Highest education level: Not on file   Occupational History    Not on file   Tobacco Use    Smoking status: Never Smoker    Smokeless tobacco: Never Used   Vaping Use    Vaping Use: Never used   Substance and Sexual Activity    Alcohol use: No    Drug use: No    Sexual activity: Not on file   Other Topics Concern    Not on file   Social History Narrative    Not on file     Social Determinants of Health     Financial Resource Strain: Low Risk     Difficulty of Paying Living Expenses: Not hard at all   Food Insecurity: No Food Insecurity    Worried About [address] [email or web address] in the Last Year: Never true    [facility] in the Last Year: Never true   Transportation Needs:     Lack of Transportation (Medical): Not on file    Lack of Transportation (Non-Medical):  Not on file   Physical Activity:     Days of Exercise per Week: Not on file    Minutes of Exercise per Session: Not on file   Stress:     Feeling of Stress : Not on file   Social Connections:     Frequency of Communication with Friends and Family: Not on file    Frequency of Social Gatherings with Friends and Family: Not on file    Attends Church Services: Not on file    Active Member of Clubs or Organizations: Not on file    Attends Club or Organization Meetings: Not on file    Marital Status: Not on file   Intimate Partner Violence:     Fear of Current or Ex-Partner: Not on file    Emotionally Abused: Not on file    Physically Abused: Not on file    Sexually Abused: Not on file   Housing Stability:     Unable to Pay for Housing in the Last Year: Not on file    Number of Places Lived in the Last Year: Not on file    Unstable Housing in the Last Year: Not on file       MEDICAL HISTORY:  Past Medical History:   Diagnosis Date    Diabetes mellitus ([address]     H/O cardiovascular stress test 12/01/2021    Lexiscan    History of blood transfusion     History of Holter monitoring 02/11/2022    Hyperlipidemia     Hypertension     Lower back pain     Thyroid disease        MEDICATIONS:   predniSONE  5 mg Oral Q24H    predniSONE  20 mg Oral Q24H    insulin glargine  15 Units SubCUTAneous Nightly    melatonin  5 mg Oral Nightly    levothyroxine  75 mcg Oral Daily    sodium chloride flush  5-40 mL IntraVENous 2 times per day    midodrine  15 mg Oral TID WC    heparin (porcine)  5,000 Units SubCUTAneous BID    pantoprazole  40 mg IntraVENous Daily    insulin lispro  0-12 Units SubCUTAneous TID WC    insulin lispro  0-6 Units SubCUTAneous Nightly      sodium chloride      sodium chloride Stopped (04/15/22 2345)    lactated ringers 50 mL/hr at 04/16/22 0701    dextrose       sodium phosphate IVPB, sodium chloride flush, sodium chloride, ondansetron **OR** ondansetron, polyethylene glycol, acetaminophen **OR** acetaminophen, glucagon (rDNA), glucose, dextrose, dextrose bolus (hypoglycemia) **OR** dextrose bolus (hypoglycemia)    REVIEW OF SYSTEMS:  Constitutional: Denies fever, weight loss, night sweats, and fatigue  Skin: Denies pigmentation, dark lesions, reports left foot wound  HEENT: Denies hearing loss, tinnitus, ear drainage, epistaxis, sore throat, and hoarseness. Cardiovascular: Denies palpitations, chest pain, and chest pressure.   Respiratory: Denies cough, dyspnea at rest, hemoptysis, apnea, and choking. Gastrointestinal: Denies nausea, vomiting, poor appetite, diarrhea, heartburn or reflux  Genitourinary: Denies dysuria, frequency, urgency or hematuria  Musculoskeletal: Denies myalgias, reports bilateral leg weakness and numbness, and denies bone pain  Neurological: Denies dizziness, vertigo, headache, and focal weakness  Psychological: Denies anxiety and depression  Endocrine: Denies heat intolerance and cold intolerance  Hematopoietic/Lymphatic: Denies bleeding problems and blood transfusions    PHYSICAL EXAM:  Vitals:    04/16/22 1000   BP: 118/66   Pulse: 74   Resp: 21   Temp:    SpO2: 91%        O2 Flow Rate (L/min): 2 L/min  O2 Device: Nasal cannula    Constitutional: No fever, chills, diaphoresis, very pleasant  HEENT: No head lesions, PERRL, EOMI, dry mouth without lesions, no nasal lesions, no cervical adenopathy palpated   Respiratory: Lungs with equal breath sounds bilaterally, no adventitious sounds auscultated, no accessory muscle use   CV: Regular rate, no murmurs, JVD, no leg edema   Abdomen: Soft, non tender, + bowel sounds, no lesions   Skin: Pale, poor skin turgor, no rash, capillary refill <2 seconds, left diabetic foot wound with grossly intact skin to the plantar surface medially, minimal erythema, no lymphangitis  Extremities: Muscular strength 4/4 in 4 limbs, moves 4 limbs spontaneously, distal pulses present   Neurology: Awake and alert, follows commands, moves 4 limbs on command and spontaneously, equal sensation, no dysmetria, neck is supple, no meningitic signs present.         LABS:  WBC   Date Value Ref Range Status   04/16/2022 3.8 (L) 4.5 - 11.5 E9/L Final   04/15/2022 5.6 4.5 - 11.5 E9/L Final   04/14/2022 6.4 4.5 - 11.5 E9/L Final     Hemoglobin   Date Value Ref Range Status   04/16/2022 7.4 (L) 12.5 - 16.5 g/dL Final   04/15/2022 7.7 (L) 12.5 - 16.5 g/dL Final   04/15/2022 7.7 (L) 12.5 - 16.5 g/dL Final     Hematocrit   Date Value Ref Range Status   04/16/2022 22.3 (L) 37.0 - 54.0 % Final   04/15/2022 22.9 (L) 37.0 - 54.0 % Final   04/15/2022 22.8 (L) 37.0 - 54.0 % Final     MCV   Date Value Ref Range Status   04/16/2022 112.6 (H) 80.0 - 99.9 fL Final   04/15/2022 111.8 (H) 80.0 - 99.9 fL Final   04/14/2022 122.1 (H) 80.0 - 99.9 fL Final     Platelets   Date Value Ref Range Status   04/16/2022 95 (L) 130 - 450 E9/L Final   04/15/2022 107 (L) 130 - 450 E9/L Final   04/14/2022 111 (L) 130 - 450 E9/L Final     Sodium   Date Value Ref Range Status   04/16/2022 135 132 - 146 mmol/L Final   04/15/2022 138 132 - 146 mmol/L Final   04/14/2022 137 132 - 146 mmol/L Final     Potassium   Date Value Ref Range Status   04/16/2022 4.1 3.5 - 5.0 mmol/L Final   04/15/2022 3.6 3.5 - 5.0 mmol/L Final   04/14/2022 3.6 3.5 - 5.0 mmol/L Final     Potassium reflex Magnesium   Date Value Ref Range Status   04/13/2022 4.4 3.5 - 5.0 mmol/L Final     Chloride   Date Value Ref Range Status   04/16/2022 105 98 - 107 mmol/L Final   04/15/2022 106 98 - 107 mmol/L Final   04/14/2022 102 98 - 107 mmol/L Final     CO2   Date Value Ref Range Status   04/16/2022 22 22 - 29 mmol/L Final   04/15/2022 21 (L) 22 - 29 mmol/L Final   04/14/2022 17 (L) 22 - 29 mmol/L Final     BUN   Date Value Ref Range Status   04/16/2022 36 (H) 6 - 23 mg/dL Final   04/15/2022 35 (H) 6 - 23 mg/dL Final   04/14/2022 47 (H) 6 - 23 mg/dL Final     CREATININE   Date Value Ref Range Status   04/16/2022 1.4 (H) 0.7 - 1.2 mg/dL Final   04/15/2022 1.5 (H) 0.7 - 1.2 mg/dL Final   04/14/2022 1.9 (H) 0.7 - 1.2 mg/dL Final     Glucose   Date Value Ref Range Status   04/16/2022 294 (H) 74 - 99 mg/dL Final   04/15/2022 247 (H) 74 - 99 mg/dL Final   04/14/2022 338 (H) 74 - 99 mg/dL Final   03/29/2012 111 (H) 70 - 110 mg/dL Final   12/06/2011 107 70 - 110 mg/dL Final   06/06/2011 109 70 - 110 mg/dL Final     Calcium   Date Value Ref Range Status   04/16/2022 7.2 (L) 8.6 - 10.2 mg/dL Final   04/15/2022 7.1 (L) 8.6 - 10.2 mg/dL Final   04/14/2022 7.1 (L) 8.6 - 10.2 mg/dL Final     Total Protein   Date Value Ref Range Status   04/16/2022 5.3 (L) 6.4 - 8.3 g/dL Final   04/15/2022 5.3 (L) 6.4 - 8.3 g/dL Final   04/14/2022 5.8 (L) 6.4 - 8.3 g/dL Final     Albumin   Date Value Ref Range Status   04/16/2022 2.5 (L) 3.5 - 5.2 g/dL Final   04/15/2022 2.3 (L) 3.5 - 5.2 g/dL Final   04/14/2022 2.8 (L) 3.5 - 5.2 g/dL Final   03/29/2012 4.4 3.2 - 4.8 g/dL Final   12/06/2011 4.2 3.2 - 4.8 g/dL Final   06/06/2011 4.6 3.2 - 4.8 g/dL Final     Total Bilirubin   Date Value Ref Range Status   04/16/2022 0.4 0.0 - 1.2 mg/dL Final   04/15/2022 0.5 0.0 - 1.2 mg/dL Final   04/14/2022 0.9 0.0 - 1.2 mg/dL Final     Alkaline Phosphatase   Date Value Ref Range Status   04/16/2022 62 40 - 129 U/L Final   04/15/2022 41 40 - 129 U/L Final   04/14/2022 34 (L) 40 - 129 U/L Final     AST   Date Value Ref Range Status   04/16/2022 43 (H) 0 - 39 U/L Final   04/15/2022 61 (H) 0 - 39 U/L Final   04/14/2022 92 (H) 0 - 39 U/L Final     ALT   Date Value Ref Range Status   04/16/2022 88 (H) 0 - 40 U/L Final   04/15/2022 115 (H) 0 - 40 U/L Final   04/14/2022 75 (H) 0 - 40 U/L Final     GFR Non-   Date Value Ref Range Status   04/16/2022 48 >=60 mL/min/1.73 Final     Comment:     Chronic Kidney Disease: less than 60 ml/min/1.73 sq.m. Kidney Failure: less than 15 ml/min/1.73 sq.m. Results valid for patients 18 years and older. 04/15/2022 44 >=60 mL/min/1.73 Final     Comment:     Chronic Kidney Disease: less than 60 ml/min/1.73 sq.m. Kidney Failure: less than 15 ml/min/1.73 sq.m. Results valid for patients 18 years and older. 04/14/2022 34 >=60 mL/min/1.73 Final     Comment:     Chronic Kidney Disease: less than 60 ml/min/1.73 sq.m. Kidney Failure: less than 15 ml/min/1.73 sq.m. Results valid for patients 18 years and older.        GFR    Date Value Ref Range Status   04/16/2022 58  Final   04/15/2022 53  Final   04/14/2022 41  Final Magnesium   Date Value Ref Range Status   04/16/2022 1.9 1.6 - 2.6 mg/dL Final   04/14/2022 1.7 1.6 - 2.6 mg/dL Final     Phosphorus   Date Value Ref Range Status   04/16/2022 1.6 (L) 2.5 - 4.5 mg/dL Final   04/14/2022 3.3 2.5 - 4.5 mg/dL Final   08/13/2020 3.2 2.5 - 4.5 mg/dL Final     No results for input(s): PH, PO2, PCO2, HCO3, BE, O2SAT in the last 72 hours. RADIOLOGY:  IR BONE MARROW BIOPSY AND ASPIRATION   Final Result   Status post CT guided bone marrow aspiration and core biopsy of the iliac   bone. US GALLBLADDER RUQ   Final Result   No evidence of cholelithiasis or acute cholecystitis. No biliary ductal   dilatation. XR CHEST PORTABLE   Final Result   Deep line catheter placed on the left tip in the SVC. Increased pulmonary   vascularity again seen bilaterally with no evidence of pneumothorax. CT ABDOMEN PELVIS WO CONTRAST Additional Contrast? None   Final Result   No acute intra-abdominal or pelvic findings. Bilateral nonobstructing renal calculi. Colonic diverticulosis, without diverticulitis. Additional findings as above. CT CHEST WO CONTRAST   Final Result   Progression of moderate interstitial lung disease, which demonstrates a UIP   pattern. MRI LUMBAR SPINE WO CONTRAST   Final Result   Multilevel degenerative changes, most notably at L4-5 and L5-S1. Stable   grade 2 anterolisthesis of L4 on L5 and grade 1 anterolisthesis of L5 on S1.   Multilevel foraminal and lateral recess stenoses are not significantly   changed since the previous exam.      Development of endplate degenerative changes at L2-3. This is a new finding. XR CHEST PORTABLE   Final Result   Marked hypoventilation. Otherwise, no acute process seen. FL UGI W KUB    (Results Pending)       IMPRESSION:  1. Septic shock possibly secondary to left diabetic foot wound  2. NSTEMI  3. Suspected adrenal insufficiency secondary to recent steroid use  4.  Macrocytic normochromic anemia ( ?  Myelodysplastic syndrome)  5. Myositis (?  Related to statin)  6. Possible GI bleed  7. BURAK on CKD  8. Transaminitis  9. Type 2 diabetes with hyperglycemia  10. Left diabetic foot wound  11. Interstitial lung disease (UIP)  12. Hypertension  13. Hyperlipidemia  14. Hypothyroidism    PLAN:  1. Patient is currently off antibiotics as per primary MD.  2. S/P Transfusion of 1 unit packed red blood cells  3. Cardiology consulted  4. Echocardiogram with normal EF with no regional wall motion abnormalities. 5. Cortisol level is within normal  6. Currently on oral prednisone. 7. CT abdomen pelvis without contrast with nephrolithiasis but no other acute abnormality  8. Patient is currently undergoing EGD. We will follow up the results. 9. GI following  10. DVT prophylaxis with heparin subcu   11. SCDs  12. Protonix 40 mg IV every 24 hours  13. Avoid nephrotoxic medications, pharmacy to dose vancomycin  14. Hematology/oncology consult possible MDS  15. Cardiac telemetry  16. Optimize nutrition. 17. OT and PT eval and treat    ATTESTATION:  ICU Staff Physician note of personal involvement in Care  As the attending physician, I certify that I personally reviewed the patients history and personally examined the patient to confirm the physical findings described above,  And that I reviewed the relevant imaging studies and available reports. I also discussed the differential diagnosis and all of the proposed management plans with the patient and individuals accompanying the patient to this visit. They had the opportunity to ask questions about the proposed management plans and to have those questions answered. This patient has a high probability of sudden, clinically significant deterioration, which requires the highest level of physician preparedness to intervene urgently. I managed/supervised life or organ supporting interventions that required frequent physician assessment.    I devoted my full attention to the direct care of this patient for the amount of time indicated below. Time I spent with the family or surrogate(s) is included only if the patient was incapable of providing the necessary information or participating in medical decisions  Time devoted to teaching and to any procedures I billed separately is not included.     CRITICAL CARE TIME:  47 minutes    Electronically signed by Margret Berg MD on 4/16/2022 at 11:16 AM

## 2022-04-16 NOTE — PROGRESS NOTES
[facility]        CARDIOLOGY                 INPATIENT PROGRESS NOTE          PATIENT SEEN IN FOLLOW UP FOR: NSTEMI    Hospital Day: 4     James Cancino is a 80year old patient previously not known to [facility] Cardiology. SUBJECTIVE: Denies any chest pain or SOB, Just returned from Endoscopy. No palpitations or dizzy. No nausea    ROS: Review of rest of 10 systems negative except as mentioned above    OBJECTIVE: No acute distress. See Assessment     Diagnostics:       Telemetry: Reviewed           Intake/Output Summary (Last 24 hours) at 4/16/2022 1132  Last data filed at 4/16/2022 0548  Gross per 24 hour   Intake 2872.02 ml   Output 1050 ml   Net 1822.02 ml       Labs:   CBC:   Recent Labs     04/15/22  0613 04/15/22  0613 04/15/22  2025 04/16/22  0418   WBC 5.6  --   --  3.8*   HGB 7.7*   < > 7.7* 7.4*   HCT 22.8*   < > 22.9* 22.3*   *  --   --  95*    < > = values in this interval not displayed. BMP:   Recent Labs     04/15/22  0613 04/16/22  0418    135   K 3.6 4.1   CO2 21* 22   BUN 35* 36*   CREATININE 1.5* 1.4*   LABGLOM 44 48   CALCIUM 7.1* 7.2*     Mag:   Recent Labs     04/14/22  0444 04/16/22  0418   MG 1.7 1.9     Phos:   Recent Labs     04/14/22  0444 04/16/22  0418   PHOS 3.3 1.6*     TSH:   Recent Labs     04/14/22  0444   TSH 1.100     HgA1c:     BNP: No results for input(s): BNP in the last 72 hours.   PT/INR:   Recent Labs     04/14/22  1151 04/14/22  1800   PROTIME 27.0* 22.4*   INR 2.3 1.9     APTT:  Recent Labs     04/14/22  1800   APTT 27.0     CARDIAC ENZYMES:  Recent Labs     04/13/22 2008 04/14/22  0444 04/15/22  0613   CKTOTAL 1,143* 1,038* 256*     FASTING LIPID PANEL:  Lab Results   Component Value Date    CHOL 70 04/14/2022    HDL 25 04/14/2022    LDLCALC 31 04/14/2022    TRIG 70 04/14/2022     LIVER PROFILE:  Recent Labs     04/15/22  0613 04/16/22  0418   AST 61* 43*   * 88*   LABALBU 2.3* 2.5*       Current Inpatient Medications:   predniSONE  5 mg Oral Q24H    predniSONE  20 mg Oral Q24H    insulin glargine  15 Units SubCUTAneous Nightly    melatonin  5 mg Oral Nightly    levothyroxine  75 mcg Oral Daily    sodium chloride flush  5-40 mL IntraVENous 2 times per day    midodrine  15 mg Oral TID WC    heparin (porcine)  5,000 Units SubCUTAneous BID    pantoprazole  40 mg IntraVENous Daily    insulin lispro  0-12 Units SubCUTAneous TID WC    insulin lispro  0-6 Units SubCUTAneous Nightly       IV Infusions (if any):   sodium chloride      sodium chloride Stopped (04/15/22 9505)    lactated ringers 50 mL/hr at 22 0701    dextrose           PHYSICAL EXAM:     CONSTITUTIONAL:   /66   Pulse 74   Temp 97.9 °F (36.6 °C) (Core)   Resp 21   Ht 5' 6\" (1.676 m)   Wt 162 lb (73.5 kg)   SpO2 91%   BMI 26.15 kg/m²   Pulse  Av.5  Min: 61  Max: 85  Systolic (99OQN), BZH:024 , Min:87 , XDA:416    Diastolic (31SLK), DPF:14, Min:43, Max:81        In general, this is a well developed, well nourished who appears stated age, awake, alert, no apparent distress  HEENT: eyes -conjunctivae pink,  Throat - Oral mucosa moist.   Neck-  no stridor,no carotid bruit. no jugular venous distention   RESPIRATORY: Chest symmetrical.  No accessory muscle use. Lung auscultation - clear to auscultation except few rhonchi  CARDIOVASCULAR:     Heart Palpation - no palpable thrills  Heart Ausculation - Regular rate and rhythm, 2/6 systolic murmur, No s3 or rub. No lower extremity edema, Distal pulses fair; no cyanosis. Abrasion to right lower leg. ABDOMEN: Soft, nontender,  Bowel sounds present. MS: n/a  : Deferred  Rectal Exam: Deferred  SKIN: warm and dry s   NEURO / PSYCH: oriented to person, place                                          ASSESSMENT/PLAN:      NSTEMI - Denies CP; EKG and Echo findings suggest inferior MI. No ASA/Anticaogualtion due to Anemia and poss GI bleed;  No BB due to low BP; No statin due to elevated LFTs and advanced age; 2D Echo showed inferior wall motion abnormality with LV EF 55%. Nonischemic stress test in Dec 2021. Consider Left Heart Cath when his renal Fn and H/H acceptable, and no signs of GI bleed or active infection. [facility] scheduled for Monday, pending his H/H and renal Fn. Needs Anemia w/u especially r/o any GI bleed prior to his Cardiac cath in case he needs dual antiplatelet therapy.     Hypotension - Off inotropic support.      PAF - Diagnosed in Jan 2022 - Was on Eliquis;  Off Eliquis since Holter in Feb 2022 showed no A Fib and due to his Anemia.     Acute on chronic Anemia - GI on case, Transfuse as needed; had Bone marrow bx - Hem/Onc following     Possible Rhabdomyolysis - Monitor CPKs and Renal  Fn     Possible GI bleed - GI on case, Ok for endoscopy     BURAK/CKD - Better, Monitor renal Fn     Elevated LFTs - GI on case     Type 2 diabetes - Per Dr Chanel Zayas     Left diabetic foot wound     Interstitial lung disease - On O2      Hypothyroidism - On HRT                  d/w Dr Chanel Zayas this morning.     Above d/w him    Electronically signed by Pepe Amos MD on 4/16/2022 at 11:32 AM  [facility]) Cardiology

## 2022-04-16 NOTE — PROGRESS NOTES
PROGRESS NOTE    By Ty Jarrett D.O., GI Fellow    [facility]  Dr. Sherryll Lefort, M.D., Dr. Kerrie Kumar M.D., Dr. Jose Talamantes., Dr. Bushra Ngo M.D., Dr. Alicia Doty Prest  80 y.o.  male    SUBJECTIVE:    No acute events overnight. Discussed postpone EGD and okay for diet after KUB and esophagram.  Discussed continued serial H&H and plans for EGD tentatively Monday morning. Patient continues to deny any melena, hematochezia, nausea, vomiting, or abdominal pain. OBJECTIVE:  /67   Pulse 84   Temp 97.9 °F (36.6 °C) (Core)   Resp 28   Ht 5' 6\" (1.676 m)   Wt 162 lb (73.5 kg)   SpO2 92%   BMI 26.15 kg/m²   GEN: A&Ox3, friendly, NAD  HEENT:PEERL  Heart: RRR  Lungs: CTAB  Abd.: soft, NT, ND, BS +, no G/R, no HSM  Extr.: no C/C/E, no brusing         Lab Results   Component Value Date    WBC 3.8 04/16/2022    WBC 5.6 04/15/2022    WBC 6.4 04/14/2022    HGB 7.4 04/16/2022    HGB 7.7 04/15/2022    HGB 7.7 04/15/2022    HCT 22.3 04/16/2022    .6 04/16/2022    RDW 17.8 04/16/2022    PLT 95 04/16/2022     04/15/2022     04/14/2022     Lab Results   Component Value Date     04/16/2022    K 4.1 04/16/2022    K 4.4 04/13/2022     04/16/2022    CO2 22 04/16/2022    BUN 36 04/16/2022    CREATININE 1.4 04/16/2022    CALCIUM 7.2 04/16/2022    PROT 5.3 04/16/2022    LABALBU 2.5 04/16/2022    LABALBU 4.4 03/29/2012    BILITOT 0.4 04/16/2022    BILITOT 0.5 04/15/2022    BILITOT 0.9 04/14/2022    ALKPHOS 62 04/16/2022    ALKPHOS 41 04/15/2022    ALKPHOS 34 04/14/2022    AST 43 04/16/2022    AST 61 04/15/2022    AST 92 04/14/2022    ALT 88 04/16/2022     04/15/2022    ALT 75 04/14/2022     No results found for: LIPASE  No results found for: AMYLASE      ASSESSMENT/PLAN:  1.   Acute blood loss anemia, unspecified  -Macrocytic  -Patient hemodynamically stable without any clinical evidence of an overt or brisk GI bleed  -Currently broad differential but cannot exclude any upper or lower GI source  -Anemia panel unremarkable for any hemolytic process not suggestive of anemia of chronic disease VS myelosuppression  -Serial H&H  -Type and cross, please keep 2 units on hold at all times. Recommend restrictive transfusion parameters, will defer to primary  -Recommend vitamin K  -KUB and barium esophagram  -Plan for EGD on Monday morning, pending hospital course  -Continue supportive care        2. NSTEMI  -Cardiology following, appreciate evaluation and recommendations        3. Elevated liver enzymes  -R factor 6.6: Hepatocellular  -Right upper quadrant ultrasound  -Hepatic panel unremarkable for any metabolic, autoimmune, or viral etiology but cannot rule out DI LI        4. Septic shock  -Possibly secondary to left diabetic foot wound  -Per primary        5. 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 Discussed with fellow and agree with the plan of care stated above.     Cynthia Padilla DO  4/29/2022  1:16 PM

## 2022-04-17 NOTE — PROGRESS NOTES
[facility]        CARDIOLOGY                 INPATIENT PROGRESS NOTE          PATIENT SEEN IN FOLLOW UP FOR: NSTEMI    Hospital Day: 5     Holly Martinez is a 80year old patient previously not known to [facility]. SUBJECTIVE: Denies any chest pain or SOB,  No palpitations or dizzy. No nausea  -For upper Endoscopy tomorrow    ROS: Review of rest of 10 systems negative except as mentioned above    OBJECTIVE: No acute distress. See Assessment     Diagnostics:       Telemetry: Reviewed           Intake/Output Summary (Last 24 hours) at 4/17/2022 1026  Last data filed at 4/17/2022 0600  Gross per 24 hour   Intake 1293.26 ml   Output 1375 ml   Net -81.74 ml       Labs:   CBC:   Recent Labs     04/16/22  0418 04/17/22  0448   WBC 3.8* 2.8*   HGB 7.4* 7.3*   HCT 22.3* 21.6*   PLT 95* 90*     BMP:   Recent Labs     04/16/22  0418 04/17/22  0448    141   K 4.1 3.5   CO2 22 25   BUN 36* 29*   CREATININE 1.4* 1.2   LABGLOM 48 57   CALCIUM 7.2* 7.6*     Mag:   Recent Labs     04/16/22  0418 04/17/22  0448   MG 1.9 1.9     Phos:   Recent Labs     04/16/22  0418 04/17/22  0448   PHOS 1.6* 1.8*     TSH:   No results for input(s): TSH in the last 72 hours. HgA1c:     BNP: No results for input(s): BNP in the last 72 hours.   PT/INR:   Recent Labs     04/14/22  1151 04/14/22  1800   PROTIME 27.0* 22.4*   INR 2.3 1.9     APTT:  Recent Labs     04/14/22  1800   APTT 27.0     CARDIAC ENZYMES:  Recent Labs     04/15/22  0613   CKTOTAL 256*     FASTING LIPID PANEL:  Lab Results   Component Value Date    CHOL 70 04/14/2022    HDL 25 04/14/2022    LDLCALC 31 04/14/2022    TRIG 70 04/14/2022     LIVER PROFILE:  Recent Labs     04/16/22  0418 04/17/22  0448   AST 43* 28   ALT 88* 71*   LABALBU 2.5* 2.3*       Current Inpatient Medications:   sodium phosphate IVPB  30 mmol IntraVENous Once    midodrine  10 mg Oral TID WC    sodium chloride flush  5-40 mL IntraVENous 2 times per day    insulin glargine  10 Units SubCUTAneous Nightly    pantoprazole  40 mg Oral QAM AC    predniSONE  5 mg Oral Q24H    predniSONE  20 mg Oral Q24H    cefTRIAXone (ROCEPHIN) IV  1,000 mg IntraVENous Q24H    insulin lispro  0-18 Units SubCUTAneous TID WC    insulin lispro  0-9 Units SubCUTAneous Nightly    melatonin  10 mg Oral Nightly    levothyroxine  75 mcg Oral Daily    sodium chloride flush  5-40 mL IntraVENous 2 times per day    [Held by provider] heparin (porcine)  5,000 Units SubCUTAneous BID       IV Infusions (if any):   sodium chloride      sodium chloride      sodium chloride Stopped (04/15/22 2345)    lactated ringers 50 mL/hr at 22 1300    dextrose           PHYSICAL EXAM:     CONSTITUTIONAL:   /84   Pulse 84   Temp 98.2 °F (36.8 °C) (Oral)   Resp 21   Ht 5' 6\" (1.676 m)   Wt 162 lb (73.5 kg)   SpO2 97%   BMI 26.15 kg/m²   Pulse  Av.6  Min: 73  Max: 852  Systolic (93QLT), AYJ:565 , Min:112 , PNN:360    Diastolic (35KLS), IOM:59, Min:50, Max:84        In general, this is a well developed, well nourished who appears stated age, awake, alert, no apparent distress  HEENT: eyes -conjunctivae pink,  Throat - Oral mucosa moist.   Neck-  no stridor,no carotid bruit. no jugular venous distention   RESPIRATORY: Chest symmetrical.  No accessory muscle use. Lung auscultation - clear to auscultation except few rhonchi  CARDIOVASCULAR:     Heart Palpation - no palpable thrills  Heart Ausculation - Regular rate and rhythm, 2/6 systolic murmur, No s3 or rub. No lower extremity edema, Distal pulses fair; no cyanosis. Abrasion to right lower leg. ABDOMEN: Soft, nontender,  Bowel sounds present. MS: n/a  : Deferred  Rectal Exam: Deferred  SKIN: warm and dry s   NEURO / PSYCH: oriented to person, place                                          ASSESSMENT/PLAN:      NSTEMI - Denies CP; EKG and Echo findings suggest inferior MI.  No ASA/Anticaogualtion due to Anemia and poss GI bleed; Start low becerra BB - monitor BP, No statin due to elevated LFTs and advanced age; 2D Echo showed inferior wall motion abnormality with LV EF 55%. Nonischemic stress test in Dec 2021. His family wish Left Heart Cath when his renal Fn and H/H acceptable, and no signs of GI bleed or active infection. Needs Anemia w/u especially r/o any GI bleed prior to his Cardiac cath in case he needs dual antiplatelet therapy. Risk benefits of Heart cath discussed with him today; His family/he are agreebale. [facility] scheduled for Tuesday, pending his H/H, GI w/u and renal Fn.      Hypotension - Off inotropic support.      PAF - Diagnosed in Jan 2022 - Was on Eliquis;  Off Eliquis since Holter in Feb 2022 showed no A Fib and due to his Anemia.     Acute on chronic Anemia -  s/p PRBC Transfusion; had Bone marrow bx - Hem/Onc following, GI on case,     Possible Rhabdomyolysis - Monitor CPKs and Renal  Fn     Possible GI bleed - GI on case, Ok for endoscopy     BURAK/CKD - Better, Monitor renal Fn     Elevated LFTs - GI on case     Type 2 diabetes - Per Dr Pamela Alvarez     Left diabetic foot wound     Interstitial lung disease - On O2      Hypothyroidism - On HRT                  d/w Dr Florina Sage d/w him    Electronically signed by Gilda Mota MD on 4/17/2022 at [address] [place] [address] Cardiology

## 2022-04-17 NOTE — PROGRESS NOTES
Pulmonary/Critical Care Consult Note    CHIEF COMPLAINT: Shortness of breath, fatigue, bilateral leg weakness and numbness    HISTORY OF PRESENT ILLNESS:   Patient  is a 80 y.o. male presenting to the ED for fatigue, shortness of breath, leg weakness and numbness. The history is obtained from the patient, patient's family as well as the patient's medical record. The patient is presenting to the emergency department the chief complaint of fatigue, shortness of breath, leg weakness and numbness. Since receiving the Covid booster the patient has had multiple chronic complaints such as worsening shortness of breath and fatigue. He has been noted to have increasing pulmonary fibrosis. He was started on prednisone with some improvement. He states that he still does have shortness of breath which is worse with activity and exertion. Over the last 2 days he has had increasing fatigue. He reports he was sitting at home in a chair was unable to get out of the chair for 2 days secondary to leg weakness and numbness. This is moderate in severity. Nothing makes it better. Nothing makes worse. No treatment prior to arrival.  He does have chronic lumbar back pain which he states has worsened over the last 2 days. He does admit to urinary incontinence and states he was not able to tell he was urinating while he was sitting in the chair. He denies any saddle anesthesias. CT chest without contrast shows progression of moderate and interstitial lung disease with UIP pattern. MRI of lumbar spine showed degenerative changes and multilevel spinal stenosis. Daily progress:  April 14, 2022: Patient is awake but he is extremely weak and tired. His blood pressure is borderline low and will be started on Levophed. He has no fever, chills, or rigors. He is currently on room air. April 15, 2022: Patient has been weaned off Levophed. Lactate has normalized. Creatinine is improving, urine output has improved. Patient is scheduled for bone marrow biopsy today. He has no fever, chills, rigors. April 16, 2022: Patient continues to be on Levophed. Antibiotics were discontinued by primary MD.  He has no fever, chills, rigors. He is undergoing endoscopy today. Fever has subsided. April 17, 2022: Patient continues to be hemodynamically stable. He remains pancytopenic. He has no fever, chills, rigors. He had an uneventful night. He had 2 bowel movements yesterday both brown with no evidence of active bleeding. He is currently on 2 L nasal cannula. Creatinine is improving. Endoscopy was canceled yesterday and patient is rescheduled for upper GI endoscopy on Monday. ALLERGY:  No known allergies    FAMILY HISTORY:  Family History   Problem Relation Age of Onset    No Known Problems Mother     Diabetes Father        SOCIAL HISTORY:  Social History     Socioeconomic History    Marital status:      Spouse name: Not on file    Number of children: Not on file    Years of education: Not on file    Highest education level: Not on file   Occupational History    Not on file   Tobacco Use    Smoking status: Never Smoker    Smokeless tobacco: Never Used   Vaping Use    Vaping Use: Never used   Substance and Sexual Activity    Alcohol use: No    Drug use: No    Sexual activity: Not on file   Other Topics Concern    Not on file   Social History Narrative    Not on file     Social Determinants of Health     Financial Resource Strain: Low Risk     Difficulty of Paying Living Expenses: Not hard at all   Food Insecurity: No Food Insecurity    Worried About [address] in the Last Year: Never true    [address] in the Last Year: Never true   Transportation Needs:     Lack of Transportation (Medical): Not on file    Lack of Transportation (Non-Medical):  Not on file   Physical Activity:     Days of Exercise per Week: Not on file    Minutes of Exercise per Session: Not on file   Stress:     Feeling of Stress : Not on file   Social Connections:     Frequency of Communication with Friends and Family: Not on file    Frequency of Social Gatherings with Friends and Family: Not on file    Attends Jewish Services: Not on file    Active Member of Clubs or Organizations: Not on file    Attends Club or Organization Meetings: Not on file    Marital Status: Not on file   Intimate Partner Violence:     Fear of Current or Ex-Partner: Not on file    Emotionally Abused: Not on file    Physically Abused: Not on file    Sexually Abused: Not on file   Housing Stability:     Unable to Pay for Housing in the Last Year: Not on file    Number of Places Lived in the Last Year: Not on file    Unstable Housing in the Last Year: Not on file       MEDICAL HISTORY:  Past Medical History:   Diagnosis Date    Diabetes mellitus ([facility] [address]     H/O cardiovascular stress test 12/01/2021    Lexiscan    History of blood transfusion     History of Holter monitoring 02/11/2022    Hyperlipidemia     Hypertension     Lower back pain     Thyroid disease        MEDICATIONS:   sodium phosphate IVPB  30 mmol IntraVENous Once    insulin glargine  15 Units SubCUTAneous Nightly    midodrine  10 mg Oral TID WC    sodium chloride flush  5-40 mL IntraVENous 2 times per day    predniSONE  5 mg Oral Q24H    predniSONE  20 mg Oral Q24H    cefTRIAXone (ROCEPHIN) IV  1,000 mg IntraVENous Q24H    insulin lispro  0-18 Units SubCUTAneous TID WC    insulin lispro  0-9 Units SubCUTAneous Nightly    melatonin  10 mg Oral Nightly    levothyroxine  75 mcg Oral Daily    sodium chloride flush  5-40 mL IntraVENous 2 times per day    heparin (porcine)  5,000 Units SubCUTAneous BID    pantoprazole  40 mg IntraVENous Daily      sodium chloride      sodium chloride      sodium chloride Stopped (04/15/22 5575)    lactated ringers 50 mL/hr at 04/16/22 1300    dextrose       sodium chloride flush, sodium chloride, sodium phosphate IVPB, sodium chloride flush, sodium chloride, ondansetron **OR** ondansetron, polyethylene glycol, acetaminophen **OR** acetaminophen, glucagon (rDNA), glucose, dextrose, dextrose bolus (hypoglycemia) **OR** dextrose bolus (hypoglycemia)    REVIEW OF SYSTEMS:  Constitutional: Denies fever, weight loss, night sweats, and fatigue  Skin: Denies pigmentation, dark lesions, reports left foot wound  HEENT: Denies hearing loss, tinnitus, ear drainage, epistaxis, sore throat, and hoarseness. Cardiovascular: Denies palpitations, chest pain, and chest pressure. Respiratory: Denies cough, dyspnea at rest, hemoptysis, apnea, and choking.   Gastrointestinal: Denies nausea, vomiting, poor appetite, diarrhea, heartburn or reflux  Genitourinary: Denies dysuria, frequency, urgency or hematuria  Musculoskeletal: Denies myalgias, reports bilateral leg weakness and numbness, and denies bone pain  Neurological: Denies dizziness, vertigo, headache, and focal weakness  Psychological: Denies anxiety and depression  Endocrine: Denies heat intolerance and cold intolerance  Hematopoietic/Lymphatic: Denies bleeding problems and blood transfusions    PHYSICAL EXAM:  Vitals:    04/17/22 0700   BP: (!) 113/54   Pulse: 89   Resp: 28   Temp:    SpO2: 90%        O2 Flow Rate (L/min): 2 L/min  O2 Device: Nasal cannula    Constitutional: No fever, chills, diaphoresis, very pleasant  HEENT: No head lesions, PERRL, EOMI, dry mouth without lesions, no nasal lesions, no cervical adenopathy palpated   Respiratory: Lungs with equal breath sounds bilaterally, no adventitious sounds auscultated, no accessory muscle use   CV: Regular rate, no murmurs, JVD, no leg edema   Abdomen: Soft, non tender, + bowel sounds, no lesions   Skin: Pale, poor skin turgor, no rash, capillary refill <2 seconds, left diabetic foot wound with grossly intact skin to the plantar surface medially, minimal erythema, no lymphangitis  Extremities: Muscular strength 4/4 in 4 limbs, moves 4 limbs spontaneously, distal pulses present   Neurology: Awake and alert, follows commands, moves 4 limbs on command and spontaneously, equal sensation, no dysmetria, neck is supple, no meningitic signs present.         LABS:  WBC   Date Value Ref Range Status   04/17/2022 2.8 (L) 4.5 - 11.5 E9/L Final   04/16/2022 3.8 (L) 4.5 - 11.5 E9/L Final   04/15/2022 5.6 4.5 - 11.5 E9/L Final     Hemoglobin   Date Value Ref Range Status   04/17/2022 7.3 (L) 12.5 - 16.5 g/dL Final   04/16/2022 7.4 (L) 12.5 - 16.5 g/dL Final   04/15/2022 7.7 (L) 12.5 - 16.5 g/dL Final     Hematocrit   Date Value Ref Range Status   04/17/2022 21.6 (L) 37.0 - 54.0 % Final   04/16/2022 22.3 (L) 37.0 - 54.0 % Final   04/15/2022 22.9 (L) 37.0 - 54.0 % Final     MCV   Date Value Ref Range Status   04/17/2022 111.9 (H) 80.0 - 99.9 fL Final   04/16/2022 112.6 (H) 80.0 - 99.9 fL Final   04/15/2022 111.8 (H) 80.0 - 99.9 fL Final     Platelets   Date Value Ref Range Status   04/17/2022 90 (L) 130 - 450 E9/L Final   04/16/2022 95 (L) 130 - 450 E9/L Final   04/15/2022 107 (L) 130 - 450 E9/L Final     Sodium   Date Value Ref Range Status   04/17/2022 141 132 - 146 mmol/L Final   04/16/2022 135 132 - 146 mmol/L Final   04/15/2022 138 132 - 146 mmol/L Final     Potassium   Date Value Ref Range Status   04/17/2022 3.5 3.5 - 5.0 mmol/L Final   04/16/2022 4.1 3.5 - 5.0 mmol/L Final   04/15/2022 3.6 3.5 - 5.0 mmol/L Final     Potassium reflex Magnesium   Date Value Ref Range Status   04/13/2022 4.4 3.5 - 5.0 mmol/L Final     Chloride   Date Value Ref Range Status   04/17/2022 108 (H) 98 - 107 mmol/L Final   04/16/2022 105 98 - 107 mmol/L Final   04/15/2022 106 98 - 107 mmol/L Final     CO2   Date Value Ref Range Status   04/17/2022 25 22 - 29 mmol/L Final   04/16/2022 22 22 - 29 mmol/L Final   04/15/2022 21 (L) 22 - 29 mmol/L Final     BUN   Date Value Ref Range Status   04/17/2022 29 (H) 6 - 23 mg/dL Final   04/16/2022 36 (H) 6 - 23 mg/dL Final   04/15/2022 35 (H) 6 - 23 mg/dL Final     CREATININE   Date Value Ref Range Status   04/17/2022 1.2 0.7 - 1.2 mg/dL Final   04/16/2022 1.4 (H) 0.7 - 1.2 mg/dL Final   04/15/2022 1.5 (H) 0.7 - 1.2 mg/dL Final     Glucose   Date Value Ref Range Status   04/17/2022 62 (L) 74 - 99 mg/dL Final   04/16/2022 294 (H) 74 - 99 mg/dL Final   04/15/2022 247 (H) 74 - 99 mg/dL Final   03/29/2012 111 (H) 70 - 110 mg/dL Final   12/06/2011 107 70 - 110 mg/dL Final   06/06/2011 109 70 - 110 mg/dL Final     Calcium   Date Value Ref Range Status   04/17/2022 7.6 (L) 8.6 - 10.2 mg/dL Final   04/16/2022 7.2 (L) 8.6 - 10.2 mg/dL Final   04/15/2022 7.1 (L) 8.6 - 10.2 mg/dL Final     Total Protein   Date Value Ref Range Status   04/17/2022 5.5 (L) 6.4 - 8.3 g/dL Final   04/16/2022 5.3 (L) 6.4 - 8.3 g/dL Final   04/15/2022 5.3 (L) 6.4 - 8.3 g/dL Final     Albumin   Date Value Ref Range Status   04/17/2022 2.3 (L) 3.5 - 5.2 g/dL Final   04/16/2022 2.5 (L) 3.5 - 5.2 g/dL Final   04/15/2022 2.3 (L) 3.5 - 5.2 g/dL Final   03/29/2012 4.4 3.2 - 4.8 g/dL Final   12/06/2011 4.2 3.2 - 4.8 g/dL Final   06/06/2011 4.6 3.2 - 4.8 g/dL Final     Total Bilirubin   Date Value Ref Range Status   04/17/2022 0.3 0.0 - 1.2 mg/dL Final   04/16/2022 0.4 0.0 - 1.2 mg/dL Final   04/15/2022 0.5 0.0 - 1.2 mg/dL Final     Alkaline Phosphatase   Date Value Ref Range Status   04/17/2022 54 40 - 129 U/L Final   04/16/2022 62 40 - 129 U/L Final   04/15/2022 41 40 - 129 U/L Final     AST   Date Value Ref Range Status   04/17/2022 28 0 - 39 U/L Final   04/16/2022 43 (H) 0 - 39 U/L Final   04/15/2022 61 (H) 0 - 39 U/L Final     ALT   Date Value Ref Range Status   04/17/2022 71 (H) 0 - 40 U/L Final   04/16/2022 88 (H) 0 - 40 U/L Final   04/15/2022 115 (H) 0 - 40 U/L Final     GFR Non-   Date Value Ref Range Status   04/17/2022 57 >=60 mL/min/1.73 Final     Comment:     Chronic Kidney Disease: less than 60 ml/min/1.73 sq.m.           Kidney Failure: less than 15 ml/min/1.73 sq.m.  Results valid for patients 18 years and older. 04/16/2022 48 >=60 mL/min/1.73 Final     Comment:     Chronic Kidney Disease: less than 60 ml/min/1.73 sq.m. Kidney Failure: less than 15 ml/min/1.73 sq.m. Results valid for patients 18 years and older. 04/15/2022 44 >=60 mL/min/1.73 Final     Comment:     Chronic Kidney Disease: less than 60 ml/min/1.73 sq.m. Kidney Failure: less than 15 ml/min/1.73 sq.m. Results valid for patients 18 years and older. GFR    Date Value Ref Range Status   04/17/2022 >60  Final   04/16/2022 58  Final   04/15/2022 53  Final     Magnesium   Date Value Ref Range Status   04/17/2022 1.9 1.6 - 2.6 mg/dL Final   04/16/2022 1.9 1.6 - 2.6 mg/dL Final   04/14/2022 1.7 1.6 - 2.6 mg/dL Final     Phosphorus   Date Value Ref Range Status   04/17/2022 1.8 (L) 2.5 - 4.5 mg/dL Final   04/16/2022 1.6 (L) 2.5 - 4.5 mg/dL Final   04/14/2022 3.3 2.5 - 4.5 mg/dL Final     No results for input(s): PH, PO2, PCO2, HCO3, BE, O2SAT in the last 72 hours. RADIOLOGY:  XR CHEST PORTABLE   Preliminary Result   Increased markings seen diffusely throughout the lung fields stable and   unchanged. Left-sided deep line catheter tip is curled back upon itself in   the SVC. FL UGI W KUB   Final Result   1. There is no esophageal mucosal irregularity, stricture or mass. 2. There is no gastric mass or obstruction no ulceration is noted   3. Tertiary contractions of the esophagus suggestive of esophageal   dysmotility disorder. IR BONE MARROW BIOPSY AND ASPIRATION   Final Result   Status post CT guided bone marrow aspiration and core biopsy of the iliac   bone. US GALLBLADDER RUQ   Final Result   No evidence of cholelithiasis or acute cholecystitis. No biliary ductal   dilatation. XR CHEST PORTABLE   Final Result   Deep line catheter placed on the left tip in the SVC.   Increased pulmonary   vascularity again seen bilaterally with no evidence of pneumothorax. CT ABDOMEN PELVIS WO CONTRAST Additional Contrast? None   Final Result   No acute intra-abdominal or pelvic findings. Bilateral nonobstructing renal calculi. Colonic diverticulosis, without diverticulitis. Additional findings as above. CT CHEST WO CONTRAST   Final Result   Progression of moderate interstitial lung disease, which demonstrates a UIP   pattern. MRI LUMBAR SPINE WO CONTRAST   Final Result   Multilevel degenerative changes, most notably at L4-5 and L5-S1. Stable   grade 2 anterolisthesis of L4 on L5 and grade 1 anterolisthesis of L5 on S1.   Multilevel foraminal and lateral recess stenoses are not significantly   changed since the previous exam.      Development of endplate degenerative changes at L2-3. This is a new finding. XR CHEST PORTABLE   Final Result   Marked hypoventilation. Otherwise, no acute process seen. IMPRESSION:  1. Septic shock possibly secondary to left diabetic foot wound  2. NSTEMI  3. Suspected adrenal insufficiency secondary to recent steroid use  4. Pancytopenia with macrocytic normochromic anemia ( ?  Myelodysplastic syndrome)  5. Myositis (?  Related to statin)  6. Possible GI bleed  7. BURAK on CKD  8. Transaminitis  9. Type 2 diabetes with hyperglycemia  10. Left diabetic foot wound  11. Interstitial lung disease (UIP)  12. Hypertension  13. Hyperlipidemia  14. Hypothyroidism    PLAN:  1. Patient is currently on ceftriaxone. Cultures are negative to date. 2. S/P Transfusion of 1 unit packed red blood cells  3. Cardiology consulted. Patient scheduled for cardiac cath on Monday, April 18, 2022  4. Echocardiogram with normal EF with no regional wall motion abnormalities. 5. Cortisol level is within normal  6. Currently on oral prednisone. 7. CT abdomen pelvis without contrast with nephrolithiasis but no other acute abnormality  8. Patient is a scheduled for an EGD tomorrow.   9. GI following  10. Hold heparin for DVT prophylaxis due to thrombocytopenia  11. SCDs  12. Protonix 40 mg PO every 24 hours  13. Avoid nephrotoxic medications  14. Hematology/oncology consult possible MDS  15. Cardiac telemetry  16. Optimize nutrition. 17. OT and PT eval and out of bed as tolerated  18. Dietitian consult for supplements    ATTESTATION:  ICU Staff Physician note of personal involvement in Care  As the attending physician, I certify that I personally reviewed the patients history and personally examined the patient to confirm the physical findings described above,  And that I reviewed the relevant imaging studies and available reports. I also discussed the differential diagnosis and all of the proposed management plans with the patient and individuals accompanying the patient to this visit. They had the opportunity to ask questions about the proposed management plans and to have those questions answered. This patient has a high probability of sudden, clinically significant deterioration, which requires the highest level of physician preparedness to intervene urgently. I managed/supervised life or organ supporting interventions that required frequent physician assessment. I devoted my full attention to the direct care of this patient for the amount of time indicated below. Time I spent with the family or surrogate(s) is included only if the patient was incapable of providing the necessary information or participating in medical decisions  Time devoted to teaching and to any procedures I billed separately is not included.     CRITICAL CARE TIME:  47 minutes    Electronically signed by Rangel Wren MD on 4/17/2022 at 8:34 AM

## 2022-04-17 NOTE — PLAN OF CARE
Problem: Skin Integrity:  Goal: Will show no infection signs and symptoms  Description: Will show no infection signs and symptoms  4/17/2022 1443 by Lola Rose RN  Outcome: Met This Shift  4/17/2022 0626 by Slick Davison RN  Outcome: Met This Shift  Goal: Absence of new skin breakdown  Description: Absence of new skin breakdown  4/17/2022 1443 by Lola Rose RN  Outcome: Met This Shift  4/17/2022 0626 by Slick Davison RN  Outcome: Met This Shift     Problem: Falls - Risk of:  Goal: Will remain free from falls  Description: Will remain free from falls  4/17/2022 1443 by Lola Rose RN  Outcome: Met This Shift  4/17/2022 0626 by Slick Davison RN  Outcome: Met This Shift  Goal: Absence of physical injury  Description: Absence of physical injury  4/17/2022 1443 by Lola Rose RN  Outcome: Met This Shift  4/17/2022 0626 by Slick Davison RN  Outcome: Met This Shift     Problem: Respiratory:  Goal: Respiratory status will improve  Description: Respiratory status will improve  Outcome: Met This Shift

## 2022-04-17 NOTE — PROGRESS NOTES
PROGRESS NOTE    By Jesse Plaza D.O., GI Fellow    [facility] Mahoney M.D., Dr. Candi Cross M.D., Dr. Stroud Friday., Dr. Adriano Yen M.D., Dr. Yesenia Lazo Prest  80 y.o.  male    SUBJECTIVE:    No acute events overnight. Discussed postpone EGD and okay for diet after KUB and esophagram.  Discussed continued serial H&H and plans for EGD tentatively Monday morning. Patient continues to deny any melena, hematochezia, nausea, vomiting, or abdominal pain. OBJECTIVE:  BP (!) 113/54   Pulse 89   Temp 98.3 °F (36.8 °C) (Oral)   Resp 28   Ht 5' 6\" (1.676 m)   Wt 162 lb (73.5 kg)   SpO2 90%   BMI 26.15 kg/m²   GEN: A&Ox3, friendly, NAD  HEENT:PEERL  Heart: RRR  Lungs: CTAB  Abd.: soft, NT, ND, BS +, no G/R, no HSM  Extr.: no C/C/E, no brusing         Lab Results   Component Value Date    WBC 2.8 04/17/2022    WBC 3.8 04/16/2022    WBC 5.6 04/15/2022    HGB 7.3 04/17/2022    HGB 7.4 04/16/2022    HGB 7.7 04/15/2022    HCT 21.6 04/17/2022    .9 04/17/2022    RDW 17.3 04/17/2022    PLT 90 04/17/2022    PLT 95 04/16/2022     04/15/2022     Lab Results   Component Value Date     04/17/2022    K 3.5 04/17/2022    K 4.4 04/13/2022     04/17/2022    CO2 25 04/17/2022    BUN 29 04/17/2022    CREATININE 1.2 04/17/2022    CALCIUM 7.6 04/17/2022    PROT 5.5 04/17/2022    LABALBU 2.3 04/17/2022    LABALBU 4.4 03/29/2012    BILITOT 0.3 04/17/2022    BILITOT 0.4 04/16/2022    BILITOT 0.5 04/15/2022    ALKPHOS 54 04/17/2022    ALKPHOS 62 04/16/2022    ALKPHOS 41 04/15/2022    AST 28 04/17/2022    AST 43 04/16/2022    AST 61 04/15/2022    ALT 71 04/17/2022    ALT 88 04/16/2022     04/15/2022     No results found for: LIPASE  No results found for: AMYLASE      ASSESSMENT/PLAN:  1.   Acute blood loss anemia, unspecified  -Macrocytic  -Patient hemodynamically stable without any clinical evidence of an overt or brisk GI bleed  -Currently broad differential but cannot exclude any upper or lower GI source  -Anemia panel unremarkable for any hemolytic process not suggestive of anemia of chronic disease VS myelosuppression  -Serial H&H  -Type and cross, please keep 2 units on hold at all times. Recommend restrictive transfusion parameters, will defer to primary  -Recommend vitamin K  -KUB and barium esophagram on 4/16/2022 unremarkable for any esophageal or gastric mucosal lesions, ulcers, or masses. Esophageal tertiary contractions noted  -Plan for EGD on Monday morning with Dr. Ninoska Molina, pending hospital course  -Continue supportive care        2. NSTEMI  -Cardiology following, appreciate evaluation and recommendations        3. Elevated liver enzymes  -Improving  -R factor 6.6: Hepatocellular  -Right upper quadrant ultrasound  -Hepatic panel unremarkable for any metabolic, autoimmune, or viral etiology but cannot rule out DI LI        4. Septic shock  -Possibly secondary to left diabetic foot wound  -Per primary        5. 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 Discussed with fellow and agree with the plan of care stated above.     Chandra Marlow DO  4/29/2022  1:16 PM

## 2022-04-17 NOTE — CONSULTS
Nutrition Note    Consult received for ONS, patient currently NPO. Will add ONS once on diet. See RD progress note dated 4/14, will follow up as planned.    Electronically signed by Michael Jiang MS, RD, LD on 4/17/22 at 9:56 AM EDT    Contact: [phone]

## 2022-04-17 NOTE — PROGRESS NOTES
Patient put call light on because \"the devil is in here telling me I need to smoke marijuana. He keeps telling me to open my mouth so he can blow the smoke in\" reassured patient that she is in a safe place and the devil isn't going to get her. She still continues to talk about the [employer] [place] being after her and that her identity and social security is being stolen by the government.  Val Lynn RN

## 2022-04-17 NOTE — PROGRESS NOTES
Internal Medicine Progress Note    [facility]    [facility]. Efraín Yun, ERICKSON.ADEVYNOAngelinaIAngelina Blakely D.O., IOANA Fuentes D.O. Iris Owens, MSN, APRN, NP-C  Mansi Rodriguez. Jerel Herman, MSN, APRN-CNP     Primary Care Physician: Pako Fuentes DO   Admitting Physician:  Zak Sawyer DO  Admission date and time: 4/13/2022  6:50 PM    Room:  [place] [address]  Admitting diagnosis: Shortness of breath [R06.02]  NSTEMI (non-ST elevated myocardial infarction) ([facility] [address] [I21.4]  BURAK (acute kidney injury) ([facility] [address] [N17.9]  Febrile illness [R50.9]  Non-traumatic rhabdomyolysis [M62.82]  Chronic bilateral low back pain without sciatica [M54.50, G89.29]    Patient Name: Sotero Magaña  MRN: [id number]    Date of Service: 4/17/2022     Subjective:  Josue Tse is a 80 y.o. male who was seen and examined today,4/17/2022, at the bedside. The patient continued to improve on a daily basis. Morales cath was removed yesterday and the patient is voiding satisfactory. Chest x-ray performed today did show slightly increased pulmonary vascular but O2 sat remained stable on supplemental O2 at 95%. Hemoglobin has gradually trended downward to 7.3 and the patient will be transfused. I have discussed the condition with cardiology in hopes of performing upper GI panendoscopy prior to cardiac catheterization. He also feel that a colonoscopic exam will be of benefit. We will attempt to coordinate these. The patient is working with physical therapy. The patient remained afebrile and white is normal.  Procalcitonin continue to improve. Blood pressure is improving and midodrine will be weaned. Supplement phosphorus is given    No family member present      Review of System:   Constitutional:   Denies fever or chills, weight loss or gain. Weakness seem to be improving and fatigue  HEENT:   Denies ear pain, sore throat, sinus or eye problems.   Wearing supplemental O2  Cardiovascular:   Denies any chest pain, irregular heartbeats, or palpitations. Respiratory:   Improved shortness of breath. Still present with activity. O2 sat stable on supplemental O2  Gastrointestinal:   Denies nausea, vomiting, diarrhea, or constipation. Denies any abdominal pain. Genitourinary:    Denies any urgency, frequency, hematuria. Voiding since removal of Morales  Extremities:   Some low back soreness with improved weakness  Neurology:    Denies any headache or focal neurological deficits, Denies generalized weakness or memory difficulty. Psch:   Denies being anxious or depressed. Musculoskeletal:    Denies  myalgias, joint complaint. Mild low back pain  Integumentary:   Denies any rashes, ulcers, or excoriations. Denies bruising. Hematologic/Lymphatic:  Denies bruising or bleeding. Physical Exam:  I/O this shift: In: 770 [P.O.:480; Blood:290]  Out: 700 [Urine:700]    Intake/Output Summary (Last 24 hours) at 2022 1523  Last data filed at 2022 1516  Gross per 24 hour   Intake 1490 ml   Output 1675 ml   Net -185 ml   I/O last 3 completed shifts: In: 2614.1 [P.O.:960; I.V.:1654.1]  Out: 1975 [GEBB]  Patient Vitals for the past 96 hrs (Last 3 readings):   Weight   22 0600 162 lb (73.5 kg)   22 0356 154 lb 8 oz (70.1 kg)   22 0100 154 lb 15.7 oz (70.3 kg)     Vital Signs:   Blood pressure (!) 152/74, pulse 76, temperature 98.4 °F (36.9 °C), temperature source Oral, resp. rate 24, height 5' 6\" (1.676 m), weight 162 lb (73.5 kg), SpO2 94 %. General appearance:  Alert, responsive, oriented to person, place, and time. Appearance much improved today in better spirits and more animated  Head:  Normocephalic. No masses, lesions or tenderness. Eyes:  PERRLA. EOMI. Sclera clear. Buccal mucosa moist.  ENT:  Ears normal. Mucosa dry wearing nasal cannula  Neck:    Supple. Trachea midline. No thyromegaly. No JVD. No bruits. Heart:    Rhythm regular. Rate controlled.    systolic murmurs. Lungs:    Fibrotic rales at the bases  Abdomen:   Soft. Non-tender. Non-distended. Bowel sounds positive. No organomegaly or masses. No pain on palpation. Extremities:    Peripheral pulses present. No peripheral edema. No ulcers. No cyanosis. No clubbing. Abrasion noted over the anterior surface of her right lower leg. Neurologic:    Alert x 3. No focal deficit. Cranial nerves grossly intact. Improved weakness  Psych:   Behavior is normal. Mood appears normal. Speech is not rapid and/or pressured. Musculoskeletal:   Spine ROM normal. Muscular strength intact. Gait not assessed. Integumentary:  No rashes  Skin normal color and texture.   Genitalia/Breast:  Morales catheter has been removed    Medication:  Scheduled Meds:   sodium chloride flush  5-40 mL IntraVENous 2 times per day    insulin glargine  10 Units SubCUTAneous Nightly    pantoprazole  40 mg Oral QAM AC    midodrine  5 mg Oral TID WC    predniSONE  5 mg Oral Q24H    predniSONE  20 mg Oral Q24H    cefTRIAXone (ROCEPHIN) IV  1,000 mg IntraVENous Q24H    insulin lispro  0-18 Units SubCUTAneous TID WC    insulin lispro  0-9 Units SubCUTAneous Nightly    melatonin  10 mg Oral Nightly    levothyroxine  75 mcg Oral Daily    sodium chloride flush  5-40 mL IntraVENous 2 times per day    [Held by provider] heparin (porcine)  5,000 Units SubCUTAneous BID     Continuous Infusions:   sodium chloride      sodium chloride      sodium chloride      dextrose         Objective Data:  CBC with Differential:    Lab Results   Component Value Date    WBC 2.8 04/17/2022    RBC 1.93 04/17/2022    HGB 7.3 04/17/2022    HCT 21.6 04/17/2022    PLT 90 04/17/2022    .9 04/17/2022    MCH 37.8 04/17/2022    MCHC 33.8 04/17/2022    RDW 17.3 04/17/2022    NRBC 0.9 04/17/2022    SEGSPCT 48 07/06/2013    METASPCT 1.8 04/17/2022    LYMPHOPCT 28.3 04/17/2022    MONOPCT 5.3 04/17/2022    MYELOPCT 1.8 02/11/2022    BASOPCT 0.0 04/17/2022    MONOSABS 0.14 04/17/2022    LYMPHSABS 0.78 04/17/2022    EOSABS 0.00 04/17/2022    BASOSABS 0.00 04/17/2022     CMP:    Lab Results   Component Value Date     04/17/2022    K 3.5 04/17/2022    K 4.4 04/13/2022     04/17/2022    CO2 25 04/17/2022    BUN 29 04/17/2022    CREATININE 1.2 04/17/2022    GFRAA >60 04/17/2022    LABGLOM 57 04/17/2022    GLUCOSE 62 04/17/2022    GLUCOSE 111 03/29/2012    PROT 5.5 04/17/2022    LABALBU 2.3 04/17/2022    LABALBU 4.4 03/29/2012    CALCIUM 7.6 04/17/2022    BILITOT 0.3 04/17/2022    ALKPHOS 54 04/17/2022    AST 28 04/17/2022    ALT 71 04/17/2022              Assessment:     · Shock probably septic in origin possibility of left diabetic foot versus abrasion right leg--rule out secondary adrenal insufficiency--significantly improved  · Elevated troponin possibly secondary to demand ischemia versus non-ST elevated myocardial infarction  · Abnormal CAT scan lungs showing usual interstitial pneumonia  · Macrocytic anemia undetermined etiology rule out autoimmune  · Acute on chronic renal insufficiency  · Type 2 diabetes mellitus without acidosis complicated by lack of steroid administration  · Essential hypertension historically  · Hyperlipidemia on Pravachol  · Hypothyroidism on Synthroid replacement  · Chronic low back pain with status post decompression with lumbar stenosis  · Asymptomatic cholelithiasis  · Elevated transaminases undetermined etiology              Plan:      · White count is normal.  Patient is afebrile. Procalcitonin is improving. Continue Rocephin  · Pancytopenia is still present. Hemoglobin slightly trending downward. We will transfuse 1 unit of packed cells due to recent cardiac event  · Blood pressure improving will decrease midodrine  · Patient remained cardiac stable. Plan for cardiac catheterization. No dysrhythmia noted  · Chest x-ray suggest ILD.   Continue supplemental O2  · Continue work with physical therapy  · Plan for endoscopy before catheterization both upper and lower  · Blood sugars improving. Adjust insulin  · Replace phosphorus      Greater than 40  minutes of critical care time was spent with the patient. This includes chart review, , and discussion with those consultants involved in the patient's care. More than 50% of my  time was spent at the bedside counseling/coordinating care with the patient and/or family with face to face contact. This time was spent reviewing notes and laboratory data as well as instructing and counseling the patient. Time I spent with the family or surrogate(s) is included only if the patient was incapable of providing the necessary information or participating in medical decisions. I also discussed the differential diagnosis and all of the proposed management plans with the patient and individuals accompanying the patient. Oneal Sanderson requires this high level of physician care and nursing on the ICU unit due the complexity of decision management and chance of rapid decline or death. Continued cardiac monitoring and higher level of nursing are required. I am readily available for any further decision-making and intervention.      Joline Angelucci Bisel, DO, F.A.C.O.I.  4/17/2022  3:23 PM

## 2022-04-18 NOTE — OP NOTE
Operative Note      Patient: Briana Barton  YOB: 1933  MRN: [id number]    Date of Procedure: 4/15/2022    Pre-Op Diagnosis: ANEMIA    Post-Op Diagnosis: SAME       Procedure(s):  EGD ESOPHAGOGASTRODUODENOSCOPY    Surgeon:  Patricia Quintana D.O. Assistant:   * No surgical staff found *    Anesthesia: Monitor Anesthesia Care    Estimated Blood Loss (mL): < 3cc    Complications: None    Specimens:   * No specimens in log *    Implants:  * No implants in log *      Drains:   [REMOVED] Urethral Catheter Temperature probe (Removed)   Catheter Indications Need for fluid volume management of the critically ill patient in a critical care setting;Urinary retention (acute or chronic), continuous bladder irrigation or bladder outlet obstruction 04/16/22 1200   Site Assessment No urethral drainage 04/14/22 1200   Urine Color Yellow 04/16/22 1200   Urine Appearance Clear 04/16/22 1200   Output (mL) 400 mL 04/16/22 1200     Detailed Description of Procedure:   Procedure:  Esophagogastroduodenoscopy    Indication:  Anemia    Consent: Informed consent was obtained from the patient including and not limited to risk of perforation, aspiration of gastric contents or teeth, bleeding, infection, dental breakage, ileus, need for surgery, or worst case death. Sedation  MAC    Estimated Blood Loss -- < 3cc    Endoscope was advanced easily through mouth to second portion of duodenum      Oropharynx views are limited but grossly normal.    Esophagus:   Mucosa is normal.  GEJ at ~39 cm. No varices. Stomach:   Antrum and Gastric body with mild gastritis, no ulcer, mass, or AVM. Retroflexed views showed normal fundus and cardia. No hiatal hernia. Duodenum: Bulb is normal.    Second portion of duodenum is normal other than a small diverticulum with no bleeding stigmata or vessel. No AVM's seen. No fresh of old blood. IMPRESSION AND PLAN:     1. Normal esophagus.     2.  Mild gastritis in antrum with no fresh or old blood. No AVM seen or bleeding source. 3.  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 No fresh or old blood. 4.  Continue Protonix daily for PUD prophylaxis in the face of likely aspirin and plavix. Ok to start Aspirin daily in am with food, upright for 4 hours after from GI standpoint and proceed with Heart Cath. Pt last colonoscopy greater than 10 years he believe and understands could have potential bleeding source in small bowel or colon still but these are longer procedures which would incur significantly more risk/length of anesthesia given cardiopulmonary status. Our service will follow. Follow up as outpatient in office, call [phone] to schedule for appointment.       DO Robin  4/18/2022  8:01 AM      Electronically signed by DO Robin on 4/18/2022 at 8:01 AM

## 2022-04-18 NOTE — PROGRESS NOTES
Internal Medicine Progress Note    [facility]    [facility] Inter[facility]. Tigist Moe., JAYNEOAngelinaI. Amy Rae D.O., IOANA Riggins D.O. Nima Garnett, MSN, APRN, NP-C  Darcey Duane. Randy Doshi, MSN, APRN-CNP     Primary Care Physician: Neela Riggins DO   Admitting Physician:  Clary Hamm DO  Admission date and time: 4/13/2022  6:50 PM    Room:  [place] [address]  Admitting diagnosis: Shortness of breath [R06.02]  NSTEMI (non-ST elevated myocardial infarction) ([facility] [address] [I21.4]  [facility] [N17.9]  Febrile illness [R50.9]  Non-traumatic rhabdomyolysis [M62.82]  Chronic bilateral low back pain without sciatica [M54.50, G89.29]    Patient Name: Sumaya Sanderson  MRN: [id number]    Date of Service: 4/18/2022     Subjective:  Juanito Winters is a 80 y.o. male who was seen and examined today,4/18/2022, at the bedside. Juanito Winters is seated at the bedside resting comfortably today. We had a long conversation regarding endoscopic findings and plans for cardiac catheterization tomorrow. He is feeling dramatically better overall. We have discontinued nasal cannula oxygen during the examination. Review of System:   Constitutional:   Improving weakness and deconditioning. HEENT:   Denies ear pain, sore throat, sinus or eye problems. Nasal cannula oxygen was weaned off during examination. Cardiovascular:   Denies any chest pain, irregular heartbeats, or palpitations. Respiratory:   Far less shortness of breath along initial presentation. No coughing or sputum production. Gastrointestinal:   Admits to an increased appetite. Genitourinary:    Voiding comfortably. No urgency or frequency. Extremities:   Generalized muscle aches and pains. Neurology:    Denies any headache or focal neurological deficits, improving weakness and deconditioning. Psch:   Denies being anxious or depressed. Musculoskeletal:    Denies  myalgias, joint complaint.   Mild low back pain  Integumentary:   Denies any rashes, ulcers, or excoriations. Denies bruising. Hematologic/Lymphatic:  Denies bruising or bleeding. Physical Exam:  I/O this shift:  In: 370 [P.O.:240; I.V.:130]  Out: 450 [Urine:450]    Intake/Output Summary (Last 24 hours) at 4/18/2022 1112  Last data filed at 4/18/2022 1000  Gross per 24 hour   Intake 2100 ml   Output 2300 ml   Net -200 ml   I/O last 3 completed shifts: In: 2450 [P.O.:1560; I.V.:300; Blood:290; IV Piggyback:300]  Out: 0664 [[facility]:4971]  Patient Vitals for the past 96 hrs (Last 3 readings):   Weight   04/16/22 0600 162 lb (73.5 kg)     Vital Signs:   Blood pressure 135/75, pulse 100, temperature 98.1 °F (36.7 °C), temperature source Oral, resp. rate 21, height 5' 6\" (1.676 m), weight 162 lb (73.5 kg), SpO2 91 %. General appearance:  Awake and alert. Very polite and conversational.  Head:  Normocephalic. No masses, lesions or tenderness. Eyes:  PERRLA. EOMI. Sclera clear. Buccal mucosa moist.  ENT:  Ears normal.  Nasal cannula oxygen was weaned off during the examination. Neck:    Supple. Trachea midline. No thyromegaly. No JVD. No bruits. Heart:    Rhythm regular. Rate controlled. 1/6 systolic murmurs. Lungs:    Relatively clear to auscultation bilaterally. Good aeration throughout. No accessory muscle usage is identified. Abdomen:   Soft. Non-tender. Non-distended. Bowel sounds positive. No organomegaly or masses. No pain on palpation. Extremities:    Peripheral pulses present. No peripheral edema. No ulcers. No cyanosis. No clubbing. Abrasion noted over the anterior surface of her right lower leg. Neurologic:    Alert x 3. No focal deficit. Cranial nerves grossly intact. Improved weakness  Psych:   Behavior is normal. Mood appears normal. Speech is not rapid and/or pressured. Musculoskeletal:   Spine ROM normal. Muscular strength intact. Gait not assessed.   Integumentary:  No rashes  Skin normal color and texture. Genitalia/Breast:  Morales catheter has been removed    Medication:  Scheduled Meds:   potassium & sodium phosphates  2 packet Oral Once    sodium chloride flush  5-40 mL IntraVENous 2 times per day    insulin glargine  10 Units SubCUTAneous Nightly    pantoprazole  40 mg Oral QAM AC    predniSONE  5 mg Oral Q24H    predniSONE  20 mg Oral Q24H    cefTRIAXone (ROCEPHIN) IV  1,000 mg IntraVENous Q24H    insulin lispro  0-18 Units SubCUTAneous TID WC    insulin lispro  0-9 Units SubCUTAneous Nightly    melatonin  10 mg Oral Nightly    levothyroxine  75 mcg Oral Daily    sodium chloride flush  5-40 mL IntraVENous 2 times per day    [Held by provider] heparin (porcine)  5,000 Units SubCUTAneous BID     Continuous Infusions:   sodium chloride      sodium chloride      sodium chloride      dextrose         Objective Data:  CBC with Differential:    Lab Results   Component Value Date    WBC 2.1 04/18/2022    RBC 2.32 04/18/2022    HGB 8.3 04/18/2022    HCT 24.7 04/18/2022    PLT 73 04/18/2022    .5 04/18/2022    MCH 35.8 04/18/2022    MCHC 33.6 04/18/2022    RDW 23.4 04/18/2022    NRBC 3.5 04/18/2022    SEGSPCT 48 07/06/2013    METASPCT 1.8 04/17/2022    LYMPHOPCT 55.7 04/18/2022    MONOPCT 13.0 04/18/2022    MYELOPCT 1.8 02/11/2022    BASOPCT 0.0 04/18/2022    MONOSABS 0.27 04/18/2022    LYMPHSABS 1.18 04/18/2022    EOSABS 0.02 04/18/2022    BASOSABS 0.00 04/18/2022     CMP:    Lab Results   Component Value Date     04/18/2022    K 3.7 04/18/2022    K 4.4 04/13/2022     04/18/2022    CO2 27 04/18/2022    BUN 26 04/18/2022    CREATININE 1.2 04/18/2022    GFRAA >60 04/18/2022    LABGLOM 57 04/18/2022    GLUCOSE 198 04/18/2022    GLUCOSE 111 03/29/2012    PROT 5.3 04/18/2022    LABALBU 2.3 04/18/2022    LABALBU 4.4 03/29/2012    CALCIUM 7.3 04/18/2022    BILITOT 0.4 04/18/2022    ALKPHOS 66 04/18/2022    AST 32 04/18/2022    ALT 62 04/18/2022        Assessment:  1.  Septic shock in the setting of a left lower extremity diabetic wound with negative cultures  2. Non-ST elevated myocardial infarction with inferior wall motion abnormality  3. Acute on chronic respiratory failure with hypoxia with radiographic suggestion of pulmonary fibrosis  4. Pancytopenia with need to rule out early myelodysplastic syndrome status post bone marrow biopsy  5. Acute on chronic kidney disease stage III  6. Non-insulin-dependent diabetes mellitus type 2  7. Essential hypertension  8. Hyperlipidemia on Pravachol  9. Hypothyroidism on Synthroid replacement  10. Chronic low back pain with status post decompression with lumbar stenosis      Plan:    EGD was performed this morning with findings of mild gastritis. Protonix therapy will be continued particularly in the setting of chronic prednisone use and potential for antiplatelet therapy following cardiac catheterization. I have discussed the case personally with the cardiovascular team with plans for cardiac catheterization tomorrow. Timing is yet to be determined. Renal function has returned to baseline. Pancytopenia is being monitored closely with somewhat downward trending platelet count. Heparin has been placed on hold and HIT antibodies will be obtained. Bone marrow biopsy is pending with suspicion for early myelodysplastic syndrome. We will maintain antibiotic therapy for an additional 24 hours with plans for discontinuation tomorrow. Cultures have been negative and he remains afebrile with downward trending procalcitonin. Chronic comorbidities are otherwise well controlled. Clinically, Isai Zee is doing much better and feels more energized. We appreciate the input from the multiple subspecialists providing care. Ultimate plans will be for discharge to acute rehabilitation and the social work team has been updated. Greater than 40 minutes of critical care time was spent with the patient.  This includes chart review, , and discussion with those consultants involved in the patient's care. More than 50% of my  time was spent at the bedside counseling/coordinating care with the patient and/or family with face to face contact. This time was spent reviewing notes and laboratory data as well as instructing and counseling the patient. Time I spent with the family or surrogate(s) is included only if the patient was incapable of providing the necessary information or participating in medical decisions. I also discussed the differential diagnosis and all of the proposed management plans with the patient and individuals accompanying the patient. Josue Tse requires this high level of physician care and nursing on the ICU unit due the complexity of decision management and chance of rapid decline or death. Continued cardiac monitoring and higher level of nursing are required. I am readily available for any further decision-making and intervention.      Trent Montez DO, F.A.C.O.I.  4/18/2022  11:12 AM

## 2022-04-18 NOTE — PROGRESS NOTES
Physical Therapy Treatment Note/Plan of Care    Room #:  IC03/IC03-01  Patient Name: Jany Dueñas  YOB: 1933  MRN: [id number]    Date of Service: 4/18/2022     Tentative placement recommendation: Inpatient Rehab  Equipment recommendation: To be determined      Evaluating Physical Therapist: Marjorie Rosas PT #[id number]      Specific Provider Orders/Date/Referring Provider :  04/14/22 0100   PT eval and treat Start: 04/14/22 0100, End: 04/14/22 0100, ONE TIME, Standing Count: 1 Occurrences, R    Yarely Oswald, DO      Admitting Diagnosis:   Shortness of breath [R06.02]  NSTEMI (non-ST elevated myocardial infarction) ([facility] [I21.4]  BURAK (acute kidney injury) ([facility] [N17.9]  Febrile illness [R50.9]  Non-traumatic rhabdomyolysis [M62.82]  Chronic bilateral low back pain without sciatica [M54.50, G89.29]     fatigue, shortness of breath, leg weakness and numbness.   progressive decline since November/December of last year following his COVID-19 booster shot    Surgery: none  Visit Diagnoses       Codes    BURAK (acute kidney injury) ([facility]     N17.9    Non-traumatic rhabdomyolysis     M62.82    Febrile illness     R50.9    Shortness of breath     R06.02    Chronic bilateral low back pain without sciatica     M54.50, G89.29          Patient Active Problem List   Diagnosis    Incomplete tear of right rotator cuff    Long biceps tear    Injury of tendon of long head of right biceps    Tear of right glenoid labrum    Bursitis of right shoulder    Essential hypertension    Acquired hypothyroidism    Dyslipidemia    Type 2 diabetes mellitus without complication, without long-term current use of insulin ([facility])    Normocytic anemia    NSTEMI (non-ST elevated myocardial infarction) ([facility] [address] [facility]        ASSESSMENT of Current Deficits Patient exhibits decreased strength, balance and endurance impairing functional mobility, transfers, gait , gait distance and tolerance to activity  Improved ambulation distance and endurance however continued increased shortness of breath and decreased O2 saturation during gait, improved with rest and cues purse lip breathing. Patient requires continued skilled physical therapy to address concerns listed above for increased safety and function at discharge. PHYSICAL THERAPY  PLAN OF CARE       Physical therapy plan of care is established based on physician order,  patient diagnosis and clinical assessment    Current Treatment Recommendations:    -Standing Balance: Perform strengthening exercises in standing to promote motor control with or without upper extremity support   -Transfers: Provide instruction on proper hand and foot position for adequate transfer of weight onto lower extremities and use of gait device if needed and Cues for hand placement, technique and safety. Provide stabilization to prevent fall   -Gait: Gait training, Standing activities to improve: base of support, weight shift, weight bearing  and Pregait training to emphasize:   Alea, Device control, Upright and Safety   -Endurance: Utilize Supervised activities to increase level of endurance to allow for safe functional mobility including transfers and gait  and Use graduated activities to promote good breathing techniques and provide support and education to maximize respiratory function    PT long term treatment goals are located in below grid    Patient and or family understand(s) diagnosis, prognosis, and plan of care. Frequency of treatments: Patient will be seen  daily.          Prior Level of Function: Patient ambulated independently    Rehab Potential: good   - for baseline    Past medical history:   Past Medical History:   Diagnosis Date    Diabetes mellitus ([facility]     H/O cardiovascular stress test 12/01/2021    Lexiscan    History of blood transfusion     History of Holter monitoring 02/11/2022    Hyperlipidemia     Hypertension     Lower back pain     Thyroid disease      Past Surgical History:   Procedure Laterality Date    BACK SURGERY  2000    fusion  lumbar area,      JOINT REPLACEMENT Bilateral [id number]    knee     LITHOTRIPSY      SHOULDER ARTHROSCOPY Right 01/30/2020    with treatment    SHOULDER ARTHROSCOPY Right 1/30/2020    ARTHROSCOPIC EXAM AND TREATMENT RIGHT SHOULDER (CPT 25440,05371) RIGHT ROTATOR CUFF REPAIR, SUBACHROMIAL DECOMPRESSION, DEBRIDEMENT OF HUMERAL performed by Nichol Gibson DO at [facility]:    Precautions: Up with assistance, falls ,   chronic lumbar back pain, urinary incontinence     Social history: Patient lives alone in a two story home resides first  with 2 + 1+2 steps  to enter without [employer] in shower grab bars, built in shower chair    Equipment owned: [employer], [address], Rollator, Standard Walker and Crutches,       [id number] [facility]   How much difficulty turning over in bed?: A Little  How much difficulty sitting down on / standing up from a chair with arms?: A Little  How much difficulty moving from lying on back to sitting on side of bed?: A Little  How much help from another person moving to and from a bed to a chair?: A Little  How much help from another person needed to walk in hospital room?: A Little  How much help from another person for climbing 3-5 steps with a railing?: A Lot  AM-PAC Inpatient Mobility Raw Score : 17  AM-PAC Inpatient T-Scale Score : 42.13  Mobility Inpatient CMS 0-100% Score: 50.57  Mobility Inpatient CMS G-Code Modifier : CK    Nursing cleared patient for PT treatment. OBJECTIVE;   Initial Evaluation  Date: 4/15/2022 Treatment Date:    4/18/2022    Short Term/ Long Term   Goals   Was pt agreeable to Eval/treatment?  Yes  To be met in 5 days   Pain level   0/10  0 0/10    Bed Mobility    Rolling: Not assessed patient in chair     Rolling: Not assessed patient in chair    Rolling: Minimal assist of 1    Supine to sit: Minimal assist of 1    Sit to supine: Minimal assist of 1    Scooting: Minimal assist of 1     Transfers Sit to stand: Minimal assist of 1 Cues for hand placement and safety especially with reaching back for chair Sit to stand: Minimal assist of 1 Cues for hand placement and safety     Sit to stand: Supervision      Ambulation    30 feet using  wheeled walker with Minimal assist of 1   for walker control and balance and cues for safety, pacing and O2 line management 2 x 30 feet using  wheeled walker with Minimal assist of 1   for walker control and balance and cues for sequencing, safety and pacing    50 feet using  wheeled walker with Supervision     Stair negotiation: ascended and descended   Not assessed          ROM Within functional limits    Increase range of motion 10% of affected joints    Strength BUE:  refer to OT eval  RLE:  3+/5  LLE:  3+/5  Increase strength in affected mm groups by 1/3 grade   Balance Sitting EOB:  not assessed    Dynamic Standing:  fair wheeled walker  Sitting EOB: not assessed    Dynamic Standing: fair wheeled walker    Sitting EOB:  good    Dynamic Standing: fair +wheeled walker      Patient is Alert & Oriented x person, place, time and situation and follows directions    Sensation:  Patient  reports numbness bilateral feet-chronic      Edema:  no   Endurance: fair  -    Vitals: 2 liters nasal cannula   Blood Pressure at rest 135/79 Blood Pressure during session 127/81   Heart Rate at rest 68 Heart Rate during session 115   SPO2 at rest 95%  SPO2 during session 85%after gait cues purse lip breathing  After treatment in chair 95%     Patient education  Patient educated on role of Physical Therapy, risks of immobility, safety and plan of care,  importance of mobility while in hospital , purse lip breathing, ankle pumps, quad set and glut set for edema control, blood clot prevention and O2 line management and safety      Patient response to education:   Pt verbalized understanding Pt demonstrated skill Pt requires further education in this area   Yes Partial Yes      Treatment:  Patient practiced and was instructed/facilitated in the following treatment: Patient in chair, ambulated in room,  stood x 2 for endurance/strength.  performed exercises. Therapeutic Exercises:  ankle pumps, heel raises, long arc quad and seated marching  x 15-20 reps. At end of session, patient in chair with   call light and phone within reach,  all lines and tubes intact, nursing notified. Patient would benefit from continued skilled Physical Therapy to improve functional independence and quality of life.          Patient's/ family goals   get stronger    Time in  0923  Time out  0947    Total Treatment Time  24 minutes      CPT codes:  Therapeutic exercises (83131)   8 minutes  1 unit(s)  Gait Training (59508) 16 minutes 1 unit(s)    Archana Stroud, PT   #[id number]

## 2022-04-18 NOTE — PROGRESS NOTES
[facility]        CARDIOLOGY                 INPATIENT PROGRESS NOTE          PATIENT SEEN IN FOLLOW UP FOR: NSTEMI    Hospital Day: 6     Sumaya Sanderson is a 80year old patient previously not known to [facility]. SUBJECTIVE: Sleeping comfortably and not in acute distress. Blood pressure is not adequately controlled and patient currently not on blood pressure medications. .  Will initiate low dose carvedilol           Intake/Output Summary (Last 24 hours) at 4/18/2022 1643  Last data filed at 4/18/2022 1400  Gross per 24 hour   Intake 1210 ml   Output 1800 ml   Net -590 ml       Labs:   CBC:   Recent Labs     04/17/22 0448 04/17/22 0448 04/17/22  1620 04/18/22 0451   WBC 2.8*  --   --  2.1*   HGB 7.3*   < > 9.7* 8.3*   HCT 21.6*   < > 28.5* 24.7*   PLT 90*  --   --  73*    < > = values in this interval not displayed. BMP:   Recent Labs     04/17/22 0448 04/18/22  0451    139   K 3.5 3.7   CO2 25 27   BUN 29* 26*   CREATININE 1.2 1.2   LABGLOM 57 57   CALCIUM 7.6* 7.3*     Mag:   Recent Labs     04/17/22 0448 04/18/22  0451   MG 1.9 1.8     Phos:   Recent Labs     04/17/22 0448 04/18/22  0451   PHOS 1.8* 2.3*     TSH:   No results for input(s): TSH in the last 72 hours. HgA1c:     BNP: No results for input(s): BNP in the last 72 hours. PT/INR:   No results for input(s): PROTIME, INR in the last 72 hours. APTT:  No results for input(s): APTT in the last 72 hours. CARDIAC ENZYMES:  No results for input(s): CKTOTAL, CKMB, CKMBINDEX, TROPONINI in the last 72 hours.   FASTING LIPID PANEL:  Lab Results   Component Value Date    CHOL 70 04/14/2022    HDL 25 04/14/2022    LDLCALC 31 04/14/2022    TRIG 70 04/14/2022     LIVER PROFILE:  Recent Labs     04/17/22 0448 04/18/22 0451   AST 28 32   ALT 71* 62*   LABALBU 2.3* 2.3*       Current Inpatient Medications:   sodium chloride flush  5-40 mL IntraVENous 2 times per day    insulin glargine  10 Units SubCUTAneous Nightly    pantoprazole  40 mg Oral QAM AC    predniSONE  5 mg Oral Q24H    predniSONE  20 mg Oral Q24H    cefTRIAXone (ROCEPHIN) IV  1,000 mg IntraVENous Q24H    insulin lispro  0-18 Units SubCUTAneous TID WC    insulin lispro  0-9 Units SubCUTAneous Nightly    melatonin  10 mg Oral Nightly    levothyroxine  75 mcg Oral Daily    sodium chloride flush  5-40 mL IntraVENous 2 times per day    [Held by provider] heparin (porcine)  5,000 Units SubCUTAneous BID       IV Infusions (if any):   sodium chloride      sodium chloride      sodium chloride      dextrose           PHYSICAL EXAM:     CONSTITUTIONAL:   BP (!) 165/85   Pulse 99   Temp 98.7 °F (37.1 °C) (Oral)   Resp 16   Ht 5' 6\" (1.676 m)   Wt 162 lb (73.5 kg)   SpO2 95%   BMI 26.15 kg/m²   Pulse  Av.9  Min: 83  Max: 381  Systolic (23KTO), JFI:991 , Min:127 , VI    Diastolic (82ITL), RCZ:64, Min:68, Max:136        In general, this is a well developed, well nourished who appears stated age, awake, alert, no apparent distress  HEENT: eyes -conjunctivae pink,  Throat - Oral mucosa moist.   Neck-  no stridor,no carotid bruit. no jugular venous distention   RESPIRATORY: Chest symmetrical.  No accessory muscle use. Lung auscultation - clear to auscultation except few rhonchi  CARDIOVASCULAR:     Heart Palpation - no palpable thrills  Heart Ausculation - Regular rate and rhythm, 2/6 systolic murmur, No s3 or rub. No lower extremity edema, Distal pulses fair; no cyanosis. Abrasion to right lower leg. ABDOMEN: Soft, nontender,  Bowel sounds present. MS: n/a  : Deferred  Rectal Exam: Deferred  SKIN: warm and dry s   NEURO / PSYCH: oriented to person, place                                          ASSESSMENT/PLAN:      NSTEMI - Denies CP; EKG and Echo findings suggest inferior MI.    I have initiated beta-blocker carvedilol  given hypotension  Initiate aspirin if there is no contraindication  Apparently has not been on statin therapy due to elevated liver enzymes  2D Echo showed inferior wall motion abnormality with LV EF 55%. Awaiting cardiac catheterization tomorrow if EGD and GI evaluation revealed no bleeding risk     Hypotension - Off inotropic support. But now hypertensive  Low-dose carvedilol is initiated for blood pressure control    PAF - Diagnosed in Jan 2022 - Was on Eliquis;  Off Eliquis since Holter in Feb 2022 showed no A Fib and due to his Anemia.     Acute on chronic Anemia -  s/p PRBC Transfusion; had Bone marrow bx - Hem/Onc following, GI on case, awaiting GI evaluation to be complete and if no risk of bleeding proceed with invasive coronary angiogram with possible PCI tomorrow     Possible Rhabdomyolysis - Monitor CPKs and Renal  Fn     Possible GI bleed - GI on case, Ok for endoscopy     BURAK/CKD - Better, Monitor renal Fn     Elevated LFTs - GI on case     Type 2 diabetes -Per primary service     Left diabetic foot wound     Interstitial lung disease - On O2      Hypothyroidism - On HRT                   Electronically signed by Justin Iqbal MD on 4/18/2022 at 4:43 PM  [facility]) Cardiology

## 2022-04-18 NOTE — PLAN OF CARE
Problem: Skin Integrity:  Goal: Will show no infection signs and symptoms  Description: Will show no infection signs and symptoms  Outcome: Met This Shift  Goal: Absence of new skin breakdown  Description: Absence of new skin breakdown  4/18/2022 1449 by Collin Ga RN  Outcome: Met This Shift  4/18/2022 0731 by Brayan Cole RN  Outcome: Met This Shift     Problem: Falls - Risk of:  Goal: Will remain free from falls  Description: Will remain free from falls  4/18/2022 1449 by Collin Ga RN  Outcome: Met This Shift  4/18/2022 0731 by Brayan Cole RN  Outcome: Met This Shift  Goal: Absence of physical injury  Description: Absence of physical injury  Outcome: Met This Shift     Problem: Respiratory:  Goal: Respiratory status will improve  Description: Respiratory status will improve  Outcome: Met This Shift

## 2022-04-18 NOTE — PROGRESS NOTES
Immediately prior to the procedure the patient's History and Physical was reviewed- there are no changes with the current vitals. BP (!) 141/76   Pulse 87   Temp 98.2 °F (36.8 °C) (Oral)   Resp 23   Ht 5' 6\" (1.676 m)   Wt 162 lb (73.5 kg)   SpO2 95%   BMI 26.15 kg/m²     No CP/SOB. Risks/benefits d/w pt. All questions answered. Proceed with EGD. Chart Extensively reviewed. Had d/w with Dr. Aguilar Corley. Cardiology notes reviewed. Esophagram/UGI reviewed with pt and family. No active CP/SOB. On 2 L NC with Pulse Ox 95%. Pt Hg was 7.3, Hg was then 9.7 after 1 unit PRBC last evening which may have been concentrated and now 8.3 this am which would make sense to be in range. Pt knows at higher than average cardiopulmonary risk given NSTEMI and inferior wall changes. Has Preserved EF at 55%. Pt with lung fibrosis. Pt with Pancytopenia. Hematology following and question of MDS. Agrees to proceed with EGD knowing higher cardiopulmonary risk and knows the alternative of bleeding if proceed with cath. Cardiology cleared and agreed for procedure. Also had d/w with anesthesia. Pt optimized for endoscopy given his overall circumstances.     DO Robin  4/18/2022  7:53 AM

## 2022-04-18 NOTE — PROGRESS NOTES
CRITICAL CARE PROGRESS NOTE    The patient's case was discussed in multidisciplinary rounds including critical care specialist, nursing, RT and pharmacy. His evaluation is as follows:     80year old person with PMH as described below admitted to ICU for management of NSTEMI, macrocytic anemia, BURAK,     --On 2L NC, feels well, no chest pain or any discomfort today   --Yesterday received 1 unit of PRBC  --No pressors  --Has TLC in [facility] --Goal to obtain peripheral lines and remove TLC  --NSTEMI -- Scheduled for LHC tomorrow  --Remains pancytopenic --Possible MDS, pending BM biopsy result  --Has hypophosphatemia --Replacing electrolytes  --Renal function improving  --EGD with mild gastritis --Continue PPI    --CT chest with subpleural fibrosis  --Procalcitonin has been elevated and decrasing, continue ceftriaxone  --BM biopsy is pending        /81   Pulse 98   Temp 98.1 °F (36.7 °C) (Oral)   Resp 28   Ht 5' 6\" (1.676 m)   Wt 162 lb (73.5 kg)   SpO2 95%   BMI 26.15 kg/m²   General: Awake, oriented to place, time and person  HEENT: No head lesions, PERRL, EOMI, mouth without lesions, no nasal lesions, no cervical adenopathy palpated  Respiratory: Lungs with equal breath sounds bilaterally, no adventitious sounds auscultated, no accessory muscle use  CV: Regular rate, no murmurs, no JVD, no leg edema  Abdomen: Soft, non tender, + bowel sounds, no lesions  Skin: Hydrated, adequate turgor, no rash, capillary refill <2 seconds  Extremities: Muscular strength 5/5 in 4 limbs, moves 4 limbs spontaneously, distal pulses present  Neurology: Awake and alert, follows commands, moves 4 limbs on command and spontaneously, neck is supple, no meningitic signs present.       Past Medical History:   Diagnosis Date    Diabetes mellitus ([facility] [address]     H/O cardiovascular stress test 12/01/2021    Lexiscan    History of blood transfusion     History of Holter monitoring 02/11/2022    Hyperlipidemia     Hypertension     Lower back pain     Thyroid disease        Last 3 CMP:  Recent Labs     04/16/22  0418 04/17/22  0448 04/18/22  0451    141 139   K 4.1 3.5 3.7    108* 104   CO2 22 25 27   BUN 36* 29* 26*   CREATININE 1.4* 1.2 1.2   GLUCOSE 294* 62* 198*   CALCIUM 7.2* 7.6* 7.3*   PROT 5.3* 5.5* 5.3*   LABALBU 2.5* 2.3* 2.3*   BILITOT 0.4 0.3 0.4   ALKPHOS 62 54 66   AST 43* 28 32   ALT 88* 71* 62*       Recent Labs     04/18/22  0451   WBC 2.1*   RBC 2.32*   HGB 8.3*   HCT 24.7*   .5*   MCH 35.8*   MCHC 33.6   RDW 23.4*   PLT 73*   MPV 11.7       No results for input(s): BC in the last 72 hours. No results for input(s): Asya Razo in the last 72 hours.     24 HR INTAKE/OUTPUT:      Intake/Output Summary (Last 24 hours) at 4/18/2022 1040  Last data filed at 4/18/2022 0900  Gross per 24 hour   Intake 1830 ml   Output 2500 ml   Net -670 ml     MEDICATIONS:   potassium & sodium phosphates  2 packet Oral Once    sodium chloride flush  5-40 mL IntraVENous 2 times per day    insulin glargine  10 Units SubCUTAneous Nightly    pantoprazole  40 mg Oral QAM AC    predniSONE  5 mg Oral Q24H    predniSONE  20 mg Oral Q24H    cefTRIAXone (ROCEPHIN) IV  1,000 mg IntraVENous Q24H    insulin lispro  0-18 Units SubCUTAneous TID WC    insulin lispro  0-9 Units SubCUTAneous Nightly    melatonin  10 mg Oral Nightly    levothyroxine  75 mcg Oral Daily    sodium chloride flush  5-40 mL IntraVENous 2 times per day    [Held by provider] heparin (porcine)  5,000 Units SubCUTAneous BID      sodium chloride      sodium chloride      sodium chloride      dextrose       sodium chloride flush, sodium chloride, sodium chloride, sodium phosphate IVPB, sodium chloride flush, sodium chloride, ondansetron **OR** ondansetron, polyethylene glycol, acetaminophen **OR** acetaminophen, glucagon (rDNA), glucose, dextrose, dextrose bolus (hypoglycemia) **OR** dextrose bolus (hypoglycemia)    OBJECTIVE:  Vitals:    04/18/22 1003   BP: Pulse: 98   Resp:    Temp:    SpO2:         O2 Flow Rate (L/min): 2 L/min  O2 Device: Nasal cannula        LABS:  WBC   Date Value Ref Range Status   04/18/2022 2.1 (L) 4.5 - 11.5 E9/L Final   04/17/2022 2.8 (L) 4.5 - 11.5 E9/L Final   04/16/2022 3.8 (L) 4.5 - 11.5 E9/L Final     Hemoglobin   Date Value Ref Range Status   04/18/2022 8.3 (L) 12.5 - 16.5 g/dL Final   04/17/2022 9.7 (L) 12.5 - 16.5 g/dL Final   04/17/2022 7.3 (L) 12.5 - 16.5 g/dL Final     Hematocrit   Date Value Ref Range Status   04/18/2022 24.7 (L) 37.0 - 54.0 % Final   04/17/2022 28.5 (L) 37.0 - 54.0 % Final   04/17/2022 21.6 (L) 37.0 - 54.0 % Final     MCV   Date Value Ref Range Status   04/18/2022 106.5 (H) 80.0 - 99.9 fL Final   04/17/2022 111.9 (H) 80.0 - 99.9 fL Final   04/16/2022 112.6 (H) 80.0 - 99.9 fL Final     Platelets   Date Value Ref Range Status   04/18/2022 73 (L) 130 - 450 E9/L Final   04/17/2022 90 (L) 130 - 450 E9/L Final   04/16/2022 95 (L) 130 - 450 E9/L Final     Sodium   Date Value Ref Range Status   04/18/2022 139 132 - 146 mmol/L Final   04/17/2022 141 132 - 146 mmol/L Final   04/16/2022 135 132 - 146 mmol/L Final     Potassium   Date Value Ref Range Status   04/18/2022 3.7 3.5 - 5.0 mmol/L Final   04/17/2022 3.5 3.5 - 5.0 mmol/L Final   04/16/2022 4.1 3.5 - 5.0 mmol/L Final     Potassium reflex Magnesium   Date Value Ref Range Status   04/13/2022 4.4 3.5 - 5.0 mmol/L Final     Chloride   Date Value Ref Range Status   04/18/2022 104 98 - 107 mmol/L Final   04/17/2022 108 (H) 98 - 107 mmol/L Final   04/16/2022 105 98 - 107 mmol/L Final     CO2   Date Value Ref Range Status   04/18/2022 27 22 - 29 mmol/L Final   04/17/2022 25 22 - 29 mmol/L Final   04/16/2022 22 22 - 29 mmol/L Final     BUN   Date Value Ref Range Status   04/18/2022 26 (H) 6 - 23 mg/dL Final   04/17/2022 29 (H) 6 - 23 mg/dL Final   04/16/2022 36 (H) 6 - 23 mg/dL Final     CREATININE   Date Value Ref Range Status   04/18/2022 1.2 0.7 - 1.2 mg/dL Final 04/17/2022 1.2 0.7 - 1.2 mg/dL Final   04/16/2022 1.4 (H) 0.7 - 1.2 mg/dL Final     Glucose   Date Value Ref Range Status   04/18/2022 198 (H) 74 - 99 mg/dL Final   04/17/2022 62 (L) 74 - 99 mg/dL Final   04/16/2022 294 (H) 74 - 99 mg/dL Final   03/29/2012 111 (H) 70 - 110 mg/dL Final   12/06/2011 107 70 - 110 mg/dL Final   06/06/2011 109 70 - 110 mg/dL Final     Calcium   Date Value Ref Range Status   04/18/2022 7.3 (L) 8.6 - 10.2 mg/dL Final   04/17/2022 7.6 (L) 8.6 - 10.2 mg/dL Final   04/16/2022 7.2 (L) 8.6 - 10.2 mg/dL Final     Total Protein   Date Value Ref Range Status   04/18/2022 5.3 (L) 6.4 - 8.3 g/dL Final   04/17/2022 5.5 (L) 6.4 - 8.3 g/dL Final   04/16/2022 5.3 (L) 6.4 - 8.3 g/dL Final     Albumin   Date Value Ref Range Status   04/18/2022 2.3 (L) 3.5 - 5.2 g/dL Final   04/17/2022 2.3 (L) 3.5 - 5.2 g/dL Final   04/16/2022 2.5 (L) 3.5 - 5.2 g/dL Final   03/29/2012 4.4 3.2 - 4.8 g/dL Final   12/06/2011 4.2 3.2 - 4.8 g/dL Final   06/06/2011 4.6 3.2 - 4.8 g/dL Final     Total Bilirubin   Date Value Ref Range Status   04/18/2022 0.4 0.0 - 1.2 mg/dL Final   04/17/2022 0.3 0.0 - 1.2 mg/dL Final   04/16/2022 0.4 0.0 - 1.2 mg/dL Final     Alkaline Phosphatase   Date Value Ref Range Status   04/18/2022 66 40 - 129 U/L Final   04/17/2022 54 40 - 129 U/L Final   04/16/2022 62 40 - 129 U/L Final     AST   Date Value Ref Range Status   04/18/2022 32 0 - 39 U/L Final   04/17/2022 28 0 - 39 U/L Final   04/16/2022 43 (H) 0 - 39 U/L Final     ALT   Date Value Ref Range Status   04/18/2022 62 (H) 0 - 40 U/L Final   04/17/2022 71 (H) 0 - 40 U/L Final   04/16/2022 88 (H) 0 - 40 U/L Final     GFR Non-   Date Value Ref Range Status   04/18/2022 57 >=60 mL/min/1.73 Final     Comment:     Chronic Kidney Disease: less than 60 ml/min/1.73 sq.m. Kidney Failure: less than 15 ml/min/1.73 sq.m. Results valid for patients 18 years and older.      04/17/2022 57 >=60 mL/min/1.73 Final     Comment: Chronic Kidney Disease: less than 60 ml/min/1.73 sq.m. Kidney Failure: less than 15 ml/min/1.73 sq.m. Results valid for patients 18 years and older. 04/16/2022 48 >=60 mL/min/1.73 Final     Comment:     Chronic Kidney Disease: less than 60 ml/min/1.73 sq.m. Kidney Failure: less than 15 ml/min/1.73 sq.m. Results valid for patients 18 years and older. GFR    Date Value Ref Range Status   04/18/2022 >60  Final   04/17/2022 >60  Final   04/16/2022 58  Final     Magnesium   Date Value Ref Range Status   04/18/2022 1.8 1.6 - 2.6 mg/dL Final   04/17/2022 1.9 1.6 - 2.6 mg/dL Final   04/16/2022 1.9 1.6 - 2.6 mg/dL Final     Phosphorus   Date Value Ref Range Status   04/18/2022 2.3 (L) 2.5 - 4.5 mg/dL Final   04/17/2022 1.8 (L) 2.5 - 4.5 mg/dL Final   04/16/2022 1.6 (L) 2.5 - 4.5 mg/dL Final     No results for input(s): PH, PO2, PCO2, HCO3, BE, O2SAT in the last 72 hours. RADIOLOGY:  XR CHEST PORTABLE   Final Result   Increased markings seen diffusely throughout the lung fields stable and   unchanged. Left-sided deep line catheter tip is curled back upon itself in   the SVC. FL UGI W KUB   Final Result   1. There is no esophageal mucosal irregularity, stricture or mass. 2. There is no gastric mass or obstruction no ulceration is noted   3. Tertiary contractions of the esophagus suggestive of esophageal   dysmotility disorder. IR BONE MARROW BIOPSY AND ASPIRATION   Final Result   Status post CT guided bone marrow aspiration and core biopsy of the iliac   bone. US GALLBLADDER RUQ   Final Result   No evidence of cholelithiasis or acute cholecystitis. No biliary ductal   dilatation. XR CHEST PORTABLE   Final Result   Deep line catheter placed on the left tip in the SVC. Increased pulmonary   vascularity again seen bilaterally with no evidence of pneumothorax.          CT ABDOMEN PELVIS WO CONTRAST Additional Contrast? None   Final Result   No acute intra-abdominal or pelvic findings. Bilateral nonobstructing renal calculi. Colonic diverticulosis, without diverticulitis. Additional findings as above. CT CHEST WO CONTRAST   Final Result   Progression of moderate interstitial lung disease, which demonstrates a UIP   pattern. MRI LUMBAR SPINE WO CONTRAST   Final Result   Multilevel degenerative changes, most notably at L4-5 and L5-S1. Stable   grade 2 anterolisthesis of L4 on L5 and grade 1 anterolisthesis of L5 on S1.   Multilevel foraminal and lateral recess stenoses are not significantly   changed since the previous exam.      Development of endplate degenerative changes at L2-3. This is a new finding. XR CHEST PORTABLE   Final Result   Marked hypoventilation. Otherwise, no acute process seen. PROBLEM LIST:  Principal Problem:    NSTEMI (non-ST elevated myocardial infarction) ([address]  Resolved Problems:    * No resolved hospital problems.  *      Laura Estrada MD  Pulmonary and Critical Care Medicine

## 2022-04-18 NOTE — ANESTHESIA POSTPROCEDURE EVALUATION
Department of Anesthesiology  Postprocedure Note    Patient: Felicity Darby  MRN: [id number]  YOB: 1933  Date of evaluation: 4/18/2022  Time:  9:37 AM     Procedure Summary     Date: 04/18/22 Room / Location: [address] [place] [address] / [address]    Anesthesia Start: 4429 Anesthesia Stop: 0830    Procedure: EGD ESOPHAGOGASTRODUODENOSCOPY (N/A ) Diagnosis: (ANEMIA)    Surgeons: Musa Myles DO Responsible Provider: Elizabeth John MD    Anesthesia Type: MAC ASA Status: 4          Anesthesia Type: MAC    Tashi Phase I:      Tashi Phase II:      Last vitals: Reviewed and per EMR flowsheets.        Anesthesia Post Evaluation    Patient location during evaluation: bedside  Patient participation: complete - patient participated  Level of consciousness: awake  Airway patency: patent  Nausea & Vomiting: no nausea and no vomiting  Complications: no  Cardiovascular status: hemodynamically stable  Respiratory status: acceptable  Hydration status: euvolemic

## 2022-04-18 NOTE — PROGRESS NOTES
OCCUPATIONAL THERAPY Treatment note  [address] [facility]  [address], [address]    Date:2022                                                   Patient Name: Marni Adam     MRN: [id number]     : 1933     Room: [place] [address]     EVAL  Evaluating OT: Theora Neither, OTR/L; [id number]        Referring Provider and Orders/Date:   OT eval and treat Start: 22, End: 22, ONE TIME, Standing Count: 1 Occurrences, R    Nicole Bolanos DO     Diagnosis:   1. NSTEMI (non-ST elevated myocardial infarction) ([facility] [address]    2. BURAK (acute kidney injury) ([facility] [address]    3. Non-traumatic rhabdomyolysis    4. Febrile illness    5. Shortness of breath    6. Chronic bilateral low back pain without sciatica          Pertinent Medical History:  :ESOPHAGOGASTRODUODENOSCOPY      Past Medical History        Past Medical History:   Diagnosis Date    Diabetes mellitus ([facility] [address]      H/O cardiovascular stress test 2021     Lexiscan    History of blood transfusion      History of Holter monitoring 2022    Hyperlipidemia      Hypertension      Lower back pain      Thyroid disease               Past Surgical History         Past Surgical History:   Procedure Laterality Date    BACK SURGERY        fusion  lumbar area,      JOINT REPLACEMENT Bilateral [id number]     knee     LITHOTRIPSY        SHOULDER ARTHROSCOPY Right 2020     with treatment    SHOULDER ARTHROSCOPY Right 2020     ARTHROSCOPIC EXAM AND TREATMENT RIGHT SHOULDER (CPT 93484,14531) RIGHT ROTATOR CUFF REPAIR, SUBACHROMIAL DECOMPRESSION, DEBRIDEMENT OF HUMERAL performed by Maximo Meckel, DO at [facility]               Precautions:  Fall Risk, 2L    Recommended placement: IRF    Assessment of current deficits     [x]? Functional mobility           [x]? ADLs           [x]? Strength                   []?Cognition     [x]? Functional transfers         [x]? IADLs         [x]? Safety Awareness   [x]? Endurance     []? Fine Coordination                        [x]? Balance      []? Vision/perception    []? Sensation       [x]? Gross Motor Coordination            []? ROM           []? Delirium                   []? Motor Control      OT PLAN OF CARE   OT POC based on physician orders, patient diagnosis and results of clinical assessment     Frequency/Duration 1-3 days/wk for 2 weeks PRN   Specific OT Treatment Interventions to include:   * Instruction/training on adapted ADL techniques and AE recommendations to increase functional independence within precautions       * Training on energy conservation strategies, correct breathing pattern and techniques to improve independence/tolerance for self-care routine  * Functional transfer/mobility training/DME recommendations for increased independence, safety, and fall prevention  * Patient/Family education to increase follow through with safety techniques and functional independence  * Recommendation of environmental modifications for increased safety with functional transfers/mobility and ADLs  * Therapeutic exercise to improve motor endurance, ROM, and functional strength for ADLs/functional transfers  * Therapeutic activities to facilitate/challenge dynamic balance, stand tolerance for increased safety and independence with ADLs  * Therapeutic activities to facilitate gross/fine motor skills for increased independence with ADLs  * Neuro-muscular re-education: facilitation of righting/equilibrium reactions, midline orientation, scapular stability/mobility, normalization of muscle tone, and facilitation of volitional active controled movement  * Positioning to improve skin integrity, interaction with environment and functional independence      Recommended Adaptive Equipment/DME: TBD       Home Living: Pt lives at home alone. Can have 1st floor set up with 2 small 4\" steps down and 1 step up into the home without rails.  Does go up a standard flight of steps for laundry and bumps basket up. Does have rails. Social support can be provided at [place]. Bathroom setup: walk in shower with built in seat and grab bars; standard toilet with grab bars    DME owned: cane, crutches, ww, standard walker, rollator      Prior Level of Function: indep with ADLs , indep with IADLs; ambulated indep   Driving: yes   Occupation: retired   Enjoys: fishing, golfing, gardening, exercise     Pain Level: none  Cognition: A&O: 4/4; Follows 3 step directions              Memory:  Intact              Sequencing:  Intact              Problem solving:  Intact              Judgement/safety:  Intact     AM-[facility] Daily Activity Inpatient   How much help for putting on and taking off regular lower body clothing?: A Lot  How much help for Bathing?: A Little  How much help for Toileting?: A Lot  How much help for putting on and taking off regular upper body clothing?: A Lot  How much help for taking care of personal grooming?: A Little  How much help for eating meals?: None  AM-[facility] Inpatient Daily Activity Raw Score: 16  AM-[facility] Inpatient ADL T-Scale Score : 35.96  ADL Inpatient CMS 0-100% Score: 53.32  ADL Inpatient CMS G-Code Modifier : CK    Functional Assessment:      Initial Eval Status  Date: 4/15/2022   Treatment Status  Date: 4/18/22 STGs = LTGs  Time frame: 10-14 days   Feeding Supervision with simulation. Possible assist with containers. NPO on eval.  Indep sitting up in chair with breakfast.   Indep   Grooming Stand by Assist from sitting up in chair with face/hand wash, oral care and applying chapstick.   Set up and supervision with oral care and shaving with electric razor from sitting due to fatigue.   Independent    UB Dressing Maximal Assist with gown management sitting EOB with assist for IV and lines Mod A with gown management due to lines from sitting.   Stand by Assist    LB Dressing Dependent to doff and don socks from supine with fatigue noted Mod A with socks and pants from sitting/standing.   Moderate Assist    Bathing Moderate Assist overall with assist for LB and extended time. Min A overall with extended time from sitting/standing from arm chair. Assist for buttocks from standing.   Stand by Assist    Toileting Dependent with looney management NT-looney removed  Stand by Assist    Bed Mobility  Supine to sit: Moderate Assist   With extended time and some dizziness reported. NT with pt up in chair on arrival.   Supine to sit: Stand by Assist   Sit to supine: Stand by Assist    Functional Transfers Moderate Assist from lower surface of chair and min A from elevated surface of bed with some loss of balance initially. Declining walker, but recommended for safety and increased indep next session. Min A for sit to stand from arm chair with walker for support. Completed x 2  Stand by Assist    Functional Mobility Minimal Assist with hand held assist for short distance from bed to arm chair with pt sitting impulsively due to fatigue and dizziness. Min A with walker for short distance functional mobility throughout the room to simulate house hold distances. Request to return to chair due to fatigue to complete grooming rather than stand at sink. Stand by Assist    Balance Sitting:     Static:  Fair+    Dynamic:fair  Standing: fair- Sitting:     Static:  Fair+    Dynamic:fair  Standing: fair-  Sitting:     Static:  good    Dynamic:fair+  Standing: fair   Activity Tolerance Vitals with activity:2L 106/60 HR 75; 115/73 HR 72 97%; up in chair 106/66 HR 75 93%  Sitting EOB for 5min period and standing for 1min average. 2L 151/136 HR 98 95%; 127/81 90%  with UB ADLs; standing tolerance 1min average with SOB reported.   Increase standing tolerance for >4min with stable vital signs for carry over into toileting, functional tranfers and indep in ADLs   Visual/  Perceptual Glasses: reading, Present; WFL     Reports change in vision since admission: No      NA      Hand Dominance  [x]? Right   []? Left     AROM (PROM) Strength Additional Info:  Goal:   RUE  WFL 4/5 good  and fair FMC/dexterity noted during ADL tasks  Opposition [x]? Intact []? Impaired  Finger to nose [x]? Intact []? Impaired 5/5MMT generally for carry over into self care, functional transfers and functional mobility with AD. LUE WFL 4-/5 good  and fair FMC/dexterity noted during ADL tasks  Opposition [x]? Intact []? Impaired  Finger to nose [x]? Intact []? Impaired 5/5MMT generally for carry over into self care, functional transfers and functional mobility with AD.       Hearing: [place]  Sensation:  No c/o numbness or tingling   Tone: WFL   Edema: none                  Comments: Upon arrival patient sitting up in chair. Pt required mod A for most UB ADLs and mod A LB ADLs tasks. Limited with min A for standing during LB ADLs and functional transfers. The biggest barriers reflect that of functional transfers, functional mobility, UB/LB ADLs, activity tolerance, balance, safety and strengthening. At end of session, patient sitting up in arm chair with call light and phone within reach, all lines and tubes intact. Overall patient demonstrated decreased independence and safety during completion of ADL/functional transfer/mobility tasks. Nursing updated on pt position and status following OT treatment. Pt would benefit from continued skilled OT to increase safety and independence with completion of ADL/IADL tasks for functional independence and quality of life. Treatment: OT treatment provided this date includes:   Instruction, education and training on safe facilitation and adapted techniques for completion of ADLs. These include neuromuscular reeducation to facilitate balance/righting reactions,safe functional transfer techniques, proper positioning/alignment to improve interaction with environment and overall function and on adapted techniques/work simplification for completion of ADLs.  Education provided on hand/feet placement with arm chair, walker and body mechanics for fall prevention. Cues for energy conservation and safety for in the home at [place], including modifications and DME. Extended time to complete all tasks, including skilled monitoring of patient's response during treatment session and vital signs. Prior to and at the end of session, environmental modifications / line management completed for patients safety and efficiency of treatment session. See above for further details. · Pt has made good progress towards set goals    · OT 1-3x/week for 5-7 days during hospitalization      Treatment Time also includes thorough review of current medical information, gathering information on past medical history/social history and prior level of function, informal observation of tasks, assessment of data and education on plan of care and goals.      Treatment Time In: 1755    Treatment Time Out: 7330     Treatment Charges: Mins Units   ADL/Home Mgt     38717 29 2   Thera Activities     12978 11 1   Ther Ex                 04135       Manual Therapy    88824       Neuro Re-ed         41015       Orthotic manage/training                               68136       Non Billable Time       Total Timed Treatment 40 3     Eugenia Law OTR/L; [id number]

## 2022-04-18 NOTE — PROGRESS NOTES
Hollis and Mansi Austin M.D. Patient Name: Esperanza Ortiz  YOB: 1933  PCP: Gerard Guardado DO   Referring Provider:      Reason for Consultation:   Chief Complaint   Patient presents with    Extremity Weakness     WEAKNESS, INCONTINENCE STARTING TODAY        History of Present Illness:  80years old male with history of pulmonary fibrosis, diabetes mellitus, coronary artery disease came to the ER complaining of fatigue and shortness of breath for the past few days. Admitted to ICU with septic shock, NSTEMI, rhabdomyolysis. On antibiotics. Levophed has been tapered off. I was consulted for possible hemolysis. Hemoglobin was normal up until last year. Over the past few months his hemoglobin has dropped to his current level of 7.6. MCV has increased in the same timeframe and is 122 right now. Bilirubin is 0.9. , absolute reticulocyte count was 35,000. Total white count 6.4 with normal differential and platelet count of 220,425. B12 folate were normal.  Peripheral smear evaluation from yesterday showed dysplastic neutrophils and less than 1% blasts. CT chest showed worsening of his interstitial lung disease pattern. CT abdomen did not show any acute findings. Patient reports feeling somewhat better. Has been afebrile. Is awake alert oriented. Denies any blood in stools or black tarry stools. Review of systems: Over 10 systems were reviewed and all were negative except as mentioned above.     Diagnostic Data:     Past Medical History:   Diagnosis Date    Diabetes mellitus ([address]     H/O cardiovascular stress test 12/01/2021    Lexiscan    History of blood transfusion     History of Holter monitoring 02/11/2022    Hyperlipidemia     Hypertension     Lower back pain     Thyroid disease        Patient Active Problem List    Diagnosis Date Noted    NSTEMI (non-ST elevated myocardial infarction) ([address] 04/13/2022  Essential hypertension 08/17/2021    Acquired hypothyroidism 08/17/2021    Dyslipidemia 08/17/2021    Type 2 diabetes mellitus without complication, without long-term current use of insulin ([facility] 08/17/2021    Normocytic anemia 08/17/2021    Incomplete tear of right rotator cuff 01/30/2020    Long biceps tear 01/30/2020    Injury of tendon of long head of right biceps 01/30/2020    Tear of right glenoid labrum 01/30/2020    Bursitis of right shoulder 01/30/2020        Past Surgical History:   Procedure Laterality Date    BACK SURGERY  2000    fusion  lumbar area,      JOINT REPLACEMENT Bilateral [id number],[id number]    knee     LITHOTRIPSY      SHOULDER ARTHROSCOPY Right 01/30/2020    with treatment    SHOULDER ARTHROSCOPY Right 1/30/2020    ARTHROSCOPIC EXAM AND TREATMENT RIGHT SHOULDER (CPT 94498,75296) RIGHT ROTATOR CUFF REPAIR, SUBACHROMIAL DECOMPRESSION, DEBRIDEMENT OF HUMERAL performed by VIRGINIA Stokes DO at [facility]         Family History  Family History   Problem Relation Age of Onset    No Known Problems Mother     Diabetes Father        Social History  Social History     Socioeconomic History    Marital status:      Spouse name: Not on file    Number of children: Not on file    Years of education: Not on file    Highest education level: Not on file   Occupational History    Not on file   Tobacco Use    Smoking status: Never Smoker    Smokeless tobacco: Never Used   Vaping Use    Vaping Use: Never used   Substance and Sexual Activity    Alcohol use: No    Drug use: No    Sexual activity: Not on file   Other Topics Concern    Not on file   Social History Narrative    Not on file     Social Determinants of Health     Financial Resource Strain: Low Risk     Difficulty of Paying Living Expenses: Not hard at all   Food Insecurity: No Food Insecurity    Worried About Running Out of Food in the Last Year: Never true    Jessica of Food in the Last Year: Never true   Transportation Needs:     Lack of Transportation (Medical): Not on file    Lack of Transportation (Non-Medical): Not on file   Physical Activity:     Days of Exercise per Week: Not on file    Minutes of Exercise per Session: Not on file   Stress:     Feeling of Stress : Not on file   Social Connections:     Frequency of Communication with Friends and Family: Not on file    Frequency of Social Gatherings with Friends and Family: Not on file    Attends Restoration Services: Not on file    Active Member of [address] [place] [address] [email or web address] or Organizations: Not on file    Attends Club or Organization Meetings: Not on file    Marital Status: Not on file   Intimate Partner Violence:     Fear of Current or Ex-Partner: Not on file    Emotionally Abused: Not on file    Physically Abused: Not on file    Sexually Abused: Not on file   Housing Stability:     Unable to Pay for Housing in the Last Year: Not on file    Number of [place] in the Last Year: Not on file    Unstable Housing in the Last Year: Not on file        TOBACCO:   reports that he has never smoked. He has never used smokeless tobacco.  ETOH:   reports no history of alcohol use. Home Medications  Prior to Admission medications    Medication Sig Start Date End Date Taking? Authorizing Provider   omeprazole (PRILOSEC) 20 MG delayed release capsule Take 1 capsule by mouth every morning (before breakfast) 4/5/22   Orlin Goode,    SITagliptin (JANUVIA) 100 MG tablet Take 1 tablet by mouth daily 3/30/22   Orlin Goode, DO   ONETOUCH ULTRA strip 1 each by Other route 4 times daily As needed.  2/13/22 5/14/22  Denilson Mcguire, DO   Lancets (ONETOUCH DELICA PLUS [id number]) MISC 1 strip by Other route 4 times daily 2/13/22 5/14/22  Denilson Mcguire DO   colchicine (COLCRYS) 0.6 MG tablet Take 1 tablet by mouth daily 2/7/22   Sean Farah DO   levothyroxine (SYNTHROID) 75 MCG tablet Take 1 tablet by mouth Daily 2/7/22   Sean Farah, DO atenolol (TENORMIN) 25 MG tablet Take 1 tablet by mouth daily 1/26/22   Orlin Goode DO   pravastatin (PRAVACHOL) 20 MG tablet Take 1 tablet by mouth daily 1/21/22   Orlin Goode DO   potassium citrate (UROCIT-K) 10 MEQ (1080 MG) extended release tablet TAKE ONE TABLET BY MOUTH EVERY DAY 8/5/21   Historical Provider, MD   aspirin 325 MG tablet Take 325 mg by mouth daily    Historical Provider, MD   metFORMIN (GLUCOPHAGE) 500 MG tablet TAKE ONE TABLET BY MOUTH EVERY DAY 5/8/18   Historical Provider, MD   finasteride (PROSCAR) 5 MG tablet Take 5 mg by mouth once a week. Historical Provider, MD   therapeutic multivitamin-minerals (THERAGRAN-M) tablet Take 1 tablet by mouth daily. Historical Provider, MD       Allergies  Allergies   Allergen Reactions    No Known Allergies            Objective  /78   Pulse 103   Temp 98.7 °F (37.1 °C) (Oral)   Resp 18   Ht 5' 6\" (1.676 m)   Wt 162 lb (73.5 kg)   SpO2 94%   BMI 26.15 kg/m²     Physical Exam:     General: AAO to person, place, time, in no acute distress, pleasant. On 2 L oxygen via nasal cannula. Head and neck : PERRLA, EOMI . Sclera non icteric. Oropharynx : Clear  Neck: no JVD,  no adenopathy, no carotid bruit  LYMPHATICS : No peripheral lymphadenopathy. Heart: Regular rate and regular rhythm, no murmur  Lungs: Clear to auscultation   Extremities: No edema,no cyanosis, no clubbing. Abdomen: Soft, non-tender;no masses, no organomegaly  Skin:  No rash. Neurologic:Cranial nerves grossly intact. No focal motor or sensory deficits .     Recent Laboratory Data-   Lab Results   Component Value Date    WBC 2.1 (L) 04/18/2022    HGB 8.3 (L) 04/18/2022    HCT 24.7 (L) 04/18/2022    .5 (H) 04/18/2022    PLT 73 (L) 04/18/2022    LYMPHOPCT 55.7 (H) 04/18/2022    RBC 2.32 (L) 04/18/2022    MCH 35.8 (H) 04/18/2022    MCHC 33.6 04/18/2022    RDW 23.4 (H) 04/18/2022    NEUTOPHILPCT 30.4 (L) 04/18/2022    MONOPCT 13.0 (H) 04/18/2022 BASOPCT 0.0 04/18/2022    NEUTROABS 0.63 (L) 04/18/2022    LYMPHSABS 1.18 (L) 04/18/2022    MONOSABS 0.27 04/18/2022    EOSABS 0.02 (L) 04/18/2022    BASOSABS 0.00 04/18/2022       Lab Results   Component Value Date     04/18/2022    K 3.7 04/18/2022     04/18/2022    CO2 27 04/18/2022    BUN 26 (H) 04/18/2022    CREATININE 1.2 04/18/2022    GLUCOSE 198 (H) 04/18/2022    CALCIUM 7.3 (L) 04/18/2022    PROT 5.3 (L) 04/18/2022    LABALBU 2.3 (L) 04/18/2022    BILITOT 0.4 04/18/2022    ALKPHOS 66 04/18/2022    AST 32 04/18/2022    ALT 62 (H) 04/18/2022    LABGLOM 57 04/18/2022    GFRAA >60 04/18/2022       Lab Results   Component Value Date    IRON 122 04/14/2022    TIBC 140 (L) 04/14/2022    FERRITIN 3,037 04/14/2022           Radiology-    XR CHEST PORTABLE   Final Result   Increased markings seen diffusely throughout the lung fields stable and   unchanged. Left-sided deep line catheter tip is curled back upon itself in   the SVC. FL UGI W KUB   Final Result   1. There is no esophageal mucosal irregularity, stricture or mass. 2. There is no gastric mass or obstruction no ulceration is noted   3. Tertiary contractions of the esophagus suggestive of esophageal   dysmotility disorder. IR BONE MARROW BIOPSY AND ASPIRATION   Final Result   Status post CT guided bone marrow aspiration and core biopsy of the iliac   bone. US GALLBLADDER RUQ   Final Result   No evidence of cholelithiasis or acute cholecystitis. No biliary ductal   dilatation. XR CHEST PORTABLE   Final Result   Deep line catheter placed on the left tip in the SVC. Increased pulmonary   vascularity again seen bilaterally with no evidence of pneumothorax. CT ABDOMEN PELVIS WO CONTRAST Additional Contrast? None   Final Result   No acute intra-abdominal or pelvic findings. Bilateral nonobstructing renal calculi. Colonic diverticulosis, without diverticulitis. Additional findings as above. CT CHEST WO CONTRAST   Final Result   Progression of moderate interstitial lung disease, which demonstrates a UIP   pattern. MRI LUMBAR SPINE WO CONTRAST   Final Result   Multilevel degenerative changes, most notably at L4-5 and L5-S1. Stable   grade 2 anterolisthesis of L4 on L5 and grade 1 anterolisthesis of L5 on S1.   Multilevel foraminal and lateral recess stenoses are not significantly   changed since the previous exam.      Development of endplate degenerative changes at L2-3. This is a new finding. XR CHEST PORTABLE   Final Result   Marked hypoventilation. Otherwise, no acute process seen. ASSESSMENT/PLAN :  80years old male with history of pulmonary fibrosis, diabetes mellitus, coronary artery disease came to the ER with worsening fatigue, admitted to ICU with septic shock, NSTEMI, rhabdomyolysis. I have been consulted for possible hemolysis. Patient had normal hemoglobin and MCV up until last year. Over the past few months his hemoglobin has dropped along with an increase in MCV. Current hemoglobin is 7 with an MCV of 122. Dysplastic neutrophils and less than 1% blasts were noted on peripheral smear evaluation. Finding suggestive of MDS. Discussed with the patient and family including Dr. Parrish Winston. We will get a bone marrow biopsy. Normal bilirubin and normal absolute reticulocyte count of 35,000 suggests absence of hemolysis. Recent B12 folate were normal.  Being treated for septic shock. Levophed is being tapered off. On antibiotics. Has been afebrile. Elevated cardiac markers: Cardiology has been consulted for an NSTEMI. GI consulted for possible GI bleed. Denies any blood in stool or black tarry stools. Iron panel ordered. We will continue to follow. Thank you for the consult. 4/18/22  Feeling significantly better. Status post EGD with findings of mild gastritis without any fresh blood. No AVMs.   Small diverticulum was described in the second portion of the duodenum without bleeding. He is on Protonix per GI.  CT chest with subpleural fibrosis  CBC shows a WBC count of 2.1 with hemoglobin of 8.3 with elevated MCV and low platelets of 73. Differential shows neutropenia and occasional metamyelocytes. HIT antibodies pending  Continues on antibiotics with ceftriaxone  Awaiting results of bone marrow aspirate and biopsy.   Will follow      Electronically signed by Vinay Hart MD on 4/18/2022 at 3:02 PM

## 2022-04-18 NOTE — ANESTHESIA PRE PROCEDURE
Department of Anesthesiology  Preprocedure Note       Name:  Sotero Magaña   Age:  80 y.o.  :  1933                                          MRN:  [id number]         Date:  2022      Surgeon: Suze Rowan):  Mata Medina DO    Procedure: Procedure(s):  EGD ESOPHAGOGASTRODUODENOSCOPY    Medications prior to admission:   Prior to Admission medications    Medication Sig Start Date End Date Taking? Authorizing Provider   omeprazole (PRILOSEC) 20 MG delayed release capsule Take 1 capsule by mouth every morning (before breakfast) 22   Orlin Goode DO   SITagliptin (JANUVIA) 100 MG tablet Take 1 tablet by mouth daily 3/30/22   Orlin Goode DO   ONETOUCH ULTRA strip 1 each by Other route 4 times daily As needed. 22  Destiny Mcguire,    Lancets (ONETOUCH DELICA PLUS [id number]) MISC 1 strip by Other route 4 times daily 22  Destiny Mcguire DO   colchicine (COLCRYS) 0.6 MG tablet Take 1 tablet by mouth daily 22   Pako Fuentes DO   levothyroxine (SYNTHROID) 75 MCG tablet Take 1 tablet by mouth Daily 22   Orlin Goode DO   atenolol (TENORMIN) 25 MG tablet Take 1 tablet by mouth daily 22   Orlin Goode DO   pravastatin (PRAVACHOL) 20 MG tablet Take 1 tablet by mouth daily 22   Orlin Goode DO   potassium citrate (UROCIT-K) 10 MEQ (1080 MG) extended release tablet TAKE ONE TABLET BY MOUTH EVERY DAY 21   Historical Provider, MD   aspirin 325 MG tablet Take 325 mg by mouth daily    Historical Provider, MD   metFORMIN (GLUCOPHAGE) 500 MG tablet TAKE ONE TABLET BY MOUTH EVERY DAY 18   Historical Provider, MD   finasteride (PROSCAR) 5 MG tablet Take 5 mg by mouth once a week. Historical Provider, MD   therapeutic multivitamin-minerals (THERAGRAN-M) tablet Take 1 tablet by mouth daily.       Historical Provider, MD       Current medications:    Current Facility-Administered Medications   Medication Dose Route Frequency Provider Last Rate Last Admin    sodium chloride flush 0.9 % injection 5-40 mL  5-40 mL IntraVENous 2 times per day Thadeus Dapash, DO   10 mL at 04/17/22 2117    sodium chloride flush 0.9 % injection 5-40 mL  5-40 mL IntraVENous PRN Thadeus Dapash, DO        0.9 % sodium chloride infusion  25 mL IntraVENous PRN Thadeus Dapash, DO        insulin glargine (LANTUS) injection vial 10 Units  10 Units SubCUTAneous Nightly Kanu Padgett MD   10 Units at 04/17/22 2124    pantoprazole (PROTONIX) tablet 40 mg  40 mg Oral QAM AC Kanu Padgett MD   40 mg at 04/17/22 0905    0.9 % sodium chloride infusion   IntraVENous PRN Leobardo Mcguire DO        predniSONE (DELTASONE) tablet 5 mg  5 mg Oral Q24H Leobardo Mcguire DO   5 mg at 04/17/22 1500    predniSONE (DELTASONE) tablet 20 mg  20 mg Oral Q24H Leobardo Mcguire DO   20 mg at 04/17/22 0830    sodium phosphate 23.52 mmol in dextrose 5 % 250 mL IVPB  0.32 mmol/kg IntraVENous PRN Seun Mcguire, DO        cefTRIAXone (ROCEPHIN) 1,000 mg in sterile water 10 mL IV syringe  1,000 mg IntraVENous Q24H Leobardo Mcguire DO   1,000 mg at 04/17/22 1500    insulin lispro (HUMALOG) injection vial 0-18 Units  0-18 Units SubCUTAneous TID WC Leobardo Mcguire DO   6 Units at 04/17/22 1724    insulin lispro (HUMALOG) injection vial 0-9 Units  0-9 Units SubCUTAneous Nightly Seun Mcguire, DO   5 Units at 04/17/22 2125    melatonin disintegrating tablet 10 mg  10 mg Oral Nightly Leobardo Mcguire, DO   10 mg at 04/17/22 2350    levothyroxine (SYNTHROID) tablet 75 mcg  75 mcg Oral Daily Darcella Spring, DO   75 mcg at 04/17/22 0540    sodium chloride flush 0.9 % injection 5-40 mL  5-40 mL IntraVENous 2 times per day Darcella Spring, DO   5 mL at 04/17/22 2318    sodium chloride flush 0.9 % injection 5-40 mL  5-40 mL IntraVENous PRN Darcella Spring, DO   10 mL at 04/15/22 2036    0.9 % sodium chloride infusion   IntraVENous PRN Ry Blackburn,         ondansetron (ZOFRAN-ODT) disintegrating tablet 4 mg  4 mg Oral Q8H PRN Dylan Luisa, DO        Or    ondansetron [facility]) injection 4 mg  4 mg IntraVENous Q6H PRN Dylan Luisa, DO        polyethylene glycol (GLYCOLAX) packet 17 g  17 g Oral Daily PRN Dylan Luisa, DO        acetaminophen (TYLENOL) tablet 650 mg  650 mg Oral Q6H PRN Dylan Luisa, DO   650 mg at 04/15/22 2342    Or    acetaminophen (TYLENOL) suppository 650 mg  650 mg Rectal Q6H PRN Dylan Luisa, DO        glucagon (rDNA) injection 1 mg  1 mg IntraMUSCular PRN Mejia Vicente MD       [facility] [place] by provider] heparin (porcine) injection 5,000 Units  5,000 Units SubCUTAneous BID Mejia Vicente MD   5,000 Units at 04/16/22 2131    glucose (GLUTOSE) 40 % oral gel 15 g  15 g Oral PRN Mejia Vicente MD   15 g at 04/17/22 0542    dextrose 5 % solution  100 mL/hr IntraVENous PRN Mejia Vicente MD        dextrose bolus (hypoglycemia) 10% 125 mL  125 mL IntraVENous PRN Mejia Vicente MD        Or    dextrose bolus (hypoglycemia) 10% 250 mL  250 mL IntraVENous PRN Mejia Vicente MD           Allergies:     Allergies   Allergen Reactions    No Known Allergies        Problem List:    Patient Active Problem List   Diagnosis Code    Incomplete tear of right rotator cuff M75.111    Long biceps tear M75.41    Injury of tendon of long head of right biceps S46.101A    Tear of right glenoid labrum S43.431A    Bursitis of right shoulder M75.51    Essential hypertension I10    Acquired hypothyroidism E03.9    Dyslipidemia E78.5    Type 2 diabetes mellitus without complication, without long-term current use of insulin (HCC) E11.9    Normocytic anemia D64.9    NSTEMI (non-ST elevated myocardial infarction) ([address] I21.4       Past Medical History:        Diagnosis Date    Diabetes mellitus ([address]     H/O cardiovascular stress test 12/01/2021    Lexiscan    History of blood transfusion     History of Holter monitoring 02/11/2022    Hyperlipidemia     Hypertension     Lower back pain     Thyroid disease        Past Surgical History:        Procedure Laterality Date    BACK SURGERY  2000    fusion  lumbar area,      JOINT REPLACEMENT Bilateral 6312,17136    knee     LITHOTRIPSY      SHOULDER ARTHROSCOPY Right 01/30/2020    with treatment    SHOULDER ARTHROSCOPY Right 1/30/2020    ARTHROSCOPIC EXAM AND TREATMENT RIGHT SHOULDER (CPT 83783,64127) RIGHT ROTATOR CUFF REPAIR, SUBACHROMIAL DECOMPRESSION, DEBRIDEMENT OF HUMERAL performed by VIRGINIA Waite DO at [address] History:    Social History     Tobacco Use    Smoking status: Never Smoker    Smokeless tobacco: Never Used   Substance Use Topics    Alcohol use: No                                Counseling given: Not Answered      Vital Signs (Current):   Vitals:    04/18/22 0400 04/18/22 0500 04/18/22 0600 04/18/22 0700   BP: 136/79 (!) 154/84 (!) 150/81 (!) 141/76   Pulse: 87 83 85 87   Resp: 22 21 16 23   Temp: 36.8 °C (98.2 °F)      TempSrc: Oral      SpO2: 95% 97% 98% 95%   Weight:       Height:                                                  BP Readings from Last 3 Encounters:   04/18/22 (!) 141/76   02/23/22 100/60   01/26/22 128/70       NPO Status:                                                                                 BMI:   Wt Readings from Last 3 Encounters:   04/16/22 162 lb (73.5 kg)   02/23/22 148 lb (67.1 kg)   01/26/22 124 lb (56.2 kg)     Body mass index is 26.15 kg/m².     CBC:   Lab Results   Component Value Date    WBC 2.1 04/18/2022    RBC 2.32 04/18/2022    HGB 8.3 04/18/2022    HCT 24.7 04/18/2022    .5 04/18/2022    RDW 23.4 04/18/2022    PLT 73 04/18/2022       CMP:   Lab Results   Component Value Date     04/18/2022    K 3.7 04/18/2022    K 4.4 04/13/2022     04/18/2022    CO2 27 04/18/2022    BUN 26 04/18/2022    CREATININE 1.2 04/18/2022    GFRAA >60 04/18/2022    LABGLOM 57 04/18/2022    GLUCOSE 198 04/18/2022    GLUCOSE 111 03/29/2012    PROT 5.3 04/18/2022    CALCIUM 7.3 04/18/2022    BILITOT 0.4 04/18/2022    ALKPHOS 66 04/18/2022    AST 32 04/18/2022    ALT 62 04/18/2022       POC Tests: No results for input(s): POCGLU, POCNA, POCK, POCCL, POCBUN, POCHEMO, POCHCT in the last 72 hours. Coags:   Lab Results   Component Value Date    PROTIME 22.4 04/14/2022    INR 1.9 04/14/2022    APTT 27.0 04/14/2022       HCG (If Applicable): No results found for: PREGTESTUR, PREGSERUM, HCG, HCGQUANT     ABGs: No results found for: PHART, PO2ART, QLY4OQF, AAW1KRQ, BEART, O3JEFAWR     Type & Screen (If Applicable):  No results found for: LABABO, LABRH    Drug/Infectious Status (If Applicable):  No results found for: HIV, HEPCAB    COVID-19 Screening (If Applicable):   Lab Results   Component Value Date    COVID19 Non-Reactive 04/14/2022    COVID19 Not Detected 04/14/2022           Anesthesia Evaluation  Patient summary reviewed no history of anesthetic complications:   Airway: Mallampati: II  TM distance: >3 FB   Neck ROM: full  Mouth opening: > = 3 FB Dental:          Pulmonary:Negative Pulmonary ROS and normal exam  breath sounds clear to auscultation                             Cardiovascular:    (+) hypertension:, past MI (Non-ST elevated myocardial infarction: patient scheduled for heart catherization tomorrow 4/19/22): < 1 month, hyperlipidemia      ECG reviewed  Rhythm: regular  Rate: normal  Echocardiogram reviewed    Cleared by cardiology              Neuro/Psych:   Negative Neuro/Psych ROS              GI/Hepatic/Renal: Neg GI/Hepatic/Renal ROS            Endo/Other:    (+) DiabetesType II DM, using insulin, hypothyroidism, blood dyscrasia: thrombocytopenia and anemia:., .                  ROS comment: Lower back pain Abdominal:             Vascular: negative vascular ROS.          Other Findings:           Anesthesia Plan      MAC     ASA 4     (Cardiology and medical clearance given for egd despite patient's increased risk due to noted bleeding)        Anesthetic plan and risks discussed with patient. Plan discussed with attending. Attending anesthesiologist reviewed and agrees with Preprocedure content          MIK Landa - CRNA   4/18/2022    DOS STAFF ADDENDUM:    Pt seen and examined, chart reviewed (including anesthesia, drug and allergy history). Anesthetic plan, risks, benefits, alternatives, and personnel involved discussed with patient. Patient verbalized an understanding and agrees to proceed. Plan discussed with care team members and agreed upon.     Kenzie Del Rio MD  Staff Anesthesiologist  8:11 AM

## 2022-04-19 NOTE — PLAN OF CARE
Problem: Skin Integrity:  Goal: Will show no infection signs and symptoms  Description: Will show no infection signs and symptoms  4/18/2022 1449 by Danilo Hogan RN  Outcome: Met This Shift  Goal: Absence of new skin breakdown  Description: Absence of new skin breakdown  4/18/2022 1449 by Danilo Hogan RN  Outcome: Met This Shift     Problem: Falls - Risk of:  Goal: Will remain free from falls  Description: Will remain free from falls  4/19/2022 0332 by Misael Colvin RN  Outcome: Met This Shift  4/18/2022 1449 by Danilo Hogan RN  Outcome: Met This Shift  Goal: Absence of physical injury  Description: Absence of physical injury  4/19/2022 0332 by Misael Colvin RN  Outcome: Met This Shift  4/18/2022 1449 by Danilo Hogan RN  Outcome: Met This Shift     Problem: Respiratory:  Goal: Respiratory status will improve  Description: Respiratory status will improve  4/19/2022 0332 by Misael Colvin RN  Outcome: Met This Shift  4/18/2022 1449 by Danilo Hogan RN  Outcome: Met This Shift

## 2022-04-19 NOTE — PROGRESS NOTES
[facility] Reynoso M.D. Patient Name: Roz Reese  YOB: 1933  PCP: Ellis Casas DO   Referring Provider:      Reason for Consultation:   Chief Complaint   Patient presents with    Extremity Weakness     WEAKNESS, INCONTINENCE STARTING TODAY        Subjective:  Continues to slowly feel better    History of Present Illness:  80years old male with history of pulmonary fibrosis, diabetes mellitus, coronary artery disease came to the ER complaining of fatigue and shortness of breath for the past few days. Admitted to ICU with septic shock, NSTEMI, rhabdomyolysis. On antibiotics. Levophed has been tapered off. I was consulted for possible hemolysis. Hemoglobin was normal up until last year. Over the past few months his hemoglobin has dropped to his current level of 7.6. MCV has increased in the same timeframe and is 122 right now. Bilirubin is 0.9. , absolute reticulocyte count was 35,000. Total white count 6.4 with normal differential and platelet count of 138,719. B12 folate were normal.  Peripheral smear evaluation from yesterday showed dysplastic neutrophils and less than 1% blasts. CT chest showed worsening of his interstitial lung disease pattern. CT abdomen did not show any acute findings. Patient reports feeling somewhat better. Has been afebrile. Is awake alert oriented. Denies any blood in stools or black tarry stools. Review of systems: Over 10 systems were reviewed and all were negative except as mentioned above.     Diagnostic Data:     Past Medical History:   Diagnosis Date    Diabetes mellitus ([address]     H/O cardiovascular stress test 12/01/2021    Lexiscan    History of blood transfusion     History of Holter monitoring 02/11/2022    Hyperlipidemia     Hypertension     Lower back pain     Thyroid disease        Patient Active Problem List    Diagnosis Date Noted    NSTEMI (non-ST elevated myocardial infarction) ([facility] 04/13/2022    Essential hypertension 08/17/2021    Acquired hypothyroidism 08/17/2021    Dyslipidemia 08/17/2021    Type 2 diabetes mellitus without complication, without long-term current use of insulin ([facility] 08/17/2021    Normocytic anemia 08/17/2021    Incomplete tear of right rotator cuff 01/30/2020    Long biceps tear 01/30/2020    Injury of tendon of long head of right biceps 01/30/2020    Tear of right glenoid labrum 01/30/2020    Bursitis of right shoulder 01/30/2020        Past Surgical History:   Procedure Laterality Date    BACK SURGERY  2000    fusion  lumbar area,      JOINT REPLACEMENT Bilateral [id number]    knee     LITHOTRIPSY      SHOULDER ARTHROSCOPY Right 01/30/2020    with treatment    SHOULDER ARTHROSCOPY Right 1/30/2020    ARTHROSCOPIC EXAM AND TREATMENT RIGHT SHOULDER (CPT 44059,41883) RIGHT ROTATOR CUFF REPAIR, SUBACHROMIAL DECOMPRESSION, DEBRIDEMENT OF HUMERAL performed by Madelin Pearce DO at [address] [place] [address] ENDOSCOPY N/A 4/18/2022    EGD ESOPHAGOGASTRODUODENOSCOPY performed by Jen Jacobs DO at [facility] ENDOSCOPY       Family History  Family History   Problem Relation Age of Onset    No Known Problems Mother     Diabetes Father        Social History  Social History     Socioeconomic History    Marital status:      Spouse name: Not on file    Number of children: Not on file    Years of education: Not on file    Highest education level: Not on file   Occupational History    Not on file   Tobacco Use    Smoking status: Never Smoker    Smokeless tobacco: Never Used   Vaping Use    Vaping Use: Never used   Substance and Sexual Activity    Alcohol use: No    Drug use: No    Sexual activity: Not on file   Other Topics Concern    Not on file   Social History Narrative    Not on file     Social Determinants of Health     Financial Resource Strain: Low Risk     Difficulty of Paying Living Expenses: Not hard at all   Food Insecurity: No Food Insecurity    Worried About Running Out of Food in the Last Year: Never true    Ran Out of Food in the Last Year: Never true   Transportation Needs:     Lack of Transportation (Medical): Not on file    Lack of Transportation (Non-Medical): Not on file   Physical Activity:     Days of Exercise per Week: Not on file    Minutes of Exercise per Session: Not on file   Stress:     Feeling of Stress : Not on file   Social Connections:     Frequency of Communication with Friends and Family: Not on file    Frequency of Social Gatherings with Friends and Family: Not on file    Attends Baptism Services: Not on file    Active Member of [address] [place] [address] [employer] or Organizations: Not on file    Attends Club or Organization Meetings: Not on file    Marital Status: Not on file   Intimate Partner Violence:     Fear of Current or Ex-Partner: Not on file    Emotionally Abused: Not on file    Physically Abused: Not on file    Sexually Abused: Not on file   Housing Stability:     Unable to Pay for Housing in the Last Year: Not on file    Number of [place] in the Last Year: Not on file    Unstable Housing in the Last Year: Not on file        TOBACCO:   reports that he has never smoked. He has never used smokeless tobacco.  ETOH:   reports no history of alcohol use. Home Medications  Prior to Admission medications    Medication Sig Start Date End Date Taking? Authorizing Provider   omeprazole (PRILOSEC) 20 MG delayed release capsule Take 1 capsule by mouth every morning (before breakfast) 4/5/22   Orlin Goode,    SITagliptin (JANUVIA) 100 MG tablet Take 1 tablet by mouth daily 3/30/22   Orlin Goode DO   ONETOUCH ULTRA strip 1 each by Other route 4 times daily As needed.  2/13/22 5/14/22  Reza Mcguire DO   Lancets [facility] PLUS [id number]) MISC 1 strip by Other route 4 times daily 2/13/22 5/14/22  Leobardo Mcguire DO   colchicine (COLCRYS) 0.6 MG tablet Take 1 tablet by mouth daily 2/7/22   Bennett Maki,    levothyroxine (SYNTHROID) 75 MCG tablet Take 1 tablet by mouth Daily 2/7/22   Orlin Goode DO   atenolol (TENORMIN) 25 MG tablet Take 1 tablet by mouth daily 1/26/22   Orlin Goode DO   pravastatin (PRAVACHOL) 20 MG tablet Take 1 tablet by mouth daily 1/21/22   Orlin Goode DO   potassium citrate (UROCIT-K) 10 MEQ (1080 MG) extended release tablet TAKE ONE TABLET BY MOUTH EVERY DAY 8/5/21   Historical Provider, MD   aspirin 325 MG tablet Take 325 mg by mouth daily    Historical Provider, MD   metFORMIN (GLUCOPHAGE) 500 MG tablet TAKE ONE TABLET BY MOUTH EVERY DAY 5/8/18   Historical Provider, MD   finasteride (PROSCAR) 5 MG tablet Take 5 mg by mouth once a week. Historical Provider, MD   therapeutic multivitamin-minerals (THERAGRAN-M) tablet Take 1 tablet by mouth daily. Historical Provider, MD       Allergies  Allergies   Allergen Reactions    No Known Allergies            Objective  BP (!) 165/95   Pulse 82   Temp 97.3 °F (36.3 °C) (Oral)   Resp 21   Ht 5' 6\" (1.676 m)   Wt 162 lb (73.5 kg)   SpO2 95%   BMI 26.15 kg/m²     Physical Exam:     General: AAO to person, place, time, in no acute distress, pleasant. On 2 L oxygen via nasal cannula. Head and neck : PERRLA, EOMI . Sclera non icteric. Oropharynx : Clear  Neck: no JVD,  no adenopathy, no carotid bruit  LYMPHATICS : No peripheral lymphadenopathy. Heart: Regular rate and regular rhythm, no murmur  Lungs: Clear to auscultation   Extremities: No edema,no cyanosis, no clubbing. Abdomen: Soft, non-tender;no masses, no organomegaly  Skin:  No rash. Neurologic:Cranial nerves grossly intact. No focal motor or sensory deficits .     Recent Laboratory Data-   Lab Results   Component Value Date    WBC 2.3 (L) 04/19/2022    HGB 8.1 (L) 04/19/2022    HCT 24.4 (L) 04/19/2022    .1 (H) 04/19/2022    PLT 74 (L) 04/19/2022    LYMPHOPCT 51.3 (H) 04/19/2022 RBC 2.30 (L) 04/19/2022    MCH 35.2 (H) 04/19/2022    MCHC 33.2 04/19/2022    RDW 21.8 (H) 04/19/2022    NEUTOPHILPCT 37.2 (L) 04/19/2022    MONOPCT 10.6 04/19/2022    BASOPCT 0.0 04/19/2022    NEUTROABS 0.87 (L) 04/19/2022    LYMPHSABS 1.17 (L) 04/19/2022    MONOSABS 0.25 04/19/2022    EOSABS 0.00 (L) 04/19/2022    BASOSABS 0.00 04/19/2022       Lab Results   Component Value Date     04/19/2022    K 3.9 04/19/2022     04/19/2022    CO2 26 04/19/2022    BUN 22 04/19/2022    CREATININE 1.1 04/19/2022    GLUCOSE 180 (H) 04/19/2022    CALCIUM 7.3 (L) 04/19/2022    PROT 5.5 (L) 04/19/2022    LABALBU 2.4 (L) 04/19/2022    BILITOT 0.4 04/19/2022    ALKPHOS 62 04/19/2022    AST 20 04/19/2022    ALT 54 (H) 04/19/2022    LABGLOM >60 04/19/2022    GFRAA >60 04/19/2022       Lab Results   Component Value Date    IRON 122 04/14/2022    TIBC 140 (L) 04/14/2022    FERRITIN 3,037 04/14/2022           Radiology-    XR CHEST PORTABLE   Final Result   Increased markings seen diffusely throughout the lung fields stable and   unchanged. Left-sided deep line catheter tip is curled back upon itself in   the SVC. FL UGI W KUB   Final Result   1. There is no esophageal mucosal irregularity, stricture or mass. 2. There is no gastric mass or obstruction no ulceration is noted   3. Tertiary contractions of the esophagus suggestive of esophageal   dysmotility disorder. IR BONE MARROW BIOPSY AND ASPIRATION   Final Result   Status post CT guided bone marrow aspiration and core biopsy of the iliac   bone. US GALLBLADDER RUQ   Final Result   No evidence of cholelithiasis or acute cholecystitis. No biliary ductal   dilatation. XR CHEST PORTABLE   Final Result   Deep line catheter placed on the left tip in the SVC. Increased pulmonary   vascularity again seen bilaterally with no evidence of pneumothorax.          CT ABDOMEN PELVIS WO CONTRAST Additional Contrast? None   Final Result   No acute intra-abdominal or pelvic findings. Bilateral nonobstructing renal calculi. Colonic diverticulosis, without diverticulitis. Additional findings as above. CT CHEST WO CONTRAST   Final Result   Progression of moderate interstitial lung disease, which demonstrates a UIP   pattern. MRI LUMBAR SPINE WO CONTRAST   Final Result   Multilevel degenerative changes, most notably at L4-5 and L5-S1. Stable   grade 2 anterolisthesis of L4 on L5 and grade 1 anterolisthesis of L5 on S1.   Multilevel foraminal and lateral recess stenoses are not significantly   changed since the previous exam.      Development of endplate degenerative changes at L2-3. This is a new finding. XR CHEST PORTABLE   Final Result   Marked hypoventilation. Otherwise, no acute process seen. ASSESSMENT/PLAN :  80years old male with history of pulmonary fibrosis, diabetes mellitus, coronary artery disease came to the ER with worsening fatigue, admitted to ICU with septic shock, NSTEMI, rhabdomyolysis. I have been consulted for possible hemolysis. Patient had normal hemoglobin and MCV up until last year. Over the past few months his hemoglobin has dropped along with an increase in MCV. Current hemoglobin is 7 with an MCV of 122. Dysplastic neutrophils and less than 1% blasts were noted on peripheral smear evaluation. Finding suggestive of MDS. Discussed with the patient and family including Dr. Adele Sandhoff. We will get a bone marrow biopsy. Normal bilirubin and normal absolute reticulocyte count of 35,000 suggests absence of hemolysis. Recent B12 folate were normal.  Being treated for septic shock. Levophed is being tapered off. On antibiotics. Has been afebrile. Elevated cardiac markers: Cardiology has been consulted for an NSTEMI. GI consulted for possible GI bleed. Denies any blood in stool or black tarry stools. Iron panel ordered. We will continue to follow.     4/15/22  - Hgb lower today at 7.7, . 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  supports this as well  - No evidence of hemolysis. Hapto elevated and bili normal. LDH mildly elevated, likely related to septic shock  - BMBx to evaluate for MDS today  - Trend CBC, transfuse for Hgb <7  - Off levophed  - Renal function improved, Cr 1.5. Lactate down to 2.1  - Will follow    4/18/22  Feeling significantly better. Status post EGD with findings of mild gastritis without any fresh blood. No AVMs. Small diverticulum was described in the second portion of the duodenum without bleeding. He is on Protonix per GI.  CT chest with subpleural fibrosis  CBC shows a WBC count of 2.1 with hemoglobin of 8.3 with elevated MCV and low platelets of 73. Differential shows neutropenia and occasional metamyelocytes. HIT antibodies pending  Continues on antibiotics with ceftriaxone  Awaiting results of bone marrow aspirate and biopsy. Will follow    4/19/22  - CBC with stable Hgb at 8.1, platelets 74, WBC 2.3, ANC 0.87.    - APF4 pending  - BMBx pending  - Remains on rocephin, BCx negative  - Will follow    Electronically signed by Dina Springer MD on 4/19/2022 at 9:01 AM

## 2022-04-19 NOTE — DISCHARGE SUMMARY
Internal Medicine Progress Note     [facility]     Adan Cuenca. Shalini Parker., JAYNEOMIKE. Gilbert Shah D.O., [email or web address].[email or web address]. Rod Lobato D.O. Peggy Ames, MSN, APRN, NP-C  Nancy Brooks. Melania Parekh, MSN, APRN-CNP       Internal Medicine  Discharge Summary    NAME: Mis Stinson  :  1933  MRN:  [id number]  PCP:Orlin Goode DO  ADMITTED: 2022      DISCHARGED: 22    ADMITTING PHYSICIAN: Adan Mcguire DO    CONSULTANT(S):   IP CONSULT TO CARDIOLOGY  IP CONSULT TO CRITICAL CARE  IP CONSULT TO GI  IP CONSULT TO ONCOLOGY  IP CONSULT TO DIETITIAN  IP CONSULT TO SOCIAL WORK  IP CONSULT TO DIETITIAN  IP CONSULT TO SOCIAL WORK     ADMITTING DIAGNOSIS:   Shortness of breath [R06.02]  NSTEMI (non-ST elevated myocardial infarction) ([facility] [I21.4]  BURAK (acute kidney injury) ([facility] [N17.9]  Febrile illness [R50.9]  Non-traumatic rhabdomyolysis [M62.82]  Chronic bilateral low back pain without sciatica [M54.50, G89.29]     DISCHARGE DIAGNOSES:   1. Septic shock in the setting of a left lower extremity diabetic wound with negative cultures  2. Non-ST elevated myocardial infarction with inferior wall motion abnormality  3. Acute on chronic respiratory failure with hypoxia with radiographic suggestion of pulmonary fibrosis  4. Pancytopenia with need to rule out early myelodysplastic syndrome status post bone marrow biopsy  5. Acute on chronic kidney disease stage III  6. Insulin-dependent diabetes mellitus type 2 aggravated by prednisone therapy  7. Essential hypertension  8. Hyperlipidemia on Pravachol  9. Hypothyroidism on Synthroid replacement  10. Chronic low back pain with status post decompression with lumbar stenosis      BRIEF HISTORY OF PRESENT ILLNESS:   Patient is 70-year-old female presents to the ED due to increased weakness and fatigue. Patient has had progressive decline since November/December of last year following his COVID-19 booster shot. He has had pretty significant work-up for his issues. According to daughter who was present at bedside patient was doing fine about a year ago. He was walking and golfing and working in the garden's. However since late last year he has been having progressive decline. He has become more weak and fatigued. He has been having issues with shortness of breath. He had stress test  with suspicion for three-vessel disease. He also had PFTs which showed evidence of pulmonary fibrosis. However over the last 2 days he has been even more weak. He has not been able to get up on his own. He has been essentially bedbound. He currently denies any chest pain. He does admit to some chest heaviness. Otherwise he denies any nausea or emesis. Denies any fever or chills. He denies any melena or any blood in his stools. 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 He denies any loss of sensation. He denies melena or blood in stools.      LABS[de-identified]  Lab Results   Component Value Date    WBC 2.3 (L) 04/19/2022    HGB 8.1 (L) 04/19/2022    HCT 24.4 (L) 04/19/2022    PLT 74 (L) 04/19/2022     04/19/2022    K 3.9 04/19/2022     04/19/2022    CREATININE 1.1 04/19/2022    BUN 22 04/19/2022    CO2 26 04/19/2022    GLUCOSE 180 (H) 04/19/2022    ALT 54 (H) 04/19/2022    AST 20 04/19/2022    INR 1.9 04/14/2022     Lab Results   Component Value Date    INR 1.9 04/14/2022    INR 2.3 04/14/2022    INR 2.0 04/14/2022    PROTIME 22.4 (H) 04/14/2022    PROTIME 27.0 (H) 04/14/2022    PROTIME 23.4 (H) 04/14/2022      Lab Results   Component Value Date    TSH 1.100 04/14/2022     Lab Results   Component Value Date    TRIG 70 04/14/2022    TRIG 77 03/30/2022    TRIG 77 11/08/2021     Lab Results   Component Value Date    HDL 25 04/14/2022    HDL 46 03/30/2022    HDL 39 11/08/2021     Lab Results   Component Value Date    LDLCALC 31 04/14/2022    LDLCALC 49 03/30/2022    [address] 11/08/2021     Lab Results Component Value Date    LABA1C 8.3 (H) 04/14/2022       IMAGING:  Echo Complete    Result Date: 4/14/2022  Transthoracic Echocardiography Report (TTE)  Demographics   Patient Name    Rich Gamble Gender            Male                  C   Medical Record  [id number]      Room Number       [address]  Number   Account #       [de-identified]     Procedure Date    04/14/2022   Corporate ID                  Ordering          Kishore July DO                                Physician   Accession       [id number]    Referring  Number                        Physician   Date of Birth   07/16/1933    Sonographer       Ana DEL RIO   Age             80 year(s)    Interpreting      Elba 64                                Physician         Physician Cardiology                                                  Felice Gupta MD                                 Any Other  Procedure Type of Study   TTE procedure:Echocardiogram W/Contrast.  Procedure Date Date: 04/14/2022 Start: 01:12 PM Study Location: Portable Technical Quality: Limited visualization due to poor acoustical window. Indications:LV function. Patient Status: Routine Contrast Medium: Definity. Amount - 2 ml Contrast Comments: given by the rn. Height: 66 inches Weight: 154 pounds BSA: 1.79 m^2 BMI: 24.86 kg/m^2 Rhythm: Within normal limits HR: 82 bpm BP: 140/87 mmHg  Findings   Left Ventricle  Normal left ventricular chamber size. Inferior wall hypokinesis with normal overall LV systolic function, EF  45%. Mild left ventricular concentric hypertrophy noted. Normal diastolic function. Right Ventricle  Normal right ventricle size and function. Left Atrium  Left atrium is of normal size. Interatrial septum not well visualized but appears intact. Right Atrium  Normal right atrium. Mitral Valve  Normal mitral valve structure. There is trace to mild mitral regurgitation. No mitral valve prolapse. Tricuspid Valve  Normal tricuspid valve structure.   There is trace tricuspid regurgitation. Mild pulmonary hypertension, RVSP 38mmHg. Aortic Valve  Normal aortic valve structure and function. No hemodynamically significant aortic stenosis. Pulmonic Valve  The pulmonic valve was not well visualized. Pericardial Effusion  No evidence of pericardial effusion. Pericardium appears normal.   Pleural Effusion  No evidence of pleural effusion. Aorta  Normal aortic root size and ascending aorta. Miscellaneous  The inferior vena cava not well visualized. Conclusions   Summary  Technically sub-optimal images. Normal left ventricular chamber size. Inferior wall hypokinesis with normal overall LV systolic function, EF  89%. Mild left ventricular concentric hypertrophy noted. Normal diastolic function. Interatrial septum not well visualized but appears intact. Normal right ventricle size and function. There is trace to mild mitral regurgitation. No mitral valve prolapse. No hemodynamically significant aortic stenosis. There is trace tricuspid regurgitation. Mild pulmonary hypertension, RVSP 38mmHg. Normal aortic root size. No evidence of pericardial effusion. No intra cardiac mass or thrombus. Compared to prior echo from 1/26/2020 - Inferior wall motion abnormality  is new.    Signature   ----------------------------------------------------------------  Electronically signed by Agueda Downing MD(Interpreting  physician) on 04/14/2022 07:37 PM  ----------------------------------------------------------------  M-Mode/2D Measurements & Calculations   LV Diastolic    LV Systolic Dimension: 2.5   AV Cusp Separation: 1.7 cmLA  Dimension: 3.6  cm                           Dimension: 3.2 cmAO Root  cm              LV Volume Diastolic: 39.0 ml Dimension: 3.6 cm  LV FS:30.6 %    LV Volume Systolic: 06.5 ml  LV PW           LV EDV/LV EDV Index: 50.8  Diastolic: 0.9  KL/44 MM/E^5UO ESV/LV ESV  cm              Index: 23.2 ml/13ml/ m^2     RV Diastolic Dimension: 2.1  LV PW Systolic: EF Calculated: 12.5 %        cm  1.3 cm          LV Mass Index: 65 l/min*m^2  Septum          LV Length: 7.3 cm  Diastolic: 1.2                               LA volume/Index: 21.6 ml  cm              LVOT: 1.9 cm                 /13.00TM/F^5  Septum  Systolic: 1.1  cm  CO: 3.17 l/min  CI: 1.55  l/m*m^2  LV Mass: 115.86  g  Doppler Measurements & Calculations   MV Peak E-Wave:   AV Peak Velocity: 1.09 m/s     LVOT Peak Velocity: 0.49  1.03 m/s          AV Peak Gradient: 4.74 mmHg    m/s  MV Peak A-Wave:   AV Mean Velocity: 0.79 m/s     LVOT Mean Velocity: 0.37  0.73 m/s          AV Mean Gradient: 2.7 mmHg     m/s  MV E/A Ratio:     AV VTI: 20.9 cm                LVOT Peak Gradient: 1  1.41              AV Area (Continuity):1.61 cm^2 mmHgLVOT Mean Gradient:  MV Peak Gradient:                                0.6 mmHg  4.9 mmHg          LVOT VTI: 11.9 cm  MV Mean Gradient:  1.8 mmHg  MV Mean Velocity: Pulm. Vein A Reversal          TR Velocity:2.87 m/s  0.62 m/s          Duration:85.4 msec             TR Gradient:32.92 mmHg  MV Deceleration   Pulm. Vein D Velocity:0.46  Time: 164 msec    m/sPulm. Vein A Reversal  MV P1/2t: 54.8    Velocity:0.2 m/s  msec              Pulm. Vein S Velocity: 0.55    DC ED Velocity: 1.46 m/s  MVA by PHT:4.01   m/s  cm^2  MV Area  (continuity): 1.4  cm^2  MV E' Septal  Velocity: 0.07  m/s  MV E' Lateral  Velocity: 7 m/s  [email or web address] [id number]    CT ABDOMEN PELVIS WO CONTRAST Additional Contrast? None    Result Date: 4/14/2022  EXAMINATION: CT OF THE ABDOMEN AND PELVIS WITHOUT CONTRAST 4/14/2022 6:29 am TECHNIQUE: CT of the abdomen and pelvis was performed without the administration of intravenous contrast. Multiplanar reformatted images are provided for review.  Dose modulation, iterative reconstruction, and/or weight based adjustment of the mA/kV was utilized to reduce the radiation dose to as low as reasonably achievable. COMPARISON: Correlation made with CT scan of the chest performed April 13, 2022. HISTORY: ORDERING SYSTEM PROVIDED HISTORY: abdominal pain. anemia. TECHNOLOGIST PROVIDED HISTORY: Reason for exam:->abdominal pain. anemia. Additional Contrast?->None FINDINGS: Lower Chest: Chronic interstitial/fibrotic changes involving the lung bases. There is no pleural or pericardial effusion. Please refer to the chest CT for further information. Organs: Within the limitations of a nonenhanced scan, the liver, spleen, adrenal glands and pancreas are unremarkable. Bilateral nonobstructing renal calculi are noted. No obstructive uropathy. No perinephric or periureteral fat stranding. GI/Bowel: Colonic diverticulosis, without evidence of acute diverticulitis. No bowel obstruction or free intraperitoneal air is identified. The small bowel is normal in appearance within the limitations of a nonenhanced exam. Pelvis: Morales catheter within the bladder, with small pockets of air within the lumen of the bladder, likely secondary to catheter placement. The pelvic organs are normal otherwise. No free fluid or lymphadenopathy. Peritoneum/Retroperitoneum: No mass, fluid collection or lymphadenopathy. Bones/Soft Tissues: Atherosclerotic calcifications of the abdominal aorta and its branches, without aneurysm. Small amounts of subcutaneous emphysema within the deep subcutaneous fat of the ventral abdominal wall, a nonspecific finding. No discrete soft tissue mass or fluid collection identified. No lytic or blastic osseous lesions. Degenerative changes of the lumbar spine are noted. Laminectomy at L3-4. Grade 2 anterolisthesis of L4 on L5. Mild anterolisthesis of L5 on S1 is also noted. Please refer to the recent previous MRI of the lumbar spine for further information. No acute intra-abdominal or pelvic findings. Bilateral nonobstructing renal calculi.  Colonic diverticulosis, without diverticulitis. Additional findings as above. XR CHEST (2 VW)    Result Date: 3/27/2022  EXAMINATION: TWO XRAY VIEWS OF THE CHEST 3/26/2022 12:34 pm COMPARISON: February 11, 2022 HISTORY: ORDERING SYSTEM PROVIDED HISTORY: Shortness of breath FINDINGS: Stable coarsened interstitial lung markings. No airspace opacity or pleural effusion. The heart appears normal in size. No pneumothorax. Stable chronic interstitial opacities bilaterally. No evidence of pneumonia or pleural effusion. CT CHEST WO CONTRAST    Result Date: 4/13/2022  EXAMINATION: CT OF THE CHEST WITHOUT CONTRAST 4/13/2022 9:15 pm TECHNIQUE: CT of the chest was performed without the administration of intravenous contrast. Multiplanar reformatted images are provided for review. Dose modulation, iterative reconstruction, and/or weight based adjustment of the mA/kV was utilized to reduce the radiation dose to as low as reasonably achievable. COMPARISON: CT chest high-resolution 01/13/2022 HISTORY: ORDERING SYSTEM PROVIDED HISTORY: shortness of breath TECHNOLOGIST PROVIDED HISTORY: Reason for exam:->shortness of breath Decision Support Exception - unselect if not a suspected or confirmed emergency medical condition->Emergency Medical Condition (MA) FINDINGS: Mediastinum: The heart appears mildly enlarged. No pericardial effusion is seen. Atherosclerotic vascular calcification including severe coronary artery calcification is noted. No lymphadenopathy is noted. Lungs/pleura: Subpleural reticulation with smooth interlobular and interlobular septal thickening is identified with a bibasilar predominance, which has progressed compared to the prior exam.  There is suggestion of mild honeycombing at the bases. There is increased bronchovascular thickening with increased bibasilar traction bronchiectasis. There is some peripheral ground-glass density. Upper Abdomen: The visualized upper abdomen is unremarkable.  Soft Tissues/Bones: No acute osseous abnormality is seen. Progression of moderate interstitial lung disease, which demonstrates a UIP pattern. MRI LUMBAR SPINE WO CONTRAST    Result Date: 4/13/2022  EXAMINATION: MRI OF THE LUMBAR SPINE WITHOUT CONTRAST, 4/13/2022 8:54 pm TECHNIQUE: Multiplanar multisequence MRI of the lumbar spine was performed without the administration of intravenous contrast. COMPARISON: August 23, 2016. HISTORY: ORDERING SYSTEM PROVIDED HISTORY: pain, incontinence TECHNOLOGIST PROVIDED HISTORY: Reason for exam:->pain, incontinence Decision Support Exception - unselect if not a suspected or confirmed emergency medical condition->Emergency Medical Condition (MA) FINDINGS: BONES/ALIGNMENT: Degenerative endplate changes at M8-4, which is more prominent than on the previous exam.  No evidence of bone marrow edema is seen. No findings to suggest fracture. Persistent grade 2 anterolisthesis of L4 on L5 and grade 1 anterolisthesis of L5 on S1. Anterolisthesis of L4 on L5 by approximately 1.2-1.4 cm. Anterolisthesis of L5 on S1 by approximately 5-6 mm. Mild prominence of the epidural fat noted at L5-S1 and within the sacrococcygeal canal. SPINAL CORD: The conus medullaris terminates at L1-2, which is normal.  No evidence of a tethered cord. SOFT TISSUES: No paraspinal mass identified. T12-L1: No significant spinal or foraminal stenosis. L1-L2: Mild annular bulge, with facet and ligamentous hypertrophy. No significant spinal stenosis. Mild bilateral foraminal stenosis is noted, stable. L2-L3: Degenerative changes, with spurring. Facet hypertrophy seen. Mild, diffuse and or bulge and disc space narrowing. This results in moderate left foraminal stenosis and mild right foraminal stenosis. Bilateral lateral recess stenosis is also suspected. Minimal T2 high signal intensity within the L2-3 intervertebral disc, which may be suggestive of degenerative change.  L3-L4: Diffuse annular bulge, with a small central protrusion. Degenerative facet and ligamentous hypertrophy is noted. There is evidence of mild bilateral foraminal stenosis, as well as moderate to severe bilateral lateral recess stenosis. L4-L5: As mentioned above, there is anterolisthesis of L4 on L5. This results in partial unroofing of the L4-5 intervertebral disc. Facet and ligamentous hypertrophy is also noted, resulting in moderate to severe left and mild-to-moderate right foraminal stenosis. When compared to the previous exam, the appearance is not significantly changed. L5-S1: Diffuse annular bulge, with a prominent right paracentral/lateral component. In addition, there is evidence of facet and ligamentous hypertrophy. This results in severe right foraminal stenosis and mild-to-moderate left foraminal stenosis. Bilateral lateral recess stenosis is noted. Multilevel degenerative changes, most notably at L4-5 and L5-S1. Stable grade 2 anterolisthesis of L4 on L5 and grade 1 anterolisthesis of L5 on S1. Multilevel foraminal and lateral recess stenoses are not significantly changed since the previous exam. Development of endplate degenerative changes at L2-3. This is a new finding. FL UGI W KUB    Result Date: 4/16/2022  EXAMINATION: DOUBLE CONTRAST UPPER GI SERIES 4/16/2022 TECHNIQUE: Double contrast upper GI series was performed with barium and air contrast. FLUOROSCOPY DOSE AND TYPE OR TIME AND EXPOSURES: Fluoroscopy time equals 1 minute. Total dose equals 42.6 mGy COMPARISON: None HISTORY: ORDERING SYSTEM PROVIDED HISTORY: anemia, gi bleed, R/O ulcer TECHNOLOGIST PROVIDED HISTORY: Reason for exam:->anemia, gi bleed, R/O ulcer FINDINGS: Due to the patient's medical condition a limited upper GI series was performed. Barium transits the esophagus without obstruction or stricture. There is no stricture, web or gross mucosal irregularity. Tertiary contractions are noted.  The gastroesophageal junction is normal.  There is no evidence for reflux seen. There is no hiatal hernia. Contrast opacifies the stomach. Patient was placed on his right side and contrast flowed freely from the stomach into the duodenum and proximal small bowel. There is no gross gastric mass or ulceration. There is no duodenal irregularity or stricture. 1. There is no esophageal mucosal irregularity, stricture or mass. 2. There is no gastric mass or obstruction no ulceration is noted 3. Tertiary contractions of the esophagus suggestive of esophageal dysmotility disorder. US GALLBLADDER RUQ    Result Date: 4/14/2022  EXAMINATION: RIGHT UPPER QUADRANT ULTRASOUND 4/14/2022 3:41 pm COMPARISON: CT abdomen and pelvis without 04/14/2022 HISTORY: ORDERING SYSTEM PROVIDED HISTORY: Rule out cholecystitis TECHNOLOGIST PROVIDED HISTORY: Reason for exam:->Rule out cholecystitis What reading provider will be dictating this exam?->CRC FINDINGS: LIVER:  The liver demonstrates normal echogenicity without evidence of intrahepatic biliary ductal dilatation. BILIARY SYSTEM:  Gallbladder is unremarkable without evidence of pericholecystic fluid, wall thickening or stones. Negative sonographic Melchor's sign. Common bile duct is within normal limits measuring 3.6 mm. RIGHT KIDNEY: Multiple small right renal calculi are noted. No hydronephrosis is seen. PANCREAS:  Visualized portions of the pancreas are unremarkable. OTHER: Right pleural effusion is partially visualized. No evidence of cholelithiasis or acute cholecystitis. No biliary ductal dilatation. XR CHEST PORTABLE    Result Date: 4/17/2022  EXAMINATION: ONE XRAY VIEW OF THE CHEST 4/17/2022 6:57 am COMPARISON: 04/14/2022 HISTORY: ORDERING SYSTEM PROVIDED HISTORY: dyspnea TECHNOLOGIST PROVIDED HISTORY: Reason for exam:->dyspnea FINDINGS: Portable chest reveals cardiac silhouette to be within normal limits. Lung volumes are low with increased markings seen diffusely throughout the lung fields.   The tip of the left deep line catheter is curled back upon itself in the SVC. Elevation seen of the right hemidiaphragm stable and unchanged. No new focal parenchymal opacification present. Increased markings seen diffusely throughout the lung fields stable and unchanged. Left-sided deep line catheter tip is curled back upon itself in the SVC. XR CHEST PORTABLE    Result Date: 4/14/2022  EXAMINATION: ONE XRAY VIEW OF THE CHEST 4/14/2022 7:56 am COMPARISON: 04/13/2022 HISTORY: ORDERING SYSTEM PROVIDED HISTORY: tlc placement TECHNOLOGIST PROVIDED HISTORY: Reason for exam:->tlc placement FINDINGS: Portable chest reveals left-sided deep line catheter tip in the SVC. Cardiac and mediastinal silhouettes are within normal limits. Increased pulmonary vascularity bilaterally. No pneumothorax. No definite pleural effusion. Deep line catheter placed on the left tip in the SVC. Increased pulmonary vascularity again seen bilaterally with no evidence of pneumothorax. XR CHEST PORTABLE    Result Date: 4/13/2022  EXAMINATION: ONE XRAY VIEW OF THE CHEST 4/13/2022 5:50 pm COMPARISON: 03/26/2022 HISTORY: ORDERING SYSTEM PROVIDED HISTORY: fatigue TECHNOLOGIST PROVIDED HISTORY: Reason for exam:->fatigue FINDINGS: Significantly decreased lung volumes are noted. There is suggestion of increased interstitial markings most pronounced in the periphery. No definite consolidation is seen. No significant pleural effusion. Heart is normal in size. Marked hypoventilation. Otherwise, no acute process seen. IR BONE MARROW BIOPSY AND ASPIRATION    Result Date: 4/15/2022  PROCEDURE: CT GUIDED BONE MARROW ASPIRATION AND CORE NEEDLE BONE BIOPSY OF THE RIGHT ILIAC BONE. MODERATE CONSCIOUS SEDATION 4/15/2022 HISTORY: ORDERING SYSTEM PROVIDED HISTORY: bone marrow aspiration and biopsy to r/o mDS.  To be sent for IHC, flow cytometry, karyotype, MDS panel TECHNOLOGIST PROVIDED HISTORY: Reason for exam:->bone marrow aspiration and biopsy to r/o mDS. To be sent for IHC, flow cytometry, karyotype, MDS panel SEDATION: 0.5 mgversed and 25 mcg fentanyl were titrated intravenously for moderate sedation monitored under my direction. Total intraservice time of sedation was 25 minutes. The patient's vital signs were monitored throughout the procedure and recorded in the patient's medical record by the nurse. Sedation monitoring started at 0847 and ended at 0912. TECHNIQUE: Informed consent was obtained following a detailed explanation of the procedure including risks, benefits, and alternatives. Universal protocol was followed. Axial images were obtained through the iliac bones using CT guidance and a suitable skin site was prepped and draped in sterile fashion. Local anesthesia was achieved with lidocaine. An 11 gauge [employer] bone marrow biopsy needle was advanced into the right iliac bone and approximately 10 mL of bone marrow aspirate was obtained. A single core biopsy specimen was obtained and the patient tolerated the procedure well. Dose modulation, iterative reconstruction, and/or weight based adjustment of the mA/kV was utilized to reduce the radiation dose to as low as reasonably achievable. Status post CT guided bone marrow aspiration and core biopsy of the iliac bone. HOSPITAL COURSE:   Lisandra Talbot did very well throughout the hospitalization. He had been dealing with longstanding weakness and deconditioning with decline following COVID vaccination at the end of 2021. Work-up in the emergency department revealed worsened anemia and evidence of a non-ST elevated myocardial infarction with elevated cardiac enzymes. He was found to have some degree of acute on chronic kidney disease and identification of diffuse pancytopenia. Bone marrow biopsy was obtained and results are pending at this point. Endoscopic intervention was undertaken with findings of mild gastritis.   PPI therapy has been added in the setting of chronic steroid usage and possible need for antiplatelet therapy following cardiac catheterization today. Echocardiographic findings revealed a new inferior wall abnormality suggesting ischemia. His condition has been maximized to a point where he will be transferred to [facility] today for cardiac catheterization. To note, work-up throughout the hospitalization again revealed some degree of pulmonary fibrosis as well. This is certainly contributing to his intermittent respiratory issues. Chronic comorbidities were evaluated. He has been transitioned to insulin therapy. Cardiac medications have been maximized and then adjusted. He is acceptable for discharge to [facility] today. Pending cardiac intervention, he may be considered for return to this facility as the ultimate discharge plan is for discharge to [facility] for acute rehabilitation. BRIEF PHYSICAL EXAMINATION AND LABORATORIES ON DAY OF DISCHARGE:  VITALS:  BP (!) 152/90   Pulse 95   Temp 97.8 °F (36.6 °C) (Oral)   Resp 23   Ht 5' 6\" (1.676 m)   Wt 162 lb (73.5 kg)   SpO2 91%   BMI 26.15 kg/m²     HEENT:  PERRLA. EOMI. Sclera clear. Buccal mucosa moist.  Intermittent use of nasal cannula oxygen. Neck:  Supple. Trachea midline. No thyromegaly. No JVD. No bruits. Heart:  Rhythm regular, rate controlled. Systolic murmur. Lungs:  Symmetrical. Clear to auscultation bilaterally. No wheezes. No rhonchi. No rales. Abdomen: Soft. Non-tender. Non-distended. Bowel sounds positive. No organomegaly or masses. No pain on palpation    Extremities:  Peripheral pulses present. No peripheral edema. No ulcers. Neurologic:  Alert x 3. No focal deficit. Cranial nerves grossly intact. Skin:  No petechia. No hemorrhage. Small wound to the lower extremity. .       DISPOSITION:  Zuly Bolaños will be transferred to [facility] today for cardiac catheterization with    DISCHARGE MEDICATIONS:   Current Discharge Medication List           Details   carvedilol (COREG) 6.25 MG tablet Take 1 tablet by mouth 2 times daily (with meals)  Qty: 60 tablet, Refills: 3      pantoprazole (PROTONIX) 40 MG tablet Take 1 tablet by mouth every morning (before breakfast)  Qty: 30 tablet, Refills: 0      insulin glargine (LANTUS SOLOSTAR) 100 UNIT/ML injection pen Inject 10 Units into the skin nightly  Qty: 5 pen, Refills: 3      insulin lispro, 1 Unit Dial, (HUMALOG KWIKPEN) 100 UNIT/ML SOPN Glucose: Dose:       No Insulin   140-199   3 Units   200-249   6 Units   250-299   9 Units   300-349  12 Units   350-400  15 Units   Over 400  18 Units  Qty: 5 pen, Refills: 0      Insulin Pen Needle 29G X 12MM MISC 1 each by Does not apply route 4 times daily (before meals and nightly)  Qty: 100 each, Refills: 3              Details   predniSONE (DELTASONE) 5 MG tablet 20 mg PO in the am  5 mg PO in the evening  Qty: 180 tablet, Refills: 0              Details   ONETOUCH ULTRA strip 1 each by Other route 4 times daily As needed. Qty: 360 strip, Refills: 3    Associated Diagnoses: Type 2 diabetes mellitus without complication, without long-term current use of insulin ([facility])      Lancets (ONETOUCH DELICA PLUS [id number]) MISC 1 strip by Other route 4 times daily  Qty: 360 each, Refills: 3    Associated Diagnoses: Type 2 diabetes mellitus without complication, without long-term current use of insulin (HCC)      levothyroxine (SYNTHROID) 75 MCG tablet Take 1 tablet by mouth Daily  Qty: 90 tablet, Refills: 2      pravastatin (PRAVACHOL) 20 MG tablet Take 1 tablet by mouth daily  Qty: 30 tablet, Refills: 5      finasteride (PROSCAR) 5 MG tablet Take 5 mg by mouth once a week. therapeutic multivitamin-minerals (THERAGRAN-M) tablet Take 1 tablet by mouth daily. Preparing for this patient's discharge, including paperwork, orders, instructions, and meeting with patient did require > 40 minutes.     Kerry Arnold DO 4/19/2022  10:45 AM

## 2022-04-19 NOTE — PROGRESS NOTES
Physical Therapy Treatment Note/Plan of Care    Room #:  IC03/IC03-01  Patient Name: Heather Ortega  YOB: 1933  MRN: [id number]    Date of Service: 4/19/2022     Tentative placement recommendation: Inpatient Rehab  Equipment recommendation: To be determined      Evaluating Physical Therapist: Camila Alonso, PT #[id number]      Specific Provider Orders/Date/Referring Provider :  04/14/22 0100   PT eval and treat Start: 04/14/22 0100, End: 04/14/22 0100, ONE TIME, Standing Count: 1 Occurrences, R    Ravinder Ordaz DO      Admitting Diagnosis:   Shortness of breath [R06.02]  NSTEMI (non-ST elevated myocardial infarction) ([facility] [address] [I21.4]  BURAK (acute kidney injury) ([facility] [address] [N17.9]  Febrile illness [R50.9]  Non-traumatic rhabdomyolysis [M62.82]  Chronic bilateral low back pain without sciatica [M54.50, G89.29]     fatigue, shortness of breath, leg weakness and numbness.   progressive decline since November/December of last year following his COVID-19 booster shot    Surgery: none  Visit Diagnoses       Codes    BURAK (acute kidney injury) ([facility] [address]     N17.9    Non-traumatic rhabdomyolysis     M62.82    Febrile illness     R50.9    Shortness of breath     R06.02    Chronic bilateral low back pain without sciatica     M54.50, G89.29          Patient Active Problem List   Diagnosis    Incomplete tear of right rotator cuff    Long biceps tear    Injury of tendon of long head of right biceps    Tear of right glenoid labrum    Bursitis of right shoulder    Essential hypertension    Acquired hypothyroidism    Dyslipidemia    Type 2 diabetes mellitus without complication, without long-term current use of insulin (HCC)    Normocytic anemia    NSTEMI (non-ST elevated myocardial infarction) ([facility] [address]        ASSESSMENT of Current Deficits Patient exhibits decreased strength, balance and endurance impairing functional mobility, transfers, gait , gait distance and tolerance to activity  Improved ambulation distance and endurance however continued increased shortness of breath and decreased O2 saturation during gait, improved with rest and cues purse lip breathing. Patient requires continued skilled physical therapy to address concerns listed above for increased safety and function at discharge. Scheduled for heart cath later today; motivated to return to pre hospital level with ambulation without device or o2      PHYSICAL THERAPY  PLAN OF CARE       Physical therapy plan of care is established based on physician order,  patient diagnosis and clinical assessment    Current Treatment Recommendations:    -Standing Balance: Perform strengthening exercises in standing to promote motor control with or without upper extremity support   -Transfers: Provide instruction on proper hand and foot position for adequate transfer of weight onto lower extremities and use of gait device if needed and Cues for hand placement, technique and safety. Provide stabilization to prevent fall   -Gait: Gait training, Standing activities to improve: base of support, weight shift, weight bearing  and Pregait training to emphasize:   Alea, Device control, Upright and Safety   -Endurance: Utilize Supervised activities to increase level of endurance to allow for safe functional mobility including transfers and gait  and Use graduated activities to promote good breathing techniques and provide support and education to maximize respiratory function    PT long term treatment goals are located in below grid    Patient and or family understand(s) diagnosis, prognosis, and plan of care. Frequency of treatments: Patient will be seen  daily.          Prior Level of Function: Patient ambulated independently    Rehab Potential: good   - for baseline    Past medical history:   Past Medical History:   Diagnosis Date    Diabetes mellitus ([address]     H/O cardiovascular stress test 12/01/2021    Lexiscan    History of blood transfusion     History of Holter monitoring 02/11/2022    Hyperlipidemia     Hypertension     Lower back pain     Thyroid disease      Past Surgical History:   Procedure Laterality Date    BACK SURGERY  2000    fusion  lumbar area,      JOINT REPLACEMENT Bilateral [phone]    knee     LITHOTRIPSY      SHOULDER ARTHROSCOPY Right 01/30/2020    with treatment    SHOULDER ARTHROSCOPY Right 1/30/2020    ARTHROSCOPIC EXAM AND TREATMENT RIGHT SHOULDER (CPT 11069,62437) RIGHT ROTATOR CUFF REPAIR, SUBACHROMIAL DECOMPRESSION, DEBRIDEMENT OF HUMERAL performed by Kira Conn DO at [address] N/A 4/18/2022    EGD ESOPHAGOGASTRODUODENOSCOPY performed by Han Wren DO at [address]:    Precautions: Up with assistance, falls ,   chronic lumbar back pain, urinary incontinence     Social history: Patient lives alone in a two story home resides first  with 2 + 1+2 steps  to enter without [employer] in shower grab bars, built in shower chair    Equipment owned: Real, [address], Rollator, Standard Walker and Crutches,       [address] [facility]   How much difficulty turning over in bed?: None  How much difficulty sitting down on / standing up from a chair with arms?: A Little  How much difficulty moving from lying on back to sitting on side of bed?: A Little  How much help from another person moving to and from a bed to a chair?: A Little  How much help from another person needed to walk in hospital room?: A Little  How much help from another person for climbing 3-5 steps with a railing?: A Lot  AM-PAC Inpatient Mobility Raw Score : 18  AM-PAC Inpatient T-Scale Score : 43.63  Mobility Inpatient CMS 0-100% Score: 46.58  Mobility Inpatient CMS G-Code Modifier : CK    Nursing cleared patient for PT treatment.      OBJECTIVE;   Initial Evaluation  Date: 4/15/2022 Treatment Date:    4/19/2022    Short Term/ Long Term   Goals   Was pt agreeable to Eval/treatment?  Yes  To be met in 5 days   Pain level   0/10  0 0/10    Bed Mobility    Rolling: Not assessed patient in chair     Rolling: Independent   Supine to sit: Minimal assist of 1   Sit to supine: Minimal assist of 1   Scooting: Supervision      Rolling: Minimal assist of 1    Supine to sit: Minimal assist of 1    Sit to supine: Minimal assist of 1    Scooting: Minimal assist of 1     Transfers Sit to stand: Minimal assist of 1 Cues for hand placement and safety especially with reaching back for chair Sit to stand: Supervision  Cues for hand placement and safety     Sit to stand: Supervision      Ambulation    30 feet using  wheeled walker with Minimal assist of 1   for walker control and balance and cues for safety, pacing and O2 line management 2x50 feet using  wheeled walker with Minimal assist of 1   for walker control and balance and cues for sequencing, safety and pacing line management and o2    50 feet using  wheeled walker with Supervision     Stair negotiation: ascended and descended   Not assessed          ROM Within functional limits    Increase range of motion 10% of affected joints    Strength BUE:  refer to OT eval  RLE:  3+/5  LLE:  3+/5  Increase strength in affected mm groups by 1/3 grade   Balance Sitting EOB:  not assessed    Dynamic Standing:  fair wheeled walker  Sitting EOB: not assessed    Dynamic Standing:   fair  + wheeled walker    Sitting EOB:  good    Dynamic Standing: fair +wheeled walker      Patient is Alert & Oriented x person, place, time and situation and follows directions    Sensation:  Patient  reports numbness bilateral feet-chronic      Edema:  no   Endurance: fair  -    Vitals: 2 liters nasal cannula   Blood Pressure at rest 152/90 Blood Pressure during session 149/59   Heart Rate at rest 83 Heart Rate during session 111   SPO2 at rest 93%  SPO2 during session 88-90%after gait cues purse lip breathing  After treatment in chair 94%     Patient education  Patient educated on  importance of mobility while in hospital , purse lip breathing, ankle pumps, quad set and glut set for edema control, blood clot prevention and O2 line management and safety      Patient response to education:   Pt verbalized understanding Pt demonstrated skill Pt requires further education in this area   Yes Partial Yes      Treatment:  Patient practiced and was instructed/facilitated in the following treatment: Patient in chair, ambulated in room,  stood x 2 for endurance/strength.  performed standing  exercises. Therapeutic Exercises:  squat, calf raise, standing hamstring curls and shoulder elevation  x 5-10 reps. With vitals monitored    At end of session, patient in chair with   call light and phone within reach,  all lines and tubes intact, nursing notified. Patient would benefit from continued skilled Physical Therapy to improve functional independence and quality of life.          Patient's/ family goals   get stronger    Time in  1012  Time out  1042    Total Treatment Time  30 minutes      CPT codes:  Therapeutic exercises (79081)   20 minutes  1 unit(s)  Gait Training (34426) 10 minutes 1 unit(s)    Luci Jhaveri, PT

## 2022-04-19 NOTE — CARE COORDINATION
Discharge orders noted. Follow-up after discharge -patient/family to call for appointment [phone].   Thank you    Yudith Cr MD

## 2022-04-19 NOTE — CARE COORDINATION
4-19  note: ( covid neg 4-14) [facility] transport pt to [facility] via stretcher for a heart catheterization, pt's family aware of transport time, Rn aware, pt sleeping at this time, rn will update when he wakes. Please see notes from CM yesterday regarding dc planning.  Electronically signed by Charlie Tripathi RN on 4/19/2022 at 9:25 AM

## 2022-04-20 NOTE — H&P
[address]                              HISTORY AND PHYSICAL    PATIENT NAME: Nannette Blizzard                    :        1933  MED REC NO:   [id number]                            ROOM:       [facility]  ACCOUNT NO:   [de-identified]                           ADMIT DATE: 2022  PROVIDER:     Angelique Britt DO    CHIEF COMPLAINT AND HISTORY OF CHIEF COMPLAINT:  This is a pleasant  77-year-old white male who was admitted to [address] [place] [address]. The  patient initially presented to the hospital here on 2022. The  patient was presented to the hospital here with what appeared to be a  hypotensive episode occurring for approximately 48 hours at home. It  was noted at that time the patient did have evidence of adrenal  insufficiency with suspected sepsis. It was also noted at that time  that blood culture did not show any specific growth with no identifiable  source of infection. He was treated with antibiotic therapy and his  procalcitonin did improve. Unfortunately, his CPK and troponin were  elevated suggesting a non-ST elevation myocardial infarction. The  patient was transferred to [facility] yesterday where Dr. Charisma Luke did perform a cardiac catheterization. Finding at that time did  show diffuse disease, most of this was occurring on the right coronary  artery system of the distal artery. Because of the patient's other  multiple medical problems, medical therapy was advised at this time. Also complicating the issue is evidence of anemia. He underwent a bone  marrow biopsy last week with results still pending at this time. Currently, he appeared to be stable. He is waiting discharge  preparation at this time from a cardiac standpoint of view. He recently  did suffer with non-ST elevation myocardial infarction. He denies any  resting dyspnea, orthopnea, paroxysmal dyspnea.   He does have exertional  dyspnea, was recently diagnosed of possible interstitial lung disease. He has no swelling of his lower extremities. Respiratory wise, his O2  sat remains stable, but some evidence of desaturation is noted. He is  on p.r.n. oxygen. Gastrointestinal wise, he underwent upper GI  panendoscopy, but did not show any abnormalities. He denies any recent  change in bowel habits, hematochezia, or rectal bleeding. Genitourinary  wise, he does have a history of urinary frequency and benign prostatic  hypertrophy. Neurologically, he does complain of some chronic low back  pain. Otherwise he appeared to be stable at the present time. ALLERGIES:  No known drug allergies. MEDICATIONS:  At the present time include Coreg 6.25 b.i.d., Imdur 30 mg  daily, Ranexa 500 b.i.d., Proscar 5 mg daily, Lantus 20 units at bedtime  with sliding scale before meal and at bedtime, steroids 20 units in the  a.m. and 5 in the p.m. Synthroid 75 mcg daily. PAST MEDICAL HISTORY:  Positive for non-insulin-dependent diabetes  mellitus; history of Lexiscan stress test in 2021, which was normal;  recent cardiac catheterization done on 2022; history of blood  transfusion; paroxysmal atrial fibrillation; hyperlipidemia;  hypertension; and low back pain with lumbar stenosis and thyroid  disease. PAST SURGICAL HISTORY:  Includes back surgery in , cardiac  catheterization on 2022, bilateral knee replacement in  and  , hip repair, shoulder surgery on 2020. Also tonsillectomy  and upper GI panendoscopy on 2022. Also recent bone marrow biopsy  on 04/15/2022. FAMILY HISTORY:  Parents are . SOCIAL HISTORY:  The patient does not drink. No recreational drugs. Currently . Wife  in 2021 of acute leukemia and sepsis. Father of four children, two girls, two boys. Retired schoolteacher.     REVIEW OF CHART:  White count is 1.7, hemoglobin and hematocrit are 8.1  and 24.5 respectively. BUN and creatinine are 25 and 1.1 respectively. Glucose of 359. PHYSICAL EXAMINATION:  VITAL SIGNS:  Showed height to be 5 feet 6 inches, weight is 162 pounds,  BMI 26.15.  Blood pressure 159/79, pulse 70, respirations 18. GENERAL APPEARANCE:  This is a pleasant 80-year-old white male who is  alert and responsive; oriented to person, place, and time. HEENT:  Normal grossly to visual inspection. NECK:  Revealed adequate carotid upstroke without associated bruits. Trachea midline. Thyroid not palpable. LUNGS:  Clear. Fibrotic rales at the bases. HEART:  Fairly regular. Grade 1/6 murmur. No S3. No S4.  ABDOMEN:  Soft. No guarding, rebound, or rigidity. EXTREMITIES:  Without any cyanosis, clubbing, or edema. NEUROLOGIC:  Grossly intact. BREASTS:  Normal male. RECTAL:  Not performed. GENITAL:  Not performed. IMPRESSION:  At this time include;  1. Recent non-ST elevation myocardial infarction with cardiac  catheterization on 04/19/2022 showing diffuse coronary artery disease  predominantly in the right coronary artery system with recommendation  for medical therapy. 2.  Abnormal CAT scan of the lung showing usual interstitial pneumonia. 3.  Macrocytic anemia with status post transfusion x2 with recent bone  marrow performed showing suspected myelodysplastic syndrome. 4.  Acute on chronic renal insufficiency, resolved. 5.  Type 2 diabetes mellitus. 6.  Suspected recent shock secondary to adrenal insufficiency. 7.  Essential hypertension. 8.  Hyperlipidemia, on Pravachol. 9.  Hypothyroidism, on Synthroid replacement. 10.  Chronic low back pain with status post decompression for lumbar  stenosis. 11.  Elevated transaminases, resolving. PLAN AND RECOMMENDATION:  At this time, currently the patient does  appear stable. He has been transferred back to the hospital here on   for discharge planning for [facility].   Hopefully the bone  marrow will be back soon. He is currently being followed by Oncology. In the meantime, we will continue medical therapy at this time and will  make further recommendation as clinically indicated.         Harinder Benítez DO    D: 04/20/2022 7:00:08       T: 04/20/2022 7:03:48     [email or web address]  Job#: [id number]     Doc#: [id number]    CC:

## 2022-04-20 NOTE — PLAN OF CARE
Problem: Skin Integrity:  Goal: Will show no infection signs and symptoms  Description: Will show no infection signs and symptoms  4/19/2022 2024 by Liban Austin RN  Outcome: Met This Shift  4/19/2022 2024 by Liban Austin RN  Reactivated  4/19/2022 1225 by Barnetta Dancer, RN  Outcome: Completed  Goal: Absence of new skin breakdown  Description: Absence of new skin breakdown  4/19/2022 2024 by Liban Austin RN  Outcome: Met This Shift  4/19/2022 2024 by Liban Austin RN  Reactivated  4/19/2022 1225 by Barnetta Dancer, RN  Outcome: Completed     Problem: Falls - Risk of:  Goal: Will remain free from falls  Description: Will remain free from falls  4/19/2022 2024 by Liban Austin RN  Outcome: Met This Shift  4/19/2022 2024 by Liban Austin RN  Reactivated  4/19/2022 1225 by Barnetta Dancer, RN  Outcome: Completed  Goal: Absence of physical injury  Description: Absence of physical injury  4/19/2022 2024 by Liban Austin RN  Outcome: Met This Shift  4/19/2022 2024 by Liban Austin RN  Reactivated  4/19/2022 1225 by Barnetta Dancer, RN  Outcome: Completed     Problem: Cardiac:  Goal: Ability to maintain an adequate cardiac output will improve  Description: Ability to maintain an adequate cardiac output will improve  4/19/2022 2024 by Liban Austin RN  Outcome: Completed  4/19/2022 2024 by Liban Austin RN  Reactivated  Goal: Hemodynamic stability will improve  Description: Hemodynamic stability will improve  4/19/2022 2024 by Liban Austin RN  Outcome: Completed  4/19/2022 2024 by Liban Austin RN  Reactivated

## 2022-04-20 NOTE — PROGRESS NOTES
Physical Therapy Treatment Note/Plan of Care    Room #:  IC03/IC03-01  Patient Name: Liz De La Torre  YOB: 1933  MRN: [id number]    Date of Service: 4/20/2022     Tentative placement recommendation: Inpatient Rehab  Equipment recommendation: To be determined      Evaluating Physical Therapist: Sharon Ghosh, PT #[id number]      Specific Provider Orders/Date/Referring Provider :  04/14/22 0100   PT eval and treat Start: 04/14/22 0100, End: 04/14/22 0100, ONE TIME, Standing Count: 1 Occurrences, R    Lena Stallworth,       Admitting Diagnosis:   Shortness of breath [R06.02]  NSTEMI (non-ST elevated myocardial infarction) ([facility] [address] [I21.4]  BURAK (acute kidney injury) ([facility] [address] [N17.9]  Febrile illness [R50.9]  Non-traumatic rhabdomyolysis [M62.82]  Chronic bilateral low back pain without sciatica [M54.50, G89.29]     fatigue, shortness of breath, leg weakness and numbness.   progressive decline since November/December of last year following his COVID-19 booster shot    Surgery: none  Visit Diagnoses       Codes    BURAK (acute kidney injury) ([facility] [address]     N17.9    Non-traumatic rhabdomyolysis     M62.82    Febrile illness     R50.9    Shortness of breath     R06.02    Chronic bilateral low back pain without sciatica     M54.50, G89.29          Patient Active Problem List   Diagnosis    Incomplete tear of right rotator cuff    Long biceps tear    Injury of tendon of long head of right biceps    Tear of right glenoid labrum    Bursitis of right shoulder    Essential hypertension    Acquired hypothyroidism    Dyslipidemia    Type 2 diabetes mellitus without complication, without long-term current use of insulin ([facility])    Normocytic anemia    NSTEMI (non-ST elevated myocardial infarction) ([facility] [address]        ASSESSMENT of Current Deficits Patient exhibits decreased strength, balance and endurance impairing functional mobility, transfers, gait , gait distance and tolerance to activity  Patient able to increase ambulation distance during treatment today in comparsion to treatment today. Patient did not have oxygen during treatment today. Patient able to decrease assist level from min assist to supervision as well. Patient will continue to require skilled therapy to improve strength and endurance. PHYSICAL THERAPY  PLAN OF CARE       Physical therapy plan of care is established based on physician order,  patient diagnosis and clinical assessment    Current Treatment Recommendations:    -Standing Balance: Perform strengthening exercises in standing to promote motor control with or without upper extremity support   -Transfers: Provide instruction on proper hand and foot position for adequate transfer of weight onto lower extremities and use of gait device if needed and Cues for hand placement, technique and safety. Provide stabilization to prevent fall   -Gait: Gait training, Standing activities to improve: base of support, weight shift, weight bearing  and Pregait training to emphasize:   Alea, Device control, Upright and Safety   -Endurance: Utilize Supervised activities to increase level of endurance to allow for safe functional mobility including transfers and gait  and Use graduated activities to promote good breathing techniques and provide support and education to maximize respiratory function    PT long term treatment goals are located in below grid    Patient and or family understand(s) diagnosis, prognosis, and plan of care. Frequency of treatments: Patient will be seen  daily.          Prior Level of Function: Patient ambulated independently    Rehab Potential: good   - for baseline    Past medical history:   Past Medical History:   Diagnosis Date    Diabetes mellitus ([facility] [address]     H/O cardiovascular stress test 12/01/2021    Lexiscan    History of blood transfusion     History of Holter monitoring 02/11/2022    Hyperlipidemia     Hypertension     Lower back pain     Thyroid disease      Past Surgical History: Procedure Laterality Date    BACK SURGERY  2000    fusion  lumbar area,      CARDIAC CATHETERIZATION  04/19/2022    JOINT REPLACEMENT Bilateral [id number]    knee     LITHOTRIPSY      SHOULDER ARTHROSCOPY Right 01/30/2020    with treatment    SHOULDER ARTHROSCOPY Right 1/30/2020    ARTHROSCOPIC EXAM AND TREATMENT RIGHT SHOULDER (CPT 83369,76282) RIGHT ROTATOR CUFF REPAIR, SUBACHROMIAL DECOMPRESSION, DEBRIDEMENT OF HUMERAL performed by Tab Arzola DO at [address] N/A 4/18/2022    EGD ESOPHAGOGASTRODUODENOSCOPY performed by Lauryn Knowles DO at [address]:    Precautions: Up with assistance, falls ,   chronic lumbar back pain, urinary incontinence     Social history: Patient lives alone in a two story home resides first  with 2 + 1+2 steps  to enter without [employer] in shower grab bars, built in shower chair    Equipment owned: Real, [address], Rollator, Standard Walker and Crutches,       [address]   How much difficulty turning over in bed?: None  How much difficulty sitting down on / standing up from a chair with arms?: A Little  How much difficulty moving from lying on back to sitting on side of bed?: A Little  How much help from another person moving to and from a bed to a chair?: A Little  How much help from another person needed to walk in hospital room?: A Little  How much help from another person for climbing 3-5 steps with a railing?: A Lot  AM-PAC Inpatient Mobility Raw Score : 18  AM-PAC Inpatient T-Scale Score : 43.63  Mobility Inpatient CMS 0-100% Score: 46.58  Mobility Inpatient CMS G-Code Modifier : CK    Nursing cleared patient for PT treatment. OBJECTIVE;   Initial Evaluation  Date: 4/15/2022 Treatment Date:    4/20/2022    Short Term/ Long Term   Goals   Was pt agreeable to Eval/treatment?  Yes  To be met in 5 days   Pain level   0/10  0 0/10    Bed Mobility    Rolling: Not assessed patient in chair     Rolling: Not assessed patient in chair   Supine to sit: Not assessed patient in chair   Sit to supine: Not assessed patient in chair   Scooting: Not assessed patient in chair     Rolling: Minimal assist of 1    Supine to sit: Minimal assist of 1    Sit to supine: Minimal assist of 1    Scooting: Minimal assist of 1     Transfers Sit to stand: Minimal assist of 1 Cues for hand placement and safety especially with reaching back for chair Sit to stand: Supervision  Cues for hand placement and safety     Sit to stand: Supervision      Ambulation    30 feet using  wheeled walker with Minimal assist of 1   for walker control and balance and cues for safety, pacing and O2 line management 2x75, 1x50 feet using  wheeled walker with Supervision    cues for safety and pursed lip breathing     50 feet using  wheeled walker with Supervision     Stair negotiation: ascended and descended   Not assessed  Not assessed        ROM Within functional limits    Increase range of motion 10% of affected joints    Strength BUE:  refer to OT eval  RLE:  3+/5  LLE:  3+/5  Increase strength in affected mm groups by 1/3 grade   Balance Sitting EOB:  not assessed    Dynamic Standing:  fair wheeled walker  Sitting EOB: not assessed    Dynamic Standing:   fair  + wheeled walker    Sitting EOB:  good    Dynamic Standing: fair +wheeled walker      Patient is Alert & Oriented x person, place, time and situation and follows directions    Sensation:  Patient  reports numbness bilateral feet-chronic      Edema:  no   Endurance: fair  -    Vitals: room air   Blood Pressure at rest  Blood Pressure during session    Heart Rate at rest 94 Heart Rate during session    SPO2 at rest 92%  SPO2 during session 87-94     Patient education  Patient educated on  importance of mobility while in hospital , purse lip breathing, ankle pumps, quad set and glut set for edema control, blood clot prevention and O2 line management and safety      Patient response to education:   Pt verbalized understanding Pt demonstrated skill Pt requires further education in this area   Yes Partial Yes      Treatment:  Patient practiced and was instructed/facilitated in the following treatment: Patient in chair upon arrival into room. Patient ambulated multiple times in room and hallway with wheeled walker. Therapeutic Exercises:  not performed     At end of session, patient in chair with   call light and phone within reach,  all lines and tubes intact, nursing notified. Patient would benefit from continued skilled Physical Therapy to improve functional independence and quality of life.          Patient's/ family goals   get stronger    Time in  238  Time out  303    Total Treatment Time  25 minutes  CPT codes:  Gait Training (97762) 25 minutes 2 unit(s)    Lorena Burgos, Student Physical Therapist

## 2022-04-20 NOTE — PROCEDURES
[address]                  [address] [facility],  [facility]                            CARDIAC CATHETERIZATION    PATIENT NAME: Matheus Mulligan                    :        1933  MED REC NO:   [id number]                            ROOM:  ACCOUNT NO:   [de-identified]                           ADMIT DATE: 2022  PROVIDER:     Maddison Cannon MD    DATE OF PROCEDURE:  2022    PROCEDURES:  1. Coronary angiography. 2.  Conscious sedation using Versed and fentanyl. Indication #4, score of 8. INDICATION FOR PROCEDURE:  The patient is an 24-year-old male who was  having shortness of breath as an outpatient. The patient was found to  have elevated troponin. The patient was brought to the cath lab for  further evaluation. DESCRIPTION OF PROCEDURE:  After the appropriate informed consent, the  right wrist area was prepped and draped in the usual sterile fashion. A  timeout was called. The right wrist area was locally anesthetized with  2 mL of 2% lidocaine. A 21-gauge needle was used to access the right  radial artery. A 6-Andorran introducer sheath was used to cannulate the  right radial artery. 6-Andorran JL-3.5 and 6-Andorran JR-4 catheters were  used for coronary angiography in multiple projections. ANGIOGRAPHIC FINDINGS:  Moderate calcifications involving the coronary  tree. The left main coronary artery arises normally from the left sinus of  Valsalva. It is a mid-size vessel with mild disease. It bifurcates  into left anterior descending artery and left circumflex artery. The left anterior descending artery extends down to the apex. It gives  off two diagonal branches. The first diagonal branch is small in  diameter, long that has subtotal occlusion in the proximal segment. The  LAD itself has mild-to-moderate disease.     The left circumflex artery is a large vessel, gives off high OM branch  that has 70% stenosis in the mid segment. However, the vessel is a  1.5-mm vessel. The left circumflex artery has 40% calcified lesion in  the proximal segment. Then, it gives off a large OM branch. The whole  proximal and mid segment of that OM branch is mildly to moderately  diseased, at worst point estimated at 50% stenosis. There were faint  left-to-right collaterals noted. The right coronary artery has mendoza's crook takeoff. The right  coronary artery is severely diseased in the mid segment. There is  heavily calcified lesion involving the mid segment. There is another  sequential lesion estimated at 80% at the junction of the mid and distal  segment. The vessel itself is a 2.5-mm vessel. There is distal  competitive flow noted in the right PDA. At the end of the procedure, the catheter and the wire were pulled out  from the body. The sheath was pulled out from the body. A Vasc Band  was applied to the right wrist area with effective hemostasis. COMPLICATIONS:  None. ESTIMATED TOTAL BLOOD LOSS:  15 to 20 mL. MEDICATIONS USED DURING THE PROCEDURE:  We used intraarterial verapamil  to prevent vasospasm. We used intraarterial heparin for  anticoagulation. CONSCIOUS SEDATION:  We used Versed and fentanyl for conscious sedation. First dose was given at 1513. Procedure ended at [phone]. There were 23  minutes of direct face-to-face supervision during conscious sedation  administration. TOTAL FLUOROSCOPY TIME:  3.3 minutes. TOTAL CONTRAST USED:  75 mL of Isovue. IMPRESSION:  Multivessel disease involving small- to mid-size first  diagonal branch, small first OM branch, second OM branch and RCA. RECOMMENDATIONS:  We will optimize medical therapy. If the patient  continues to be symptomatic despite optimal medical therapy, we will  bring him back for possible PCI. The patient will be observed for two hours and transferred back to [facility] to continue treatment.         Danni Singleton MD    D: 04/19/2022 21:00:24       T: 04/19/2022 21:24:35     [email or web address]  Job#: [id number]     Doc#: [id number]    CC:  Gerard Guardado DO

## 2022-04-20 NOTE — PROGRESS NOTES
[facility]        CARDIOLOGY                 INPATIENT PROGRESS NOTE          PATIENT SEEN IN FOLLOW UP FOR: NSTEMI    Hospital Day: 8     Jonas Walsh is a 80year old patient previously not known to [facility]. SUBJECTIVE: Denies chest pain or shortness of breath. Medical therapy was recommended for his coronary artery disease after the cardiac transition         Intake/Output Summary (Last 24 hours) at 4/20/2022 1820  Last data filed at 4/20/2022 1400  Gross per 24 hour   Intake 1300 ml   Output 1300 ml   Net 0 ml       Labs:   CBC:   Recent Labs     04/19/22 0513 04/20/22 0429   WBC 2.3* 1.7*   HGB 8.1* 8.1*   HCT 24.4* 24.5*   PLT 74* 80*     BMP:   Recent Labs     04/19/22 0513 04/20/22 0429    136   K 3.9 4.4   CO2 26 26   BUN 22 25*   CREATININE 1.1 1.1   LABGLOM >60 >60   CALCIUM 7.3* 7.5*     Mag:   Recent Labs     04/18/22 0451 04/19/22  0513   MG 1.8 1.8     Phos:   Recent Labs     04/18/22 0451 04/19/22  0513   PHOS 2.3* 2.6     TSH:   No results for input(s): TSH in the last 72 hours. HgA1c:     BNP: No results for input(s): BNP in the last 72 hours. PT/INR:   No results for input(s): PROTIME, INR in the last 72 hours. APTT:  No results for input(s): APTT in the last 72 hours. CARDIAC ENZYMES:  No results for input(s): CKTOTAL, CKMB, CKMBINDEX, TROPONINI in the last 72 hours.   FASTING LIPID PANEL:  Lab Results   Component Value Date    CHOL 70 04/14/2022    HDL 25 04/14/2022    LDLCALC 31 04/14/2022    TRIG 70 04/14/2022     LIVER PROFILE:  Recent Labs     04/18/22 0451 04/19/22  0513   AST 32 20   ALT 62* 54*   LABALBU 2.3* 2.4*       Current Inpatient Medications:   aspirin  81 mg Oral Daily    atorvastatin  80 mg Oral Nightly    isosorbide mononitrate  30 mg Oral Daily    ranolazine  500 mg Oral BID    insulin glargine  18 Units SubCUTAneous Nightly    sodium chloride flush  5-40 mL IntraVENous 2 times per day    carvedilol  6.25 mg Oral BID WC    levothyroxine  75 mcg Oral Daily    melatonin  10 mg Oral Nightly    pantoprazole  40 mg Oral QAM AC    predniSONE  20 mg Oral Q24H    predniSONE  5 mg Oral Q24H    insulin lispro  0-18 Units SubCUTAneous TID WC    insulin lispro  0-9 Units SubCUTAneous Nightly       IV Infusions (if any):   sodium chloride      dextrose           PHYSICAL EXAM:     CONSTITUTIONAL:   /67   Pulse 88   Temp 97.8 °F (36.6 °C) (Oral)   Resp 21   Ht 5' 6\" (1.676 m)   Wt 162 lb (73.5 kg)   SpO2 94%   BMI 26.15 kg/m²   Pulse  Av.5  Min: 76  Max: 96  Systolic (03AWX), LWS:939 , Min:98 , SVH:412    Diastolic (64MIZ), REF:77, Min:61, Max:93        In general, this is a well developed, well nourished who appears stated age, awake, alert, no apparent distress  HEENT: eyes -conjunctivae pink,  Throat - Oral mucosa moist.   Neck-  no stridor,no carotid bruit. no jugular venous distention   RESPIRATORY: Chest symmetrical.  No accessory muscle use. Lung auscultation - clear to auscultation except few rhonchi  CARDIOVASCULAR:     Heart Palpation - no palpable thrills  Heart Ausculation - Regular rate and rhythm, 2/6 systolic murmur, No s3 or rub. No lower extremity edema, Distal pulses fair; no cyanosis. Abrasion to right lower leg. ABDOMEN: Soft, nontender,  Bowel sounds present. MS: n/a  : Deferred  Rectal Exam: Deferred  SKIN: warm and dry s   NEURO / PSYCH: oriented to person, place     Left heart catheterization 2022  ANGIOGRAPHIC FINDINGS:  Moderate calcifications involving the coronary  tree.     The left main coronary artery arises normally from the left sinus of  Valsalva. It is a mid-size vessel with mild disease. It bifurcates  into left anterior descending artery and left circumflex artery.     The left anterior descending artery extends down to the apex. It gives  off two diagonal branches.   The first diagonal branch is small in  diameter, long that has subtotal occlusion in the proximal segment. The  LAD itself has mild-to-moderate disease.     The left circumflex artery is a large vessel, gives off high OM branch  that has 70% stenosis in the mid segment. However, the vessel is a  1.5-mm vessel. The left circumflex artery has 40% calcified lesion in  the proximal segment. Then, it gives off a large OM branch. The whole  proximal and mid segment of that OM branch is mildly to moderately  diseased, at worst point estimated at 50% stenosis. There were faint  left-to-right collaterals noted.     The right coronary artery has mendoza's crook takeoff. The right  coronary artery is severely diseased in the mid segment. There is  heavily calcified lesion involving the mid segment. There is another  sequential lesion estimated at 80% at the junction of the mid and distal  segment. The vessel itself is a 2.5-mm vessel. There is distal  competitive flow noted in the right PDA.     IMPRESSION:  Multivessel disease involving small- to mid-size first  diagonal branch, small first OM branch, second OM branch and RCA.     RECOMMENDATIONS:  We will optimize medical therapy. If the patient  continues to be symptomatic despite optimal medical therapy, we will  bring him back for possible PCI.                                         ASSESSMENT/PLAN:      Coronary artery disease   Left heart catheterization as shown above   Continue aspirin, statin and beta-blocker as well as Imdur and Ranexa  2D Echo showed inferior wall motion abnormality with LV EF 55%.         Hypotension -  Stable       PAF - Diagnosed in Jan 2022 - Was on Eliquis;  Off Eliquis since Holter in Feb 2022 showed no A Fib and due to his Anemia.     Acute on chronic Anemia -  s/p PRBC Transfusion; had Bone marrow bx - Hem/Onc following, GI on case, awaiting GI evaluation to be complete and if no risk of bleeding proceed with invasive coronary angiogram with possible PCI tomorrow     Possible Rhabdomyolysis - Monitor CPKs and Renal  Fn     Possible GI bleed - GI on case, Ok for endoscopy     BURAK/CKD - Better, Monitor renal Fn     Elevated LFTs - GI on case     Type 2 diabetes -Per primary service     Left diabetic foot wound     Interstitial lung disease - On O2      Hypothyroidism - On HRT                   Electronically signed by Oksana Cheng MD on 4/20/2022 at [address]

## 2022-04-20 NOTE — PROGRESS NOTES
OT BEDSIDE TREATMENT NOTE      Date:2022  Patient Name: Susan James  MRN: [id number]  : 1933  Room: [place] [address]        EVAL  Evaluating OT: ELIZABETH Baugh/MILDRED; [id number]        Referring Provider and Orders/Date:   OT eval and treat Start: 22, End: 22, ONE TIME, Standing Count: 1 Occurrences, R    Ry Blackburn DO     Diagnosis:   1. NSTEMI (non-ST elevated myocardial infarction) ([facility] [address]    2. BURAK (acute kidney injury) ([facility] [address]    3. Non-traumatic rhabdomyolysis    4. Febrile illness    5. Shortness of breath    6. Chronic bilateral low back pain without sciatica          Pertinent Medical History:  :ESOPHAGOGASTRODUODENOSCOPY      Past Medical History           Past Medical History:   Diagnosis Date    Diabetes mellitus ([facility] [address]      H/O cardiovascular stress test 2021     Lexiscan    History of blood transfusion      History of Holter monitoring 2022    Hyperlipidemia      Hypertension      Lower back pain      Thyroid disease               Past Surgical History             Past Surgical History:   Procedure Laterality Date    BACK SURGERY        fusion  lumbar area,      JOINT REPLACEMENT Bilateral [id number]     knee     LITHOTRIPSY        SHOULDER ARTHROSCOPY Right 2020     with treatment    SHOULDER ARTHROSCOPY Right 2020     ARTHROSCOPIC EXAM AND TREATMENT RIGHT SHOULDER (CPT 35687,78899) RIGHT ROTATOR CUFF REPAIR, SUBACHROMIAL DECOMPRESSION, DEBRIDEMENT OF HUMERAL performed by Rehan Suresh DO at [address] [place] [address], room air    Recommended placement: Inpatient Rehab [facility])    Assessment of current deficits     [x]? ? Functional mobility           [x]? ?ADLs           [x]? ? Strength                   []? ? Cognition     [x]? ? Functional transfers         [x]? ? IADLs         [x]? ? Safety Awareness   [x]? ?Endurance     []? ? Fine Coordination                        [x]? ? Balance      []? ? Vision/perception    []? ? Sensation       [x]? ? Gross Motor Coordination            []? ? ROM           []? ? Delirium                   []? ? Motor Control      OT PLAN OF CARE   OT POC based on physician orders, patient diagnosis and results of clinical assessment     Frequency/Duration 1-3 days/wk for 2 weeks PRN   Specific OT Treatment Interventions to include:   * Instruction/training on adapted ADL techniques and AE recommendations to increase functional independence within precautions       * Training on energy conservation strategies, correct breathing pattern and techniques to improve independence/tolerance for self-care routine  * Functional transfer/mobility training/DME recommendations for increased independence, safety, and fall prevention  * Patient/Family education to increase follow through with safety techniques and functional independence  * Recommendation of environmental modifications for increased safety with functional transfers/mobility and ADLs  * Therapeutic exercise to improve motor endurance, ROM, and functional strength for ADLs/functional transfers  * Therapeutic activities to facilitate/challenge dynamic balance, stand tolerance for increased safety and independence with ADLs  * Therapeutic activities to facilitate gross/fine motor skills for increased independence with ADLs  * Neuro-muscular re-education: facilitation of righting/equilibrium reactions, midline orientation, scapular stability/mobility, normalization of muscle tone, and facilitation of volitional active controled movement  * Positioning to improve skin integrity, interaction with environment and functional independence      Recommended Adaptive Equipment/DME: TBD       Home Living: Pt lives at home alone. Can have 1st floor set up with 2 small 4\" steps down and 1 step up into the home without rails. Does go up a standard flight of steps for laundry and bumps basket up. Does have rails.  Social support can be provided at [facility].      Bathroom setup: walk in shower with built in seat and grab bars; standard toilet with grab bars    DME owned: cane, crutches, ww, standard walker, rollator      Prior Level of Function: indep with ADLs , indep with IADLs; ambulated indep   Driving: yes   Occupation: retired   Enjoys: fishing, golfing, gardening, exercise     Pain Level: none  Cognition: A&O: 4/4; Follows 3 step directions              Memory:  Intact              Sequencing:  Intact              Problem solving:  Intact              Judgement/safety:  Intact     AM-PAC Daily Activity Inpatient   How much help for putting on and taking off regular lower body clothing?: A Lot  How much help for Bathing?: A Little  How much help for Toileting?: A Lot  How much help for putting on and taking off regular upper body clothing?: A Lot  How much help for taking care of personal grooming?: A Little  How much help for eating meals?: None  AM-[facility] Inpatient Daily Activity Raw Score: 16  AM-PAC Inpatient ADL T-Scale Score : 35.96  ADL Inpatient CMS 0-100% Score: 53.32  ADL Inpatient CMS G-Code Modifier : CK     Functional Assessment:      Initial Eval Status  Date: 4/15/2022   Treatment Status  Date: 4/20/22 STGs = LTGs  Time frame: 10-14 days   Feeding Supervision with simulation. Possible assist with containers.  NPO on eval.  N/T  Indep   Grooming Stand by Assist from sitting up in chair with face/hand wash, oral care and applying chapstick.  Set up assist with oral care and washing hair with shampoo cap when sitting in chair due to fatigue.   Independent    UB Dressing Maximal Assist with gown management sitting EOB with assist for IV and lines Mod A with gown management due to lines ; assist to tie/untie back of gown, as well  Stand by Assist    LB Dressing Dependent to doff and don socks from supine with fatigue noted Mod A/max A to change socks d/t fatigue/decreased endurance; continue to assess Moderate Assist    Bathing Moderate Assist overall with assist for LB and extended time.  Min A for UE/LE bathing with extended time; pt completed ADL's when sitting/standing from arm chair. Pt able to complete pericare with min A for balance; assist to wash B feet and back Stand by Assist    Toileting Dependent with looney management NT  Stand by Assist    Bed Mobility  Supine to sit: Moderate Assist   With extended time and some dizziness reported.  NT with pt up in chair on arrival and at end of session   Supine to sit: Stand by Assist   Sit to supine: Stand by Assist    Functional Transfers Moderate Assist from lower surface of chair and min A from elevated surface of bed with some loss of balance initially. Declining walker, but recommended for safety and increased indep next session.  Min A for sit to stand from arm chair with HHA  Stand by Assist    Functional Mobility Minimal Assist with hand held assist for short distance from bed to arm chair with pt sitting impulsively due to fatigue and dizziness.  N/T d/t fatigue Stand by Assist    Balance Sitting:     Static:  Fair+    Dynamic:fair  Standing: fair- Sitting:     Static:  Fair+    Dynamic:fair  Standing: fair/fair minus with HHA; slightly unsteady  Sitting:     Static:  good    Dynamic:fair+  Standing: fair   Activity Tolerance Vitals with activity:2L 106/60 HR 75; 115/73 HR 72 97%; up in chair 106/66 HR 75 93%  Sitting EOB for 5min period and standing for 1min average.  Room Air with O2 sats remaining between 87-94% ; O2 sats dropped to 87% when standing as pt completed pericare, however pt's O2 sats returned to 90's after returning to sitting position in chair in less than 30 seconds Increase standing tolerance for >4min with stable vital signs for carry over into toileting, functional tranfers and indep in ADLs   Visual/  Perceptual Glasses: reading, Present; [facility]     Reports change in vision since admission: No      NA      Hand Dominance  [x]? ? Right   []? ? Left     UE ex's not completed d/t heart cath procedure yesterday    Hearing: [place]  Sensation:  No c/o numbness or tingling   Tone: WFL   Edema:slight edema in B LE's; nsg notified    Comments: Patient cleared by nursing staff. Upon arrival pt seated in chair. Pt agreeable to OT tx session. Pt educated with regards to  hand placement, safety awareness, static sitting balance,  standing balance, transfer training, ADL retraining,  grooming tasks, UE/LE bathing, LE/UE dressing,  ECT's. At end of session pt seated in chair  with all lines and tubes intact, call light within reach. Overall, pt demonstrated decreased independence and safety during completion of ADL/functional transfers/mobility tasks. Pt would benefit from continued skilled OT to increase safety and independence with completion of ADL/IADL tasks for functional independence and quality of life. Pt required cues and education as noted above for safe facilitation and completion of tasks. Therapist provided skilled monitoring of patient's response during treatment session. Prior to and at the end of session, environmental modifications /line management completed for patients safety and efficiency of treatment session. Overall, patient demonstrates minimal difficulties with completion of BADLs and IADLs. Factors contributing to these difficulties include decreased O2 sats to 87% with activities (however with rest and deep breathing, pt increased to 94% in less than 30 seconds), decreased endurance, and generalized weakness. As noted above, patient likely to benefit from further OT intervention to increase independence, safety, and overall quality of life. Treatment:     ? Functional transfers: Facilitated transfers to/from chair with cues for body alignment, safety and hand placement. ? ADL completion: Self-care retraining for the above-mentioned ADLs; training on proper hand placement, safety technique, sequencing, and energy conservation techniques. ?  Postural Balance: Sitting/standing balance retraining to improve righting reactions with postural changes during ADLs. · Pt has made fair progress towards set goals   · D/C to [facility] for OT PRN      Treatment Time also includes thorough review of current medical information, gathering information on past medical history/social history and prior level of function, informal observation of tasks, assessment of data and education on plan of care and goals.     Treatment Time In: 9:00 AM     Treatment Time Out: 9:26 AM            Treatment Charges: Mins Units   ADL/Home Mgt     [id number] [address] [place] [address]       Ther Ex                 [id number]       Manual Therapy    [id number]     Neuro Re-ed         [id number]     Orthotic manage/training                               [id number]     Non Billable Time     Total Timed Treatment 26 [address] [facility], MELGAR/L #[id number]

## 2022-04-20 NOTE — PROGRESS NOTES
PROGRESS NOTE  By Yudith Martinez M.D. [facility]  Dr. Cassie Oliver M.D.,  Dr. Kit Ponce M.D.,   Dr. Burnice Fleischer, D.O.,  Dr. Levar Zavala M.D.,  Dr. Romina Merritt D.O.,  Henna Thompson D.O. Orlin COONEY Prest  80 y.o.  male    SUBJECTIVE:  Denies abdominal pain. Denies nausea vomiting    OBJECTIVE:    /66   Pulse 90   Temp 98 °F (36.7 °C) (Oral)   Resp 19   Ht 5' 6\" (1.676 m)   Wt 162 lb (73.5 kg)   SpO2 93%   BMI 26.15 kg/m²     General: Elderly  male. NAD   HEENT:anicteric sclera/moist oral mucosa  Neck: Supple with trachea midline  Chest: Symmetric excursion/nonlabored respirations  Cor: Appears regular  Abd.:  Soft and nontender  Extr.:  Decreased muscle tone and bulk throughout  Skin: Warm and dry. Anicteric    DATA:    Monitor data reviewed -sinus rhythm noted.        Lab Results   Component Value Date    WBC 1.7 04/20/2022    RBC 2.27 04/20/2022    HGB 8.1 04/20/2022    HCT 24.5 04/20/2022    .9 04/20/2022    MCH 35.7 04/20/2022    MCHC 33.1 04/20/2022    RDW 21.2 04/20/2022    PLT 80 04/20/2022    MPV 12.2 04/20/2022     Lab Results   Component Value Date     04/20/2022    K 4.4 04/20/2022    K 4.4 04/13/2022     04/20/2022    CO2 26 04/20/2022    BUN 25 04/20/2022    CREATININE 1.1 04/20/2022    CALCIUM 7.5 04/20/2022    PROT 5.5 04/19/2022    LABALBU 2.4 04/19/2022    LABALBU 4.4 03/29/2012    BILITOT 0.4 04/19/2022    ALKPHOS 62 04/19/2022    AST 20 04/19/2022    ALT 54 04/19/2022     No results found for: LIPASE  No results found for: AMYLASE      ASSESSMENT/PLAN:  Patient Active Problem List   Diagnosis    Incomplete tear of right rotator cuff    Long biceps tear    Injury of tendon of long head of right biceps    Tear of right glenoid labrum    Bursitis of right shoulder    Essential hypertension    Acquired hypothyroidism    Dyslipidemia    Type 2 diabetes mellitus without complication, without long-term current use of insulin (HCC)    Normocytic anemia    NSTEMI (non-ST elevated myocardial infarction) ([facility] [address]     1.  Acute blood loss anemia, unspecified  Macrocytic  -EGD 4/18 revealing mild gastritis but no bleeding.  -Continue daily PPI  -Patient to have potential bleeding source in the small bowel or colon however this or longer procedures which will include increased risk for adverse events  -Currently H&H appears stable without clear evidence of overt bleed  -Continue to monitor H&H; defer transfusion to admitting        2. NSTEMI  Cardiac catheterization 4/19 showing diffuse disease   -According to notes plan for medical therapy   -Per admitting/cardiology     3.  Elevated liver enzymes  Improving  R factor 6.6: Hepatocellular  Right upper quadrant ultrasound unremarkable  Hepatic panel unremarkable for any metabolic, autoimmune, or viral etiology but cannot rule out DILI        4.  Septic shock  Possibly secondary to left diabetic foot wound  Per admitting     5. 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 Every effort was made to ensure accuracy; however, inadvertent computerized transcription errors may be present.

## 2022-04-20 NOTE — PLAN OF CARE
Problem: Discharge Planning  Goal: Discharge to home or other facility with appropriate resources  Outcome: Not Progressing     Problem: Discharge Planning  Goal: Discharge to home or other facility with appropriate resources  Outcome: Not Progressing     Problem: Skin Integrity:  Goal: Will show no infection signs and symptoms  Description: Will show no infection signs and symptoms  Outcome: Progressing  Goal: Absence of new skin breakdown  Description: Absence of new skin breakdown  Outcome: Progressing     Problem: Falls - Risk of:  Goal: Will remain free from falls  Description: Will remain free from falls  Outcome: Progressing  Goal: Absence of physical injury  Description: Absence of physical injury  Outcome: Progressing     Problem: Pain  Goal: Verbalizes/displays adequate comfort level or baseline comfort level  Outcome: Progressing

## 2022-04-20 NOTE — CARE COORDINATION
4-20-Cm note: ( covid neg 4-14) called [facility] liaison to start precert , paged OT to see ASAP . Will await precert, needs a BD MAX before dc, can dc prior to results. CM/SS will follow .  Electronically signed by Angelica Wynne RN on 4/20/2022 at 8:32 AM

## 2022-04-21 NOTE — PROGRESS NOTES
Physical Therapy Treatment Note/Plan of Care    Room #:  IC03/IC03-01  Patient Name: Endy Bocanegra  YOB: 1933  MRN: [id number]    Date of Service: 4/21/2022     Tentative placement recommendation: Subacute rehab  Equipment recommendation: To be determined      Evaluating Physical Therapist: Marek Maurer, PT #[id number]      Specific Provider Orders/Date/Referring Provider :  04/14/22 0100   PT eval and treat Start: 04/14/22 0100, End: 04/14/22 0100, ONE TIME, Standing Count: 1 Occurrences, R    Christian Lugo,       Admitting Diagnosis:   Shortness of breath [R06.02]  NSTEMI (non-ST elevated myocardial infarction) ([facility] [address] [I21.4]  BURAK (acute kidney injury) ([facility] [address] [N17.9]  Febrile illness [R50.9]  Non-traumatic rhabdomyolysis [M62.82]  Chronic bilateral low back pain without sciatica [M54.50, G89.29]     fatigue, shortness of breath, leg weakness and numbness.   progressive decline since November/December of last year following his COVID-19 booster shot    Surgery: none  Visit Diagnoses       Codes    BURAK (acute kidney injury) ([facility] [address]     N17.9    Non-traumatic rhabdomyolysis     M62.82    Febrile illness     R50.9    Shortness of breath     R06.02    Chronic bilateral low back pain without sciatica     M54.50, G89.29          Patient Active Problem List   Diagnosis    Incomplete tear of right rotator cuff    Long biceps tear    Injury of tendon of long head of right biceps    Tear of right glenoid labrum    Bursitis of right shoulder    Essential hypertension    Acquired hypothyroidism    Dyslipidemia    Type 2 diabetes mellitus without complication, without long-term current use of insulin ([facility])    Normocytic anemia    NSTEMI (non-ST elevated myocardial infarction) ([facility] [address]        ASSESSMENT of Current Deficits Patient exhibits decreased strength, balance and endurance impairing functional mobility, transfers, gait , gait distance and tolerance to activity  Patient able to increase ambulation distance during treatment today in comparsion to treatment today. Pt largely performed all function with Supervision with occasional Rosa for slight instability. Patient will continue to require skilled therapy to improve strength and endurance. PHYSICAL THERAPY  PLAN OF CARE       Physical therapy plan of care is established based on physician order,  patient diagnosis and clinical assessment    Current Treatment Recommendations:    -Standing Balance: Perform strengthening exercises in standing to promote motor control with or without upper extremity support   -Transfers: Provide instruction on proper hand and foot position for adequate transfer of weight onto lower extremities and use of gait device if needed and Cues for hand placement, technique and safety. Provide stabilization to prevent fall   -Gait: Gait training, Standing activities to improve: base of support, weight shift, weight bearing  and Pregait training to emphasize:   Alea, Device control, Upright and Safety   -Endurance: Utilize Supervised activities to increase level of endurance to allow for safe functional mobility including transfers and gait  and Use graduated activities to promote good breathing techniques and provide support and education to maximize respiratory function    PT long term treatment goals are located in below grid    Patient and or family understand(s) diagnosis, prognosis, and plan of care. Frequency of treatments: Patient will be seen  daily.          Prior Level of Function: Patient ambulated independently    Rehab Potential: good   - for baseline    Past medical history:   Past Medical History:   Diagnosis Date    Diabetes mellitus ([address]     H/O cardiovascular stress test 12/01/2021    Lexiscan    History of blood transfusion     History of Holter monitoring 02/11/2022    Hyperlipidemia     Hypertension     Lower back pain     Thyroid disease      Past Surgical History:   Procedure Laterality Date    BACK SURGERY  2000    fusion  lumbar area,      CARDIAC CATHETERIZATION  04/19/2022    JOINT REPLACEMENT Bilateral [id number]    knee     LITHOTRIPSY      SHOULDER ARTHROSCOPY Right 01/30/2020    with treatment    SHOULDER ARTHROSCOPY Right 1/30/2020    ARTHROSCOPIC EXAM AND TREATMENT RIGHT SHOULDER (CPT 59850,75841) RIGHT ROTATOR CUFF REPAIR, SUBACHROMIAL DECOMPRESSION, DEBRIDEMENT OF HUMERAL performed by Danilo Bonilla DO at [address] 4/18/2022    EGD ESOPHAGOGASTRODUODENOSCOPY performed by Elie Ybarra DO at [address]:    Precautions: Up with assistance, falls ,   chronic lumbar back pain, urinary incontinence     Social history: Patient lives alone in a two story home resides first  with 2 + 1+2 steps  to enter without [employer] in shower grab bars, built in shower chair    Equipment owned: Real, [address], Rollator, Standard Walker and Crutches,       [address] [facility]   How much difficulty turning over in bed?: A Little  How much difficulty sitting down on / standing up from a chair with arms?: A Little  How much difficulty moving from lying on back to sitting on side of bed?: A Little  How much help from another person moving to and from a bed to a chair?: A Little  How much help from another person needed to walk in hospital room?: A Little  How much help from another person for climbing 3-5 steps with a railing?: A Lot  AM-PAC Inpatient Mobility Raw Score : 17  AM-PAC Inpatient T-Scale Score : 42.13  Mobility Inpatient CMS 0-100% Score: 50.57  Mobility Inpatient CMS G-Code Modifier : CK    Nursing cleared patient for PT treatment. OBJECTIVE;   Initial Evaluation  Date: 4/15/2022 Treatment Date:    4/21/2022    Short Term/ Long Term   Goals   Was pt agreeable to Eval/treatment?  Yes  To be met in 5 days   Pain level   0/10  0 0/10    Bed Mobility    Rolling: Not assessed patient in chair     Rolling: Not assessed patient in chair   Supine to sit: Not assessed patient in chair   Sit to supine: Not assessed patient in chair   Scooting: Not assessed patient in chair     Rolling: Minimal assist of 1    Supine to sit: Minimal assist of 1    Sit to supine: Minimal assist of 1    Scooting: Minimal assist of 1     Transfers Sit to stand: Minimal assist of 1 Cues for hand placement and safety especially with reaching back for chair Sit to stand: Supervision  Cues for hand placement and safety     Sit to stand: Supervision      Ambulation    30 feet using  wheeled walker with Minimal assist of 1   for walker control and balance and cues for safety, pacing and O2 line management 2x75, 2 x 50 feet using  wheeled walker with Supervision    cues for safety and pursed lip breathing     50 feet using  wheeled walker with Supervision     Stair negotiation: ascended and descended   Not assessed  Not assessed        ROM Within functional limits    Increase range of motion 10% of affected joints    Strength BUE:  refer to OT eval  RLE:  3+/5  LLE:  3+/5  Increase strength in affected mm groups by 1/3 grade   Balance Sitting EOB:  not assessed    Dynamic Standing:  fair wheeled walker  Sitting EOB: not assessed    Dynamic Standing:   fair  + wheeled walker    Sitting EOB:  good    Dynamic Standing: fair +wheeled walker      Patient is Alert & Oriented x person, place, time and situation and follows directions    Sensation:  Patient  reports numbness bilateral feet-chronic      Edema:  no   Endurance: fair  -    Vitals: room air   Blood Pressure at rest  Blood Pressure during session    Heart Rate at rest 95 Heart Rate during session    SPO2 at rest 92%  SPO2 during session 88-94     Patient education  Patient educated on  importance of mobility while in hospital , purse lip breathing, ankle pumps, quad set and glut set for edema control, blood clot prevention and O2 line management and safety      Patient response to education:   Pt verbalized understanding Pt demonstrated skill Pt requires further education in this area   Yes Partial Yes      Treatment:  Patient practiced and was instructed/facilitated in the following treatment: Patient in chair upon arrival into room. Patient ambulated multiple times in room and hallway with wheeled walker. Pt performed transfers, ambulation in hallway with seated and standing rest breaks required. Therapeutic Exercises:  not performed     At end of session, patient in chair with   call light and phone within reach,  all lines and tubes intact, nursing notified. Patient would benefit from continued skilled Physical Therapy to improve functional independence and quality of life.          Patient's/ family goals   get stronger    Time in  1542  Time out  1607    Total Treatment Time  25 minutes  CPT codes:  Therapeutic activities (34300)   25 minutes  2 unit(s)  Jamarcus Merritt PT License Number [id number]

## 2022-04-21 NOTE — DISCHARGE INSTR - COC
Continuity of Care Form    Patient Name: Rosaura Cuevas   :  1933  MRN:  [id number]    Admit date:  2022  Discharge date:  22    Code Status Order: Full Code   Advance Directives:      Admitting Physician:  Sherri Dubin, DO  PCP: Aniya Diaz DO    Discharging Nurse: Eve Villegas [place] Unit/Room#: IC03/IC03-01  Discharging Unit Phone Number: [phone]    Emergency Contact:   Extended Emergency Contact Information  Primary Emergency Contact: [address] [place] Phone: [phone]  Mobile Phone: [phone]  Relation: Child    Past Surgical History:  Past Surgical History:   Procedure Laterality Date    BACK SURGERY      fusion  lumbar area,      CARDIAC CATHETERIZATION  2022    JOINT REPLACEMENT Bilateral [id number]    knee     LITHOTRIPSY      SHOULDER ARTHROSCOPY Right 2020    with treatment    SHOULDER ARTHROSCOPY Right 2020    ARTHROSCOPIC EXAM AND TREATMENT RIGHT SHOULDER (CPT 06547,47015) RIGHT ROTATOR CUFF REPAIR, SUBACHROMIAL DECOMPRESSION, DEBRIDEMENT OF HUMERAL performed by Carla Mehta DO at [address] 2022    EGD ESOPHAGOGASTRODUODENOSCOPY performed by Bobo Robbins DO at [facility] ENDOSCOPY       Immunization History:   Immunization History   Administered Date(s) Administered    COVID-19, Moderna, Primary or Immunocompromised, PF, 100mcg/0.5mL 2021, 2021    Influenza, High-dose, Quadv, 65 yrs +, IM (Fluzone) 2021    Influenza, Quadv, IM, PF (6 mo and older Fluzone, Flulaval, Fluarix, and 3 yrs and older Afluria) 2020    Influenza, Triv, inactivated, subunit, adjuvanted, IM (Fluad 65 yrs and older) 10/02/2018    Tdap (Boostrix, Adacel) 2016       Active Problems:  Patient Active Problem List   Diagnosis Code    Incomplete tear of right rotator cuff M75.111    Long biceps tear M75.41    Injury of tendon of long head of right biceps S46. 101A    Tear of right glenoid labrum S43.431A    Bursitis of right shoulder M75.51    Essential hypertension I10    Acquired hypothyroidism E03.9    Dyslipidemia E78.5    Type 2 diabetes mellitus without complication, without long-term current use of insulin (HCC) E11.9    Normocytic anemia D64.9    NSTEMI (non-ST elevated myocardial infarction) ([facility] [address] I21.4       Isolation/Infection:   Isolation            No Isolation          Patient Infection Status       Infection Onset Added Last Indicated Last Indicated By Review Planned Expiration Resolved Resolved By    COVID-19 (Rule Out) 04/20/22 04/20/22 04/20/22 COVID-19 (Ordered) 04/27/22 05/04/22      Resolved    COVID-19 (Rule Out) 04/14/22 04/14/22 04/14/22 Respiratory Panel, Molecular, with COVID-19 (Restricted: peds pts or suitable admitted adults) (Ordered)   04/14/22 Rule-Out Test Resulted            Nurse Assessment:  Last Vital Signs: /70   Pulse 83   Temp 97.8 °F (36.6 °C) (Oral)   Resp 19   Ht 5' 6\" (1.676 m)   Wt 160 lb (72.6 kg)   SpO2 92%   BMI 25.82 kg/m²     Last documented pain score (0-10 scale): Pain Level: 0  Last Weight:   Wt Readings from Last 1 Encounters:   04/21/22 160 lb (72.6 kg)     Mental Status:  oriented, alert, coherent, logical, thought processes intact, and able to concentrate and follow conversation    IV Access:  - None    Nursing Mobility/ADLs:  Walking   Assisted  Transfer  Assisted  Bathing  Assisted  Dressing  Assisted  Toileting  Assisted  Feeding  Independent  Med Admin  Independent  Med Delivery   whole    Wound Care Documentation and Therapy:        Elimination:  Continence: Bowel: Yes  Bladder: Yes  Urinary Catheter: None   Colostomy/Ileostomy/Ileal Conduit: No       Date of Last BM: 04/22/22    Intake/Output Summary (Last 24 hours) at 4/21/2022 1001  Last data filed at 4/21/2022 0400  Gross per 24 hour   Intake 440 ml   Output 1400 ml   Net -960 ml     I/O last 3 completed shifts:   In: 1500 [P.O.:1500]  Out: 2250 [Urine:2250]    Safety Concerns: At Risk for Falls    Impairments/Disabilities:      None    Nutrition Therapy:  Current Nutrition Therapy:   - Oral Diet:  Low Fat and high fiber    Routes of Feeding: Oral  Liquids: No Restrictions  Daily Fluid Restriction: no  Last Modified Barium Swallow with Video (Video Swallowing Test): not done    Treatments at the Time of Hospital Discharge:   Respiratory Treatments: N/A  Oxygen Therapy:  is not on home oxygen therapy. Ventilator:    - No ventilator support    Rehab Therapies: Physical Therapy  Weight Bearing Status/Restrictions: No weight bearing restrictions  Other Medical Equipment (for information only, NOT a DME order):  walker  Other Treatments:     Patient's personal belongings (please select all that are sent with patient):  Glasses, Dentures- uppers/ lowers      RN SIGNATURE:  Natali Zaldivar RN    CASE MANAGEMENT/SOCIAL WORK SECTION    Inpatient Status Date: 4-    Readmission Risk Assessment Score:  Readmission Risk              Risk of Unplanned Readmission:  21           Discharging to Facility/ Agency   Name: [facility]   Address[address]  [address] [facility] [address]  Phone: [phone]  Fax:    Dialysis Facility (if applicable)   Name:  Address:  Dialysis Schedule:  Phone:  Fax:    / signature: Electronically signed by Sho Arcos RN on 4/21/22 at 10:03 AM EDT    PHYSICIAN SECTION    Prognosis: {Prognosis:9704086105}    Condition at Discharge: [address] Hart Patient Condition:757554969}    Rehab Potential (if transferring to Rehab): {Prognosis:4501929038}    Recommended Labs or Other Treatments After Discharge: ***    Physician Certification: I certify the above information and transfer of Jaden Lai  is necessary for the continuing treatment of the diagnosis listed and that he requires {Admit to Appropriate Level of Care:86130} for {GREATER/LESS:363979446} 30 days.      Update Admission H&P: {CHP DME Changes in [facility]:165436191}    PHYSICIAN SIGNATURE:  Electronically signed by Ethan Marcano DO on 4/22/22 at 4:22 PM EDT

## 2022-04-21 NOTE — PLAN OF CARE
Problem: Skin Integrity:  Goal: Will show no infection signs and symptoms  Description: Will show no infection signs and symptoms  4/21/2022 0930 by Dorinda Iglesias RN  Outcome: Progressing  4/21/2022 0407 by Marjorie Sosa RN  Outcome: Progressing  4/20/2022 2027 by Zehra Byrnes RN  Outcome: Progressing  Goal: Absence of new skin breakdown  Description: Absence of new skin breakdown  4/21/2022 0930 by Dorinda Iglesias RN  Outcome: Progressing  4/20/2022 2027 by Zehra Byrnes RN  Outcome: Progressing     Problem: Falls - Risk of:  Goal: Will remain free from falls  Description: Will remain free from falls  4/21/2022 0930 by Dorinda Iglesias RN  Outcome: Progressing  4/21/2022 0407 by Marjorie Sosa RN  Outcome: Progressing  4/20/2022 2027 by Zehra Byrnes RN  Outcome: Progressing  Goal: Absence of physical injury  Description: Absence of physical injury  4/21/2022 0930 by Dorinda Iglesias RN  Outcome: Progressing  4/21/2022 0407 by Marjorie Sosa RN  Outcome: Progressing  4/20/2022 2027 by Zehra Byrnes RN  Outcome: Progressing     Problem: Discharge Planning  Goal: Discharge to home or other facility with appropriate resources  Outcome: Progressing     Problem: ABCDS Injury Assessment  Goal: Absence of physical injury  Outcome: Progressing

## 2022-04-21 NOTE — PROGRESS NOTES
[facility]  Dr. Salma Lemus M.D. Patient Name: Joshua Boyer  YOB: 1933  PCP: Fide Bey DO   Referring Provider:      Reason for Consultation:   Chief Complaint   Patient presents with    Extremity Weakness     WEAKNESS, INCONTINENCE STARTING TODAY        Subjective:  No new complaints. Patient feels much better. I will plan on chair. History of Present Illness:  80years old male with history of pulmonary fibrosis, diabetes mellitus, coronary artery disease came to the ER complaining of fatigue and shortness of breath for the past few days. Admitted to ICU with septic shock, NSTEMI, rhabdomyolysis. On antibiotics. Levophed has been tapered off. I was consulted for possible hemolysis. Hemoglobin was normal up until last year. Over the past few months his hemoglobin has dropped to his current level of 7.6. MCV has increased in the same timeframe and is 122 right now. Bilirubin is 0.9. , absolute reticulocyte count was 35,000. Total white count 6.4 with normal differential and platelet count of 127,387. B12 folate were normal.  Peripheral smear evaluation from yesterday showed dysplastic neutrophils and less than 1% blasts. CT chest showed worsening of his interstitial lung disease pattern. CT abdomen did not show any acute findings. Patient reports feeling somewhat better. Has been afebrile. Is awake alert oriented. Denies any blood in stools or black tarry stools. Review of systems: Over 10 systems were reviewed and all were negative except as mentioned above.     Diagnostic Data:     Past Medical History:   Diagnosis Date    Diabetes mellitus ([address]     H/O cardiovascular stress test 12/01/2021    Lexiscan    History of blood transfusion     History of Holter monitoring 02/11/2022    Hyperlipidemia     Hypertension     Lower back pain     Thyroid disease        Patient Active Problem List Diagnosis Date Noted    NSTEMI (non-ST elevated myocardial infarction) [facility] 04/13/2022    Essential hypertension 08/17/2021    Acquired hypothyroidism 08/17/2021    Dyslipidemia 08/17/2021    Type 2 diabetes mellitus without complication, without long-term current use of insulin [facility] 08/17/2021    Normocytic anemia 08/17/2021    Incomplete tear of right rotator cuff 01/30/2020    Long biceps tear 01/30/2020    Injury of tendon of long head of right biceps 01/30/2020    Tear of right glenoid labrum 01/30/2020    Bursitis of right shoulder 01/30/2020        Past Surgical History:   Procedure Laterality Date    BACK SURGERY  2000    fusion  lumbar area,      CARDIAC CATHETERIZATION  04/19/2022    JOINT REPLACEMENT Bilateral [id number]    knee     LITHOTRIPSY      SHOULDER ARTHROSCOPY Right 01/30/2020    with treatment    SHOULDER ARTHROSCOPY Right 1/30/2020    ARTHROSCOPIC EXAM AND TREATMENT RIGHT SHOULDER (CPT 42732,44881) RIGHT ROTATOR CUFF REPAIR, SUBACHROMIAL DECOMPRESSION, DEBRIDEMENT OF HUMERAL performed by Gucci Muñoz DO at [facility] ENDOSCOPY N/A 4/18/2022    EGD ESOPHAGOGASTRODUODENOSCOPY performed by Aurelia Jama DO at [address] [place] [address] History  Family History   Problem Relation Age of Onset    No Known Problems Mother     Diabetes Father        Social History  Social History     Socioeconomic History    Marital status:       Spouse name: Not on file    Number of children: Not on file    Years of education: Not on file    Highest education level: Not on file   Occupational History    Not on file   Tobacco Use    Smoking status: Never Smoker    Smokeless tobacco: Never Used   Vaping Use    Vaping Use: Never used   Substance and Sexual Activity    Alcohol use: No    Drug use: No    Sexual activity: Not on file   Other Topics Concern    Not on file   Social History Narrative    Not on file     Social Determinants of Health     Financial Resource Strain: Low Risk     Difficulty of Paying Living Expenses: Not hard at all   Food Insecurity: No Food Insecurity    Worried About Running Out of Food in the Last Year: Never true    Jessica of Food in the Last Year: Never true   Transportation Needs:     Lack of Transportation (Medical): Not on file    Lack of Transportation (Non-Medical): Not on file   Physical Activity:     Days of Exercise per Week: Not on file    Minutes of Exercise per Session: Not on file   Stress:     Feeling of Stress : Not on file   Social Connections:     Frequency of Communication with Friends and Family: Not on file    Frequency of Social Gatherings with Friends and Family: Not on file    Attends Buddhism Services: Not on file    Active Member of [address] [place] [address] [employer] or Organizations: Not on file    Attends Club or Organization Meetings: Not on file    Marital Status: Not on file   Intimate Partner Violence:     Fear of Current or Ex-Partner: Not on file    Emotionally Abused: Not on file    Physically Abused: Not on file    Sexually Abused: Not on file   Housing Stability:     Unable to Pay for Housing in the Last Year: Not on file    Number of [place] in the Last Year: Not on file    Unstable Housing in the Last Year: Not on file        TOBACCO:   reports that he has never smoked. He has never used smokeless tobacco.  ETOH:   reports no history of alcohol use. Home Medications  Prior to Admission medications    Medication Sig Start Date End Date Taking?  Authorizing Provider   carvedilol (COREG) 6.25 MG tablet Take 1 tablet by mouth 2 times daily (with meals) 4/19/22  Yes Dania Shore DO   pantoprazole (PROTONIX) 40 MG tablet Take 1 tablet by mouth every morning (before breakfast) 4/19/22  Yes Dania Shore DO   predniSONE (DELTASONE) 5 MG tablet 20 mg PO in the am  5 mg PO in the evening 4/19/22  Yes Dania Shore DO   insulin glargine (LANTUS SOLOSTAR) 100 UNIT/ML injection pen Inject 10 Units into the skin nightly 4/19/22  Yes Mimi Gillette DO   insulin lispro, 1 Unit Dial, (HUMALOG KWIKPEN) 100 UNIT/ML SOPN Glucose: Dose:       No Insulin   140-199   3 Units   200-249   6 Units   250-299   9 Units   300-349  12 Units   350-400  15 Units   Over 400  18 Units 4/19/22  Yes Mimi Gillette DO   Insulin Pen Needle 29G X 12MM MISC 1 each by Does not apply route 4 times daily (before meals and nightly) 4/19/22  Yes Mimi Gillette DO   isosorbide mononitrate (IMDUR) 30 MG extended release tablet Take 1 tablet by mouth daily 4/19/22   Charly Silva MD   ranolazine (RANEXA) 500 MG extended release tablet Take 1 tablet by mouth 2 times daily 4/19/22   Charly Silva MD   atorvastatin (LIPITOR) 20 MG tablet Take 1 tablet by mouth daily 4/19/22   Charly Silva MD   aspirin (ECOTRIN LOW STRENGTH) 81 MG EC tablet Take 1 tablet by mouth daily 4/19/22   Charly Silva MD   [facility] ULTRA strip 1 each by Other route 4 times daily As needed. 2/13/22 5/14/22  Gisela Mcguire DO   Lancets (ONETOUCH DELICA PLUS AAAXJY15G) MISC 1 strip by Other route 4 times daily 2/13/22 5/14/22  Gisela Mcguire DO   levothyroxine (SYNTHROID) 75 MCG tablet Take 1 tablet by mouth Daily 2/7/22   Orlin Goode DO   finasteride (PROSCAR) 5 MG tablet Take 5 mg by mouth once a week. Historical Provider, MD   therapeutic multivitamin-minerals (THERAGRAN-M) tablet Take 1 tablet by mouth daily. Historical Provider, MD       Allergies  Allergies   Allergen Reactions    No Known Allergies            Objective  /70   Pulse 83   Temp 97.8 °F (36.6 °C) (Oral)   Resp 19   Ht 5' 6\" (1.676 m)   Wt 160 lb (72.6 kg)   SpO2 92%   BMI 25.82 kg/m²     Physical Exam:     General: AAO to person, place, time, in no acute distress, pleasant. Head and neck : PERRLA, EOMI . Sclera non icteric. Oropharynx : Clear  Neck: no JVD,  no adenopathy, no carotid bruit  LYMPHATICS : No peripheral lymphadenopathy.   Heart: Regular rate and regular rhythm, no murmur  Lungs: Clear to auscultation   Extremities: No edema,no cyanosis, no clubbing. Abdomen: Soft, non-tender;no masses, no organomegaly  Skin:  No rash. Neurologic:Cranial nerves grossly intact. No focal motor or sensory deficits . Recent Laboratory Data-   Lab Results   Component Value Date    WBC 2.3 (L) 04/21/2022    HGB 8.1 (L) 04/21/2022    HCT 25.0 (L) 04/21/2022    .1 (H) 04/21/2022    PLT 93 (L) 04/21/2022    LYMPHOPCT 45.0 (H) 04/21/2022    RBC 2.27 (L) 04/21/2022    MCH 35.7 (H) 04/21/2022    MCHC 32.4 04/21/2022    RDW 20.9 (H) 04/21/2022    NEUTOPHILPCT 41.0 (L) 04/21/2022    MONOPCT 14.0 (H) 04/21/2022    BASOPCT 0.0 04/21/2022    NEUTROABS 0.94 (L) 04/21/2022    LYMPHSABS 1.04 (L) 04/21/2022    MONOSABS 0.32 04/21/2022    EOSABS 0.00 (L) 04/21/2022    BASOSABS 0.00 04/21/2022       Lab Results   Component Value Date     04/21/2022    K 4.5 04/21/2022     04/21/2022    CO2 25 04/21/2022    BUN 26 (H) 04/21/2022    CREATININE 1.1 04/21/2022    GLUCOSE 148 (H) 04/21/2022    CALCIUM 8.1 (L) 04/21/2022    PROT 5.8 (L) 04/21/2022    LABALBU 2.8 (L) 04/21/2022    BILITOT 0.5 04/21/2022    ALKPHOS 53 04/21/2022    AST 14 04/21/2022    ALT 40 04/21/2022    LABGLOM >60 04/21/2022    GFRAA >60 04/21/2022       Lab Results   Component Value Date    IRON 122 04/14/2022    TIBC 140 (L) 04/14/2022    FERRITIN 3,037 04/14/2022           Radiology-    XR CHEST PORTABLE   Final Result   Increased markings seen diffusely throughout the lung fields stable and   unchanged. Left-sided deep line catheter tip is curled back upon itself in   the SVC. FL UGI W KUB   Final Result   1. There is no esophageal mucosal irregularity, stricture or mass. 2. There is no gastric mass or obstruction no ulceration is noted   3. Tertiary contractions of the esophagus suggestive of esophageal   dysmotility disorder.          IR BONE MARROW BIOPSY AND ASPIRATION   Final Result Status post CT guided bone marrow aspiration and core biopsy of the iliac   bone. US GALLBLADDER RUQ   Final Result   No evidence of cholelithiasis or acute cholecystitis. No biliary ductal   dilatation. XR CHEST PORTABLE   Final Result   Deep line catheter placed on the left tip in the SVC. Increased pulmonary   vascularity again seen bilaterally with no evidence of pneumothorax. CT ABDOMEN PELVIS WO CONTRAST Additional Contrast? None   Final Result   No acute intra-abdominal or pelvic findings. Bilateral nonobstructing renal calculi. Colonic diverticulosis, without diverticulitis. Additional findings as above. CT CHEST WO CONTRAST   Final Result   Progression of moderate interstitial lung disease, which demonstrates a UIP   pattern. MRI LUMBAR SPINE WO CONTRAST   Final Result   Multilevel degenerative changes, most notably at L4-5 and L5-S1. Stable   grade 2 anterolisthesis of L4 on L5 and grade 1 anterolisthesis of L5 on S1.   Multilevel foraminal and lateral recess stenoses are not significantly   changed since the previous exam.      Development of endplate degenerative changes at L2-3. This is a new finding. XR CHEST PORTABLE   Final Result   Marked hypoventilation. Otherwise, no acute process seen. ASSESSMENT/PLAN :  80years old male with history of pulmonary fibrosis, diabetes mellitus, coronary artery disease came to the ER with worsening fatigue, admitted to ICU with septic shock, NSTEMI, rhabdomyolysis. I have been consulted for possible hemolysis. Patient had normal hemoglobin and MCV up until last year. Over the past few months his hemoglobin has dropped along with an increase in MCV. Current hemoglobin is 7 with an MCV of 122. Dysplastic neutrophils and less than 1% blasts were noted on peripheral smear evaluation. Finding suggestive of MDS. Discussed with the patient and family including Dr. Mi Cui.   We will get a bone marrow biopsy. Normal bilirubin and normal absolute reticulocyte count of 35,000 suggests absence of hemolysis. Recent B12 folate were normal.  Being treated for septic shock. Levophed is being tapered off. On antibiotics. Has been afebrile. Elevated cardiac markers: Cardiology has been consulted for an NSTEMI. GI consulted for possible GI bleed. Denies any blood in stool or black tarry stools. Iron panel ordered. We will continue to follow. 4/15/22  - Hgb lower today at 7.7, . 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  supports this as well  - No evidence of hemolysis. Hapto elevated and bili normal. LDH mildly elevated, likely related to septic shock  - BMBx to evaluate for MDS today  - Trend CBC, transfuse for Hgb <7  - Off levophed  - Renal function improved, Cr 1.5. Lactate down to 2.1  - Will follow    4/18/22  Feeling significantly better. Status post EGD with findings of mild gastritis without any fresh blood. No AVMs. Small diverticulum was described in the second portion of the duodenum without bleeding. He is on Protonix per GI.  CT chest with subpleural fibrosis  CBC shows a WBC count of 2.1 with hemoglobin of 8.3 with elevated MCV and low platelets of 73. Differential shows neutropenia and occasional metamyelocytes. HIT antibodies pending  Continues on antibiotics with ceftriaxone  Awaiting results of bone marrow aspirate and biopsy. Will follow    4/19/22  - CBC with stable Hgb at 8.1, platelets 74, WBC 2.3, ANC 0.87.   - APF4 pending  - BMBx pending  - Remains on rocephin, BCx negative  - Will follow    4/21/2022  -S/p bone marrow biopsypending results. Flow cytometry showed abnormal immunophenotype granulocytes with decreased expression of CD10 which is nonspecific. CBC is stable with hemoglobin of 8.1 platelets 93 and total white count of 2.3.   We will continue to monitor and follow bone marrow biopsy. HIT antibodies pending.  S/p cardiac catheterization showing multivessel disease. Possible PCI tomorrow.   -    Electronically signed by Salma Lemus MD on 4/21/2022 at 2:44 PM

## 2022-04-21 NOTE — CARE COORDINATION
4-21-Cm note: ( covid neg 4-14) [facility] accepted the pt, they are starting precert this am, pt aware, family to bring in Covid card for date of booster shot. Pt will require PRECERT, signed SANDRA, HENS (initiated)  and neg covid test (BD MAX pending) . CM/SS will follow.  Electronically signed by Miguel Carey RN on 4/21/2022 at 9:58 AM

## 2022-04-21 NOTE — PLAN OF CARE
Problem: Skin Integrity:  Goal: Will show no infection signs and symptoms  Description: Will show no infection signs and symptoms  4/21/2022 0407 by Theron Mendoza RN  Outcome: Progressing  4/20/2022 2027 by Karley Pelayo RN  Outcome: Progressing  4/20/2022 1510 by Camila Conrad RN  Outcome: Progressing  Goal: Absence of new skin breakdown  Description: Absence of new skin breakdown  4/20/2022 2027 by Karley Pelayo RN  Outcome: Progressing  4/20/2022 1510 by Camila Conrad RN  Outcome: Progressing     Problem: Falls - Risk of:  Goal: Will remain free from falls  Description: Will remain free from falls  4/21/2022 0407 by Theron Mendoza RN  Outcome: Progressing  4/20/2022 2027 by Karley Pelayo RN  Outcome: Progressing  4/20/2022 1510 by Camila Conrad RN  Outcome: Progressing  Goal: Absence of physical injury  Description: Absence of physical injury  4/21/2022 0407 by Theron Mendoza RN  Outcome: Progressing  4/20/2022 2027 by Karley Pelayo RN  Outcome: Progressing  4/20/2022 1510 by Camila Conrad RN  Outcome: Progressing

## 2022-04-21 NOTE — PLAN OF CARE
Problem: Inadequate oral food/beverage intake (NI-2.1)  Goal: Food and/or Nutrient Delivery  Description: Individualized approach for food/nutrient provision.   Outcome: Completed

## 2022-04-21 NOTE — PLAN OF CARE
Problem: Skin Integrity:  Goal: Will show no infection signs and symptoms  Description: Will show no infection signs and symptoms  4/20/2022 2027 by Zehra Byrnes RN  Outcome: Progressing  4/20/2022 1510 by Severo Fisherman, RN  Outcome: Progressing  Goal: Absence of new skin breakdown  Description: Absence of new skin breakdown  4/20/2022 2027 by Zehra Byrnes RN  Outcome: Progressing  4/20/2022 1510 by Severo Fisherman, RN  Outcome: Progressing     Problem: Falls - Risk of:  Goal: Will remain free from falls  Description: Will remain free from falls  4/20/2022 2027 by Zehra Byrnes RN  Outcome: Progressing  4/20/2022 1510 by Severo Fisherman, RN  Outcome: Progressing  Goal: Absence of physical injury  Description: Absence of physical injury  4/20/2022 2027 by Zehra Byrnes RN  Outcome: Progressing  4/20/2022 1510 by Severo Fisherman, RN  Outcome: Progressing

## 2022-04-21 NOTE — CARE COORDINATION
Patient is a [place], but does not want [facility] notified of admission.  Electronically signed by Carissa Rasmussen on 4/21/2022 at 11:16 AM

## 2022-04-21 NOTE — CARE COORDINATION
4-21-Cm note: ( covid neg 4-14) [employer] denied [place] LOC, pt chose [facility], called referral, will await acceptance. Pt will require precert.  Electronically signed by Tayler Bennett RN on 4/21/2022 at 7:28 AM

## 2022-04-21 NOTE — PROGRESS NOTES
PROGRESS NOTE  By Bruno Michelle M.D. [facility]  Dr. Viri Acevedo M.D.,  Dr. Otis Kaplan M.D.,   Dr. Kavita Cabrera D.O.,  Dr. Shaq Augustin M.D.,  Dr. Charissa Cohen D.O.,  Abhi Mayo D.O. Carlos Vargas Prest  80 y.o.  male    SUBJECTIVE:  Denies abdominal pain    OBJECTIVE:    /70   Pulse 83   Temp 97.8 °F (36.6 °C) (Oral)   Resp 19   Ht 5' 6\" (1.676 m)   Wt 160 lb (72.6 kg)   SpO2 92%   BMI 25.82 kg/m²     General: NAD/elderly  male  HEENT: Anicteric sclera/moist oral mucosa  Neck: Supple  Chest: Symmetric excursion with respiration  Cor: Monitor reviewed   Abd.:soft and nondistended  Extr.:  Decreased muscle tone and bulk, consistent with age and condition  Skin: Warm and dry/anicteric      DATA:    Monitor data reviewed .        Lab Results   Component Value Date    WBC 2.3 04/21/2022    RBC 2.27 04/21/2022    HGB 8.1 04/21/2022    HCT 25.0 04/21/2022    .1 04/21/2022    MCH 35.7 04/21/2022    MCHC 32.4 04/21/2022    RDW 20.9 04/21/2022    PLT 93 04/21/2022    MPV 11.9 04/21/2022     Lab Results   Component Value Date     04/21/2022    K 4.5 04/21/2022    K 4.4 04/13/2022     04/21/2022    CO2 25 04/21/2022    BUN 26 04/21/2022    CREATININE 1.1 04/21/2022    CALCIUM 8.1 04/21/2022    PROT 5.8 04/21/2022    LABALBU 2.8 04/21/2022    LABALBU 4.4 03/29/2012    BILITOT 0.5 04/21/2022    ALKPHOS 53 04/21/2022    AST 14 04/21/2022    ALT 40 04/21/2022     No results found for: LIPASE  No results found for: AMYLASE      ASSESSMENT/PLAN:  Patient Active Problem List   Diagnosis    Incomplete tear of right rotator cuff    Long biceps tear    Injury of tendon of long head of right biceps    Tear of right glenoid labrum    Bursitis of right shoulder    Essential hypertension    Acquired hypothyroidism    Dyslipidemia    Type 2 diabetes mellitus without complication, without long-term current use of insulin ([facility])    Normocytic anemia    NSTEMI (non-ST elevated myocardial infarction) ([facility]     1.  Acute blood loss anemia, unspecified  Macrocytic  -EGD 4/18 revealing mild gastritis but no bleeding.  -Continue daily PPI  -Patient to have potential bleeding source in the small bowel or colon however this or longer procedures which will include increased risk for adverse events -hold off endoscopy unless overt bleed/precipitous drop in H&H  -Currently H&H appears stable without clear evidence of overt bleed  -Continue to monitor H&H; defer transfusion to admitting        2. NSTEMI  Cardiac catheterization 4/19 showing diffuse disease   -According to notes plan for medical therapy   -Per admitting/cardiology     3.  Elevated liver enzymes  Improving  R factor 6.6: Hepatocellular  Right upper quadrant ultrasound unremarkable  Hepatic panel unremarkable for any metabolic, autoimmune, or viral etiology but cannot rule out DILI        4.  Septic shock  Possibly secondary to left diabetic foot wound  Per admitting     5. 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 Every effort was made to ensure accuracy; however, inadvertent computerized transcription errors may be present.

## 2022-04-21 NOTE — PROGRESS NOTES
[facility]        CARDIOLOGY                 INPATIENT PROGRESS NOTE          PATIENT SEEN IN FOLLOW UP FOR: NSTEMI    Hospital Day: 9     Alirio Chauhan is a 80year old patient previously not known to [facility]. SUBJECTIVE: Report doing fine. Participating in physical therapy. Intake/Output Summary (Last 24 hours) at 4/21/2022 1813  Last data filed at 4/21/2022 1340  Gross per 24 hour   Intake    Output 1500 ml   Net -1500 ml       Labs:   CBC:   Recent Labs     04/20/22  0429 04/21/22  0501   WBC 1.7* 2.3*   HGB 8.1* 8.1*   HCT 24.5* 25.0*   PLT 80* 93*     BMP:   Recent Labs     04/20/22  0429 04/21/22  0501    138   K 4.4 4.5   CO2 26 25   BUN 25* 26*   CREATININE 1.1 1.1   LABGLOM >60 >60   CALCIUM 7.5* 8.1*     Mag:   Recent Labs     04/19/22  0513   MG 1.8     Phos:   Recent Labs     04/19/22  0513   PHOS 2.6     TSH:   No results for input(s): TSH in the last 72 hours. HgA1c:     BNP: No results for input(s): BNP in the last 72 hours. PT/INR:   No results for input(s): PROTIME, INR in the last 72 hours. APTT:  No results for input(s): APTT in the last 72 hours. CARDIAC ENZYMES:  No results for input(s): CKTOTAL, CKMB, CKMBINDEX, TROPONINI in the last 72 hours.   FASTING LIPID PANEL:  Lab Results   Component Value Date    CHOL 70 04/14/2022    HDL 25 04/14/2022    LDLCALC 31 04/14/2022    TRIG 70 04/14/2022     LIVER PROFILE:  Recent Labs     04/19/22  0513 04/21/22  0501   AST 20 14   ALT 54* 40   LABALBU 2.4* 2.8*       Current Inpatient Medications:   insulin glargine  20 Units SubCUTAneous Nightly    atorvastatin  40 mg Oral Nightly    [START ON 4/22/2022] predniSONE  15 mg Oral Q24H    predniSONE  5 mg Oral Daily    aspirin  81 mg Oral Daily    isosorbide mononitrate  30 mg Oral Daily    ranolazine  500 mg Oral BID    sodium chloride flush  5-40 mL IntraVENous 2 times per day    carvedilol  6.25 mg Oral BID WC    levothyroxine  75 mcg Oral Daily    melatonin  10 mg Oral Nightly    pantoprazole  40 mg Oral QAM AC    insulin lispro  0-18 Units SubCUTAneous TID WC    insulin lispro  0-9 Units SubCUTAneous Nightly       IV Infusions (if any):   sodium chloride      dextrose           PHYSICAL EXAM:     CONSTITUTIONAL:   BP (!) 101/59   Pulse 89   Temp 97.9 °F (36.6 °C) (Oral)   Resp 21   Ht 5' 6\" (1.676 m)   Wt 160 lb (72.6 kg)   SpO2 91%   BMI 25.82 kg/m²   Pulse  Av.4  Min: 80  Max: 352  Systolic (85FNK), ZMO:197 , Min:101 , KSN:896    Diastolic (95JDY), PFV:92, Min:59, Max:82        In general, this is a well developed, well nourished who appears stated age, awake, alert, no apparent distress  HEENT: eyes -conjunctivae pink,  Throat - Oral mucosa moist.   Neck-  no stridor,no carotid bruit. no jugular venous distention   RESPIRATORY: Chest symmetrical.  No accessory muscle use. Lung auscultation - clear to auscultation except few rhonchi  CARDIOVASCULAR:     Heart Palpation - no palpable thrills  Heart Ausculation - Regular rate and rhythm, 2/6 systolic murmur, No s3 or rub. No lower extremity edema, Distal pulses fair; no cyanosis. Abrasion to right lower leg. ABDOMEN: Soft, nontender,  Bowel sounds present. MS: n/a  : Deferred  Rectal Exam: Deferred  SKIN: warm and dry s   NEURO / PSYCH: oriented to person, place     Left heart catheterization 2022  ANGIOGRAPHIC FINDINGS:  Moderate calcifications involving the coronary  tree.     The left main coronary artery arises normally from the left sinus of  Valsalva. It is a mid-size vessel with mild disease. It bifurcates  into left anterior descending artery and left circumflex artery.     The left anterior descending artery extends down to the apex. It gives  off two diagonal branches. The first diagonal branch is small in  diameter, long that has subtotal occlusion in the proximal segment.   The  LAD itself has mild-to-moderate disease.     The left circumflex artery is a large vessel, gives off high OM branch  that has 70% stenosis in the mid segment. However, the vessel is a  1.5-mm vessel. The left circumflex artery has 40% calcified lesion in  the proximal segment. Then, it gives off a large OM branch. The whole  proximal and mid segment of that OM branch is mildly to moderately  diseased, at worst point estimated at 50% stenosis. There were faint  left-to-right collaterals noted.     The right coronary artery has mendoza's crook takeoff. The right  coronary artery is severely diseased in the mid segment. There is  heavily calcified lesion involving the mid segment. There is another  sequential lesion estimated at 80% at the junction of the mid and distal  segment. The vessel itself is a 2.5-mm vessel. There is distal  competitive flow noted in the right PDA.     IMPRESSION:  Multivessel disease involving small- to mid-size first  diagonal branch, small first OM branch, second OM branch and RCA.     RECOMMENDATIONS:  We will optimize medical therapy. If the patient  continues to be symptomatic despite optimal medical therapy, we will  bring him back for possible PCI.                                         ASSESSMENT/PLAN:      Coronary artery disease   Left heart catheterization as shown above   Continue aspirin, statin and beta-blocker as well as Imdur and Ranexa  2D Echo showed inferior wall motion abnormality with LV EF 55%.         Hypotension -monitor closely and if persistent hypotension switch carvedilol to metoprolol succinate  Stable       PAF - Diagnosed in Jan 2022 - Was on Eliquis; Off Eliquis since Holter in Feb 2022 showed no A Fib and due to his Anemia. Continue on current dose of carvedilol for rate control.   If patient becomes hypotensive with carvedilol please switch to metoprolol succinate 25 mg daily and monitor closely    Acute on chronic Anemia -  s/p PRBC Transfusion; had Bone marrow bx - Hem/Onc following, GI on case, awaiting GI evaluation to be complete and if no risk of bleeding proceed with invasive coronary angiogram with possible PCI tomorrow     Possible Rhabdomyolysis - Monitor CPKs and Renal  Fn     Possible GI bleed - GI on case, Ok for endoscopy     BURAK/CKD - Better, Monitor renal Fn     Elevated LFTs - GI on case     Type 2 diabetes -Per primary service     Left diabetic foot wound     Interstitial lung disease - On O2      Hypothyroidism - On HRT                   Electronically signed by Noam Flowers MD on 4/21/2022 at [address] [place] [address]

## 2022-04-21 NOTE — PROGRESS NOTES
Internal Medicine Progress Note    [facility]    Samira Argueta. Amy Olmos, F.A.CAngelinaOAngelinaI. Trevin Willett D.O., JAYNEOAngelinaINEVAEH Silva, MSN, APRN, NP-C  Indira London. Mariella Morris, MSN, APRN-CNP     Primary Care Physician: James Pierson DO   Admitting Physician:  Lizbet Byrd DO  Admission date and time: 4/13/2022  6:50 PM    Room:  [place] [address]  Admitting diagnosis: Shortness of breath [R06.02]  NSTEMI (non-ST elevated myocardial infarction) ([facility] [I21.4]  BURAK (acute kidney injury) ([facility] [N17.9]  Febrile illness [R50.9]  Non-traumatic rhabdomyolysis [M62.82]  Chronic bilateral low back pain without sciatica [M54.50, G89.29]    Patient Name: Va Gunter  MRN: [id number]    Date of Service: 4/21/2022     Subjective:  Cait Escobar is a 80 y.o. male who was seen and examined today,4/21/2022, at the bedside. Sitting up in chair appeared to be significantly improved. Denies any chest pain. Working with physical therapy. Does need short-term rehab due to acute deconditioning. Awaiting precertification. Lab work seems to be improving    Daughter Claritza Kaur was present during my examination. Review of System:   Constitutional:   Denies fever or chills, weight loss or gain, fatigue or malaise. HEENT:   Denies ear pain, sore throat, sinus or eye problems. Cardiovascular:   Denies any chest pain, irregular heartbeats, or palpitations. Respiratory:   Denies shortness of breath, coughing, sputum production, hemoptysis, or wheezing. Gastrointestinal:   Denies nausea, vomiting, diarrhea, or constipation. Denies any abdominal pain. Genitourinary:    Denies any urgency, frequency, hematuria. Voiding  without difficulty. Extremities:   Denies lower extremity swelling, edema or cyanosis. Neurology:    Denies any headache or focal neurological deficits, Denies generalized weakness or memory difficulty.    Psch:   Denies being anxious or depressed. Musculoskeletal:    Denies  myalgias, joint complaints or back pain. Integumentary:   Denies any rashes, ulcers, or excoriations. Denies bruising. Hematologic/Lymphatic:  Denies bruising or bleeding. Physical Exam:  No intake/output data recorded. Intake/Output Summary (Last 24 hours) at 4/21/2022 1533  Last data filed at 4/21/2022 0400  Gross per 24 hour   Intake 200 ml   Output 950 ml   Net -750 ml   I/O last 3 completed shifts: In: 1500 [P.O.:1500]  Out: 2250 [Urine:2250]  Patient Vitals for the past 96 hrs (Last 3 readings):   Weight   04/21/22 0605 160 lb (72.6 kg)     Vital Signs:   Blood pressure 129/65, pulse 91, temperature 97.6 °F (36.4 °C), temperature source Oral, resp. rate 19, height 5' 6\" (1.676 m), weight 160 lb (72.6 kg), SpO2 91 %. General appearance:  Alert, responsive, oriented to person, place, and time. Well preserved, alert, no distress. Head:  Normocephalic. No masses, lesions or tenderness. Eyes:  PERRLA. EOMI. Sclera clear. Buccal mucosa moist.  ENT:  Ears normal. Mucosa normal.  Neck:    Supple. Trachea midline. No thyromegaly. No JVD. No bruits. Heart:    Rhythm regular. Rate controlled. No murmurs. Lungs:    Symmetrical. Clear to auscultation bilaterally. No wheezes. No rhonchi. No rales. Abdomen:   Soft. Non-tender. Non-distended. Bowel sounds positive. No organomegaly or masses. No pain on palpation. Extremities:    Peripheral pulses present. No peripheral edema. No ulcers. No cyanosis. No clubbing. Neurologic:    Alert x 3. No focal deficit. Cranial nerves grossly intact. No focal weakness. Psych:   Behavior is normal. Mood appears normal. Speech is not rapid and/or pressured. Musculoskeletal:   Spine ROM normal. Muscular strength intact. Gait not assessed. Integumentary:  No rashes  Skin normal color and texture.   Genitalia/Breast:  Deferred    Medication:  Scheduled Meds:   insulin glargine  20 Units SubCUTAneous Nightly    atorvastatin  40 mg Oral Nightly    [START ON 4/22/2022] predniSONE  15 mg Oral Q24H    predniSONE  5 mg Oral Daily    aspirin  81 mg Oral Daily    isosorbide mononitrate  30 mg Oral Daily    ranolazine  500 mg Oral BID    sodium chloride flush  5-40 mL IntraVENous 2 times per day    carvedilol  6.25 mg Oral BID WC    levothyroxine  75 mcg Oral Daily    melatonin  10 mg Oral Nightly    pantoprazole  40 mg Oral QAM AC    insulin lispro  0-18 Units SubCUTAneous TID WC    insulin lispro  0-9 Units SubCUTAneous Nightly     Continuous Infusions:   sodium chloride      dextrose         Objective Data:  CBC with Differential:    Lab Results   Component Value Date    WBC 2.3 04/21/2022    RBC 2.27 04/21/2022    HGB 8.1 04/21/2022    HCT 25.0 04/21/2022    PLT 93 04/21/2022    .1 04/21/2022    MCH 35.7 04/21/2022    MCHC 32.4 04/21/2022    RDW 20.9 04/21/2022    NRBC 1.0 04/21/2022    SEGSPCT 48 07/06/2013    METASPCT 0.9 04/19/2022    LYMPHOPCT 45.0 04/21/2022    MONOPCT 14.0 04/21/2022    MYELOPCT 0.9 04/20/2022    BASOPCT 0.0 04/21/2022    MONOSABS 0.32 04/21/2022    LYMPHSABS 1.04 04/21/2022    EOSABS 0.00 04/21/2022    BASOSABS 0.00 04/21/2022     CMP:    Lab Results   Component Value Date     04/21/2022    K 4.5 04/21/2022    K 4.4 04/13/2022     04/21/2022    CO2 25 04/21/2022    BUN 26 04/21/2022    CREATININE 1.1 04/21/2022    GFRAA >60 04/21/2022    LABGLOM >60 04/21/2022    GLUCOSE 148 04/21/2022    GLUCOSE 111 03/29/2012    PROT 5.8 04/21/2022    LABALBU 2.8 04/21/2022    LABALBU 4.4 03/29/2012    CALCIUM 8.1 04/21/2022    BILITOT 0.5 04/21/2022    ALKPHOS 53 04/21/2022    AST 14 04/21/2022    ALT 40 04/21/2022              Assessment:    · Recent non-ST elevation myocardial infarction with cardiac catheterization on 04/19/2022 showing diffuse coronary artery disease predominantly in the right coronary artery system with recommendation for medical therapy.   · Abnormal CAT scan of the lung showing usual interstitial pneumonia. · Macrocytic anemia with status post transfusion x2 with recent bone marrow performed showing suspected myelodysplastic syndrome. · Acute on chronic renal insufficiency, resolved. · Type 2 diabetes mellitus aggravated by steroids  · Suspected recent shock secondary to adrenal insufficiency with blood cultures being negative and procalcitonin improving  · Essential hypertension. · Hyperlipidemia, on Lipitor  · Hypothyroidism, on Synthroid replacement. · Chronic low back pain with status post decompression for lumbar stenosis. · Elevated transaminases, resolving. Plan:       · Awaiting results of bone marrow  · Overall clinicly improving  · Adjust insulin  · Awaiting precertification for short-term rehab  · Okay to transfer to telemetry    More than 50% of my  time was spent at the bedside counseling/coordinating care with the patient and/or family with face to face contact. This time was spent reviewing notes and laboratory data as well as instructing and counseling the patient. Time I spent with the family or surrogate(s) is included only if the patient was incapable of providing the necessary information or participating in medical decisions. I also discussed the differential diagnosis and all of the proposed management plans with the patient and individuals accompanying the patient. Oneal Linker requires this high level of physician care and nursing on the Telemetry unit due the complexity of decision management and chance of rapid decline or death. Continued cardiac monitoring and higher level of nursing are required. I am readily available for any further decision-making and intervention.      Rey Burgess DO, EDU.C.O.I.  4/21/2022  3:33 PM

## 2022-04-21 NOTE — PROGRESS NOTES
Comprehensive Nutrition Assessment    Type and Reason for Visit:  Reassess    Nutrition Recommendations/Plan:   1. Start Ensure HP BID  2. Will continue to monitor     Malnutrition Assessment:  Malnutrition Status:  No malnutrition (04/21/22 0859)    Context:  Acute Illness     Findings of the 6 clinical characteristics of malnutrition:  Energy Intake:  No significant decrease in energy intake  Weight Loss:  Unable to assess (lack measured wt hx per EMR)     Body Fat Loss:  No significant body fat loss     Muscle Mass Loss:  No significant muscle mass loss    Fluid Accumulation:  No significant fluid accumulation     Strength:  Normal  strength    Nutrition Assessment:    Pt nutritional status stable, consuming >75% meals. Adm d/t SOB, fatigue, weakness w/ NSTEMI s/p LHC w/ CAD, anemia s/p EGD w/ mild gastritis, septic shock/L foot DM wound, BURAK/CKD, Pancytopenia s/p bone bx to r/o MDS. Hx DM. Start Ensure HP BID. Nutrition Related Findings:    A&Ox4, round/soft abd, active BS/noted constipation, trace BLE edema, +2.7L I/Os Wound Type: None       Current Nutrition Intake & Therapies:    Average Meal Intake: %  Average Supplements Intake: None Ordered  ADULT DIET; Regular; Low Fat/Low Chol/High Fiber/2 gm Na    Anthropometric Measures:  Height: 5' 6\" (167.6 cm)  Ideal Body Weight (IBW): 142 lbs (65 kg)    Admission Body Weight: 154 lb 15.7 oz (70.3 kg) (4/14 bed scale)  Current Body Weight: 160 lb (72.6 kg) (4/19 bed scale, likely elevated d/t fluids), 108.5 % IBW. Current BMI (kg/m2): 25.8  Usual Body Weight:  (Inaccurate wts per EMR review: No method)  Weight Adjustment For: No Adjustment  BMI Categories: Normal Weight (BMI 18.5-24. 9)    Estimated Daily Nutrient Needs:  Energy Requirements Based On: Formula  Weight Used for Energy Requirements: Admission  Energy (kcal/day): 3749-7282  Weight Used for Protein Requirements: Admission  Protein (g/day):   Method Used for Fluid Requirements: 1 ml/kcal  Fluid (ml/day): 9098-8975    Nutrition Diagnosis:   No nutrition diagnosis at this time     Nutrition Interventions:   Food and/or Nutrient Delivery: Continue Current Diet,Start Oral Nutrition Supplement (Ensure HP BID)  Nutrition Education/Counseling: No recommendation at this time  Coordination of Nutrition Care: Continue to monitor while inpatient    Goals:  Previous Goal Met: Goal(s) Achieved  Goals: PO intake 75% or greater    Nutrition Monitoring and Evaluation:   Behavioral-Environmental Outcomes: None Identified  Food/Nutrient Intake Outcomes: Food and Nutrient Intake,Supplement Intake  Physical Signs/Symptoms Outcomes: Biochemical Data,Nutrition Focused Physical Findings,Skin,Weight,Chewing or Swallowing,GI Status,Fluid Status or Edema,Constipation,Hemodynamic Status    Discharge Planning:     Too soon to determine     Anh Rosario MS, RD, LD  Contact: [phone]

## 2022-04-22 NOTE — PLAN OF CARE
Problem: Skin Integrity:  Goal: Will show no infection signs and symptoms  Description: Will show no infection signs and symptoms  Outcome: Progressing  Goal: Absence of new skin breakdown  Description: Absence of new skin breakdown  Outcome: Progressing     Problem: Falls - Risk of:  Goal: Will remain free from falls  Description: Will remain free from falls  Outcome: Progressing  Goal: Absence of physical injury  Description: Absence of physical injury  Outcome: Progressing     Problem: Discharge Planning  Goal: Discharge to home or other facility with appropriate resources  Outcome: Progressing     Problem: ABCDS Injury Assessment  Goal: Absence of physical injury  Outcome: Progressing

## 2022-04-22 NOTE — SIGNIFICANT EVENT
I have again attempted to reach the insurance company to perform [id number] as I would like this to be performed before the weekend to allow an appropriate discharge plan. I have been unable to reach any physician or representative and have had to leave a message twice. I hope to hear from the physician soon.     Ruby Flores, DO  3:42 PM  4/22/2022

## 2022-04-22 NOTE — CARE COORDINATION
4-22-Cm note: ( Covid neg 4-22) Dr. Paramjit Sullivan was successful at overturning the denial with [employer] , pt has been approved to go to [facility] , family is providing transportation to the building, PASSR is completed to go with pt. Nursing will call report and give dc time to building.   Electronically signed by Cassidy Mejia RN on 4/22/2022 at 4:34 PM

## 2022-04-22 NOTE — CARE COORDINATION
4-22-Cm note:( covid neg 4-20) -[employer] denied SNF for pt, family aware, Peer to peer [phone]  Option # 4 ref#  [id number] BY 4:30 PM today. Kim Cordova will do the peer to peer . Will await outcome. If not overturned pt will go [facility] as a private pay. Family agreeable, SOV will order his meds and have PT do an evaluation to send to insurance for part B coverage for PT. Pt's dtr is here and will transport pt to the facility either way.    Electronically signed by Brissa Orantes RN on 4/22/2022 at 1:21 PM

## 2022-04-22 NOTE — PROGRESS NOTES
SHIRLENE called to get an estimated time for when they were ready to receive the patient and to call Nurse to Nurse. Per the nurse, she was \"unaware of another admisson\" and had to get a hold of her DON. She took the unit's number and will be giving me a call back.

## 2022-04-22 NOTE — DISCHARGE SUMMARY
Internal Medicine Progress Note     [facility]     Reza Barkley. Lucho Peña., F.A.C.OAngelinaI. Tami Colvin D.O., NIRALIAAngelinaCAngelinaOAngelinaIAngelina Marin D.O. Shawn Ortiz, MSN, APRN, NP-C  Lizzie Redman. Dominic Da Silva, MSN, APRN-CNP       Internal Medicine  Discharge Summary    NAME: Holly Martinez  :  1933  MRN:  [id number]  PCP:Orlin Goode DO  ADMITTED: 2022      DISCHARGED: 22    ADMITTING PHYSICIAN: Reza Mcguire DO    CONSULTANT(S):   IP CONSULT TO CARDIOLOGY  IP CONSULT TO CRITICAL CARE  IP CONSULT TO GI  IP CONSULT TO ONCOLOGY  IP CONSULT TO DIETITIAN  IP CONSULT TO SOCIAL WORK  IP CONSULT TO DIETITIAN  IP CONSULT TO SOCIAL WORK  IP CONSULT TO SPIRITUAL SERVICES     ADMITTING DIAGNOSIS:   Shortness of breath [R06.02]  NSTEMI (non-ST elevated myocardial infarction) ([facility] [address] [I21.4]  BURAK (acute kidney injury) ([facility] [address] [N17.9]  Febrile illness [R50.9]  Non-traumatic rhabdomyolysis [M62.82]  Chronic bilateral low back pain without sciatica [M54.50, G89.29]     DISCHARGE DIAGNOSES:   1. Non-ST elevation myocardial infarction with cardiac catheterization on 2022 showing diffuse coronary artery disease predominantly in the right coronary artery system with recommendation for medical therapy. 2. Abnormal CAT scan of the lung showing usual interstitial pneumonia requiring intermittent nasal cannula oxygen usage  3. Macrocytic anemia with status post transfusion x2 status post bone marrow biopsy  4. Acute on chronic renal insufficiency, resolved. 5. Insulin-dependent diabetes mellitus type 2 aggravated by current steroids  6. Suspected recent shock secondary to adrenal insufficiency with blood cultures being negative and procalcitonin improving  7. Essential hypertension. 8. Hyperlipidemia, on Lipitor  9. Hypothyroidism, on Synthroid replacement. 10. Chronic low back pain with status post decompression for lumbar stenosis.   11. Elevated transaminases, resolving. BRIEF HISTORY OF PRESENT ILLNESS:   This is a pleasant  42-year-old white male who was admitted to [address] [place] [address]. The  patient initially presented to the hospital here on 04/13/2022. The  patient was presented to the hospital here with what appeared to be a  hypotensive episode occurring for approximately 48 hours at home. It  was noted at that time the patient did have evidence of adrenal  insufficiency with suspected sepsis. It was also noted at that time  that blood culture did not show any specific growth with no identifiable  source of infection. He was treated with antibiotic therapy and his  procalcitonin did improve. Unfortunately, his CPK and troponin were  elevated suggesting a non-ST elevation myocardial infarction. The  patient was transferred to [facility] yesterday where Dr. Tonya Merida did perform a cardiac catheterization. Finding at that time did  show diffuse disease, most of this was occurring on the right coronary  artery system of the distal artery. Because of the patient's other  multiple medical problems, medical therapy was advised at this time. Also complicating the issue is evidence of anemia. He underwent a bone  marrow biopsy last week with results still pending at this time. Currently, he appeared to be stable. He is waiting discharge  preparation at this time from a cardiac standpoint of view. He recently  did suffer with non-ST elevation myocardial infarction. He denies any  resting dyspnea, orthopnea, paroxysmal dyspnea. He does have exertional  dyspnea, was recently diagnosed of possible interstitial lung disease. He has no swelling of his lower extremities. Respiratory wise, his O2  sat remains stable, but some evidence of desaturation is noted. He is  on p.r.n. oxygen. Gastrointestinal wise, he underwent upper GI  panendoscopy, but did not show any abnormalities.   He denies any recent  change in bowel habits, hematochezia, or rectal bleeding. Genitourinary  wise, he does have a history of urinary frequency and benign prostatic  hypertrophy. Neurologically, he does complain of some chronic low back  pain. Otherwise he appeared to be stable at the present time.     LABS[de-identified]  Lab Results   Component Value Date    WBC 2.4 (L) 04/22/2022    HGB 8.4 (L) 04/22/2022    HCT 25.5 (L) 04/22/2022     (L) 04/22/2022     04/22/2022    K 4.9 04/22/2022    CL 98 04/22/2022    CREATININE 1.3 (H) 04/22/2022    BUN 26 (H) 04/22/2022    CO2 28 04/22/2022    GLUCOSE 197 (H) 04/22/2022    ALT 34 04/22/2022    AST 15 04/22/2022    INR 1.9 04/14/2022     Lab Results   Component Value Date    INR 1.9 04/14/2022    INR 2.3 04/14/2022    INR 2.0 04/14/2022    PROTIME 22.4 (H) 04/14/2022    PROTIME 27.0 (H) 04/14/2022    PROTIME 23.4 (H) 04/14/2022      Lab Results   Component Value Date    TSH 1.100 04/14/2022     Lab Results   Component Value Date    TRIG 70 04/14/2022    TRIG 77 03/30/2022    TRIG 77 11/08/2021     Lab Results   Component Value Date    HDL 25 04/14/2022    HDL 46 03/30/2022    HDL 39 11/08/2021     Lab Results   Component Value Date    LDLCALC 31 04/14/2022    LDLCALC 49 03/30/2022    LDLCALC 72 11/08/2021     Lab Results   Component Value Date    LABA1C 8.3 (H) 04/14/2022       IMAGING:  Echo Complete    Result Date: 4/14/2022  Transthoracic Echocardiography Report (TTE)  Demographics   Patient Name    Irlanda Quintero Gender            Male                  C   Medical Record  [id number]      Room Number       [address]  Number   Account #       [de-identified]     Procedure Date    04/14/2022   Corporate ID                  Ordering          Rica Tijerina DO                                Physician   Accession       [id number]    Referring  Number                        Physician   Date of Birth   07/16/1933    Sonographer       Marveen Mohs [facility]   Age             80 year(s)    Interpreting      [address] Physician         Physician Cardiology                                                  Gilda Mota MD                                 Any Other  Procedure Type of Study   TTE procedure:Echocardiogram W/Contrast.  Procedure Date Date: 04/14/2022 Start: 01:12 PM Study Location: Portable Technical Quality: Limited visualization due to poor acoustical window. Indications:LV function. Patient Status: Routine Contrast Medium: Definity. Amount - 2 ml Contrast Comments: given by the rn. Height: 66 inches Weight: 154 pounds BSA: 1.79 m^2 BMI: 24.86 kg/m^2 Rhythm: Within normal limits HR: 82 bpm BP: 140/87 mmHg  Findings   Left Ventricle  Normal left ventricular chamber size. Inferior wall hypokinesis with normal overall LV systolic function, EF  60%. Mild left ventricular concentric hypertrophy noted. Normal diastolic function. Right Ventricle  Normal right ventricle size and function. Left Atrium  Left atrium is of normal size. Interatrial septum not well visualized but appears intact. Right Atrium  Normal right atrium. Mitral Valve  Normal mitral valve structure. There is trace to mild mitral regurgitation. No mitral valve prolapse. Tricuspid Valve  Normal tricuspid valve structure. There is trace tricuspid regurgitation. Mild pulmonary hypertension, RVSP 38mmHg. Aortic Valve  Normal aortic valve structure and function. No hemodynamically significant aortic stenosis. Pulmonic Valve  The pulmonic valve was not well visualized. Pericardial Effusion  No evidence of pericardial effusion. Pericardium appears normal.   Pleural Effusion  No evidence of pleural effusion. Aorta  Normal aortic root size and ascending aorta. Miscellaneous  The inferior vena cava not well visualized. Conclusions   Summary  Technically sub-optimal images. Normal left ventricular chamber size. Inferior wall hypokinesis with normal overall LV systolic function, EF  62%.   Mild left ventricular concentric hypertrophy noted.  Normal diastolic function. Interatrial septum not well visualized but appears intact. Normal right ventricle size and function. There is trace to mild mitral regurgitation. No mitral valve prolapse. No hemodynamically significant aortic stenosis. There is trace tricuspid regurgitation. Mild pulmonary hypertension, RVSP 38mmHg. Normal aortic root size. No evidence of pericardial effusion. No intra cardiac mass or thrombus. Compared to prior echo from 1/26/2020 - Inferior wall motion abnormality  is new.    Signature   ----------------------------------------------------------------  Electronically signed by Lily Diehl MD(Interpreting  physician) on 04/14/2022 07:37 PM  ----------------------------------------------------------------  M-Mode/2D Measurements & Calculations   LV Diastolic    LV Systolic Dimension: 2.5   AV Cusp Separation: 1.7 cmLA  Dimension: 3.6  cm                           Dimension: 3.2 cmAO Root  cm              LV Volume Diastolic: 37.7 ml Dimension: 3.6 cm  LV FS:30.6 %    LV Volume Systolic: 25.3 ml  LV PW           LV EDV/LV EDV Index: 87.5  Diastolic: 0.9  XN/86 GA/S^4AC ESV/LV ESV  cm              Index: 23.2 ml/13ml/ m^2     RV Diastolic Dimension: 2.1  LV PW Systolic: EF Calculated: 41.9 %        cm  1.3 cm          LV Mass Index: 65 l/min*m^2  Septum          LV Length: 7.3 cm  Diastolic: 1.2                               LA volume/Index: 21.6 ml  cm              LVOT: 1.9 cm                 /29.77EY/M^9  Septum  Systolic: 1.1  cm  CO: 4.08 l/min  CI: 1.55  l/m*m^2  LV Mass: 115.86  g  Doppler Measurements & Calculations   MV Peak E-Wave:   AV Peak Velocity: 1.09 m/s     LVOT Peak Velocity: 0.49  1.03 m/s          AV Peak Gradient: 4.74 mmHg    m/s  MV Peak A-Wave:   AV Mean Velocity: 0.79 m/s     LVOT Mean Velocity: 0.37  0.73 m/s          AV Mean Gradient: 2.7 mmHg     m/s  MV E/A Ratio:     AV VTI: 20.9 cm                LVOT Peak Gradient: 1  1.41 AV Area (Continuity):1.61 cm^2 mmHgLVOT Mean Gradient:  MV Peak Gradient:                                0.6 mmHg  4.9 mmHg          LVOT VTI: 11.9 cm  MV Mean Gradient:  1.8 mmHg  MV Mean Velocity: Pulm. Vein A Reversal          TR Velocity:2.87 m/s  0.62 m/s          Duration:85.4 msec             TR Gradient:32.92 mmHg  MV Deceleration   Pulm. Vein D Velocity:0.46  Time: 164 msec    m/sPulm. Vein A Reversal  MV P1/2t: 54.8    Velocity:0.2 m/s  msec              Pulm. Vein S Velocity: 0.55    AR ED Velocity: 1.46 m/s  MVA by PHT:4.01   m/s  cm^2  MV Area  (continuity): 1.4  cm^2  MV E' Septal  Velocity: 0.07  m/s  MV E' Lateral  Velocity: 7 m/s  [email or web address] [id number]    CT ABDOMEN PELVIS WO CONTRAST Additional Contrast? None    Result Date: 4/14/2022  EXAMINATION: CT OF THE ABDOMEN AND PELVIS WITHOUT CONTRAST 4/14/2022 6:29 am TECHNIQUE: CT of the abdomen and pelvis was performed without the administration of intravenous contrast. Multiplanar reformatted images are provided for review. Dose modulation, iterative reconstruction, and/or weight based adjustment of the mA/kV was utilized to reduce the radiation dose to as low as reasonably achievable. COMPARISON: Correlation made with CT scan of the chest performed April 13, 2022. HISTORY: ORDERING SYSTEM PROVIDED HISTORY: abdominal pain. anemia. TECHNOLOGIST PROVIDED HISTORY: Reason for exam:->abdominal pain. anemia. Additional Contrast?->None FINDINGS: Lower Chest: Chronic interstitial/fibrotic changes involving the lung bases. There is no pleural or pericardial effusion. Please refer to the chest CT for further information. Organs: Within the limitations of a nonenhanced scan, the liver, spleen, adrenal glands and pancreas are unremarkable. Bilateral nonobstructing renal calculi are noted. No obstructive uropathy.  No perinephric or periureteral fat stranding. GI/Bowel: Colonic diverticulosis, without evidence of acute diverticulitis. No bowel obstruction or free intraperitoneal air is identified. The small bowel is normal in appearance within the limitations of a nonenhanced exam. Pelvis: Morales catheter within the bladder, with small pockets of air within the lumen of the bladder, likely secondary to catheter placement. The pelvic organs are normal otherwise. No free fluid or lymphadenopathy. Peritoneum/Retroperitoneum: No mass, fluid collection or lymphadenopathy. Bones/Soft Tissues: Atherosclerotic calcifications of the abdominal aorta and its branches, without aneurysm. Small amounts of subcutaneous emphysema within the deep subcutaneous fat of the ventral abdominal wall, a nonspecific finding. No discrete soft tissue mass or fluid collection identified. No lytic or blastic osseous lesions. Degenerative changes of the lumbar spine are noted. Laminectomy at L3-4. Grade 2 anterolisthesis of L4 on L5. Mild anterolisthesis of L5 on S1 is also noted. Please refer to the recent previous MRI of the lumbar spine for further information. No acute intra-abdominal or pelvic findings. Bilateral nonobstructing renal calculi. Colonic diverticulosis, without diverticulitis. Additional findings as above. XR CHEST (2 VW)    Result Date: 3/27/2022  EXAMINATION: TWO XRAY VIEWS OF THE CHEST 3/26/2022 12:34 pm COMPARISON: February 11, 2022 HISTORY: ORDERING SYSTEM PROVIDED HISTORY: Shortness of breath FINDINGS: Stable coarsened interstitial lung markings. No airspace opacity or pleural effusion. The heart appears normal in size. No pneumothorax. Stable chronic interstitial opacities bilaterally. No evidence of pneumonia or pleural effusion.      CT CHEST WO CONTRAST    Result Date: 4/13/2022  EXAMINATION: CT OF THE CHEST WITHOUT CONTRAST 4/13/2022 9:15 pm TECHNIQUE: CT of the chest was performed without the administration of intravenous contrast. Multiplanar reformatted images are provided for review. Dose modulation, iterative reconstruction, and/or weight based adjustment of the mA/kV was utilized to reduce the radiation dose to as low as reasonably achievable. COMPARISON: CT chest high-resolution 01/13/2022 HISTORY: ORDERING SYSTEM PROVIDED HISTORY: shortness of breath TECHNOLOGIST PROVIDED HISTORY: Reason for exam:->shortness of breath Decision Support Exception - unselect if not a suspected or confirmed emergency medical condition->Emergency Medical Condition (MA) FINDINGS: Mediastinum: The heart appears mildly enlarged. No pericardial effusion is seen. Atherosclerotic vascular calcification including severe coronary artery calcification is noted. No lymphadenopathy is noted. Lungs/pleura: Subpleural reticulation with smooth interlobular and interlobular septal thickening is identified with a bibasilar predominance, which has progressed compared to the prior exam.  There is suggestion of mild honeycombing at the bases. There is increased bronchovascular thickening with increased bibasilar traction bronchiectasis. There is some peripheral ground-glass density. Upper Abdomen: The visualized upper abdomen is unremarkable. Soft Tissues/Bones: No acute osseous abnormality is seen. Progression of moderate interstitial lung disease, which demonstrates a UIP pattern. MRI LUMBAR SPINE WO CONTRAST    Result Date: 4/13/2022  EXAMINATION: MRI OF THE LUMBAR SPINE WITHOUT CONTRAST, 4/13/2022 8:54 pm TECHNIQUE: Multiplanar multisequence MRI of the lumbar spine was performed without the administration of intravenous contrast. COMPARISON: August 23, 2016.  HISTORY: ORDERING SYSTEM PROVIDED HISTORY: pain, incontinence TECHNOLOGIST PROVIDED HISTORY: Reason for exam:->pain, incontinence Decision Support Exception - unselect if not a suspected or confirmed emergency medical condition->Emergency Medical Condition (MA) FINDINGS: BONES/ALIGNMENT: Degenerative endplate changes at U8-2, which is more prominent than on the previous exam.  No evidence of bone marrow edema is seen. No findings to suggest fracture. Persistent grade 2 anterolisthesis of L4 on L5 and grade 1 anterolisthesis of L5 on S1. Anterolisthesis of L4 on L5 by approximately 1.2-1.4 cm. Anterolisthesis of L5 on S1 by approximately 5-6 mm. Mild prominence of the epidural fat noted at L5-S1 and within the sacrococcygeal canal. SPINAL CORD: The conus medullaris terminates at L1-2, which is normal.  No evidence of a tethered cord. SOFT TISSUES: No paraspinal mass identified. T12-L1: No significant spinal or foraminal stenosis. L1-L2: Mild annular bulge, with facet and ligamentous hypertrophy. No significant spinal stenosis. Mild bilateral foraminal stenosis is noted, stable. L2-L3: Degenerative changes, with spurring. Facet hypertrophy seen. Mild, diffuse and or bulge and disc space narrowing. This results in moderate left foraminal stenosis and mild right foraminal stenosis. Bilateral lateral recess stenosis is also suspected. Minimal T2 high signal intensity within the L2-3 intervertebral disc, which may be suggestive of degenerative change. L3-L4: Diffuse annular bulge, with a small central protrusion. Degenerative facet and ligamentous hypertrophy is noted. There is evidence of mild bilateral foraminal stenosis, as well as moderate to severe bilateral lateral recess stenosis. L4-L5: As mentioned above, there is anterolisthesis of L4 on L5. This results in partial unroofing of the L4-5 intervertebral disc. Facet and ligamentous hypertrophy is also noted, resulting in moderate to severe left and mild-to-moderate right foraminal stenosis. When compared to the previous exam, the appearance is not significantly changed. L5-S1: Diffuse annular bulge, with a prominent right paracentral/lateral component. In addition, there is evidence of facet and ligamentous hypertrophy.   This results in severe right foraminal stenosis and mild-to-moderate left foraminal stenosis. Bilateral lateral recess stenosis is noted. Multilevel degenerative changes, most notably at L4-5 and L5-S1. Stable grade 2 anterolisthesis of L4 on L5 and grade 1 anterolisthesis of L5 on S1. Multilevel foraminal and lateral recess stenoses are not significantly changed since the previous exam. Development of endplate degenerative changes at L2-3. This is a new finding. FL UGI W KUB    Result Date: 4/16/2022  EXAMINATION: DOUBLE CONTRAST UPPER GI SERIES 4/16/2022 TECHNIQUE: Double contrast upper GI series was performed with barium and air contrast. FLUOROSCOPY DOSE AND TYPE OR TIME AND EXPOSURES: Fluoroscopy time equals 1 minute. Total dose equals 42.6 mGy COMPARISON: None HISTORY: ORDERING SYSTEM PROVIDED HISTORY: anemia, gi bleed, R/O ulcer TECHNOLOGIST PROVIDED HISTORY: Reason for exam:->anemia, gi bleed, R/O ulcer FINDINGS: Due to the patient's medical condition a limited upper GI series was performed. Barium transits the esophagus without obstruction or stricture. There is no stricture, web or gross mucosal irregularity. Tertiary contractions are noted. The gastroesophageal junction is normal.  There is no evidence for reflux seen. There is no hiatal hernia. Contrast opacifies the stomach. Patient was placed on his right side and contrast flowed freely from the stomach into the duodenum and proximal small bowel. There is no gross gastric mass or ulceration. There is no duodenal irregularity or stricture. 1. There is no esophageal mucosal irregularity, stricture or mass. 2. There is no gastric mass or obstruction no ulceration is noted 3. Tertiary contractions of the esophagus suggestive of esophageal dysmotility disorder.      US GALLBLADDER RUQ    Result Date: 4/14/2022  EXAMINATION: RIGHT UPPER QUADRANT ULTRASOUND 4/14/2022 3:41 pm COMPARISON: CT abdomen and pelvis without 04/14/2022 HISTORY: ORDERING SYSTEM PROVIDED HISTORY: Rule out cholecystitis TECHNOLOGIST PROVIDED HISTORY: Reason for exam:->Rule out cholecystitis What reading provider will be dictating this exam?->CRC FINDINGS: LIVER:  The liver demonstrates normal echogenicity without evidence of intrahepatic biliary ductal dilatation. BILIARY SYSTEM:  Gallbladder is unremarkable without evidence of pericholecystic fluid, wall thickening or stones. Negative sonographic Melchor's sign. Common bile duct is within normal limits measuring 3.6 mm. RIGHT KIDNEY: Multiple small right renal calculi are noted. No hydronephrosis is seen. PANCREAS:  Visualized portions of the pancreas are unremarkable. OTHER: Right pleural effusion is partially visualized. No evidence of cholelithiasis or acute cholecystitis. No biliary ductal dilatation. XR CHEST PORTABLE    Result Date: 4/17/2022  EXAMINATION: ONE XRAY VIEW OF THE CHEST 4/17/2022 6:57 am COMPARISON: 04/14/2022 HISTORY: ORDERING SYSTEM PROVIDED HISTORY: dyspnea TECHNOLOGIST PROVIDED HISTORY: Reason for exam:->dyspnea FINDINGS: Portable chest reveals cardiac silhouette to be within normal limits. Lung volumes are low with increased markings seen diffusely throughout the lung fields. The tip of the left deep line catheter is curled back upon itself in the SVC. Elevation seen of the right hemidiaphragm stable and unchanged. No new focal parenchymal opacification present. Increased markings seen diffusely throughout the lung fields stable and unchanged. Left-sided deep line catheter tip is curled back upon itself in the SVC. XR CHEST PORTABLE    Result Date: 4/14/2022  EXAMINATION: ONE XRAY VIEW OF THE CHEST 4/14/2022 7:56 am COMPARISON: 04/13/2022 HISTORY: ORDERING SYSTEM PROVIDED HISTORY: tlc placement TECHNOLOGIST PROVIDED HISTORY: Reason for exam:->tlc placement FINDINGS: Portable chest reveals left-sided deep line catheter tip in the SVC. Cardiac and mediastinal silhouettes are within normal limits. Increased pulmonary vascularity bilaterally. No pneumothorax. No definite pleural effusion. Deep line catheter placed on the left tip in the SVC. Increased pulmonary vascularity again seen bilaterally with no evidence of pneumothorax. XR CHEST PORTABLE    Result Date: 4/13/2022  EXAMINATION: ONE XRAY VIEW OF THE CHEST 4/13/2022 5:50 pm COMPARISON: 03/26/2022 HISTORY: ORDERING SYSTEM PROVIDED HISTORY: fatigue TECHNOLOGIST PROVIDED HISTORY: Reason for exam:->fatigue FINDINGS: Significantly decreased lung volumes are noted. There is suggestion of increased interstitial markings most pronounced in the periphery. No definite consolidation is seen. No significant pleural effusion. Heart is normal in size. Marked hypoventilation. Otherwise, no acute process seen. IR BONE MARROW BIOPSY AND ASPIRATION    Result Date: 4/15/2022  PROCEDURE: CT GUIDED BONE MARROW ASPIRATION AND CORE NEEDLE BONE BIOPSY OF THE RIGHT ILIAC BONE. MODERATE CONSCIOUS SEDATION 4/15/2022 HISTORY: ORDERING SYSTEM PROVIDED HISTORY: bone marrow aspiration and biopsy to r/o mDS. To be sent for IHC, flow cytometry, karyotype, MDS panel TECHNOLOGIST PROVIDED HISTORY: Reason for exam:->bone marrow aspiration and biopsy to r/o mDS. To be sent for IHC, flow cytometry, karyotype, MDS panel SEDATION: 0.5 mgversed and 25 mcg fentanyl were titrated intravenously for moderate sedation monitored under my direction. Total intraservice time of sedation was 25 minutes. The patient's vital signs were monitored throughout the procedure and recorded in the patient's medical record by the nurse. Sedation monitoring started at 0847 and ended at 0912. TECHNIQUE: Informed consent was obtained following a detailed explanation of the procedure including risks, benefits, and alternatives. Universal protocol was followed. Axial images were obtained through the iliac bones using CT guidance and a suitable skin site was prepped and draped in sterile fashion. Local anesthesia was achieved with lidocaine. An 11 gauge [facility] bone marrow biopsy needle was advanced into the right iliac bone and approximately 10 mL of bone marrow aspirate was obtained. A single core biopsy specimen was obtained and the patient tolerated the procedure well. Dose modulation, iterative reconstruction, and/or weight based adjustment of the mA/kV was utilized to reduce the radiation dose to as low as reasonably achievable. Status post CT guided bone marrow aspiration and core biopsy of the iliac bone. HOSPITAL COURSE:   Oneal Sanderson was transported back to [address] [place] [address] after undergoing cardiac evaluation which revealed diffuse atherosclerotic disease without burden for stenting. Cardiac medications were maximized. He was resumed on corticosteroid therapy as well as PPI therapy. Diet was advanced and he worked aggressively with the therapy teams. There is really consideration for myelodysplastic syndrome and bone marrow biopsy results are pending. He was evaluated by the pulmonary, cardiology, and hematology teams. He intermittently required nasal cannula oxygen at night in the setting of interstitial lung disease. His strength improved but it was determined he would benefit from temporary rehabilitation. Acute rehabilitation was denied but skilled nursing facility placement was approved. He requires ongoing work with the therapy teams as well as ongoing work with the nursing staff. He may require nasal cannula oxygen teaching if hypoxia persists throughout the day as he is intermittently requiring nasal cannula oxygen here in the hospital.  Otherwise, he was very appreciative of the care he received and is acceptable for discharge to the nursing home facility today.      BRIEF PHYSICAL EXAMINATION AND LABORATORIES ON DAY OF DISCHARGE:  VITALS:  BP (!) 94/58   Pulse 96   Temp 96.4 °F (35.8 °C) (Axillary)   Resp 21   Ht 5' 6\" (1.676 m)   Wt 160 lb (72.6 kg) SpO2 94%   BMI 25.82 kg/m²     General appearance:  Alert, responsive, oriented to person, place, and time. Well preserved, alert, no distress. Head:  Normocephalic. No masses, lesions or tenderness. Eyes:  PERRLA. EOMI. Sclera clear. Buccal mucosa moist.  ENT:  Ears normal. Mucosa normal.  Neck:    Supple. Trachea midline. No thyromegaly. No JVD. No bruits. Heart:    Rhythm regular. Rate controlled. No murmurs. Lungs:    Symmetrical. Clear to auscultation bilaterally. No wheezes. No rhonchi. No rales. Abdomen:   Soft. Non-tender. Non-distended. Bowel sounds positive. No organomegaly or masses. No pain on palpation. Extremities:    Peripheral pulses present. No peripheral edema. No ulcers. No cyanosis. No clubbing. Neurologic:    Alert x 3. No focal deficit. Cranial nerves grossly intact. No focal weakness. Psych:   Behavior is normal. Mood appears normal. Speech is not rapid and/or pressured. Musculoskeletal:   Spine ROM normal. Muscular strength intact. Gait not assessed. Integumentary:  No rashes  Skin normal color and texture. Genitalia/Breast:  Deferred      DISPOSITION:  The patient's condition is good. At this time the patient is without objective evidence of an acute process requiring continuing hospitalization or inpatient management. They are stable for discharge with outpatient follow-up. I have spoken with the patient and discussed the results of the current hospitalization, in addition to providing specific details for the plan of care and counseling regarding the diagnosis and prognosis. The plan has been discussed in detail and they are aware of the specific conditions for emergent return, as well as the importance of follow-up.   Their questions are answered at this time and they are agreeable with the plan for discharge to nursing home    DISCHARGE MEDICATIONS:   Current Discharge Medication List           Details   aspirin 81 MG chewable tablet Take 1 tablet by mouth daily  Qty: 30 tablet, Refills: 3      atorvastatin (LIPITOR) 40 MG tablet Take 1 tablet by mouth nightly  Qty: 30 tablet, Refills: 3      carvedilol (COREG) 6.25 MG tablet Take 1 tablet by mouth 2 times daily (with meals)  Qty: 60 tablet, Refills: 3      isosorbide mononitrate (IMDUR) 30 MG extended release tablet Take 1 tablet by mouth daily  Qty: 30 tablet, Refills: 3      pantoprazole (PROTONIX) 40 MG tablet Take 1 tablet by mouth every morning (before breakfast)  Qty: 30 tablet, Refills: 3      ranolazine (RANEXA) 500 MG extended release tablet Take 1 tablet by mouth 2 times daily  Qty: 60 tablet, Refills: 3      insulin glargine (LANTUS SOLOSTAR) 100 UNIT/ML injection pen Inject 10 Units into the skin nightly  Qty: 5 pen, Refills: 3      insulin lispro, 1 Unit Dial, (HUMALOG KWIKPEN) 100 UNIT/ML SOPN Glucose: Dose:       No Insulin   140-199   3 Units   200-249   6 Units   250-299   9 Units   300-349  12 Units   350-400  15 Units   Over 400  18 Units  Qty: 5 pen, Refills: 0      Insulin Pen Needle 29G X 12MM MISC 1 each by Does not apply route 4 times daily (before meals and nightly)  Qty: 100 each, Refills: 3              Details   levothyroxine (SYNTHROID) 75 MCG tablet Take 1 tablet by mouth Daily  Qty: 30 tablet, Refills: 3      finasteride (PROSCAR) 5 MG tablet Take 1 tablet by mouth once a week  Qty: 30 tablet, Refills: 3      !! predniSONE (DELTASONE) 5 MG tablet Take 3 tablets by mouth every evening  Qty: 90 tablet, Refills: 0      !! predniSONE (DELTASONE) 5 MG tablet Take 1 tablet by mouth every morning  Qty: 30 tablet, Refills: 0       !! - Potential duplicate medications found. Please discuss with provider. Details   ONETOUCH ULTRA strip 1 each by Other route 4 times daily As needed.   Qty: 360 strip, Refills: 3    Associated Diagnoses: Type 2 diabetes mellitus without complication, without long-term current use of insulin (HCC)      Lancets (ONETOUCH DELICA PLUS [id number]) MISC 1 strip by Other route 4 times daily  Qty: 360 each, Refills: 3    Associated Diagnoses: Type 2 diabetes mellitus without complication, without long-term current use of insulin (HCC)      therapeutic multivitamin-minerals (THERAGRAN-M) tablet Take 1 tablet by mouth daily. FOLLOW UP/INSTRUCTIONS:  · This patient is instructed to follow-up with his primary care physician. · Patient is instructed to follow-up with the consults listed above as directed by them. · he is instructed to resume home medications and take new medications as indicated in the list above. · If the patient has a recurrence of symptoms, he is instructed to go to the ED. Preparing for this patient's discharge, including paperwork, orders, instructions, and meeting with patient did require > 40 minutes.     Scarlet Horn DO     4/22/2022  4:23 PM

## 2022-04-22 NOTE — PROGRESS NOTES
Internal Medicine Progress Note    [facility]    Evon Cardenas. Shonna Arcos., F.A.C.OAngelinaI. Camila Ohara D.O., JAYNEONAV Morris D.O. Johnson Coulter, MSN, APRN, NP-C  Racquel Evans, MSN, APRN-CNP     Primary Care Physician: Ricardo Morris DO   Admitting Physician:  Lisandro Kenyon DO  Admission date and time: 4/13/2022  6:50 PM    Room:  [place] [address]  Admitting diagnosis: Shortness of breath [R06.02]  NSTEMI (non-ST elevated myocardial infarction) ([address] [I21.4]  BURAK (acute kidney injury) ([address] [N17.9]  Febrile illness [R50.9]  Non-traumatic rhabdomyolysis [M62.82]  Chronic bilateral low back pain without sciatica [M54.50, G89.29]    Patient Name: Endy Bocanegra  MRN: [id number]    Date of Service: 4/22/2022     Subjective:  Bob Negron is a 80 y.o. male who was seen and examined today,4/22/2022, at the bedside. Bob Negron is seated comfortably at the bedside chair. He continues to improve on a daily basis. Unfortunately, he was denied precertification for transfer to the skilled nursing facility. I am in the process of performing [id number] and have not yet heard back from the reviewing physician. Review of System:   Constitutional:   Improving malaise and fatigue. HEENT:   Denies ear pain, sore throat, sinus or eye problems. Cardiovascular:   Denies any chest pain, irregular heartbeats, or palpitations. Respiratory:   Denies shortness of breath, coughing, sputum production, hemoptysis, or wheezing. Gastrointestinal:   Increasing appetite and therefore increased oral intake. No overt abdominal pain. Genitourinary:    Denies any urgency, frequency, hematuria. Voiding without difficulty. Extremities:   Denies lower extremity swelling, edema or cyanosis. Neurology:    Denies any headache or focal neurological deficits, Denies generalized weakness or memory difficulty. Psch:   Denies being anxious or depressed.   Musculoskeletal:    Denies myalgias, joint complaints or back pain. Integumentary:   Denies any rashes, ulcers, or excoriations. Denies bruising. Hematologic/Lymphatic:  Denies bruising or bleeding. Physical Exam:  I/O this shift:  In: 600 [P.O.:600]  Out: -     Intake/Output Summary (Last 24 hours) at 4/22/2022 1428  Last data filed at 4/22/2022 1239  Gross per 24 hour   Intake 660 ml   Output    Net 660 ml   I/O last 3 completed shifts: In: 61 [P.O.:60]  Out: 1500 [Urine:1500]  Patient Vitals for the past 96 hrs (Last 3 readings):   Weight   04/21/22 0605 160 lb (72.6 kg)     Vital Signs:   Blood pressure (!) 94/58, pulse 86, temperature 96.4 °F (35.8 °C), temperature source Axillary, resp. rate 16, height 5' 6\" (1.676 m), weight 160 lb (72.6 kg), SpO2 97 %. General appearance:  Alert, responsive, oriented to person, place, and time. Well preserved, alert, no distress. Head:  Normocephalic. No masses, lesions or tenderness. Eyes:  PERRLA. EOMI. Sclera clear. Buccal mucosa moist.  ENT:  Ears normal. Mucosa normal.  Neck:    Supple. Trachea midline. No thyromegaly. No JVD. No bruits. Heart:    Rhythm regular. Rate controlled. No murmurs. Lungs:    Symmetrical. Clear to auscultation bilaterally. No wheezes. No rhonchi. No rales. Abdomen:   Soft. Non-tender. Non-distended. Bowel sounds positive. No organomegaly or masses. No pain on palpation. Extremities:    Peripheral pulses present. No peripheral edema. No ulcers. No cyanosis. No clubbing. Neurologic:    Alert x 3. No focal deficit. Cranial nerves grossly intact. No focal weakness. Psych:   Behavior is normal. Mood appears normal. Speech is not rapid and/or pressured. Musculoskeletal:   Spine ROM normal. Muscular strength intact. Gait not assessed. Integumentary:  No rashes  Skin normal color and texture.   Genitalia/Breast:  Deferred    Medication:  Scheduled Meds:   insulin glargine  20 Units SubCUTAneous Nightly    atorvastatin  40 mg Oral Nightly    predniSONE  15 mg Oral Q24H    predniSONE  5 mg Oral Daily    aspirin  81 mg Oral Daily    isosorbide mononitrate  30 mg Oral Daily    ranolazine  500 mg Oral BID    sodium chloride flush  5-40 mL IntraVENous 2 times per day    carvedilol  6.25 mg Oral BID WC    levothyroxine  75 mcg Oral Daily    melatonin  10 mg Oral Nightly    pantoprazole  40 mg Oral QAM AC    insulin lispro  0-18 Units SubCUTAneous TID WC    insulin lispro  0-9 Units SubCUTAneous Nightly     Continuous Infusions:   sodium chloride      dextrose         Objective Data:  CBC with Differential:    Lab Results   Component Value Date    WBC 2.4 04/22/2022    RBC 2.30 04/22/2022    HGB 8.4 04/22/2022    HCT 25.5 04/22/2022     04/22/2022    .9 04/22/2022    MCH 36.5 04/22/2022    MCHC 32.9 04/22/2022    RDW 20.6 04/22/2022    NRBC 2.0 04/22/2022    SEGSPCT 48 07/06/2013    METASPCT 0.9 04/19/2022    LYMPHOPCT 47.0 04/22/2022    MONOPCT 15.0 04/22/2022    MYELOPCT 1.0 04/22/2022    BASOPCT 0.0 04/22/2022    MONOSABS 0.36 04/22/2022    LYMPHSABS 1.13 04/22/2022    EOSABS 0.00 04/22/2022    BASOSABS 0.00 04/22/2022     CMP:    Lab Results   Component Value Date     04/22/2022    K 4.9 04/22/2022    K 4.4 04/13/2022    CL 98 04/22/2022    CO2 28 04/22/2022    BUN 26 04/22/2022    CREATININE 1.3 04/22/2022    GFRAA >60 04/22/2022    LABGLOM 52 04/22/2022    GLUCOSE 197 04/22/2022    GLUCOSE 111 03/29/2012    PROT 5.9 04/22/2022    LABALBU 2.8 04/22/2022    LABALBU 4.4 03/29/2012    CALCIUM 8.4 04/22/2022    BILITOT 0.4 04/22/2022    ALKPHOS 56 04/22/2022    AST 15 04/22/2022    ALT 34 04/22/2022       Assessment:  1. Non-ST elevation myocardial infarction with cardiac catheterization on 04/19/2022 showing diffuse coronary artery disease predominantly in the right coronary artery system with recommendation for medical therapy. 2. Abnormal CAT scan of the lung showing usual interstitial pneumonia.   3. Macrocytic anemia with status post transfusion x2 status post bone marrow biopsy  4. Acute on chronic renal insufficiency, resolved. 5. Insulin-dependent diabetes mellitus type 2 aggravated by current steroids  6. Suspected recent shock secondary to adrenal insufficiency with blood cultures being negative and procalcitonin improving  7. Essential hypertension. 8. Hyperlipidemia, on Lipitor  9. Hypothyroidism, on Synthroid replacement. 10. Chronic low back pain with status post decompression for lumbar stenosis. 11. Elevated transaminases, resolving. Plan:   Following insurance denial for temporary skilled nursing facility placement, I have reached out to the insurance company to perform [id number]. I am awaiting a return call from the reviewing physician. The patient lives at home and functions independently and has become profoundly weak and deconditioned in the setting of multiple medical issues including recent non-ST elevated myocardial infarction complicated by worsening pancytopenia with suspicion for early myelodysplastic syndrome. In the setting of multiple medication adjustments particularly in the setting of adrenal insufficiency, the patient would certainly benefit from increased physical therapy as well as the added benefit of nursing and medication administration. We will hold on transfer to the nursing home facility until [id number] can be performed as the patient definitely requires skilled nursing facility placement as recommended by all specialists providing care including cardiology, pulmonology, and hematology. More than 50% of my  time was spent at the bedside counseling/coordinating care with the patient and/or family with face to face contact. This time was spent reviewing notes and laboratory data as well as instructing and counseling the patient.  Time I spent with the family or surrogate(s) is included only if the patient was incapable of providing the necessary information or participating in medical decisions. I also discussed the differential diagnosis and all of the proposed management plans with the patient and individuals accompanying the patient. Josephine Begum requires this high level of physician care and nursing on the Telemetry unit due the complexity of decision management and chance of rapid decline or death. Continued cardiac monitoring and higher level of nursing are required. I am readily available for any further decision-making and intervention.      Tawana Ibrahim DO, EDU.C.O.I.  4/22/2022  2:28 PM

## 2022-04-22 NOTE — PROGRESS NOTES
[facility]        CARDIOLOGY                 INPATIENT PROGRESS NOTE          PATIENT SEEN IN FOLLOW UP FOR: NSTEMI    Hospital Day: 10     John Mitchell is a 80year old patient previously not known to [facility]. SUBJECTIVE: Being discharged to a rehab facility today. Denies any chest pain or shortness of breath. Follow-up with cardiology in the office      Intake/Output Summary (Last 24 hours) at 4/22/2022 1825  Last data filed at 4/22/2022 1239  Gross per 24 hour   Intake 660 ml   Output    Net 660 ml       Labs:   CBC:   Recent Labs     04/21/22  0501 04/22/22  0520   WBC 2.3* 2.4*   HGB 8.1* 8.4*   HCT 25.0* 25.5*   PLT 93* 115*     BMP:   Recent Labs     04/21/22  0501 04/22/22  0520    135   K 4.5 4.9   CO2 25 28   BUN 26* 26*   CREATININE 1.1 1.3*   LABGLOM >60 52   CALCIUM 8.1* 8.4*     Mag:   No results for input(s): MG in the last 72 hours. Phos:   No results for input(s): PHOS in the last 72 hours. TSH:   No results for input(s): TSH in the last 72 hours. HgA1c:     BNP: No results for input(s): BNP in the last 72 hours. PT/INR:   No results for input(s): PROTIME, INR in the last 72 hours. APTT:  No results for input(s): APTT in the last 72 hours. CARDIAC ENZYMES:  No results for input(s): CKTOTAL, CKMB, CKMBINDEX, TROPONINI in the last 72 hours.   FASTING LIPID PANEL:  Lab Results   Component Value Date    CHOL 70 04/14/2022    HDL 25 04/14/2022    LDLCALC 31 04/14/2022    TRIG 70 04/14/2022     LIVER PROFILE:  Recent Labs     04/21/22  0501 04/22/22  0520   AST 14 15   ALT 40 34   LABALBU 2.8* 2.8*       Current Inpatient Medications:   insulin glargine  20 Units SubCUTAneous Nightly    atorvastatin  40 mg Oral Nightly    predniSONE  15 mg Oral Q24H    predniSONE  5 mg Oral Daily    aspirin  81 mg Oral Daily    isosorbide mononitrate  30 mg Oral Daily    ranolazine  500 mg Oral BID    sodium chloride flush  5-40 mL IntraVENous 2 times per day    carvedilol  6.25 mg Oral BID WC    levothyroxine  75 mcg Oral Daily    melatonin  10 mg Oral Nightly    pantoprazole  40 mg Oral QAM AC    insulin lispro  0-18 Units SubCUTAneous TID WC    insulin lispro  0-9 Units SubCUTAneous Nightly       IV Infusions (if any):   sodium chloride      dextrose           PHYSICAL EXAM:     CONSTITUTIONAL:   /69   Pulse 102   Temp 97 °F (36.1 °C) (Axillary)   Resp 21   Ht 5' 6\" (1.676 m)   Wt 160 lb (72.6 kg)   SpO2 95%   BMI 25.82 kg/m²   Pulse  Av.1  Min: 76  Max: 169  Systolic (52WDD), LBV:918 , Min:94 , IUD:279    Diastolic (66OAY), ALMA:67, Min:58, Max:75        In general, this is a well developed, well nourished who appears stated age, awake, alert, no apparent distress  HEENT: eyes -conjunctivae pink,  Throat - Oral mucosa moist.   Neck-  no stridor,no carotid bruit. no jugular venous distention   RESPIRATORY: Chest symmetrical.  No accessory muscle use. Lung auscultation - clear to auscultation except few rhonchi  CARDIOVASCULAR:     Heart Palpation - no palpable thrills  Heart Ausculation - Regular rate and rhythm, 2/6 systolic murmur, No s3 or rub. No lower extremity edema, Distal pulses fair; no cyanosis. Abrasion to right lower leg. ABDOMEN: Soft, nontender,  Bowel sounds present. MS: n/a  : Deferred  Rectal Exam: Deferred  SKIN: warm and dry s   NEURO / PSYCH: oriented to person, place     Left heart catheterization 2022  ANGIOGRAPHIC FINDINGS:  Moderate calcifications involving the coronary  tree.     The left main coronary artery arises normally from the left sinus of  Valsalva. It is a mid-size vessel with mild disease. It bifurcates  into left anterior descending artery and left circumflex artery.     The left anterior descending artery extends down to the apex. It gives  off two diagonal branches. The first diagonal branch is small in  diameter, long that has subtotal occlusion in the proximal segment. The  LAD itself has mild-to-moderate disease.     The left circumflex artery is a large vessel, gives off high OM branch  that has 70% stenosis in the mid segment. However, the vessel is a  1.5-mm vessel. The left circumflex artery has 40% calcified lesion in  the proximal segment. Then, it gives off a large OM branch. The whole  proximal and mid segment of that OM branch is mildly to moderately  diseased, at worst point estimated at 50% stenosis. There were faint  left-to-right collaterals noted.     The right coronary artery has mendoza's crook takeoff. The right  coronary artery is severely diseased in the mid segment. There is  heavily calcified lesion involving the mid segment. There is another  sequential lesion estimated at 80% at the junction of the mid and distal  segment. The vessel itself is a 2.5-mm vessel. There is distal  competitive flow noted in the right PDA.     IMPRESSION:  Multivessel disease involving small- to mid-size first  diagonal branch, small first OM branch, second OM branch and RCA.     RECOMMENDATIONS:  We will optimize medical therapy. If the patient  continues to be symptomatic despite optimal medical therapy, we will  bring him back for possible PCI.                                         ASSESSMENT/PLAN:      Coronary artery disease   Left heart catheterization as shown above   Continue aspirin, statin and beta-blocker as well as Imdur and Ranexa  2D Echo showed inferior wall motion abnormality with LV EF 55%. Follow-up with cardiology in the outpatient     Hypotension -  Stable       PAF - Diagnosed in Jan 2022 - Was on Eliquis;  Off Eliquis since Holter in Feb 2022 showed no A Fib and due to his Anemia.     Acute on chronic Anemia -  s/p PRBC Transfusion; had Bone marrow bx - Hem/Onc following, GI on case, awaiting GI evaluation to be complete and if no risk of bleeding proceed with invasive coronary angiogram with possible PCI tomorrow     Possible Rhabdomyolysis -      Possible GI bleed - GI on case     BURAK/CKD -     Elevated LFTs - GI on case     Type 2 diabetes -Per primary service     Left diabetic foot wound     Interstitial lung disease - On O2      Hypothyroidism - On HRT                   Electronically signed by Bucky Cortes MD on 4/22/2022 at 6:25 PM  [facility]) Cardiology

## 2022-04-22 NOTE — PROGRESS NOTES
Hollis and Gaby Ellison M.D. Patient Name: Mason Ortega  YOB: 1933  PCP: Coby Perez DO   Referring Provider:      Reason for Consultation:   Chief Complaint   Patient presents with    Extremity Weakness     WEAKNESS, INCONTINENCE STARTING TODAY        Subjective:  Patient continues to do well, for possible PCI today with cardiology for MVD    History of Present Illness:  80years old male with history of pulmonary fibrosis, diabetes mellitus, coronary artery disease came to the ER complaining of fatigue and shortness of breath for the past few days. Admitted to ICU with septic shock, NSTEMI, rhabdomyolysis. On antibiotics. Levophed has been tapered off. I was consulted for possible hemolysis. Hemoglobin was normal up until last year. Over the past few months his hemoglobin has dropped to his current level of 7.6. MCV has increased in the same timeframe and is 122 right now. Bilirubin is 0.9. , absolute reticulocyte count was 35,000. Total white count 6.4 with normal differential and platelet count of 609,565. B12 folate were normal.  Peripheral smear evaluation from yesterday showed dysplastic neutrophils and less than 1% blasts. CT chest showed worsening of his interstitial lung disease pattern. CT abdomen did not show any acute findings. Patient reports feeling somewhat better. Has been afebrile. Is awake alert oriented. Denies any blood in stools or black tarry stools. Review of systems: Over 10 systems were reviewed and all were negative except as mentioned above.     Diagnostic Data:     Past Medical History:   Diagnosis Date    Diabetes mellitus ([address]     H/O cardiovascular stress test 12/01/2021    Lexiscan    History of blood transfusion     History of Holter monitoring 02/11/2022    Hyperlipidemia     Hypertension     Lower back pain     Thyroid disease        Patient Active Problem List Diagnosis Date Noted    NSTEMI (non-ST elevated myocardial infarction) [address] [address] 04/13/2022    Essential hypertension 08/17/2021    Acquired hypothyroidism 08/17/2021    Dyslipidemia 08/17/2021    Type 2 diabetes mellitus without complication, without long-term current use of insulin ([facility] [address] 08/17/2021    Normocytic anemia 08/17/2021    Incomplete tear of right rotator cuff 01/30/2020    Long biceps tear 01/30/2020    Injury of tendon of long head of right biceps 01/30/2020    Tear of right glenoid labrum 01/30/2020    Bursitis of right shoulder 01/30/2020        Past Surgical History:   Procedure Laterality Date    BACK SURGERY  2000    fusion  lumbar area,      CARDIAC CATHETERIZATION  04/19/2022    JOINT REPLACEMENT Bilateral [id number]    knee     LITHOTRIPSY      SHOULDER ARTHROSCOPY Right 01/30/2020    with treatment    SHOULDER ARTHROSCOPY Right 1/30/2020    ARTHROSCOPIC EXAM AND TREATMENT RIGHT SHOULDER (CPT 70222,25207) RIGHT ROTATOR CUFF REPAIR, SUBACHROMIAL DECOMPRESSION, DEBRIDEMENT OF HUMERAL performed by Tk Estrada DO at [facility] ENDOSCOPY N/A 4/18/2022    EGD ESOPHAGOGASTRODUODENOSCOPY performed by Deonte Hodge DO at [address] [place] [address] History  Family History   Problem Relation Age of Onset    No Known Problems Mother     Diabetes Father        Social History  Social History     Socioeconomic History    Marital status:       Spouse name: Not on file    Number of children: Not on file    Years of education: Not on file    Highest education level: Not on file   Occupational History    Not on file   Tobacco Use    Smoking status: Never Smoker    Smokeless tobacco: Never Used   Vaping Use    Vaping Use: Never used   Substance and Sexual Activity    Alcohol use: No    Drug use: No    Sexual activity: Not on file   Other Topics Concern    Not on file   Social History Narrative    Not on file     Social Determinants of Health     Financial Resource Strain: Low Risk     Difficulty of Paying Living Expenses: Not hard at all   Food Insecurity: No Food Insecurity    Worried About Running Out of Food in the Last Year: Never true    Jessica of Food in the Last Year: Never true   Transportation Needs:     Lack of Transportation (Medical): Not on file    Lack of Transportation (Non-Medical): Not on file   Physical Activity:     Days of Exercise per Week: Not on file    Minutes of Exercise per Session: Not on file   Stress:     Feeling of Stress : Not on file   Social Connections:     Frequency of Communication with Friends and Family: Not on file    Frequency of Social Gatherings with Friends and Family: Not on file    Attends Hindu Services: Not on file    Active Member of [address] [place] [address] [employer] or Organizations: Not on file    Attends Club or Organization Meetings: Not on file    Marital Status: Not on file   Intimate Partner Violence:     Fear of Current or Ex-Partner: Not on file    Emotionally Abused: Not on file    Physically Abused: Not on file    Sexually Abused: Not on file   Housing Stability:     Unable to Pay for Housing in the Last Year: Not on file    Number of [place] in the Last Year: Not on file    Unstable Housing in the Last Year: Not on file        TOBACCO:   reports that he has never smoked. He has never used smokeless tobacco.  ETOH:   reports no history of alcohol use. Home Medications  Prior to Admission medications    Medication Sig Start Date End Date Taking?  Authorizing Provider   carvedilol (COREG) 6.25 MG tablet Take 1 tablet by mouth 2 times daily (with meals) 4/19/22  Yes Mimi Gillette DO   pantoprazole (PROTONIX) 40 MG tablet Take 1 tablet by mouth every morning (before breakfast) 4/19/22  Yes Mimi Gillette DO   predniSONE (DELTASONE) 5 MG tablet 20 mg PO in the am  5 mg PO in the evening 4/19/22  Yes Mimi Gillette DO   insulin glargine (LANTUS SOLOSTAR) 100 UNIT/ML injection pen Inject 10 Units into the skin nightly 4/19/22  Yes Herbie Masterson DO   insulin lispro, 1 Unit Dial, (HUMALOG KWIKPEN) 100 UNIT/ML SOPN Glucose: Dose:       No Insulin   140-199   3 Units   200-249   6 Units   250-299   9 Units   300-349  12 Units   350-400  15 Units   Over 400  18 Units 4/19/22  Yes Herbie Masterson DO   Insulin Pen Needle 29G X 12MM MISC 1 each by Does not apply route 4 times daily (before meals and nightly) 4/19/22  Yes Herbie Masterson DO   isosorbide mononitrate (IMDUR) 30 MG extended release tablet Take 1 tablet by mouth daily 4/19/22   Kyle Kaye MD   ranolazine (RANEXA) 500 MG extended release tablet Take 1 tablet by mouth 2 times daily 4/19/22   Kyle Kaye MD   atorvastatin (LIPITOR) 20 MG tablet Take 1 tablet by mouth daily 4/19/22   Kyle Kaye MD   aspirin (ECOTRIN LOW STRENGTH) 81 MG EC tablet Take 1 tablet by mouth daily 4/19/22   Kyle Kaye MD   [facility] ULTRA strip 1 each by Other route 4 times daily As needed. 2/13/22 5/14/22  Ibrahima Mcguire DO   Lancets (ONETOUCH DELICA PLUS [id number]) MISC 1 strip by Other route 4 times daily 2/13/22 5/14/22  Ibrahima Mcguire DO   levothyroxine (SYNTHROID) 75 MCG tablet Take 1 tablet by mouth Daily 2/7/22   Orlin Goode DO   finasteride (PROSCAR) 5 MG tablet Take 5 mg by mouth once a week. Historical Provider, MD   therapeutic multivitamin-minerals (THERAGRAN-M) tablet Take 1 tablet by mouth daily. Historical Provider, MD       Allergies  Allergies   Allergen Reactions    No Known Allergies            Objective  /65   Pulse 79   Temp 96.4 °F (35.8 °C) (Axillary)   Resp 30   Ht 5' 6\" (1.676 m)   Wt 160 lb (72.6 kg)   SpO2 96%   BMI 25.82 kg/m²     Physical Exam:     General: AAO to person, place, time, in no acute distress, pleasant. Head and neck : PERRLA, EOMI . Sclera non icteric. Oropharynx : Clear  Neck: no JVD,  no adenopathy, no carotid bruit  LYMPHATICS : No peripheral lymphadenopathy.   Heart: Regular rate and regular rhythm, no murmur  Lungs: Clear to auscultation   Extremities: No edema,no cyanosis, no clubbing. Abdomen: Soft, non-tender;no masses, no organomegaly  Skin:  No rash. Neurologic:Cranial nerves grossly intact. No focal motor or sensory deficits . Recent Laboratory Data-   Lab Results   Component Value Date    WBC 2.4 (L) 04/22/2022    HGB 8.4 (L) 04/22/2022    HCT 25.5 (L) 04/22/2022    .9 (H) 04/22/2022     (L) 04/22/2022    LYMPHOPCT 47.0 (H) 04/22/2022    RBC 2.30 (L) 04/22/2022    MCH 36.5 (H) 04/22/2022    MCHC 32.9 04/22/2022    RDW 20.6 (H) 04/22/2022    NEUTOPHILPCT 37.0 (L) 04/22/2022    MONOPCT 15.0 (H) 04/22/2022    BASOPCT 0.0 04/22/2022    NEUTROABS 0.91 (L) 04/22/2022    LYMPHSABS 1.13 (L) 04/22/2022    MONOSABS 0.36 04/22/2022    EOSABS 0.00 (L) 04/22/2022    BASOSABS 0.00 04/22/2022       Lab Results   Component Value Date     04/22/2022    K 4.9 04/22/2022    CL 98 04/22/2022    CO2 28 04/22/2022    BUN 26 (H) 04/22/2022    CREATININE 1.3 (H) 04/22/2022    GLUCOSE 197 (H) 04/22/2022    CALCIUM 8.4 (L) 04/22/2022    PROT 5.9 (L) 04/22/2022    LABALBU 2.8 (L) 04/22/2022    BILITOT 0.4 04/22/2022    ALKPHOS 56 04/22/2022    AST 15 04/22/2022    ALT 34 04/22/2022    LABGLOM 52 04/22/2022    GFRAA >60 04/22/2022       Lab Results   Component Value Date    IRON 122 04/14/2022    TIBC 140 (L) 04/14/2022    FERRITIN 3,037 04/14/2022           Radiology-    XR CHEST PORTABLE   Final Result   Increased markings seen diffusely throughout the lung fields stable and   unchanged. Left-sided deep line catheter tip is curled back upon itself in   the SVC. FL UGI W KUB   Final Result   1. There is no esophageal mucosal irregularity, stricture or mass. 2. There is no gastric mass or obstruction no ulceration is noted   3. Tertiary contractions of the esophagus suggestive of esophageal   dysmotility disorder.          IR BONE MARROW BIOPSY AND ASPIRATION Final Result   Status post CT guided bone marrow aspiration and core biopsy of the iliac   bone. US GALLBLADDER RUQ   Final Result   No evidence of cholelithiasis or acute cholecystitis. No biliary ductal   dilatation. XR CHEST PORTABLE   Final Result   Deep line catheter placed on the left tip in the SVC. Increased pulmonary   vascularity again seen bilaterally with no evidence of pneumothorax. CT ABDOMEN PELVIS WO CONTRAST Additional Contrast? None   Final Result   No acute intra-abdominal or pelvic findings. Bilateral nonobstructing renal calculi. Colonic diverticulosis, without diverticulitis. Additional findings as above. CT CHEST WO CONTRAST   Final Result   Progression of moderate interstitial lung disease, which demonstrates a UIP   pattern. MRI LUMBAR SPINE WO CONTRAST   Final Result   Multilevel degenerative changes, most notably at L4-5 and L5-S1. Stable   grade 2 anterolisthesis of L4 on L5 and grade 1 anterolisthesis of L5 on S1.   Multilevel foraminal and lateral recess stenoses are not significantly   changed since the previous exam.      Development of endplate degenerative changes at L2-3. This is a new finding. XR CHEST PORTABLE   Final Result   Marked hypoventilation. Otherwise, no acute process seen. ASSESSMENT/PLAN :  80years old male with history of pulmonary fibrosis, diabetes mellitus, coronary artery disease came to the ER with worsening fatigue, admitted to ICU with septic shock, NSTEMI, rhabdomyolysis. I have been consulted for possible hemolysis. Patient had normal hemoglobin and MCV up until last year. Over the past few months his hemoglobin has dropped along with an increase in MCV. Current hemoglobin is 7 with an MCV of 122. Dysplastic neutrophils and less than 1% blasts were noted on peripheral smear evaluation. Finding suggestive of MDS.   Discussed with the patient and family including  Maynor. We will get a bone marrow biopsy. Normal bilirubin and normal absolute reticulocyte count of 35,000 suggests absence of hemolysis. Recent B12 folate were normal.  Being treated for septic shock. Levophed is being tapered off. On antibiotics. Has been afebrile. Elevated cardiac markers: Cardiology has been consulted for an NSTEMI. GI consulted for possible GI bleed. Denies any blood in stool or black tarry stools. Iron panel ordered. We will continue to follow. 4/15/22  - Hgb lower today at 7.7, . 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  supports this as well  - No evidence of hemolysis. Hapto elevated and bili normal. LDH mildly elevated, likely related to septic shock  - BMBx to evaluate for MDS today  - Trend CBC, transfuse for Hgb <7  - Off levophed  - Renal function improved, Cr 1.5. Lactate down to 2.1  - Will follow    4/18/22  Feeling significantly better. Status post EGD with findings of mild gastritis without any fresh blood. No AVMs. Small diverticulum was described in the second portion of the duodenum without bleeding. He is on Protonix per GI.  CT chest with subpleural fibrosis  CBC shows a WBC count of 2.1 with hemoglobin of 8.3 with elevated MCV and low platelets of 73. Differential shows neutropenia and occasional metamyelocytes. HIT antibodies pending  Continues on antibiotics with ceftriaxone  Awaiting results of bone marrow aspirate and biopsy. Will follow    4/19/22  - CBC with stable Hgb at 8.1, platelets 74, WBC 2.3, ANC 0.87.   - APF4 pending  - BMBx pending  - Remains on rocephin, BCx negative  - Will follow    4/21/2022  -S/p bone marrow biopsypending results. Flow cytometry showed abnormal immunophenotype granulocytes with decreased expression of CD10 which is nonspecific. CBC is stable with hemoglobin of 8.1 platelets 93 and total white count of 2.3.   We will continue to monitor and follow bone marrow biopsy. HIT antibodies pending.  - S/p cardiac catheterization showing multivessel disease. Possible PCI tomorrow.     4/22/22  - BMBx results pending  - CBC stable to slightly improved  - Cardiology planning possible PCI today    Electronically signed by Edmond Turner MD on 4/22/2022 at 9:41 AM

## 2022-05-11 PROBLEM — N18.30 CHRONIC RENAL DISEASE, STAGE III (HCC): Status: ACTIVE | Noted: 2022-01-01

## 2022-05-19 PROBLEM — D69.6 THROMBOCYTOPENIA, UNSPECIFIED (HCC): Status: ACTIVE | Noted: 2022-01-01

## 2022-05-19 PROBLEM — D46.9 MDS (MYELODYSPLASTIC SYNDROME) (HCC): Status: ACTIVE | Noted: 2022-01-01

## 2022-05-19 NOTE — PROGRESS NOTES
[address]  [id number]   Hematology/Oncology  Consult      Patient Name: Holly Martinez  YOB: 1933  PCP: Cathi Marin DO   Referring Provider:      Reason for Consultation:   Chief Complaint   Patient presents with    New Patient    Anemia        History of Present Illness:  80years old male with history of pulmonary fibrosis, diabetes mellitus, coronary artery disease admitted with NSTEMI in 4/2022 s/p diagnostic cardiac cath showing diffuse disease in the right coronary system with recommendation for medical therapy. We were consulted for possible hemolysis. Hemoglobin was normal up until last year. Over the past few months his hemoglobin dropped to his current level of 7-8. MCV has increased in the same timeframe and is 122 right now. Bilirubin  0.9. , absolute reticulocyte count was 35,000. Total white count 6.4 with normal differential and platelet count of 705,711. B12 folate were normal.  Peripheral smear evaluation showed dysplastic neutrophils and less than 1% blasts. CT chest showed worsening of his interstitial lung disease pattern. CT abdomen did not show any acute findings.     S/p bone marrow biopsy in April 2022. It showed maturing trilineage hematopoiesis and atypia and myeloid and erythroid elements. Blasts 2%. Flow cytometry showed atypical myeloid maturation, cytogenetics showed multiple abnormalities including deletion of Y, loss of chromosomes 5 and 7, 10,18, additional material of unknown origin on 17 and 3. NGS showed T p53 mutation. Consistent with a diagnosis of MDS with multilineage dysplasia, high risk based on R- IPSS score of 5.5.      Review of systems: Over 10 systems were reviewed and all were negative except as mentioned above.       Diagnostic Data:     Past Medical History:   Diagnosis Date    Diabetes mellitus ([address]     H/O cardiovascular stress test 12/01/2021    Martina Asif    History of blood transfusion     History of Holter monitoring 02/11/2022    Hyperlipidemia     Hypertension     Lower back pain     Thyroid disease        Patient Active Problem List    Diagnosis Date Noted    Thrombocytopenia, unspecified 05/19/2022    Chronic renal disease, stage III ([facility] [address] [876062] 05/11/2022    NSTEMI (non-ST elevated myocardial infarction) ([facility] [address] 04/13/2022    Essential hypertension 08/17/2021    Acquired hypothyroidism 08/17/2021    Dyslipidemia 08/17/2021    Type 2 diabetes mellitus without complication, without long-term current use of insulin ([facility] [address] 08/17/2021    Normocytic anemia 08/17/2021    Incomplete tear of right rotator cuff 01/30/2020    Long biceps tear 01/30/2020    Injury of tendon of long head of right biceps 01/30/2020    Tear of right glenoid labrum 01/30/2020    Bursitis of right shoulder 01/30/2020        Past Surgical History:   Procedure Laterality Date    BACK SURGERY  2000    fusion  lumbar area,      CARDIAC CATHETERIZATION  04/19/2022    JOINT REPLACEMENT Bilateral [id number]    knee     LITHOTRIPSY      SHOULDER ARTHROSCOPY Right 01/30/2020    with treatment    SHOULDER ARTHROSCOPY Right 1/30/2020    ARTHROSCOPIC EXAM AND TREATMENT RIGHT SHOULDER (CPT 67198,80980) RIGHT ROTATOR CUFF REPAIR, SUBACHROMIAL DECOMPRESSION, DEBRIDEMENT OF HUMERAL performed by Mariana Sanchez DO at [facility] ENDOSCOPY N/A 4/18/2022    EGD ESOPHAGOGASTRODUODENOSCOPY performed by Justin Melchor DO at [facility] History  Family History   Problem Relation Age of Onset    No Known Problems Mother     Diabetes Father        Social History    TOBACCO:   reports that he has never smoked. He has never used smokeless tobacco.  ETOH:   reports no history of alcohol use. Home Medications  Prior to Admission medications    Medication Sig Start Date End Date Taking?  Authorizing Provider   insulin lispro, 1 Unit Dial, (HUMALOG KWIKPEN) 100 UNIT/ML SOPN Inject 30 Units into the skin 3 times daily (before meals) Patient is to take 10 units at breakfast and 10 units lunch and dinner 5/16/22 8/14/22  Orlin Goode,    predniSONE (DELTASONE) 5 MG tablet Take 5 mg by mouth daily Daily at 3 pm    Historical Provider, MD   predniSONE (DELTASONE) 5 MG tablet Take 15 mg by mouth every morning    Historical Provider, MD   furosemide (LASIX) 40 MG tablet Take 1 tablet by mouth daily 5/7/22 11/3/22  Windy Mcguire DO   metOLazone (ZAROXOLYN) 5 MG tablet Take 1 tablet by mouth daily  Patient taking differently: Take 5 mg by mouth daily as needed  5/7/22   Windy Mcguire DO   aspirin 81 MG chewable tablet Take 1 tablet by mouth daily 4/23/22   Rudi Abdul DO   atorvastatin (LIPITOR) 40 MG tablet Take 1 tablet by mouth nightly 4/22/22   Rudi Abdul DO   carvedilol (COREG) 6.25 MG tablet Take 1 tablet by mouth 2 times daily (with meals) 4/22/22   Rudi Abdul DO   levothyroxine (SYNTHROID) 75 MCG tablet Take 1 tablet by mouth Daily 4/23/22   Rudi Abdul DO   finasteride (PROSCAR) 5 MG tablet Take 1 tablet by mouth once a week  Patient taking differently: Take 5 mg by mouth daily  4/22/22   Rudi Abdul DO   pantoprazole (PROTONIX) 40 MG tablet Take 1 tablet by mouth every morning (before breakfast) 4/23/22   Rudi Abdul DO   insulin glargine (LANTUS SOLOSTAR) 100 UNIT/ML injection pen Inject 10 Units into the skin nightly  Patient taking differently: Inject 25 Units into the skin nightly  4/19/22   Rudi Abdul DO   Insulin Pen Needle 29G X 12MM MISC 1 each by Does not apply route 4 times daily (before meals and nightly) 4/19/22   Rudi Abdul DO   isosorbide mononitrate (IMDUR) 30 MG extended release tablet Take 1 tablet by mouth daily 4/19/22   Broderick Perez MD   [facility] ULTRA strip 1 each by Other route 4 times daily As needed.  2/13/22 5/14/22  Leobardo Mcguire, DO   Lancets (ONETOUCH DELICA PLUS [id number]) MISC 1 strip by Other 58 05/16/2022    AST 22 05/16/2022    ALT 22 05/16/2022    LABGLOM 52 05/16/2022    GFRAA >60 05/16/2022       Lab Results   Component Value Date    IRON 122 04/14/2022    TIBC 140 (L) 04/14/2022    FERRITIN 3,037 04/14/2022           Radiology-    No results found. ASSESSMENT/PLAN :    Lisandra Talbot was seen today for new patient and anemia. Diagnoses and all orders for this visit:    MDS (myelodysplastic syndrome) ([facility] [address]  -     CBC with Auto Differential; Standing  -     TYPE AND SCREEN; Standing  -     CBC with Auto Differential; Future    Thrombocytopenia, unspecified    80years old male with multiple medical comorbidities including coronary artery disease, s/p recent NSTEMI, pulmonary fibrosis on supplemental oxygen, diagnosed with MDS with multilineage dysplasia in 4/2022, high risk, R IPSS score of 5.5 due to complex karyotype. Discussed the diagnosis and prognosis with the patient. High risk MDS have a high risk of turning into acute myeloid leukemia and median survival is 12 to 18 months in absence of any treatment. Treatment with hyper methylating agent improved survival and reduces chances of progression to AML. Discussed risks (decreased blood counts, risk for infection, risk of bleeding, death) and benefits (prolonged survival) of azacitidine with the patient. He understood and agreed for treatment. Wrote orders for 75 mg per metered squared azacitidine daily for 7 days every 28 days. Will continue as needed transfusions and check CBC once a week. Return to clinic in a week. Return in about 1 week (around 5/26/2022).      Ricardo Zafar MD   Electronically signed 5/19/2022 at 1:32 PM

## 2022-05-19 NOTE — PROGRESS NOTES
Denver Shock Prest  1933 80 y.o. Referring Physician:     PCP: Hina Zaldivar, DO    Vitals:    22 1137   BP: 107/63   Pulse: 115   Temp: 98.7 °F (37.1 °C)   SpO2: 96%        Wt Readings from Last 3 Encounters:   22 155 lb 12.8 oz (70.7 kg)   22 151 lb (68.5 kg)   22 160 lb (72.6 kg)        Body mass index is 25.15 kg/m². Chief Complaint:   Chief Complaint   Patient presents with    New Patient    Anemia         Cancer Staging  No matching staging information was found for the patient. Prior Radiation Therapy? NO    Concurrent Chemo/radiation? NO    Prior Chemotherapy? NO    Prior Hormonal Therapy? NO    Head and Neck Cancer? No, patient does NOT have HN cancer.       LMP: na    Age at first Menses: na    : na    Para: na          Current Outpatient Medications:     insulin lispro, 1 Unit Dial, (HUMALOG KWIKPEN) 100 UNIT/ML SOPN, Inject 30 Units into the skin 3 times daily (before meals) Patient is to take 10 units at breakfast and 10 units lunch and dinner, Disp: 81 mL, Rfl: 3    predniSONE (DELTASONE) 5 MG tablet, Take 5 mg by mouth daily Daily at 3 pm, Disp: , Rfl:     predniSONE (DELTASONE) 5 MG tablet, Take 15 mg by mouth every morning, Disp: , Rfl:     furosemide (LASIX) 40 MG tablet, Take 1 tablet by mouth daily, Disp: 90 tablet, Rfl: 1    metOLazone (ZAROXOLYN) 5 MG tablet, Take 1 tablet by mouth daily (Patient taking differently: Take 5 mg by mouth daily as needed ), Disp: 90 tablet, Rfl: 1    aspirin 81 MG chewable tablet, Take 1 tablet by mouth daily, Disp: 30 tablet, Rfl: 3    atorvastatin (LIPITOR) 40 MG tablet, Take 1 tablet by mouth nightly, Disp: 30 tablet, Rfl: 3    carvedilol (COREG) 6.25 MG tablet, Take 1 tablet by mouth 2 times daily (with meals), Disp: 60 tablet, Rfl: 3    levothyroxine (SYNTHROID) 75 MCG tablet, Take 1 tablet by mouth Daily, Disp: 30 tablet, Rfl: 3    finasteride (PROSCAR) 5 MG tablet, Take 1 tablet by mouth once a week (Patient taking differently: Take 5 mg by mouth daily ), Disp: 30 tablet, Rfl: 3    pantoprazole (PROTONIX) 40 MG tablet, Take 1 tablet by mouth every morning (before breakfast), Disp: 30 tablet, Rfl: 3    insulin glargine (LANTUS SOLOSTAR) 100 UNIT/ML injection pen, Inject 10 Units into the skin nightly (Patient taking differently: Inject 25 Units into the skin nightly ), Disp: 5 pen, Rfl: 3    Insulin Pen Needle 29G X 12MM MISC, 1 each by Does not apply route 4 times daily (before meals and nightly), Disp: 100 each, Rfl: 3    isosorbide mononitrate (IMDUR) 30 MG extended release tablet, Take 1 tablet by mouth daily, Disp: 30 tablet, Rfl: 3    ONETOUCH ULTRA strip, 1 each by Other route 4 times daily As needed. , Disp: 360 strip, Rfl: 3    Lancets (ONETOUCH DELICA PLUS [id number]) MISC, 1 strip by Other route 4 times daily, Disp: 360 each, Rfl: 3    therapeutic multivitamin-minerals (THERAGRAN-M) tablet, Take 1 tablet by mouth daily.   , Disp: , Rfl:        Past Medical History:   Diagnosis Date    Diabetes mellitus ([address]     H/O cardiovascular stress test 12/01/2021    Lexiscan    History of blood transfusion     History of Holter monitoring 02/11/2022    Hyperlipidemia     Hypertension     Lower back pain     Thyroid disease        Past Surgical History:   Procedure Laterality Date    BACK SURGERY  2000    fusion  lumbar area,      CARDIAC CATHETERIZATION  04/19/2022    JOINT REPLACEMENT Bilateral [id number]    knee     LITHOTRIPSY      SHOULDER ARTHROSCOPY Right 01/30/2020    with treatment    SHOULDER ARTHROSCOPY Right 1/30/2020    ARTHROSCOPIC EXAM AND TREATMENT RIGHT SHOULDER (CPT 33681,38483) RIGHT ROTATOR CUFF REPAIR, SUBACHROMIAL DECOMPRESSION, DEBRIDEMENT OF HUMERAL performed by Vic Londono DO at [address] [facility] N/A 4/18/2022    EGD ESOPHAGOGASTRODUODENOSCOPY performed by Camelia Cruz DO at [place] [address] History   Problem Relation Age of Onset    No Known Problems Mother     Diabetes Father        Social History     Socioeconomic History    Marital status:      Spouse name: Not on file    Number of children: Not on file    Years of education: Not on file    Highest education level: Not on file   Occupational History    Not on file   Tobacco Use    Smoking status: Never Smoker    Smokeless tobacco: Never Used   Vaping Use    Vaping Use: Never used   Substance and Sexual Activity    Alcohol use: No    Drug use: No    Sexual activity: Not Currently     Partners: Female   Other Topics Concern    Not on file   Social History Narrative    Not on file     Social Determinants of Health     Financial Resource Strain: Low Risk     Difficulty of Paying Living Expenses: Not hard at all   Food Insecurity: No Food Insecurity    Worried About [address] [employer] [address] [employer] in the Last Year: Never true    [address] in the Last Year: Never true   Transportation Needs:     Lack of Transportation (Medical): Not on file    Lack of Transportation (Non-Medical):  Not on file   Physical Activity:     Days of Exercise per Week: Not on file    Minutes of Exercise per Session: Not on file   Stress:     Feeling of Stress : Not on file   Social Connections:     Frequency of Communication with Friends and Family: Not on file    Frequency of Social Gatherings with Friends and Family: Not on file    Attends Yazdanism Services: Not on file    Active Member of Clubs or Organizations: Not on file    Attends Club or Organization Meetings: Not on file    Marital Status: Not on file   Intimate Partner Violence:     Fear of Current or Ex-Partner: Not on file    Emotionally Abused: Not on file    Physically Abused: Not on file    Sexually Abused: Not on file   Housing Stability:     Unable to Pay for Housing in the Last Year: Not on file    Number of [place] in the Last Year: Not on file    Unstable Housing in the Last Year: Not on file           Occupation: teacher  Retired:  YES: Patient is retired from [employer]. REVIEW OF SYSTEMS:   Pacemaker/Defibulator/ICD:  No    Mediport: No           FALLS RISK SCREENING ASSESSMENT    Instructions:  Assess the patient and [place] the appropriate indicators that are present for fall risk identification. Total the numbers circled and assign a fall risk score from Table 2.  Reassess patient at a minimum every 12 weeks or with status change. Assessment   Date  5/19/2022     1. Mental Ability: confusion/cognitively impaired No - 0       2. Elimination Issues: incontinence, frequency No - 0       3. Ambulatory: use of assistive devices (walker, cane, off-loading devices), attached to equipment (IV pole, oxygen) Yes - 2     4. Sensory Limitations: dizziness, vertigo, impaired vision Yes - 3       5. Age 72 years or greater - 1       10. Medication: diuretics, strong analgesics, hypnotics, sedatives, antihypertensive agents   Yes - 3   7. Falls:  recent history of falls within the last 3 months (not to include slipping or tripping)   No - 0   TOTAL 9    If score of 4 or greater was education given? Yes       TABLE 2   Risk Score Risk Level Plan of Care   0-3 Little or  No Risk 1. Provide assistance as indicated for ambulation activities  2. Reorient confused/cognitively impaired patient  3. Call-light/bell within patient's reach  4. Chair/bed in low position, stretcher/bed with siderails up except when performing patient care activities  5. Educate patient/family/caregiver on falls prevention  6.  Reassess in 12 weeks or with any noted change in patient condition which places them at a risk for a fall   4-6 Moderate Risk 1. Provide assistance as indicated for ambulation activities  2. Reorient confused/cognitively impaired patient  3. Call-light/bell within patient's reach  4.   Chair/bed in low position, stretcher/bed with siderails up except when performing patient care activities  5. Educate patient/family/caregiver on falls prevention  6. Falls risk precaution (Yellow sticker Level II) placed on patient chart   7 or   Higher High Risk 1. Place patient in easily observable treatment room  2. Patient attended at all times by family member or staff  3. Provide assistance as indicated for ambulation activities  4. Reorient confused/cognitively impaired patient  5. Call-light/bell within patient's reach  6. Chair/bed in low position, stretcher/bed with siderails up except when performing patient care activities  7. Educate patient/family/caregiver on falls prevention  8. Falls risk precaution (Yellow sticker Level III) placed on patient chart           MALNUTRITION RISK SCREENING ASSESSMENT    Instructions:  Assess the patient and enter the appropriate indicators that are present for nutrition risk identification. Total the numbers entered and assign a risk score. Follow the appropriate action for total score listed below. Assessment   Date  5/19/2022     1. Have you lost weight without trying? 0- No     2. Have you been eating poorly because of a decreased appetite? 0- No   3. Do you have a diagnosis of head and neck cancer?       0- No                                                                                    TOTAL 0        Score of 0-1: No action  Score 2 or greater:  · For Non-Diabetic Patient: Recommend adding Ensure Enlive 2 x daily and provide patient with Ensure wellness bag with coupons  · For Diabetic Patient: Recommend adding Glucerna Shake 2 x daily and provide patient with Glucerna Wellness bag with coupons  · Route to the dietitian via Epic          \                  Sabrina Tracey RN

## 2022-05-24 NOTE — ED PROVIDER NOTES
Chief complaint:  Dizzy    HPI history provided by the patient and family  Patient here stating that earlier today he had a vague chest pain episode, he kind of glances over this although family states he was describing more of a heavy sensation to the sternal although he had recent cardiac work-up and MIs. They have been watching his troponins. He also states that twice today while getting up or moving around he felt dizzy, he describes not really near syncopal events but states he was more of almost an off-balance sensation. This was not associated with chest pain or shortness of breath. He denies headache and denies extremity numbness, tingling, paresthesias or weakness. They have been giving him diuretics lately as he did have some leg edema and there is some concern that he had too much diuretic use potentially. Sitting in the bed during the exam he states he feels fine has no symptoms. He has had no recurrent chest pain throughout the day other than the episode this morning and states that he has had no particular dizziness since this morning. He states he feels fine. Review of Systems   Constitutional: Negative for chills, diaphoresis, fatigue and fever. HENT: Negative for congestion and sore throat. Respiratory: Negative for cough, chest tightness, shortness of breath and wheezing. Cardiovascular: Positive for chest pain. Negative for palpitations and leg swelling. Gastrointestinal: Negative for abdominal pain, diarrhea, nausea and vomiting. Genitourinary: Negative for dysuria, flank pain and frequency. Musculoskeletal: Negative for arthralgias, back pain, gait problem, joint swelling, myalgias, neck pain and neck stiffness. Neurological: Positive for dizziness. Negative for seizures, syncope, weakness, light-headedness, numbness and headaches. All other systems reviewed and are negative. Physical Exam  Vitals and nursing note reviewed.    Constitutional:       General: He keenly responsive   1B: Ask Month and Age 0 - answers both questions correctly   1C: Tell Patient To Open and Close Eyes, then Hand  Squeeze 0 - performs both tasks correctly   2: Test Horizontal Extraocular Movements 0 - normal   3: Test Visual Fields 0 - no visual loss   4: Test Facial Palsy 0 - normal symmetric movement   5A: Test Left Arm Motor Drift 0 - no drift, limb holds 90 (or 45) degrees for full 10 seconds   5B: Test Right Arm Motor Drift 0 - no drift, limb holds 90 (or 45) degrees for full 10 seconds   6A: Test Left Leg Motor Drift 0 - no drift; leg holds 30 degree position for full 5 seconds   6B: Test Right Leg Motor Drift 0 - no drift; leg holds 30 degree position for full 5 seconds   7: Test Limb Ataxia   (FNF/Heel-Shin) 0 - absent   8: Test Sensation 0 - normal; no sensory loss   9: Test Language/Aphasia 0 - no aphasia, normal   10: Test Dysarthria 0 - normal   11: Test Extinction/Inattention 0 - no abnormality   Total 0       Procedures     MDM     ED Course as of 05/24/22 1743   Tue May 24, 2022   1551 Case discussed with Dr. Orrie Fleischer, detailed overview given, he will follow closely with the patient on an outpatient basis if patient able to be discharged. We do discussed, I will repeat a troponin we will give the patient some IV fluids [NC]   1742 Case discussed with Dr. Orrie Fleischer, updated on work-up results, he will follow closely with the patient.  [NC]      ED Course User Index  [NC] Elizabeth Street DO     EKG Interpretation    Interpreted by emergency department physician    Rhythm: normal sinus   Rate: 88  Axis: normal  Ectopy: none  Conduction: normal  ST Segments: no acute change  T Waves: no acute change  Q Waves: none    Clinical Impression: no acute changes    Elizabeth Street DO    ED Course as of 05/24/22 1743   Tue May 24, 2022   1551 Case discussed with Dr. Orrie Fleischer, detailed overview given, he will follow closely with the patient on an outpatient basis if patient able to be discharged. We do discussed, I will repeat a troponin we will give the patient some IV fluids [NC]   1742 Case discussed with Dr. Moise Villa, updated on work-up results, he will follow closely with the patient. [NC]      ED Course User Index  [NC] Zaira Verduzco, DO       --------------------------------------------- PAST HISTORY ---------------------------------------------  Past Medical History:  has a past medical history of [facility] H/O cardiovascular stress test, History of blood transfusion, History of Holter monitoring, Hyperlipidemia, Hypertension, Lower back pain, and Thyroid disease. Past Surgical History:  has a past surgical history that includes joint replacement (Bilateral, [id number]); back surgery (2000); Tonsillectomy; Lithotripsy; Shoulder arthroscopy (Right, 01/30/2020); Shoulder arthroscopy (Right, 1/30/2020); Upper gastrointestinal endoscopy (N/A, 4/18/2022); and Cardiac catheterization (04/19/2022). Social History:  reports that he has never smoked. He has never used smokeless tobacco. He reports that he does not drink alcohol and does not use drugs. Family History: family history includes Diabetes in his father; No Known Problems in his mother. The patients home medications have been reviewed.     Allergies: No known allergies    -------------------------------------------------- RESULTS -------------------------------------------------  Labs:  Results for orders placed or performed during the hospital encounter of 05/24/22   CBC with Auto Differential   Result Value Ref Range    WBC 3.3 (L) 4.5 - 11.5 E9/L    RBC 2.46 (L) 3.80 - 5.80 E12/L    Hemoglobin 9.2 (L) 12.5 - 16.5 g/dL    Hematocrit 27.8 (L) 37.0 - 54.0 %    .0 (H) 80.0 - 99.9 fL    MCH 37.4 (H) 26.0 - 35.0 pg    MCHC 33.1 32.0 - 34.5 %    RDW 20.4 (H) 11.5 - 15.0 fL    Platelets 876 022 - 544 E9/L    MPV 10.8 7.0 - 12.0 fL    Neutrophils % 55.0 43.0 - 80.0 %    Lymphocytes % 30.0 20.0 - 42.0 % Monocytes % 15.0 (H) 2.0 - 12.0 %    Eosinophils % 0.0 0.0 - 6.0 %    Basophils % 0.0 0.0 - 2.0 %    Neutrophils Absolute 1.82 1.80 - 7.30 E9/L    Lymphocytes Absolute 0.99 (L) 1.50 - 4.00 E9/L    Monocytes Absolute 0.50 0.10 - 0.95 E9/L    Eosinophils Absolute 0.00 (L) 0.05 - 0.50 E9/L    Basophils Absolute 0.00 0.00 - 0.20 E9/L    nRBC 2.0 /100 WBC    Anisocytosis 2+    Comprehensive Metabolic Panel w/ Reflex to MG   Result Value Ref Range    Sodium 136 132 - 146 mmol/L    Potassium reflex Magnesium 3.5 3.5 - 5.0 mmol/L    Chloride 92 (L) 98 - 107 mmol/L    CO2 30 (H) 22 - 29 mmol/L    Anion Gap 14 7 - 16 mmol/L    Glucose 254 (H) 74 - 99 mg/dL    BUN 42 (H) 6 - 23 mg/dL    CREATININE 1.6 (H) 0.7 - 1.2 mg/dL    GFR Non-African American 41 >=60 mL/min/1.73    GFR African American 50     Calcium 8.5 (L) 8.6 - 10.2 mg/dL    Total Protein 7.3 6.4 - 8.3 g/dL    Albumin 3.8 3.5 - 5.2 g/dL    Total Bilirubin 0.7 0.0 - 1.2 mg/dL    Alkaline Phosphatase 70 40 - 129 U/L    ALT 23 0 - 40 U/L    AST 18 0 - 39 U/L   Troponin   Result Value Ref Range    Troponin, High Sensitivity 114 (H) 0 - 11 ng/L   Magnesium   Result Value Ref Range    Magnesium 1.9 1.6 - 2.6 mg/dL   Troponin   Result Value Ref Range    Troponin, High Sensitivity 87 (H) 0 - 11 ng/L   EKG 12 Lead   Result Value Ref Range    Ventricular Rate 88 BPM    Atrial Rate 88 BPM    P-R Interval 156 ms    QRS Duration 94 ms    Q-T Interval 368 ms    QTc Calculation (Bazett) 445 ms    P Axis 18 degrees    R Axis 17 degrees    T Axis 58 degrees       Radiology:  CT HEAD WO CONTRAST   Final Result   No acute intracranial abnormality. Cortical atrophy and periventricular leukomalacia. XR CHEST 1 VIEW   Final Result   Persistent chronic lung changes diffusely throughout both lung fields.       No focal acute infiltrate.             ------------------------- NURSING NOTES AND VITALS REVIEWED ---------------------------  Date / Time Roomed:  5/24/2022 12:02 PM  ED Bed Assignment:  [id number]    The nursing notes within the ED encounter and vital signs as below have been reviewed. BP (!) 111/54   Pulse 93   Temp 98.7 °F (37.1 °C) (Oral)   Resp 20   SpO2 100%   Oxygen Saturation Interpretation: Normal      ------------------------------------------ PROGRESS NOTES ------------------------------------------  I have spoken with the patient and discussed todays results, in addition to providing specific details for the plan of care and counseling regarding the diagnosis and prognosis. Their questions are answered at this time and they are agreeable with the plan. I discussed at length with them reasons for immediate return here for re evaluation. They will followup with primary care by calling their office tomorrow. --------------------------------- ADDITIONAL PROVIDER NOTES ---------------------------------  At this time the patient is without objective evidence of an acute process requiring hospitalization or inpatient management. They have remained hemodynamically stable throughout their entire ED visit and are stable for discharge with outpatient follow-up. The plan has been discussed in detail and they are aware of the specific conditions for emergent return, as well as the importance of follow-up. New Prescriptions    No medications on file       Diagnosis:  1. Dehydration    2. Chronic renal insufficiency, stage 1    3. Dizziness    4. Chest pain, unspecified type        Disposition:  Patient's disposition: Discharge to home  Patient's condition is stable.          Alessia Dawkins DO  05/24/22 1744

## 2022-05-24 NOTE — ED NOTES
Department of Emergency Medicine  FIRST PROVIDER TRIAGE NOTE             Independent MLP           5/24/22  11:26 AM EDT    Date of Encounter: 5/24/22   MRN: [id number]      HPI: Adwoa Coleman is a 80 y.o. male who presents to the ED for No chief complaint on file. dizziness off balance felt weak chest pain started     ROS: Negative for abd pain or back pain. PE: Gen Appearance/Constitutional: alert  CV: irregular rate  Pulm: CTA bilat     Initial Plan of Care: All treatment areas with department are currently occupied. Plan to order/Initiate the following while awaiting opening in ED: labs, EKG and imaging studies.   Initiate Treatment-Testing, Proceed toTreatment Area When Bed Available for ED Attending/MLP to Continue Care    Electronically signed by JEANETH Escalante   DD: 5/24/22       JEANETH Escalante  05/24/22 3045

## 2022-05-26 PROBLEM — I95.1 ORTHOSTATIC HYPOTENSION: Status: ACTIVE | Noted: 2022-01-01

## 2022-05-26 NOTE — CONSENT
Informed Consent for Blood Component Transfusion Note    I have discussed with the patient the rationale for blood component transfusion; its benefits in treating or preventing fatigue, organ damage, or death; and its risk which includes mild transfusion reactions, rare risk of blood borne infection, or more serious but rare reactions. I have discussed the alternatives to transfusion, including the risk and consequences of not receiving transfusion. The patient had an opportunity to ask questions and had agreed to proceed with transfusion of blood components. Electronically signed by Darshana Young DO on 5/26/22 at 5:06 PM EDT    Patient exhibits significant orthostasis with known history of coronary artery disease and elevated troponin. Will transfuse since his hemoglobin is close to 7.

## 2022-05-26 NOTE — PROGRESS NOTES
Physical Therapy Initial Evaluation/Plan of Care    Room #:  9073/8182-36  Patient Name: Iven Peabody  YOB: 1933  MRN: [id number]    Date of Service: 5/26/2022     Tentative placement recommendation: Subacute vs Home Health Physical Therapy if patient meets goals  Equipment recommendation: To be determined and Patient has needed equipment       Evaluating Physical Therapist: Charu Perdomo, [address] [facility] #[id number]      Specific Provider Orders/Date/Referring Provider :  05/26/22 1130   PT eval and treat Start: 05/26/22 1130, End: 05/26/22 1130, ONE TIME, Standing Count: 1 Occurrences, R    Alex Leonard, DO      Admitting Diagnosis:   Orthostatic hypotension [I95.1]       Surgery:         Patient Active Problem List   Diagnosis    Incomplete tear of right rotator cuff    Long biceps tear    Injury of tendon of long head of right biceps    Tear of right glenoid labrum    Bursitis of right shoulder    Essential hypertension    Acquired hypothyroidism    Dyslipidemia    Type 2 diabetes mellitus without complication, without long-term current use of insulin (HCC)    Normocytic anemia    NSTEMI (non-ST elevated myocardial infarction) ([address]    Chronic renal disease, stage III ([address] [410899]    Thrombocytopenia, unspecified    MDS (myelodysplastic syndrome) (HCC)    Orthostatic hypotension        ASSESSMENT of Current Deficits Patient exhibits decreased strength, balance and endurance impairing functional mobility, transfers, gait , gait distance and tolerance to activity increased shortness of breath and decreased O2 saturation with activity improves with cues purse lip breathing and increased time. Patient requires continued skilled physical therapy to address concerns listed above for increased safety and function at discharge.          PHYSICAL THERAPY  PLAN OF CARE       Physical therapy plan of care is established based on physician order,  patient diagnosis and clinical assessment    Current Treatment Recommendations:    -Sitting Balance: Incorporate reaching activities to activate trunk muscles   -Standing Balance: Perform strengthening exercises in standing to promote motor control with or without upper extremity support   -Transfers: Provide instruction on proper hand and foot position for adequate transfer of weight onto lower extremities and use of gait device if needed and Cues for hand placement, technique and safety. Provide stabilization to prevent fall   -Gait: Gait training, Standing activities to improve: base of support, weight shift, weight bearing  and Pregait training to emphasize: Sequencing , Device control, Upright and Safety   -Endurance: Utilize Supervised activities to increase level of endurance to allow for safe functional mobility including transfers and gait   -Stairs: Stair training with instruction on proper technique and hand placement on rail  -Instruction in independent management of O2 line    PT long term treatment goals are located in below grid    Patient and or family understand(s) diagnosis, prognosis, and plan of care. Frequency of treatments: Patient will be seen  daily.          Prior Level of Function: Patient ambulated with rollator or wheeled walker     Rehab Potential: good    for baseline    Past medical history:   Past Medical History:   Diagnosis Date    Diabetes mellitus ([facility]     H/O cardiovascular stress test 12/01/2021    Lexiscan    History of blood transfusion     History of Holter monitoring 02/11/2022    Hyperlipidemia     Hypertension     Lower back pain     Thyroid disease      Past Surgical History:   Procedure Laterality Date    BACK SURGERY  2000    fusion  lumbar area,      CARDIAC CATHETERIZATION  04/19/2022    JOINT REPLACEMENT Bilateral [id number]    knee     LITHOTRIPSY      SHOULDER ARTHROSCOPY Right 01/30/2020    with treatment    SHOULDER ARTHROSCOPY Right 1/30/2020    ARTHROSCOPIC EXAM AND TREATMENT RIGHT SHOULDER (CPT Not assessed     Scooting: Supervision     Rolling: Independent    Supine to sit:  Independent    Sit to supine: Independent    Scooting: Independent     Transfers Sit to stand: Minimal assist of 1 Cues for hand placement and safety   Sit to stand: Modified Independent     Ambulation    1 x 4 feet 2 x 20 feet using  wheeled walker with Minimal assist of 1   for balance and O2 line management  and cues for upright posture, safety, pacing and pursed lip breathing   100 feet using  wheeled walker with Modified Independent indep O2 line management    Stair negotiation: ascended and descended   Not assessed     2 steps  with supervision    ROM Within functional limits    Increase range of motion 10% of affected joints    Strength BUE:  refer to OT eval  RLE:  3+/5  LLE:  3+/5  Increase strength in affected mm groups by 1/3 grade   Balance Sitting EOB:  good -  Dynamic Standing:  fair wheeled walker   Sitting EOB:  good    Dynamic Standing: good wheeled walker      Patient is Alert & Oriented x person, place, time and situation and follows directions    Sensation:  Patient  reports numbness/tingling right 4-5th digits getting better     Edema:  no   Endurance: fair  -    Vitals: 2 liters nasal cannula   Blood Pressure at rest  Blood Pressure during session    Heart Rate at rest 95 Heart Rate during session 111   SPO2 at rest 96%  SPO2 during session 85%     Patient education  Patient educated on role of Physical Therapy, risks of immobility, safety and plan of care,  importance of mobility while in hospital , purse lip breathing, ankle pumps, quad set and glut set for edema control, blood clot prevention, safety  and O2 line management and safety      Patient response to education:   Pt verbalized understanding Pt demonstrated skill Pt requires further education in this area   Yes Partial Yes      Treatment:  Patient practiced and was instructed/facilitated in the following treatment: Patient assisted to edge of bed, Sat edge of bed 10 minutes with Supervision  to increase dynamic sitting balance and activity tolerance. shaky, assisted up to chair, ambulated to/from bathroom. Minimal assist for transfer, cues for hand placement and slow descent. Ambulated back in room, returned up to chair, cues purse lip breathing. Therapeutic Exercises:  ankle pumps  x 15 reps. At end of session, patient in chair with   call light and phone within reach,  all lines and tubes intact, nursing notified. Patient would benefit from continued skilled Physical Therapy to improve functional independence and quality of life. Patient's/ family goals   feel better    Time in  1345  Time out  1429    Total Treatment Time  24 minutes    Evaluation time includes thorough review of current medical information, gathering information on past medical history/social history and prior level of function, completion of standardized testing/informal observation of tasks, assessment of data, and development of Plan of care and goals.      CPT codes:  Low Complexity PT evaluation (14094)  Therapeutic activities (83369)   15 minutes  1 unit(s)  Gait Training (38882) 9 minutes 1 unit(s)    Angeles Yang, PT

## 2022-05-26 NOTE — PROGRESS NOTES
[facility]  [facility] [place]        Date:2022                                                  Patient Name: Daron Mohs    MRN: [id number]    : 1933    Room: [address] [place] [address]      Evaluating OT: Stephanie Cavazos OTR/L; [id number]     Referring Provider and Specific Provider Orders/Date:      22 113  OT eval and treat  Start:  22 113,   End:  22 1130,   ONE TIME,   Standing Count:  1 Occurrences,   R         Sharon Mcguire DO     Placement Recommendation: HHOT vs. Subacute if goals are not met        Diagnosis:   No diagnosis found.        Surgery: none       Pertinent Medical History:       Past Medical History:   Diagnosis Date    Diabetes mellitus ([address]     H/O cardiovascular stress test 2021    Lexiscan    History of blood transfusion     History of Holter monitoring 2022    Hyperlipidemia     Hypertension     Lower back pain     Thyroid disease          Past Surgical History:   Procedure Laterality Date    BACK SURGERY      fusion  lumbar area,      CARDIAC CATHETERIZATION  2022    JOINT REPLACEMENT Bilateral [id number]    knee     LITHOTRIPSY      SHOULDER ARTHROSCOPY Right 2020    with treatment    SHOULDER ARTHROSCOPY Right 2020    ARTHROSCOPIC EXAM AND TREATMENT RIGHT SHOULDER (CPT 31798,91343) RIGHT ROTATOR CUFF REPAIR, SUBACHROMIAL DECOMPRESSION, DEBRIDEMENT OF HUMERAL performed by Margaret Lin DO at [address] 2020 N/A 2022    EGD ESOPHAGOGASTRODUODENOSCOPY performed by Valarie Durbin DO at [facility] ENDOSCOPY      Precautions:  Fall Risk, 3L O2      Assessment of current deficits:     [x] Functional mobility  [x]ADLs  [x] Strength               []Cognition    [x] Functional transfers   [x] IADLs         [] Safety Awareness   [x]Endurance    [] Fine Coordination [x] Balance      [] Vision/perception   []Sensation     []Gross Motor Coordination  [] ROM  [] Delirium                   [] Motor Control     OT PLAN OF CARE   OT POC based on physician orders, patient diagnosis and results of clinical assessment    Frequency/Duration 1-3 days/wk for 2 weeks PRN     Specific OT Treatment Interventions to include:   * Instruction/training on adapted ADL techniques and AE recommendations to increase functional independence within precautions       * Training on energy conservation strategies, correct breathing pattern and techniques to improve independence/tolerance for self-care routine  * Functional transfer/mobility training/DME recommendations for increased independence, safety, and fall prevention  * Patient/Family education to increase follow through with safety techniques and functional independence  * Recommendation of environmental modifications for increased safety with functional transfers/mobility and ADLs  * Therapeutic exercise to improve motor endurance, ROM, and functional strength for ADLs/functional transfers  * Therapeutic activities to facilitate/challenge dynamic balance, stand tolerance for increased safety and independence with ADLs  * Positioning to improve skin integrity, interaction with environment and functional independence    Recommended Adaptive Equipment: TBD      Home Living: alone; single family home, 2 story, 2+1 steps to enter with no rail, 2 steps up into kitchen, walk in shower. Equipment owned: wheeled walker, standard walker, rollator, cane, wheelchair    Prior Level of Function: Pt states he has been fairly independent with ADLs and IADLs; ambulated with rollator    Driving: yes but \"havent lately, they took my car\"  Occupation: retired teacher, mostly science  Interest: [employer]     Pain Level: no pain at time of evaluation; Nursing notified.       Cognition: A&O: 4/4; Follows 2 step directions   Memory: good    Sequencing: good Problem solving: good    Judgement/safety: good     [facility]   AM-PAC Daily Activity Inpatient   How much help for putting on and taking off regular lower body clothing?: A Lot  How much help for Bathing?: A Lot  How much help for Toileting?: A Little  How much help for putting on and taking off regular upper body clothing?: A Little  How much help for taking care of personal grooming?: A Little  How much help for eating meals?: None  AM-PAC Inpatient Daily Activity Raw Score: 17  AM-PAC Inpatient ADL T-Scale Score : 37.26  ADL Inpatient CMS 0-100% Score: 50.11  ADL Inpatient CMS G-Code Modifier : CK     Functional Assessment:    Initial Eval Status  Date: 5/26/22 Treatment Status  Date: STGs = LTGs  Time frame: 10-14 days   Feeding Independent   Independent    Grooming Supervision   Independent    UB Dressing Minimal Assist   Independent    LB Dressing Minimal Assist   Independent    Bathing Moderate Assist  Independent    Toileting Supervision   Independent    Bed Mobility  Supine to sit: N/T as pt was up in chair upon arrival to the room. Sit to supine: Supervision. Moderate Assist for positioning in bed for symmetry and comfort. Supine to sit: Independent   Sit to supine: Independent    Functional Transfers Minimal Assist from bedside chair to wheeled walker. Transfer training with verbal cues for hand placement throughout session to improve safety. Independent    Functional Mobility Minimal assist with wheeled walker to improve balance from bedside chair to edge of bed, verbal cues for walker sequence and safety. Distance limited as technician arrived for EKG.     Modified [place]    Balance Sitting:     Static: good     Dynamic: fair plus  Standing: fair  with wheeled walker     Activity Tolerance fair   good    Visual/  Perceptual Glasses: yes, readers                 Hand Dominance: right      AROM (PROM) Strength Additional Info:    RUE  WFL 4/5 good  and wfl FMC/dexterity noted during ADL tasks     LUE WFL 4/5 good  and wfl FMC/dexterity noted during ADL tasks       Hearing: [facility]   Sensation:  No c/o numbness or tingling  Tone: WFL   Edema: no     Comments: Upon arrival the patient was seated in bedside chair. At end of session, patient was assisted back to bed for an EKG. Call light and phone within reach, all lines and tubes intact. Overall patient demonstrated decreased independence and safety during completion of ADL/functional transfer/mobility tasks. Pt would benefit from continued skilled OT to increase safety and independence with completion of ADL/IADL tasks for functional independence and quality of life. Treatment: OT treatment provided this date includes:    Instruction/training on safety and adapted techniques for completion of ADLs    Instruction/training on safe functional mobility/transfer techniques    Instruction/training on energy conservation/work simplification for completion of ADLs    Proper Positioning/Alignment    Rehab Potential: Good for established goals. Patient / Family Goal: return home, pt states he was in rehab at the beginning of the month and would like to return home upon this discharge       Patient and/or family were instructed on functional diagnosis, prognosis/goals and OT plan of care. Demonstrated good understanding.      Eval Complexity: Low    Time In: 2:45pm  Time Out: 3:15pm    Total Treatment Time: 0       Min Units   OT Eval Low 97165  X  1    OT Eval Medium 56875      OT Eval High 04477      OT Re-Eval [id number]            ADL/Self Care 50084     Therapeutic Activities 42198       Therapeutic Ex 62248       Orthotic Management 74779       Manual [id number]     Neuro Re-Ed 98009       Non-Billable Time        Evaluation Time additionally includes thorough review of current medical information, gathering information on past medical history/social history and prior level of function, interpretation of standardized testing/informal observation of tasks, assessment of data and development of plan of care and goals.         Evaluating OT: Shaka Hernandez OTR/L; [id number]

## 2022-05-26 NOTE — CONSULTS
Blood and [facility]  Dr. Blake Grace M.D. Patient Name: Humphrey Diaz  YOB: 1933  PCP: Jus Sánchez DO   Referring Provider: [address] / [address] [address]     Reason for Consultation: No chief complaint on file. 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 Scheduled to start treatment with Nadine Jarrett today. Admitted with orthostatic hypotension. ED notes H&P not available at this time. Patient is being hydrated feels better. Labs remarkable for sodium 129, creatinine 1.7, glucose 400, hemoglobin 7.4. No new imaging. Review of systems: Over 10 systems were reviewed and all were negative except as mentioned above.     Diagnostic Data:     Past Medical History:   Diagnosis Date    Diabetes mellitus ([address]     H/O cardiovascular stress test 12/01/2021    Lexiscan    History of blood transfusion     History of Holter monitoring 02/11/2022    Hyperlipidemia     Hypertension     Lower back pain     Thyroid disease        Patient Active Problem List    Diagnosis Date Noted    Orthostatic hypotension 05/26/2022    Thrombocytopenia, unspecified 05/19/2022    MDS (myelodysplastic syndrome) ([address] 05/19/2022    Chronic renal disease, stage III ([address] [232853] 05/11/2022    NSTEMI (non-ST elevated myocardial infarction) ([address] 04/13/2022    Essential hypertension 08/17/2021    Acquired hypothyroidism 08/17/2021    Dyslipidemia 08/17/2021    Type 2 diabetes mellitus without complication, without long-term current use of insulin ([address] 08/17/2021    Normocytic anemia 08/17/2021    Incomplete tear of right rotator cuff 01/30/2020    Long biceps tear 01/30/2020    Injury of tendon of long head of right biceps 01/30/2020    Tear of right glenoid labrum 01/30/2020    Bursitis of right shoulder 01/30/2020 Stress : Not on file   Social Connections:     Frequency of Communication with Friends and Family: Not on file    Frequency of Social Gatherings with Friends and Family: Not on file    Attends Orthodox Services: Not on file    Active Member of Clubs or Organizations: Not on file    Attends Club or Organization Meetings: Not on file    Marital Status: Not on file   Intimate Partner Violence:     Fear of Current or Ex-Partner: Not on file    Emotionally Abused: Not on file    Physically Abused: Not on file    Sexually Abused: Not on file   Housing Stability:     Unable to Pay for Housing in the Last Year: Not on file    Number of [place] in the Last Year: Not on file    Unstable Housing in the Last Year: Not on file        TOBACCO:   reports that he has never smoked. He has never used smokeless tobacco.  ETOH:   reports no history of alcohol use. Home Medications  Prior to Admission medications    Medication Sig Start Date End Date Taking?  Authorizing Provider   levothyroxine (SYNTHROID) 75 MCG tablet Take 1 tablet by mouth Daily 5/22/22   Destiny Mcguire DO   finasteride (PROSCAR) 5 MG tablet Take 1 tablet by mouth daily 5/22/22   Destiny Mcguire DO   isosorbide mononitrate (IMDUR) 30 MG extended release tablet Take 1 tablet by mouth daily 5/22/22   Destiny Mcguire DO   carvedilol (COREG) 6.25 MG tablet Take 1 tablet by mouth 2 times daily (with meals) 5/22/22   Destiny Mcguire DO   predniSONE (DELTASONE) 5 MG tablet Take 1 tablet by mouth daily Daily at 3 pm 5/22/22   Leobardo Mcguire DO   predniSONE (DELTASONE) 10 MG tablet Take 1 tablet by mouth every morning 5/22/22   Destiny Mcguire DO   potassium citrate (UROCIT-K) 10 MEQ (1080 MG) extended release tablet Take 1 tablet by mouth 2 times daily 5/22/22   Leobardo Mcguire DO   insulin lispro, 1 Unit Dial, (HUMALOG KWIKPEN) 100 UNIT/ML SOPN Inject 30 Units into the skin 3 times daily (before meals) Patient is to take 10 units at breakfast and 10 units lunch and dinner 5/16/22 8/14/22  Orlin Van, DO   furosemide (LASIX) 40 MG tablet Take 1 tablet by mouth daily 5/7/22 11/3/22  Joline Angelucci Bisel, DO   aspirin 81 MG chewable tablet Take 1 tablet by mouth daily 4/23/22   Melody Nix DO   atorvastatin (LIPITOR) 40 MG tablet Take 1 tablet by mouth nightly 4/22/22   Melody Nix DO   insulin glargine (LANTUS SOLOSTAR) 100 UNIT/ML injection pen Inject 10 Units into the skin nightly  Patient taking differently: Inject 25 Units into the skin nightly  4/19/22   Melody Nix DO   Insulin Pen Needle 29G X 12MM MISC 1 each by Does not apply route 4 times daily (before meals and nightly) 4/19/22   Melody Nix, DO   ONETOUCH ULTRA strip 1 each by Other route 4 times daily As needed. 2/13/22 5/14/22  Joline Angelucci Bisel, DO   Lancets [facility] PLUS [id number]) MISC 1 strip by Other route 4 times daily 2/13/22 5/14/22  Joline Angelucci Bisel, DO   therapeutic multivitamin-minerals [facility]) tablet Take 1 tablet by mouth daily. Historical Provider, MD       Allergies  Allergies   Allergen Reactions    No Known Allergies            Objective  BP (!) 100/55   Pulse (!) 107   Temp 97.5 °F (36.4 °C) (Infrared)   Resp 27   SpO2 99%     Physical Exam:   Performance Status:  General: AAO to person, place, time, in no acute distress, pleasant   Head and neck : PERRLA, EOMI . Sclera non icteric. Oropharynx : Clear  Neck: no JVD,  no adenopathy, no carotid bruit  LYMPHATICS : No peripheral lymphadenopathy. Heart: Regular rate and regular rhythm, no murmur  Lungs: Clear to auscultation   Extremities: No edema,no cyanosis, no clubbing. Abdomen: Soft, non-tender;no masses, no organomegaly  Skin:  No rash. Neurologic:Cranial nerves grossly intact. No focal motor or sensory deficits .     Recent Laboratory Data-   Lab Results   Component Value Date    WBC 5.8 05/26/2022    HGB 7.4 (L) 05/26/2022    HCT 21.8 (L) 05/26/2022    .7 (H) 05/26/2022     05/26/2022    LYMPHOPCT 18.0 (L) 05/26/2022    RBC 1.97 (L) 05/26/2022    MCH 37.6 (H) 05/26/2022    MCHC 33.9 05/26/2022    RDW 20.7 (H) 05/26/2022    NEUTOPHILPCT 70.0 05/26/2022    MONOPCT 12.0 05/26/2022    BASOPCT 0.0 05/26/2022    NEUTROABS 4.06 05/26/2022    LYMPHSABS 1.04 (L) 05/26/2022    MONOSABS 0.70 05/26/2022    EOSABS 0.00 (L) 05/26/2022    BASOSABS 0.00 05/26/2022       Lab Results   Component Value Date     (L) 05/26/2022    K 3.3 (L) 05/26/2022    CL 88 (L) 05/26/2022    CO2 28 05/26/2022    BUN 44 (H) 05/26/2022    CREATININE 1.7 (H) 05/26/2022    GLUCOSE 441 (H) 05/26/2022    CALCIUM 7.8 (L) 05/26/2022    PROT 6.2 (L) 05/26/2022    LABALBU 3.0 (L) 05/26/2022    BILITOT 0.4 05/26/2022    ALKPHOS 65 05/26/2022    AST 17 05/26/2022    ALT 20 05/26/2022    LABGLOM 38 05/26/2022    GFRAA 46 05/26/2022       Lab Results   Component Value Date    IRON 122 04/14/2022    TIBC 140 (L) 04/14/2022    FERRITIN 3,037 04/14/2022           Radiology-    No orders to display         ASSESSMENT/PLAN :  80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 Scheduled to start treatment with Kumar Dancer today. Admitted with orthostatic hypotension. Feels better with hydration. Medical management by Dr. Elmer Santa. Macrocytic anemia due to MDS. Transfuse for hemoglobin less than 7. We will continue to follow. Thank you for the consult.           Electronically signed by Enrrique Nation MD on 5/26/2022 at 3:57 PM

## 2022-05-27 NOTE — H&P
[address]                              HISTORY AND PHYSICAL    PATIENT NAME: Robin Watkins                    :        1933  MED REC NO:   [id number]                            ROOM:       3147  ACCOUNT NO:   [de-identified]                           ADMIT DATE: 2022  PROVIDER:     Jessica Alexandra DO    CHIEF COMPLAINT AND HISTORY OF CHIEF COMPLAINT:  This is a pleasant  80-year white male who was admitted to [address] [place] [address]. The patient  presented to the hospital here as a direct admission under the service  of Dr. Adele Sandhoff and Dr. Richard Knight with symptomatic orthostasis. The patient  was doing well until 2021. The patient at time did receive his third  vaccine for COVID of the [place] variety. The patient had been doing  well, but subsequently in December he began to have increasing amount of  shortness of breath and difficulty breathing. Subsequently, at that  time CT of the lungs high-resolution and chest x-ray did show  development of pulmonary fibrosis and the patient had been on oxygen  therapy. He subsequently had complaints at that time of progressive  shortness of breath and difficulty breathing. This was followed up with  stress test, which was normal and other diagnostic findings. The  patient did well up until 2022, at which time he presented here to  the hospital with symptomatology of chest pain. The patient at that  time was transferred to [address] [place] [address] where he underwent a cardiac  catheterization and finding of coronary artery disease was present. The  patient opted for medical therapy and he was discharged to the nursing  home for rehab. The patient has been at [address] for approximately week, recovered nicely, and has been home. Since he has been home, he has been working with physical therapy and  further workup has been undergoing. Unfortunately, the patient has  complaints of some dizziness and lightheadedness. He presented to the  hospital here through the emergency room on 05/24, at which time it was  noted that he appeared to be slightly dehydrated, which was treated with  fluid and sent home. Subsequently, today he woke up at which time he  had breakfast.  He did not quite feel well and while going to see his  hematology consult almost had a syncopal episode. The patient at that  time did not feel well. He was symptomatic, at which time we directly  admitted him to the hospital here. The patient was seen upon arrival.   It was noted at this time that his blood pressure resting was 115/70 and  with standing drop down to 80/40. He was also symptomatic at that time  with tachycardia. He was admitted to the hospital at this time. From a  cardiac standpoint view, he currently has no complaints of chest pain. His EKG at this time has improved. His cardiac catheterization which  was done did show multivessel disease involving small to mid size first  diagonal branch, small obtuse marginal branch, and the right coronary  artery. At that time, he did opt for medical therapy and that was done  on 04/19. He currently have no chest pain. He does have a slightly  elevated troponin level, has been on nitrates therapy. He currently has  no edema and has been on some diuretic therapy, which this has resolved. Respiratory wise, he does wear oxygen at home at 2 liters. He has had a  cough, which is currently resolved and chest x-ray yesterday did not  show any acute findings. Gastrointestinal wise, he did undergo a scope  in April, which did not show any abnormalities. He does have occasional  diarrhea. He has lost some weight, which due to fluid retention. Genitourinary wise, he have nocturia x2 with benign prostatic  hypertrophy. Neurologically, he does complain of some weakness.   He  denies any history of stroke, TIAs, etc. Otherwise, he was admitted at  this time. ALLERGIES:  No known drug allergies. MEDICATIONS:  His medication prior to admission include Synthroid 75 mcg  daily, Proscar 5 daily, Imdur 30 daily, Coreg 6.25 b.i.d. which had been  0.5 mg half a tablet daily, prednisone 10 mg in a.m. and 5 mg in p.m.,  potassium 10 b.i.d., Humalog sliding scale, Lantus 25 at bedtime, Lasix  20 mg daily, aspirin 81 daily, atorvastatin 40 daily, and multivitamin  daily,    PAST MEDICAL HISTORY:  Positive for the usual childhood diseases,  history of type 2 diabetes mellitus which has recently been aggravated  by steroids, history of stress test in 2021 which was negative,  cardiac catheterization in 2022, history of blood transfusion in the  past, hyperlipidemia, hypertension, low back pain, and hypothyroidism. PAST SURGICAL HISTORY:  Include back surgery, cardiac catheterization on  2022, bilateral knee replacement in  and ,  shoulder arthroplasty in 2020, tonsillectomy, upper GI panendoscopy  on 2022. FAMILY HISTORY:  His parents are . SOCIAL HISTORY:  Wife currently passed away. He is . She   on 2021 from leukemia. He never smoked. He denies any use of  alcohol or recreational drugs. Father of four children. REVIEW OF THE CHART:  EKG showed normal sinus rhythm. Respiratory panel  was negative. Hemoglobin and hematocrit at this time is 7.4 and 21.8,  respectively. White count was 8000. BUN and creatinine were 41 and  1.7. Sodium 129, potassium 3.3, chloride 88, and CO2 was 28. PHYSICAL EXAMINATION:  VITAL SIGNS:  Show evidence of orthostasis, blood pressure was 100/55,  pulse 107, respirations 18, afebrile. GENERAL APPEARANCE:  Revealed a pleasant 80-year white male who is  alert, responsive, oriented to person, place and time. HEENT:  Normal.  Hair distribution is receding.   Examination of the  Abrasion noted on the nose from a recent fall 2 days ago.  NECK:  Revealed adequate carotid upstrokes without bruits. Trachea  midline. Thyroid not palpable. LUNGS:  Clear anteriorly. Posteriorly with fibrotic rales. HEART:  Fairly regular. Grade 1/6 murmur. No S3. No S4.  ABDOMEN:  Soft. No guarding, rebound or rigidity. EXTREMITIES:  Currently without any cyanosis, clubbing, or edema. DERMATOLOGIC:  Abrasion noted to the nose and left arm. NEUROLOGIC:  Revealed cranial nerves II through XII grossly intact. BREASTS:  Normal male. RECTAL:  Not performed. GENITAL:  Not performed. IMPRESSION:  At this time includes:  1. Symptomatic orthostasis possibly secondary to medications, but not  exclude that of secondary adrenal insufficiency. 2.  Coronary artery disease with cardiac catheterization in 04/2022  showing small to moderate multiple vessel disease with elevated troponin  suggesting demand ischemia. 3.  Macrocytic anemia with recent bone marrow performed in 04/2022  showing probable myelodysplastic syndrome. 4.  Chronic renal insufficiency stage III, aggravated by dehydration. 5.  Type 2 diabetes mellitus, aggravated by steroids. 6.  Abnormal CAT scan and chest x-ray showing idiopathic lung disease  with associated pulmonary fibrosis with exertional desaturation, on  oxygen therapy. 7.  Primary hypothyroidism, on Synthroid. 8.  Hyperlipidemia, on statin agent. 9.  BPH on Proscar    PLAN AND RECOMMENDATIONS:  At this time, currently the patient appeared  stable. He does have evidence of symptomatic hypotension and has been  admitted to the telemetry floor. We will, at this time, monitor his  hemoglobin and hematocrit. The patient was scheduled to see Dr. Hayden Mendiola  today, Hematology, because of possible myelodysplastic syndrome. His  hemoglobin has dropped from 9.2 to 7.4 in 24 hours. These will repeated  and transfused accordingly. He does appear to be somewhat dehydrated. IV will be instituted at this time.   Electrolytes will be replaced. We  will deal with chronic comorbidity at this time. Troponin level will be  trended. An EKG will be followed. We will adjust his medications. Blood sugar is elevated, we will do glycemic control. Otherwise, at the  present time, possibility of adrenal insufficiency has been obtained. Stat cortisol level will be obtained and the patient will be treated  with hydrocortisone and midodrine at this time. Pending results of  further studies, we will dictate further care and treatment. The  patient is a DNR-CCA at this time. More than 40 minutes of critical  care time was spent at this time with more than 50% of time present at  the bedside.         Panda Sethi DO    D: 05/26/2022 15:35:30       T: 05/26/2022 15:40:44     [email or web address]  Job#: [id number]     Doc#: [id number]    CC:

## 2022-05-27 NOTE — CARE COORDINATION
Ss note:5/27/2022 12:54 PM Neg PCR test on 5-26-22. Met with pt, resides home alone in 2 story, 2 steps to enter. Pt has rollator, w/c, walk in shower with built in shower chair, reports he has home 02 and portable tanks, unsure of datatracker company, does not drive, reports he does his own homemaking chores. Hx of [facility] skilled rehab. Pt relays family has installed cameras in the home. Pt is currently active with [facility] with [facility] per liaison Carlos Mukherjee, will need RADHA as pt plan is to return home. Utilizes [facility] in [facility] to transport home.  BALDO Yanez

## 2022-05-27 NOTE — ACP (ADVANCE CARE PLANNING)
Advance Care Planning   Healthcare Decision Maker:    Primary Decision Maker: Salvatore Jeffrey [facility] - [phone]        Electronically signed by Joseph Mahmood RN-BC on 5/27/2022 at 7:36 AM

## 2022-05-27 NOTE — PROGRESS NOTES
Dr. Caroline Chau notified of patient's blood sugar of 451, new orders received at this time. Will continue to monitor per orders.

## 2022-05-27 NOTE — CONSULTS
INPATIENT CARDIOLOGY CONSULT    Name: Meryl Mcknight    Age: 80 y.o. Date of Admission: 5/26/2022 10:52 AM    Date of Service: 5/27/2022    Reason for Consultation: No chief complaint on file. Referring Physician: Lay Johnson DO    History of Present Illness: 75-year-old male with history of coronary artery disease that is medically managed as shown below, diabetes, MDS, history of anemia status post prior blood transfusion, hyperlipidemia, hypertension, chronic low back pain, and thyroid disease who presented with dizziness and lightheadedness but denied falls and cardiology consulted. Patient is noted to have orthostasis and blood pressure is also tenuous and is on carvedilol. I have subsequently discontinued carvedilol given risk of significant hypotension with this medication and instead initiated very low-dose metoprolol succinate for atrial fibrillation management. Stress test reviewed: December 1 2021  The myocardial perfusion imaging was normal.       Overall left ventricular systolic function was normal, LVEF 65%.        Low risk myocardial perfusion study.       Compared to previous study from April 16, 2020 which showed no   ischemia, EF 63%.       Raleigh Rodriguez MD, [facility]         Left heart catheterization April 19, 2022  ANGIOGRAPHIC FINDINGS:  Moderate calcifications involving the coronary  tree.     The left main coronary artery arises normally from the left sinus of  Valsalva.  It is a mid-size vessel with mild disease.  It bifurcates  into left anterior descending artery and left circumflex artery.     The left anterior descending artery extends down to the apex.  It gives  off two diagonal branches.  The first diagonal branch is small in  diameter, long that has subtotal occlusion in the proximal segment.  The  LAD itself has mild-to-moderate disease.     The left circumflex artery is a large vessel, gives off high OM branch  that has 70% stenosis in the mid segment.  However, the vessel is a  1.5-mm vessel.  The left circumflex artery has 40% calcified lesion in  the proximal segment.  Then, it gives off a large OM branch.  The whole  proximal and mid segment of that OM branch is mildly to moderately  diseased, at worst point estimated at 50% stenosis.  There were faint  left-to-right collaterals noted.     The right coronary artery has mendoza's crook takeoff.  The right  coronary artery is severely diseased in the mid segment.  There is  heavily calcified lesion involving the mid segment.  There is another  sequential lesion estimated at 80% at the junction of the mid and distal  segment.  The vessel itself is a 2.5-mm vessel.  There is distal  competitive flow noted in the right PDA.     IMPRESSION:  Multivessel disease involving small- to mid-size first  diagonal branch, small first OM branch, second OM branch and RCA.     RECOMMENDATIONS:  We will optimize medical therapy.  If the patient  continues to be symptomatic despite optimal medical therapy, we will  bring him back for possible PCI.     Past Medical History:  Past Medical History:   Diagnosis Date    Diabetes mellitus ([facility] [address]     H/O cardiovascular stress test 12/01/2021    Lexiscan    History of blood transfusion     History of Holter monitoring 02/11/2022    Hyperlipidemia     Hypertension     Lower back pain     Thyroid disease        Past Surgical History:  Past Surgical History:   Procedure Laterality Date    BACK SURGERY  2000    fusion  lumbar area,      CARDIAC CATHETERIZATION  04/19/2022    JOINT REPLACEMENT Bilateral [id number]    knee     LITHOTRIPSY      SHOULDER ARTHROSCOPY Right 01/30/2020    with treatment    SHOULDER ARTHROSCOPY Right 1/30/2020    ARTHROSCOPIC EXAM AND TREATMENT RIGHT SHOULDER (CPT 55273,48452) RIGHT ROTATOR CUFF REPAIR, SUBACHROMIAL DECOMPRESSION, DEBRIDEMENT OF HUMERAL performed by 2002 Gorge Suresh DO at [address] GASTROINTESTINAL ENDOSCOPY N/A 4/18/2022    EGD ESOPHAGOGASTRODUODENOSCOPY performed by Jenaro Maldonado DO at [address] [facility] ENDOSCOPY       Family History:  Family History   Problem Relation Age of Onset    No Known Problems Mother     Diabetes Father        Social History:  Social History     Socioeconomic History    Marital status:      Spouse name: Not on file    Number of children: Not on file    Years of education: Not on file    Highest education level: Not on file   Occupational History    Not on file   Tobacco Use    Smoking status: Never Smoker    Smokeless tobacco: Never Used   Vaping Use    Vaping Use: Never used   Substance and Sexual Activity    Alcohol use: No    Drug use: No    Sexual activity: Not Currently     Partners: Female   Other Topics Concern    Not on file   Social History Narrative    Not on file     Social Determinants of Health     Financial Resource Strain: Low Risk     Difficulty of Paying Living Expenses: Not hard at all   Food Insecurity: No Food Insecurity    Worried About [address] in the Last Year: Never true    [address] [facility] [address] [facility] [address] in the Last Year: Never true   Transportation Needs:     Lack of Transportation (Medical): Not on file    Lack of Transportation (Non-Medical):  Not on file   Physical Activity:     Days of Exercise per Week: Not on file    Minutes of Exercise per Session: Not on file   Stress:     Feeling of Stress : Not on file   Social Connections:     Frequency of Communication with Friends and Family: Not on file    Frequency of Social Gatherings with Friends and Family: Not on file    Attends Congregational Services: Not on file    Active Member of Clubs or Organizations: Not on file    Attends Club or Organization Meetings: Not on file    Marital Status: Not on file   Intimate Partner Violence:     Fear of Current or Ex-Partner: Not on file    Emotionally Abused: Not on file    Physically Abused: Not on file    Sexually Abused: Not on file   Housing Stability:     Unable to Pay for Housing in the Last Year: Not on file    Number of Places Lived in the Last Year: Not on file    Unstable Housing in the Last Year: Not on file       Allergies: Allergies   Allergen Reactions    No Known Allergies        Home Medications:  Prior to Admission medications    Medication Sig Start Date End Date Taking?  Authorizing Provider   levothyroxine (SYNTHROID) 75 MCG tablet Take 1 tablet by mouth Daily 5/22/22   Reza Mcguire, DO   finasteride (PROSCAR) 5 MG tablet Take 1 tablet by mouth daily 5/22/22   Reza Mcguire, DO   isosorbide mononitrate (IMDUR) 30 MG extended release tablet Take 1 tablet by mouth daily 5/22/22   Reza Mcguire, DO   carvedilol (COREG) 6.25 MG tablet Take 1 tablet by mouth 2 times daily (with meals) 5/22/22   Reza Mcguire DO   predniSONE (DELTASONE) 5 MG tablet Take 1 tablet by mouth daily Daily at 3 pm 5/22/22   Reza Mcguire, DO   predniSONE (DELTASONE) 10 MG tablet Take 1 tablet by mouth every morning 5/22/22   Reza Mcguire, DO   potassium citrate (UROCIT-K) 10 MEQ (1080 MG) extended release tablet Take 1 tablet by mouth 2 times daily 5/22/22   Leobardo Mcguire, DO   insulin lispro, 1 Unit Dial, (HUMALOG KWIKPEN) 100 UNIT/ML SOPN Inject 30 Units into the skin 3 times daily (before meals) Patient is to take 10 units at breakfast and 10 units lunch and dinner 5/16/22 8/14/22  Orlin Goode, DO   furosemide (LASIX) 40 MG tablet Take 1 tablet by mouth daily 5/7/22 11/3/22  Reza Mcguire DO   aspirin 81 MG chewable tablet Take 1 tablet by mouth daily 4/23/22   Kia Crawford DO   atorvastatin (LIPITOR) 40 MG tablet Take 1 tablet by mouth nightly 4/22/22   Kia Crawford, DO   insulin glargine (LANTUS SOLOSTAR) 100 UNIT/ML injection pen Inject 10 Units into the skin nightly  Patient taking differently: Inject 25 Units into the skin nightly  4/19/22   Climmie Callow, DO   Insulin Pen Needle 29G X 12MM MISC 1 each by Does not apply route 4 times daily (before meals and nightly) 4/19/22   Dania Shore, DO   ONETOUCH ULTRA strip 1 each by Other route 4 times daily As needed. 2/13/22 5/14/22  Malena Mcguire DO   Lancets [facility] [id number]) MISC 1 strip by Other route 4 times daily 2/13/22 5/14/22  Malena Mcguire DO   therapeutic multivitamin-minerals [facility]) tablet Take 1 tablet by mouth daily.       Historical Provider, MD       Current Medications:  Current Facility-Administered Medications   Medication Dose Route Frequency Provider Last Rate Last Admin    acetaminophen (TYLENOL) tablet 650 mg  650 mg Oral Q6H PRN Delio Hews, DO        lactated ringers infusion   IntraVENous Continuous Delio Hews, DO 50 mL/hr at 05/27/22 0641 New Bag at 05/27/22 0641    insulin glargine (LANTUS) injection vial 20 Units  20 Units SubCUTAneous Nightly Malena Mcguire, DO   20 Units at 05/26/22 2322    glucose chewable tablet 16 g  4 tablet Oral PRN Leobardo Mcguire, DO        dextrose bolus 10% 125 mL  125 mL IntraVENous PRN Malena Mcguire, DO        Or    dextrose bolus 10% 250 mL  250 mL IntraVENous PRN Malena Mcguire, DO        glucagon (rDNA) injection 1 mg  1 mg IntraMUSCular PRN Leobardo Mcguire, DO        dextrose 5 % solution  100 mL/hr IntraVENous PRN Leobardo Mcguire, DO        insulin lispro (HUMALOG) injection vial 0-6 Units  0-6 Units SubCUTAneous TID  Leobardo Mcguire DO   4 Units at 05/27/22 1200    insulin lispro (HUMALOG) injection vial 0-3 Units  0-3 Units SubCUTAneous Nightly Leobardo Mcguire DO   3 Units at 05/26/22 2321    atorvastatin (LIPITOR) tablet 40 mg  40 mg Oral Nightly Leobardo Mcguire DO   40 mg at 05/26/22 2321    carvedilol (COREG) tablet 3.125 mg  3.125 mg Oral BID  Leobardo Mcguire DO   3.125 mg at 05/27/22 1064    finasteride (PROSCAR) tablet 5 mg  5 mg Oral Daily Leobardo Mcguire DO   5 mg at 05/27/22 7365    [Held by provider] furosemide (LASIX) tablet 20 mg  20 mg Oral Daily Leobardo Mcguire DO  isosorbide mononitrate (IMDUR) extended release tablet 30 mg  30 mg Oral Daily Leobardo TALI Maynor, DO   30 mg at 05/27/22 0925    levothyroxine (SYNTHROID) tablet 75 mcg  75 mcg Oral Daily Delio Mora DO   75 mcg at 05/27/22 8029    therapeutic multivitamin-minerals 1 tablet  1 tablet Oral Daily Malena Epp Bisel, DO   1 tablet at 05/27/22 0124    midodrine (PROAMATINE) tablet 5 mg  5 mg Oral TID  Leobardo CESAR Bisdaniel, DO   5 mg at 05/27/22 1200    0.9 % sodium chloride infusion   IntraVENous PRN Malena Jere Bisel, DO        potassium chloride (KLOR-CON M) extended release tablet 10 mEq  10 mEq Oral TID Malena Jere Bisel, DO   10 mEq at 05/27/22 1159    predniSONE (DELTASONE) tablet 15 mg  15 mg Oral Daily with breakfast Malenapeter Oquendo Bisel, DO   15 mg at 05/27/22 9551    And    predniSONE (DELTASONE) tablet 5 mg  5 mg Oral Dinner Leobardo Mcguire, DO          lactated ringers 50 mL/hr at 05/27/22 0641    dextrose      sodium chloride             Review of Systems:   Cardiac: As per HPI  General: Denies fever or chills  Pulmonary: As per HPI  HEENT: Denies runny nose  GI: No complaints  : No complaints  Endocrine: Denies night sweats  Musculoskeletal: No complaints  Skin: Dry skin  Neuro: No complaints  Psych: Denies depression    Physical Exam:  BP (!) 109/53   Pulse 85   Temp 97.8 °F (36.6 °C) (Oral)   Resp 18   SpO2 96%   Wt Readings from Last 3 Encounters:   05/19/22 155 lb 12.8 oz (70.7 kg)   05/11/22 151 lb (68.5 kg)   04/21/22 160 lb (72.6 kg)       Appearance: Alert and oriented x3 not in acute distress.   Skin: Dry skin  Head: Atraumatic  Eyes: Intact extraocular muscles   ENMT: Mucous membranes are moist  Neck: Supple  Lungs: Clear to auscultation  Cardiac: Normal S1 and S2  Abdomen: Protuberant  Extremities: Intact range of motion  Neurologic: No focal neurological deficits  Peripheral Pulses: 2+ peripheral pulses      Intake/Output:    Intake/Output Summary (Last 24 hours) at 5/27/2022 1656  Last data anterior descending artery extends down to the apex.  It gives  off two diagonal branches.  The first diagonal branch is small in  diameter, long that has subtotal occlusion in the proximal segment.  The  LAD itself has mild-to-moderate disease.     The left circumflex artery is a large vessel, gives off high OM branch  that has 70% stenosis in the mid segment.  However, the vessel is a  1.5-mm vessel.  The left circumflex artery has 40% calcified lesion in  the proximal segment.  Then, it gives off a large OM branch.  The whole  proximal and mid segment of that OM branch is mildly to moderately  diseased, at worst point estimated at 50% stenosis.  There were faint  left-to-right collaterals noted.     The right coronary artery has mendoza's crook takeoff.  The right  coronary artery is severely diseased in the mid segment.  There is  heavily calcified lesion involving the mid segment.  There is another  sequential lesion estimated at 80% at the junction of the mid and distal  segment.  The vessel itself is a 2.5-mm vessel.  There is distal  competitive flow noted in the right PDA.     IMPRESSION:  Multivessel disease involving small- to mid-size first  diagonal branch, small first OM branch, second OM branch and RCA.     RECOMMENDATIONS:  We will optimize medical therapy.  If the patient  continues to be symptomatic despite optimal medical therapy, we will  bring him back for possible PCI.         The ASCVD Risk score (Hanane Sandoval, et al., 2013) failed to calculate for the following reasons:     The 2013 ASCVD risk score is only valid for ages 36 to 78    The patient has a prior MI or stroke diagnosis    ASSESSMENT / PLAN:    Symptomatic orthostatic hypotension  I have discontinued carvedilol given its risk of hypotension  I have initiated low-dose metoprolol succinate for atrial fibrillation management  Discontinue metoprolol succinate if blood pressure falls again and instead use digoxin for management of atrial fibrillation if hypotensive  Uptitrate midodrine if persistent hypotension    Coronary artery disease   Left heart catheterization as shown above   Continue on statin and resume home dose Ranexa if there is no contraindication  2D Echo showed inferior wall motion abnormality with LV EF 55%.    Monitor closely  He is on Imdur and monitor closely if persistent hypotension to adjust this medication or to hold  He is also on beta-blocker and again as mentioned above  Agree with holding Lasix for now    Hypotension -  Uptitrate midodrine if persistent hypotension        PAF - Diagnosed in Jan 2022 - Was on Eliquis; Off Eliquis since Holter in Feb 2022 showed no A Fib and due to his Anemia. I have switch carvedilol to metoprolol succinate and monitor closely and if hypotension with metoprolol succinate discontinue this medication and initiate digoxin for A. fib management if kidney function allows. Acute on chronic Anemia -  s/p recent PRBC Transfusion; had Bone marrow bx - Hem/Onc following,      History of GI bleed      CKD -        Type 2 diabetes -Per primary service     Left diabetic foot wound     Interstitial lung disease - On O2      Hypothyroidism - On HRT         Thank you for allowing me to participate in your patient's care. Please feel free to contact me if you have any questions or concerns.     Alexis Locke MD  [facility] ([facility]) Cardiology

## 2022-05-27 NOTE — CARE COORDINATION
Patient is a [place] and chooses to use his Medicare this admission.  Electronically signed by Marjan Marshall on 5/27/2022 at 8:17 AM

## 2022-05-27 NOTE — PROGRESS NOTES
Internal Medicine Progress Note    [facility]    Evon Cardenas. Shonna Arcos., F.A.C.O.I. Camila Ohara D.O., JAYNEONAV Morris D.O. Johnson Coulter, MSN, APRN, NP-C  Racquel Evans, MSN, APRN-CNP     Primary Care Physician: Ricardo Morris DO   Admitting Physician:  Lisandro Kenyon DO  Admission date and time: 5/26/2022 10:52 AM    Room:  [facility]-  Admitting diagnosis: Orthostatic hypotension [I95.1]    Patient Name: Endy Bocanegra  MRN: [id number]    Date of Service: 5/27/2022     Subjective:  Bob Negron is a 80 y.o. male who was seen and examined today,5/27/2022, at the bedside. The patient does seem to be improved today. He has been transfused 1 unit of packed cells. With IV hydration therapy he appear to be less orthostatic. He denies any complaints. Patient has been seen by hematology and plan for outpatient institution of therapy. Cardiology has been consulted with plan for changing beta-blockers. Overall he has had 2 bowel movements and we will check stools for occult blood. Improvement noted over yesterday. Denies chest pain or shortness of breath complaining of nose pain secondary to recent fall        Review of System:   Constitutional:   Denies fever or chills, weight loss or gain. Appears less fatigue  HEENT:   Denies ear pain, sore throat, sinus or eye problems. Complains of nose pain secondary to fall nasal abrasion  Cardiovascular:   Denies any chest pain, irregular heartbeats, or palpitations. Respiratory:   Denies shortness of breath, coughing, sputum production, hemoptysis, or wheezing. Gastrointestinal:   Denies nausea, vomiting, diarrhea, or constipation. Denies any abdominal pain. Genitourinary:    Denies any urgency, frequency, hematuria. Voiding  without difficulty. Extremities:   Denies lower extremity swelling, edema or cyanosis.    Neurology:    Denies any headache or focal neurological deficits, Denies generalized weakness or memory difficulty. Improved dizziness  Psch:   Denies being anxious or depressed. Musculoskeletal:    Denies  myalgias, joint complaints or back pain. Integumentary:   Denies any rashes, ulcers, or excoriations. Denies bruising. Hematologic/Lymphatic:  Denies bruising or bleeding. Physical Exam:  No intake/output data recorded. Intake/Output Summary (Last 24 hours) at 5/27/2022 1719  Last data filed at 5/27/2022 0400  Gross per 24 hour   Intake 1085 ml   Output    Net 1085 ml   I/O last 3 completed shifts: In: 2079 [P.O.:370; Blood:715]  Out: -   No data found. Vital Signs:   Blood pressure (!) 109/53, pulse 85, temperature 97.8 °F (36.6 °C), temperature source Oral, resp. rate 18, SpO2 96 %. General appearance:  Alert, responsive, oriented to person, place, and time. Well preserved, alert, no distress. Head:  Normocephalic. No masses, lesions or tenderness. Eyes:  PERRLA. EOMI. Sclera clear. Buccal mucosa moist.  ENT:  Ears normal. Mucosa normal.  Abrasion on nose  Neck:    Supple. Trachea midline. No thyromegaly. No JVD. No bruits. Heart:    Rhythm regular. Rate controlled. No murmurs. Lungs:    Symmetrical. Clear to auscultation bilaterally. No wheezes. No rhonchi. No rales. Abdomen:   Soft. Non-tender. Non-distended. Bowel sounds positive. No organomegaly or masses. No pain on palpation. Extremities:    Peripheral pulses present. No peripheral edema. No ulcers. No cyanosis. No clubbing. Neurologic:    Alert x 3. No focal deficit. Cranial nerves grossly intact. Weakness improved  Psych:   Behavior is normal. Mood appears normal. Speech is not rapid and/or pressured. Musculoskeletal:   Spine ROM normal. Muscular strength intact. Gait not assessed. Integumentary:  No rashes  Skin normal color and texture.   Genitalia/Breast:  Deferred    Medication:  Scheduled Meds:   metoprolol succinate  12.5 mg Oral Daily    insulin glargine  20 Units SubCUTAneous Nightly    insulin lispro  0-6 Units SubCUTAneous TID WC    insulin lispro  0-3 Units SubCUTAneous Nightly    atorvastatin  40 mg Oral Nightly    finasteride  5 mg Oral Daily    [Held by provider] furosemide  20 mg Oral Daily    isosorbide mononitrate  30 mg Oral Daily    levothyroxine  75 mcg Oral Daily    therapeutic multivitamin-minerals  1 tablet Oral Daily    midodrine  5 mg Oral TID WC    potassium chloride  10 mEq Oral TID    predniSONE  15 mg Oral Daily with breakfast    And    predniSONE  5 mg Oral Dinner     Continuous Infusions:   lactated ringers 50 mL/hr at 05/27/22 0641    dextrose      sodium chloride         Objective Data:  CBC with Differential:    Lab Results   Component Value Date    WBC 6.0 05/27/2022    RBC 2.36 05/27/2022    HGB 8.5 05/27/2022    HCT 24.1 05/27/2022     05/27/2022    .1 05/27/2022    MCH 36.0 05/27/2022    MCHC 35.3 05/27/2022    RDW 22.9 05/27/2022    NRBC 2.7 05/27/2022    SEGSPCT 48 07/06/2013    BLASTSPCT 0.9 05/16/2022    METASPCT 0.9 05/16/2022    LYMPHOPCT 21.2 05/27/2022    MONOPCT 9.7 05/27/2022    MYELOPCT 1.0 04/22/2022    BASOPCT 0.2 05/27/2022    MONOSABS 0.60 05/27/2022    LYMPHSABS 1.26 05/27/2022    EOSABS 0.00 05/27/2022    BASOSABS 0.00 05/27/2022     CMP:    Lab Results   Component Value Date     05/27/2022    K 3.0 05/27/2022    K 3.5 05/24/2022    CL 93 05/27/2022    CO2 29 05/27/2022    BUN 39 05/27/2022    CREATININE 1.4 05/27/2022    GFRAA 58 05/27/2022    LABGLOM 48 05/27/2022    GLUCOSE 286 05/27/2022    GLUCOSE 111 03/29/2012    PROT 6.0 05/27/2022    LABALBU 2.9 05/27/2022    LABALBU 4.4 03/29/2012    CALCIUM 7.9 05/27/2022    BILITOT 0.6 05/27/2022    ALKPHOS 60 05/27/2022    AST 16 05/27/2022    ALT 19 05/27/2022              Assessment:    · Symptomatic orthostasis possibly secondary to medications, but not exclude that of secondary adrenal insufficiency.   · Coronary artery disease with cardiac catheterization in 04/2022 showing small to moderate multiple vessel disease with elevated troponin suggesting demand ischemia. · Macrocytic anemia with recent bone marrow performed in 04/2022 showing probable myelodysplastic syndrome. · Chronic renal insufficiency stage III, aggravated by dehydration. · Type 2 diabetes mellitus, aggravated by steroids. 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 · Primary hypothyroidism, on Synthroid. · Hyperlipidemia, on statin agent. · BPH on Proscar  · Anemia with transfusion with stools positive for occult blood      Plan:     · Patient has been discontinued from [address]. Placed on Toprol-XL  · Continue midodrine and monitor blood pressure  · Stool came back positive for occult blood. We will continue PPIs. Recent upper GI panendoscopy in April did not show any acute bleed. If continued drop might require further endoscopy  · Continue work with physical therapy  · Adjust insulin as tolerated since diet appear to be improving  · Plan is for discharge home with physical therapy    More than 50% of my  time was spent at the bedside counseling/coordinating care with the patient and/or family with face to face contact. This time was spent reviewing notes and laboratory data as well as instructing and counseling the patient. Time I spent with the family or surrogate(s) is included only if the patient was incapable of providing the necessary information or participating in medical decisions. I also discussed the differential diagnosis and all of the proposed management plans with the patient and individuals accompanying the patient. Elayne Margie requires this high level of physician care and nursing on the Telemetry unit due the complexity of decision management and chance of rapid decline or death. Continued cardiac monitoring and higher level of nursing are required.  I am readily available for any further decision-making and intervention.      Taina Jasso DO, F.A.C.OAngelinaI.  5/27/2022  5:19 PM

## 2022-05-27 NOTE — PROGRESS NOTES
labrum 01/30/2020    Bursitis of right shoulder 01/30/2020        Past Surgical History:   Procedure Laterality Date    BACK SURGERY  2000    fusion  lumbar area,      CARDIAC CATHETERIZATION  04/19/2022    JOINT REPLACEMENT Bilateral [id number]    knee     LITHOTRIPSY      SHOULDER ARTHROSCOPY Right 01/30/2020    with treatment    SHOULDER ARTHROSCOPY Right 1/30/2020    ARTHROSCOPIC EXAM AND TREATMENT RIGHT SHOULDER (CPT 82897,47018) RIGHT ROTATOR CUFF REPAIR, SUBACHROMIAL DECOMPRESSION, DEBRIDEMENT OF HUMERAL performed by Nemo Thakkar DO at [address] [facility] [place] ENDOSCOPY N/A 4/18/2022    EGD ESOPHAGOGASTRODUODENOSCOPY performed by Howie Quan DO at [address] [facility] History  Family History   Problem Relation Age of Onset    No Known Problems Mother     Diabetes Father        Social History  Social History     Socioeconomic History    Marital status:      Spouse name: Not on file    Number of children: Not on file    Years of education: Not on file    Highest education level: Not on file   Occupational History    Not on file   Tobacco Use    Smoking status: Never Smoker    Smokeless tobacco: Never Used   Vaping Use    Vaping Use: Never used   Substance and Sexual Activity    Alcohol use: No    Drug use: No    Sexual activity: Not Currently     Partners: Female   Other Topics Concern    Not on file   Social History Narrative    Not on file     Social Determinants of Health     Financial Resource Strain: Low Risk     Difficulty of Paying Living Expenses: Not hard at all   Food Insecurity: No Food Insecurity    Worried About [address] in the Last Year: Never true    [address] in the Last Year: Never true   Transportation Needs:     Lack of Transportation (Medical): Not on file    Lack of Transportation (Non-Medical):  Not on file   Physical Activity:     Days of Exercise per Week: Not on file    Minutes of Exercise per Session: Not on file   Stress:     Feeling of Stress : Not on file   Social Connections:     Frequency of Communication with Friends and Family: Not on file    Frequency of Social Gatherings with Friends and Family: Not on file    Attends Bahai Services: Not on file    Active Member of Clubs or Organizations: Not on file    Attends Club or Organization Meetings: Not on file    Marital Status: Not on file   Intimate Partner Violence:     Fear of Current or Ex-Partner: Not on file    Emotionally Abused: Not on file    Physically Abused: Not on file    Sexually Abused: Not on file   Housing Stability:     Unable to Pay for Housing in the Last Year: Not on file    Number of [place] in the Last Year: Not on file    Unstable Housing in the Last Year: Not on file        TOBACCO:   reports that he has never smoked. He has never used smokeless tobacco.  ETOH:   reports no history of alcohol use. Home Medications  Prior to Admission medications    Medication Sig Start Date End Date Taking?  Authorizing Provider   levothyroxine (SYNTHROID) 75 MCG tablet Take 1 tablet by mouth Daily 5/22/22   [facility] Maynor DO   finasteride (PROSCAR) 5 MG tablet Take 1 tablet by mouth daily 5/22/22   [facility] Maynor, DO   isosorbide mononitrate (IMDUR) 30 MG extended release tablet Take 1 tablet by mouth daily 5/22/22   [facility] Maynor DO   carvedilol (COREG) 6.25 MG tablet Take 1 tablet by mouth 2 times daily (with meals) 5/22/22   [facility] DO Maynor   predniSONE (DELTASONE) 5 MG tablet Take 1 tablet by mouth daily Daily at 3 pm 5/22/22   Leobardo Mcguire DO   predniSONE (DELTASONE) 10 MG tablet Take 1 tablet by mouth every morning 5/22/22   [facility] DO Maynor   potassium citrate (UROCIT-K) 10 MEQ (1080 MG) extended release tablet Take 1 tablet by mouth 2 times daily 5/22/22   Leobardo Mcguire DO   insulin lispro, 1 Unit Dial, (HUMALOG KWIKPEN) 100 UNIT/ML SOPN Inject 30 Units into the skin 3 times daily (before meals) Patient is to take 10 units at breakfast and 10 units lunch and dinner 5/16/22 8/14/22  Aniya Diaz,    furosemide (LASIX) 40 MG tablet Take 1 tablet by mouth daily 5/7/22 11/3/22  Alba Mcguire DO   aspirin 81 MG chewable tablet Take 1 tablet by mouth daily 4/23/22   Nicole Drain, DO   atorvastatin (LIPITOR) 40 MG tablet Take 1 tablet by mouth nightly 4/22/22   Nicole Drain, DO   insulin glargine (LANTUS SOLOSTAR) 100 UNIT/ML injection pen Inject 10 Units into the skin nightly  Patient taking differently: Inject 25 Units into the skin nightly  4/19/22   Nicole Drain, DO   Insulin Pen Needle 29G X 12MM MISC 1 each by Does not apply route 4 times daily (before meals and nightly) 4/19/22   Nicole Drain, DO   ONETOUCH ULTRA strip 1 each by Other route 4 times daily As needed. 2/13/22 5/14/22  Alba Mcguire, DO   Lancets [facility] PLUS [id number]) MISC 1 strip by Other route 4 times daily 2/13/22 5/14/22  Alba Mcguire, DO   therapeutic multivitamin-minerals [facility]) tablet Take 1 tablet by mouth daily. Historical Provider, MD       Allergies  Allergies   Allergen Reactions    No Known Allergies            Objective  BP (!) 116/48   Pulse 91   Temp 98.5 °F (36.9 °C)   Resp 18   SpO2 98%     Physical Exam:   Performance Status:  General: AAO to person, place, time, in no acute distress, pleasant   Head and neck : PERRLA, EOMI . Sclera non icteric. Oropharynx : Clear  Neck: no JVD,  no adenopathy, no carotid bruit  LYMPHATICS : No peripheral lymphadenopathy. Heart: Regular rate and regular rhythm, no murmur  Lungs: Clear to auscultation   Extremities: No edema,no cyanosis, no clubbing. Abdomen: Soft, non-tender;no masses, no organomegaly  Skin:  No rash. Neurologic:Cranial nerves grossly intact. No focal motor or sensory deficits .     Recent Laboratory Data-   Lab Results   Component Value Date    WBC 6.0 05/27/2022    HGB 8.5 (L) 05/27/2022    HCT 24.1 (L) 05/27/2022 .1 (H) 05/27/2022     05/27/2022    LYMPHOPCT 21.2 05/27/2022    RBC 2.36 (L) 05/27/2022    MCH 36.0 (H) 05/27/2022    MCHC 35.3 (H) 05/27/2022    RDW 22.9 (H) 05/27/2022    NEUTOPHILPCT 69.0 05/27/2022    MONOPCT 9.7 05/27/2022    BASOPCT 0.2 05/27/2022    NEUTROABS 4.14 05/27/2022    LYMPHSABS 1.26 (L) 05/27/2022    MONOSABS 0.60 05/27/2022    EOSABS 0.00 (L) 05/27/2022    BASOSABS 0.00 05/27/2022       Lab Results   Component Value Date     05/27/2022    K 3.0 (L) 05/27/2022    CL 93 (L) 05/27/2022    CO2 29 05/27/2022    BUN 39 (H) 05/27/2022    CREATININE 1.4 (H) 05/27/2022    GLUCOSE 286 (H) 05/27/2022    CALCIUM 7.9 (L) 05/27/2022    PROT 6.0 (L) 05/27/2022    LABALBU 2.9 (L) 05/27/2022    BILITOT 0.6 05/27/2022    ALKPHOS 60 05/27/2022    AST 16 05/27/2022    ALT 19 05/27/2022    LABGLOM 48 05/27/2022    GFRAA 58 05/27/2022       Lab Results   Component Value Date    IRON 122 04/14/2022    TIBC 140 (L) 04/14/2022    FERRITIN 3,037 04/14/2022           Radiology-    XR CHEST (2 VW)   Final Result   Chronic scarring in the lungs. CT FACIAL BONES WO CONTRAST   Final Result   No acute facial bone trauma. 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 Scheduled to start treatment with Nishi Collar today. Admitted with orthostatic hypotension. Feels better with hydration. Medical management by Dr. Heidy Petty. Macrocytic anemia due to MDS. Transfuse for hemoglobin less than 7. We will continue to follow. 5/27/22  - CBC with Hgb 8.5, s/p 1 unit pRBC yesterday. WBC and platelets WNL  - Macrocytosis due to MDS-MLD (high risk category)  - Cr improved to 1.4 today  - Continues on IVF.  BP improved  - Will rearranged scheduled start date for [facility] therapy after DC    Electronically signed by Jack Armenta MD on 5/27/2022 at 12:45 PM

## 2022-05-27 NOTE — PROGRESS NOTES
Liza Casas, NP with Dr. Slick Gutierrezows that patient's  this evening and patient on Low does scale of Humalog and on Regular diet. Received the order to give 8 units total once for this dinner and would adjust scale/diet tomorrow.

## 2022-05-28 NOTE — PROGRESS NOTES
Internal Medicine Progress Note    [facility]    Leandra Collet. Aisha Oh., JAYNEOMIKE. Renaldo Whittington D.O., IOANA Meneses D.O. John Butcher, MSN, APRN, NP-C  Joe Castilloain. Jose A Calvin, MSN, APRN-CNP     Primary Care Physician: Franky Meneses DO   Admitting Physician:  Ines Arredondo DO  Admission date and time: 5/26/2022 10:52 AM    Room:  [address] [place] [address]  Admitting diagnosis: Orthostatic hypotension [I95.1]    Patient Name: James Sprain  MRN: [id number]    Date of Service: 5/28/2022     Subjective:  Hosea Barry is a 80 y.o. male who was seen and examined today,5/28/2022, at the bedside. Patient continued to improve. Left knee pain which was present yesterday seem to be improving. X-ray showed no acute pathology. Hemoglobin adequate has dropped slightly. We will check stools for occult blood. CEA ordered was elevated at 12.1. Doing well with physical therapy and walked in the hallway. Still have evidence of desaturation with activity and wearing oxygen    No family member present      Review of System:   Constitutional:   Denies fever or chills, weight loss or gain. Appears less fatigue  HEENT:   Denies ear pain, sore throat, sinus or eye problems. Complains of nose pain secondary to fall nasal abrasion  Cardiovascular:   Denies any chest pain, irregular heartbeats, or palpitations. Respiratory:   Denies shortness of breath, coughing, sputum production, hemoptysis, or wheezing. Gastrointestinal:   Denies nausea, vomiting, diarrhea, or constipation. Denies any abdominal pain. Genitourinary:    Denies any urgency, frequency, hematuria. Voiding  without difficulty. Extremities:   Denies lower extremity swelling, edema or cyanosis. Neurology:    Denies any headache or focal neurological deficits, Denies generalized weakness or memory difficulty. Improved dizziness  Psch:   Denies being anxious or depressed.   Musculoskeletal:    Denies  myalgias, joint complaints or back pain. Integumentary:   Denies any rashes, ulcers, or excoriations. Denies bruising. Hematologic/Lymphatic:  Denies bruising or bleeding. Physical Exam:  No intake/output data recorded. Intake/Output Summary (Last 24 hours) at 5/28/2022 1555  Last data filed at 5/28/2022 0400  Gross per 24 hour   Intake 2173.26 ml   Output    Net 2173.26 ml   I/O last 3 completed shifts: In: 3258.3 [P.O.:600; I.V.:1943.3; Blood:715]  Out: -   No data found. Vital Signs:   Blood pressure (!) 114/58, pulse 93, temperature 99.1 °F (37.3 °C), temperature source Oral, resp. rate 23, SpO2 96 %. General appearance:  Alert, responsive, oriented to person, place, and time. Well preserved, alert, no distress. Head:  Normocephalic. No masses, lesions or tenderness. Eyes:  PERRLA. EOMI. Sclera clear. Buccal mucosa moist.  ENT:  Ears normal. Mucosa normal.  Abrasion on nose  Neck:    Supple. Trachea midline. No thyromegaly. No JVD. No bruits. Heart:    Rhythm regular. Rate controlled. No murmurs. Lungs:    Symmetrical. Clear to auscultation bilaterally. No wheezes. No rhonchi. No rales. Abdomen:   Soft. Non-tender. Non-distended. Bowel sounds positive. No organomegaly or masses. No pain on palpation. Extremities:    Peripheral pulses present. No peripheral edema. No ulcers. No cyanosis. No clubbing. Neurologic:    Alert x 3. No focal deficit. Cranial nerves grossly intact. Weakness improved  Psych:   Behavior is normal. Mood appears normal. Speech is not rapid and/or pressured. Musculoskeletal:   Spine ROM normal. Muscular strength intact. Gait not assessed. Integumentary:  No rashes  Skin normal color and texture.   Genitalia/Breast:  Deferred    Medication:  Scheduled Meds:   insulin lispro  5 Units SubCUTAneous TID WC    polyethylene glycol  17 g Oral Daily    metoprolol succinate  12.5 mg Oral Daily    pantoprazole (PROTONIX) 40 mg injection  40 mg IntraVENous Q12H    insulin lispro  0-18 Units SubCUTAneous TID WC    insulin lispro  0-9 Units SubCUTAneous Nightly    insulin glargine  25 Units SubCUTAneous Nightly    atorvastatin  40 mg Oral Nightly    finasteride  5 mg Oral Daily    [Held by provider] furosemide  20 mg Oral Daily    isosorbide mononitrate  30 mg Oral Daily    levothyroxine  75 mcg Oral Daily    therapeutic multivitamin-minerals  1 tablet Oral Daily    midodrine  5 mg Oral TID WC    potassium chloride  10 mEq Oral TID    predniSONE  15 mg Oral Daily with breakfast    And    predniSONE  5 mg Oral Dinner     Continuous Infusions:   dextrose         Objective Data:  CBC with Differential:    Lab Results   Component Value Date    WBC 4.6 05/28/2022    RBC 2.21 05/28/2022    HGB 7.9 05/28/2022    HCT 23.0 05/28/2022     05/28/2022    .1 05/28/2022    MCH 35.7 05/28/2022    MCHC 34.3 05/28/2022    RDW 23.2 05/28/2022    NRBC 1.7 05/28/2022    SEGSPCT 48 07/06/2013    BLASTSPCT 0.9 05/16/2022    METASPCT 0.9 05/16/2022    LYMPHOPCT 24.3 05/28/2022    MONOPCT 11.3 05/28/2022    MYELOPCT 1.0 04/22/2022    BASOPCT 0.9 05/28/2022    MONOSABS 0.51 05/28/2022    LYMPHSABS 1.10 05/28/2022    EOSABS 0.04 05/28/2022    BASOSABS 0.04 05/28/2022     CMP:    Lab Results   Component Value Date     05/28/2022    K 4.2 05/28/2022    K 3.5 05/24/2022     05/28/2022    CO2 30 05/28/2022    BUN 28 05/28/2022    CREATININE 1.1 05/28/2022    GFRAA >60 05/28/2022    LABGLOM >60 05/28/2022    GLUCOSE 227 05/28/2022    GLUCOSE 111 03/29/2012    PROT 5.9 05/28/2022    LABALBU 3.0 05/28/2022    LABALBU 4.4 03/29/2012    CALCIUM 8.2 05/28/2022    BILITOT 0.3 05/28/2022    ALKPHOS 58 05/28/2022    AST 17 05/28/2022    ALT 20 05/28/2022              Assessment:    · Symptomatic orthostasis possibly secondary to medications, but not exclude that of secondary adrenal insufficiency.   · Coronary artery disease with cardiac catheterization in 04/2022 showing small to moderate multiple vessel disease with elevated troponin suggesting demand ischemia. · Macrocytic anemia with recent bone marrow performed in 04/2022 showing probable myelodysplastic syndrome. · Chronic renal insufficiency stage III, aggravated by dehydration. · Type 2 diabetes mellitus, aggravated by steroids. 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 · Primary hypothyroidism, on Synthroid. · Hyperlipidemia, on statin agent. · BPH on Proscar  · Anemia with transfusion with stools positive for occult blood      Plan:     · Blood pressures seem to be improved. Orthostasis resolved. Tolerating medication  · Slight drop in hemoglobin. Initial stool was positive for occult blood we will continue to monitor. GI has been consulted  · Elevated CEA of 12.1 and possibly will require colonoscopic exam  · Continue work with physical therapy  · Discontinue IVs  · Add Humalog 5 units with each meals and continue sliding scale  · CT with oral but no IV contrast    More than 50% of my  time was spent at the bedside counseling/coordinating care with the patient and/or family with face to face contact. This time was spent reviewing notes and laboratory data as well as instructing and counseling the patient. Time I spent with the family or surrogate(s) is included only if the patient was incapable of providing the necessary information or participating in medical decisions. I also discussed the differential diagnosis and all of the proposed management plans with the patient and individuals accompanying the patient. Ace Campbell requires this high level of physician care and nursing on the Telemetry unit due the complexity of decision management and chance of rapid decline or death. Continued cardiac monitoring and higher level of nursing are required.  I am readily available for any further decision-making and intervention.      Malena Mcguire DO, F.A.C.O.I.  5/28/2022  3:55 PM

## 2022-05-28 NOTE — PROGRESS NOTES
Physical Therapy Treatment Note/Plan of Care    Room #:  5195/6209-46  Patient Name: Liz De La Torre  YOB: 1933  MRN: [id number]    Date of Service: 5/28/2022     Tentative placement recommendation: Subacute vs Home Health Physical Therapy if patient meets goals  Equipment recommendation: To be determined and Patient has needed equipment       Evaluating Physical Therapist: Sharon Ghosh, [address] [facility] #[id number]      Specific Provider Orders/Date/Referring Provider :  05/26/22 1130   PT eval and treat Start: 05/26/22 1130, End: 05/26/22 1130, ONE TIME, Standing Count: 1 Occurrences, R    Katerine Bianchi DO      Admitting Diagnosis:   Orthostatic hypotension [I95.1]       Surgery:         Patient Active Problem List   Diagnosis    Incomplete tear of right rotator cuff    Long biceps tear    Injury of tendon of long head of right biceps    Tear of right glenoid labrum    Bursitis of right shoulder    Essential hypertension    Acquired hypothyroidism    Dyslipidemia    Type 2 diabetes mellitus without complication, without long-term current use of insulin ([facility])    Normocytic anemia    NSTEMI (non-ST elevated myocardial infarction) ([address]    Chronic renal disease, stage III ([address] [247671]    Thrombocytopenia, unspecified    MDS (myelodysplastic syndrome) ([address]    Orthostatic hypotension        ASSESSMENT of Current Deficits Patient exhibits decreased strength, balance and endurance impairing functional mobility, transfers, gait , gait distance and tolerance to activity Pt ambulated on room air this session with increased distance. No complaints of dizziness with positional changes or during ambulation. Cues necessary for slowing pace, PLB, safety. Some SOB with decreased O2 saturation during activity which improved when 3L O2 donned, and cues purse lip breathing; recovered within 1min.  Patient requires continued skilled physical therapy to address concerns listed above for increased safety and function at discharge. PHYSICAL THERAPY  PLAN OF CARE       Physical therapy plan of care is established based on physician order,  patient diagnosis and clinical assessment    Current Treatment Recommendations:    -Sitting Balance: Incorporate reaching activities to activate trunk muscles   -Standing Balance: Perform strengthening exercises in standing to promote motor control with or without upper extremity support   -Transfers: Provide instruction on proper hand and foot position for adequate transfer of weight onto lower extremities and use of gait device if needed and Cues for hand placement, technique and safety. Provide stabilization to prevent fall   -Gait: Gait training, Standing activities to improve: base of support, weight shift, weight bearing  and Pregait training to emphasize: Sequencing , Device control, Upright and Safety   -Endurance: Utilize Supervised activities to increase level of endurance to allow for safe functional mobility including transfers and gait   -Stairs: Stair training with instruction on proper technique and hand placement on rail  -Instruction in independent management of O2 line    PT long term treatment goals are located in below grid    Patient and or family understand(s) diagnosis, prognosis, and plan of care. Frequency of treatments: Patient will be seen  daily.          Prior Level of Function: Patient ambulated with rollator or wheeled walker     Rehab Potential: good    for baseline    Past medical history:   Past Medical History:   Diagnosis Date    Diabetes mellitus [facility]     H/O cardiovascular stress test 12/01/2021    Lexiscan    History of blood transfusion     History of Holter monitoring 02/11/2022    Hyperlipidemia     Hypertension     Lower back pain     Thyroid disease      Past Surgical History:   Procedure Laterality Date    BACK SURGERY  2000    fusion  lumbar area,      CARDIAC CATHETERIZATION  04/19/2022    JOINT REPLACEMENT Bilateral [id number] Term   Goals   Was pt agreeable to Eval/treatment? Yes yes To be met in 3 days   Pain level   0/10    0/10     Bed Mobility    Rolling: Supervision     Supine to sit: Minimal assist of 1    Sit to supine: Not assessed     Scooting: Supervision    Rolling: Supervision    Supine to sit: Supervision    Sit to supine: Supervision    Scooting: Supervision   Rolling: Independent    Supine to sit:  Independent    Sit to supine: Independent    Scooting: Independent     Transfers Sit to stand: Minimal assist of 1 Cues for hand placement and safety  Sit to stand: Supervision  cues for safety   Sit to stand: Modified Independent     Ambulation    1 x 4 feet 2 x 20 feet using  wheeled walker with Minimal assist of 1   for balance and O2 line management  and cues for upright posture, safety, pacing and pursed lip breathing 1 x 6 ft 2 x 50 ft using  wheeled walker with Minimal assist of 1   cues for safety, pacing and pursed lip breathing   Ambulated on room air  100 feet using  wheeled walker with Modified Independent indep O2 line management    Stair negotiation: ascended and descended   Not assessed     2 steps  with supervision    ROM Within functional limits    Increase range of motion 10% of affected joints    Strength BUE:  refer to OT eval  RLE:  3+/5  LLE:  3+/5  Increase strength in affected mm groups by 1/3 grade   Balance Sitting EOB:  good -  Dynamic Standing:  fair wheeled walker   Sitting EOB:  good    Dynamic Standing: good wheeled walker      Patient is Alert & Oriented x person, place, time and situation and follows directions    Sensation:  Patient  reports numbness/tingling right 4-5th digits getting better     Edema:  no   Endurance: fair  -    Vitals: 2 liters nasal cannula   Blood Pressure at rest  Blood Pressure during session    Heart Rate at rest 95 Heart Rate during session 111   SPO2 at rest 96%  SPO2 during session 85%     Patient education  Patient educated on role of Physical Therapy, risks of immobility, safety and plan of care,  importance of mobility while in hospital , purse lip breathing, ankle pumps, quad set and glut set for edema control, blood clot prevention, safety  and O2 line management and safety      Patient response to education:   Pt verbalized understanding Pt demonstrated skill Pt requires further education in this area   Yes Partial Yes      Treatment:  Patient practiced and was instructed/facilitated in the following treatment: Patient assisted to edge of bed, Sat edge of bed 10 minutes with Supervision  to increase dynamic sitting balance and activity tolerance. Ambulated on room air to bathroom, performed hygiene then ambulated in hallway then back to seated EOB. Minimal assist for for safety, decreasing pace. SpO2 declined 94%-83% recovered > 1 min with reapplication of 3L O2 and PLB. Doffed/donned new gown. Therapeutic Exercises:  ankle pumps  x 15 reps. At end of session, patient in bed with   call light and phone within reach,  all lines and tubes intact, Dr Filemon Rosario present. Patient would benefit from continued skilled Physical Therapy to improve functional independence and quality of life. Patient's/ family goals   feel better    Time in  0850  Time out  0925    Total Treatment Time  35 minutes    Evaluation time includes thorough review of current medical information, gathering information on past medical history/social history and prior level of function, completion of standardized testing/informal observation of tasks, assessment of data, and development of Plan of care and goals.      CPT codes:  Therapeutic activities (81127)   20 minutes  1 unit(s)  Gait Training (32170) 15 minutes 1 unit(s)    Matheus Whipple PTA Lic# [id number]

## 2022-05-28 NOTE — CONSULTS
Name:  Liz De La Torre  :  1933  MRN:  [id number]  Room:  99/4000-23  DOS:  2022    [address] [facility]  [facility]  Dr. Ashwini Seth M.D. Dr. Bishop Simpson M.D. Dr. Han Wren D.O. Dr. Carol Bravo M.D. Dr. Bia Clemente D.O.     -NP Consult Note-    PCP:  James Sandoval DO  Admitting Physician:  Katerine Bianchi DO  Consultants:  GI, heme/onc  Chief Complaint:  No chief complaint on file. Reason for Consult:  Anemia    History of Present Illness  Liz De La Torre is a 80 y.o. male on consult for anemia. Patient initially presented to the hospital with complaints of dizziness and falls at home. He reports that he became dizzy and fell forward and hit his face. He has an abrasion on his nose. He reports epistaxis following his fall. He currently denies chest pain or shortness of breath, although notes indicate that he had complained of chest pressure previously. He denies nausea or vomiting. Specifically denies hematemesis or emesis of coffee-ground material.  He denies abdominal pain. He denies fever or sweats. He reports that he occasionally has chills. He reports several small bowel movements per day. Stools are soft and light brown. Small volume stool. He reports that he occasionally will have large-volume diarrhea, but none recently. He denies any melena or hematochezia. PMH: MDS, CAD, MI, hypertension, dyslipidemia, thyroid disease, diabetes, history of kidney stones, history of pulmonary fibrosis. PSH: Cardiac catheterization, bilateral knee replacement, back surgery, right shoulder surgery, tonsillectomy. EGD 2022 with findings of normal examined esophagus, no varices, gastritis, small duodenal diverticulum. No bleeding source identified. No prior colonoscopy. Family history: Father with diabetes and lung issues. Brother with diabetes. Mother with leukemia.   He is unaware of any other personal or family history of GI issues or malignancy. Social history: Patient was never smoker. Denies significant alcohol use. Denies current or past recreational or illicit drug use    On arrival, WBC 5.8, hemoglobin 7.4, hematocrit 21.8. Macrocytic with .7. Platelets 143. BUN 44, creatinine 1.7, sodium 129, potassium 3.3, chloride 88, CO2 28, calcium 7.8. Blood glucose 441. Elevated troponin 131. Total bilirubin 0.4, ALT 20, AST 17, alkaline phosphatase 65. Elevated hemoglobin A1c 8.9. COVID test was negative. Prior head CT showing no acute findings, cortical atrophy and periventricular leukomalacia. CT facial bones was unremarkable. Chest x-ray showing chronic scarring, no acute findings otherwise. Prior CT of the abdomen and pelvis 4/14/2022 showed unremarkable nonenhanced liver/spleen/adrenals, bilateral nonobstructing renal calculi, diverticulosis without evidence of diverticulitis, Morales catheter in place, no significant lymphadenopathy, degenerative changes of the lumbar spine, prior surgical changes of the spine.       TRIAGE VITALS  BP: 129/63, Temp: 97.5 °F (36.4 °C), Heart Rate: 81, Resp: 18, SpO2: 100 %    Vitals:    05/27/22 0750 05/27/22 1615 05/27/22 1953 05/28/22 0800   BP: (!) 116/48 (!) 109/53 117/62 129/63   Pulse: 91 85 91 81   Resp: 18 18 17 18   Temp: 98.5 °F (36.9 °C) 97.8 °F (36.6 °C)  97.5 °F (36.4 °C)   TempSrc:  Oral  Oral   SpO2: 98% 96% 97% 100%         Histories  Past Medical History:   Diagnosis Date    Diabetes mellitus ([facility] [address]     H/O cardiovascular stress test 12/01/2021    Lexiscan    History of blood transfusion     History of Holter monitoring 02/11/2022    Hyperlipidemia     Hypertension     Lower back pain     Thyroid disease      Past Surgical History:   Procedure Laterality Date    BACK SURGERY  2000    fusion  lumbar area,      CARDIAC CATHETERIZATION  04/19/2022    JOINT REPLACEMENT Bilateral [id number]    knee     LITHOTRIPSY      SHOULDER ARTHROSCOPY Right 01/30/2020    with treatment    SHOULDER ARTHROSCOPY Right 1/30/2020    ARTHROSCOPIC EXAM AND TREATMENT RIGHT SHOULDER (CPT 29173,16344) RIGHT ROTATOR CUFF REPAIR, SUBACHROMIAL DECOMPRESSION, DEBRIDEMENT OF HUMERAL performed by Carla Mehta DO at [facility] ENDOSCOPY N/A 4/18/2022    EGD ESOPHAGOGASTRODUODENOSCOPY performed by Bobo Robbins DO at [address] [facility] History   Problem Relation Age of Onset    No Known Problems Mother     Diabetes Father        Home Medications  Prior to Admission medications    Medication Sig Start Date End Date Taking?  Authorizing Provider   levothyroxine (SYNTHROID) 75 MCG tablet Take 1 tablet by mouth Daily 5/22/22   Alba Mcguire DO   finasteride (PROSCAR) 5 MG tablet Take 1 tablet by mouth daily 5/22/22   Alba Mcguire DO   isosorbide mononitrate (IMDUR) 30 MG extended release tablet Take 1 tablet by mouth daily 5/22/22   Alba Mcguire DO   carvedilol (COREG) 6.25 MG tablet Take 1 tablet by mouth 2 times daily (with meals) 5/22/22   Alba Mcguire DO   predniSONE (DELTASONE) 5 MG tablet Take 1 tablet by mouth daily Daily at 3 pm 5/22/22   Alba Mcguire DO   predniSONE (DELTASONE) 10 MG tablet Take 1 tablet by mouth every morning 5/22/22   Alba Mcguire DO   potassium citrate (UROCIT-K) 10 MEQ (1080 MG) extended release tablet Take 1 tablet by mouth 2 times daily 5/22/22   Leobardo Mcguire DO   insulin lispro, 1 Unit Dial, (HUMALOG KWIKPEN) 100 UNIT/ML SOPN Inject 30 Units into the skin 3 times daily (before meals) Patient is to take 10 units at breakfast and 10 units lunch and dinner 5/16/22 8/14/22  Orlin Goode,    furosemide (LASIX) 40 MG tablet Take 1 tablet by mouth daily 5/7/22 11/3/22  Alba Mcguire DO   aspirin 81 MG chewable tablet Take 1 tablet by mouth daily 4/23/22   Nicole Drain, DO   atorvastatin (LIPITOR) 40 MG tablet Take 1 tablet by mouth nightly 4/22/22   Nicole Drain, DO   insulin glargine (CALOS MERINOAR) 100 UNIT/ML injection pen Inject 10 Units into the skin nightly  Patient taking differently: Inject 25 Units into the skin nightly  4/19/22   Quincy Lefort, DO   Insulin Pen Needle 29G X 12MM MISC 1 each by Does not apply route 4 times daily (before meals and nightly) 4/19/22   Quincy Lefort, DO   ONETOUCH ULTRA strip 1 each by Other route 4 times daily As needed. 2/13/22 5/14/22  Viviana Mcguire, DO   Lancets [facility] PLUS [id number]) MISC 1 strip by Other route 4 times daily 2/13/22 5/14/22  Viviana Mcguire, DO   therapeutic multivitamin-minerals [facility]) tablet Take 1 tablet by mouth daily. Historical Provider, MD       Allergies  No known allergies    Social Hx  Social History     Socioeconomic History    Marital status:      Spouse name: Not on file    Number of children: Not on file    Years of education: Not on file    Highest education level: Not on file   Occupational History    Not on file   Tobacco Use    Smoking status: Never Smoker    Smokeless tobacco: Never Used   Vaping Use    Vaping Use: Never used   Substance and Sexual Activity    Alcohol use: No    Drug use: No    Sexual activity: Not Currently     Partners: Female   Other Topics Concern    Not on file   Social History Narrative    Not on file     Social Determinants of Health     Financial Resource Strain: Low Risk     Difficulty of Paying Living Expenses: Not hard at all   Food Insecurity: No Food Insecurity    Worried About [address] [employer] in the Last Year: Never true    [address] in the Last Year: Never true   Transportation Needs:     Lack of Transportation (Medical): Not on file    Lack of Transportation (Non-Medical):  Not on file   Physical Activity:     Days of Exercise per Week: Not on file    Minutes of Exercise per Session: Not on file   Stress:     Feeling of Stress : Not on file   Social Connections:     Frequency of Communication with Friends and Family: Not on file  Frequency of Social Gatherings with Friends and Family: Not on file    Attends Je[employer] witness Services: Not on file    Active Member of Clubs or Organizations: Not on file    Attends Club or Organization Meetings: Not on file    Marital Status: Not on file   Intimate Partner Violence:     Fear of Current or Ex-Partner: Not on file    Emotionally Abused: Not on file    Physically Abused: Not on file    Sexually Abused: Not on file   Housing Stability:     Unable to Pay for Housing in the Last Year: Not on file    Number of [place] in the Last Year: Not on file    Unstable Housing in the Last Year: Not on file       Review of Systems  All bolded are positive; please see HPI  General:  Fever, chills, diaphoresis, fatigue, malaise, night sweats, weight loss  Psychological:  Anxiety, disorientation, hallucinations. ENT:  Epistaxis, headaches, vertigo, visual changes. Cardiovascular:  Chest pain, irregular heartbeats, palpitations, paroxysmal nocturnal dyspnea. Respiratory:  Shortness of breath, coughing, sputum production, hemoptysis, wheezing, orthopnea.   Gastrointestinal:  Nausea, vomiting, diarrhea, heartburn, constipation, abdominal pain, hematemesis, hematochezia, melena, acholic stools  Genito-Urinary:  Dysuria, urgency, frequency, hematuria  Musculoskeletal:  Joint pain, joint stiffness, joint swelling, muscle pain  Neurology:  Headache, focal neurological deficits, weakness, numbness, paresthesia  Derm:  Rashes, ulcers, excoriations, bruising  Extremities:  Decreased ROM, peripheral edema, mottling    Physical Examination  Vitals:  /63   Pulse 81   Temp 97.5 °F (36.4 °C) (Oral)   Resp 18   SpO2 100%   General Appearance:  awake, alert, and oriented to person, place, time, and purpose; appears stated age and cooperative; no apparent distress no labored breathing  HEENT:  NCAT; PERRL; conjunctiva pink, sclera clear  Neck:  no adenopathy, bruit, JVD, tenderness, masses, or nodules; supple, symmetrical, trachea midline, thyroid not enlarged  Lung:  clear to auscultation bilaterally; no use of accessory muscles; no rhonchi, rales, or crackles  Heart:  regular rate and regular rhythm without murmur, rub, or gallop  Abdomen:  soft, nontender, nondistended; normoactive bowel sounds; no organomegaly  Extremities:  extremities normal, atraumatic, no cyanosis or edema  Musculokeletal:  no joint swelling, no muscle tenderness. ROM normal in all joints of extremities. Neurologic:  mental status A&Ox3, thought content appropriate; CN II-XII grossly intact; sensation intact, motor strength 5/5 globally; no slurred speech    Laboratory Data  Recent Results (from the past 24 hour(s))   BLOOD OCCULT STOOL SCREEN #1    Collection Time: 05/27/22 10:25 AM   Result Value Ref Range    Occult Blood Screening       Occult Blood test result is POSITIVE.   *  *  Normal range: negative     POCT Glucose    Collection Time: 05/27/22 11:06 AM   Result Value Ref Range    Meter Glucose 337 (H) 74 - 99 mg/dL   POCT Glucose    Collection Time: 05/27/22  4:38 PM   Result Value Ref Range    Meter Glucose 346 (H) 74 - 99 mg/dL   POCT Glucose    Collection Time: 05/27/22  8:45 PM   Result Value Ref Range    Meter Glucose >500 (H) 74 - 99 mg/dL   Glucose, Random    Collection Time: 05/27/22  9:06 PM   Result Value Ref Range    Glucose 506 (HH) 74 - 99 mg/dL   CBC with Auto Differential    Collection Time: 05/28/22  6:03 AM   Result Value Ref Range    WBC 4.6 4.5 - 11.5 E9/L    RBC 2.21 (L) 3.80 - 5.80 E12/L    Hemoglobin 7.9 (L) 12.5 - 16.5 g/dL    Hematocrit 23.0 (L) 37.0 - 54.0 %    .1 (H) 80.0 - 99.9 fL    MCH 35.7 (H) 26.0 - 35.0 pg    MCHC 34.3 32.0 - 34.5 %    RDW 23.2 (H) 11.5 - 15.0 fL    Platelets 912 638 - 411 E9/L    MPV 10.7 7.0 - 12.0 fL    Neutrophils % 62.6 43.0 - 80.0 %    Lymphocytes % 24.3 20.0 - 42.0 %    Monocytes % 11.3 2.0 - 12.0 %    Eosinophils % 0.9 0.0 - 6.0 %    Basophils % 0.9 0.0 - 2.0 % Neutrophils Absolute 2.90 1.80 - 7.30 E9/L    Lymphocytes Absolute 1.10 (L) 1.50 - 4.00 E9/L    Monocytes Absolute 0.51 0.10 - 0.95 E9/L    Eosinophils Absolute 0.04 (L) 0.05 - 0.50 E9/L    Basophils Absolute 0.04 0.00 - 0.20 E9/L    nRBC 1.7 /100 WBC    Anisocytosis 3+     Poikilocytes 1+     Ovalocytes 1+    Comprehensive Metabolic Panel    Collection Time: 05/28/22  6:03 AM   Result Value Ref Range    Sodium 137 132 - 146 mmol/L    Potassium 4.2 3.5 - 5.0 mmol/L    Chloride 101 98 - 107 mmol/L    CO2 30 (H) 22 - 29 mmol/L    Anion Gap 6 (L) 7 - 16 mmol/L    Glucose 227 (H) 74 - 99 mg/dL    BUN 28 (H) 6 - 23 mg/dL    CREATININE 1.1 0.7 - 1.2 mg/dL    GFR Non-African American >60 >=60 mL/min/1.73    GFR African American >60     Calcium 8.2 (L) 8.6 - 10.2 mg/dL    Total Protein 5.9 (L) 6.4 - 8.3 g/dL    Albumin 3.0 (L) 3.5 - 5.2 g/dL    Total Bilirubin 0.3 0.0 - 1.2 mg/dL    Alkaline Phosphatase 58 40 - 129 U/L    ALT 20 0 - 40 U/L    AST 17 0 - 39 U/L   Protime-INR    Collection Time: 05/28/22  6:03 AM   Result Value Ref Range    Protime 13.5 (H) 9.3 - 12.4 sec    INR 1.2    POCT Glucose    Collection Time: 05/28/22  6:31 AM   Result Value Ref Range    Meter Glucose 244 (H) 74 - 99 mg/dL       Imaging  XR CHEST (2 VW)    Result Date: 5/27/2022  EXAMINATION: TWO XRAY VIEWS OF THE CHEST 5/27/2022 10:15 am COMPARISON: 05/24/2022 HISTORY: ORDERING SYSTEM PROVIDED HISTORY: Dyspnea TECHNOLOGIST PROVIDED HISTORY: Reason for exam:->Dyspnea FINDINGS: There are linear densities throughout both lungs which may reflect some chronic scarring. These are unchanged. There are no acute infiltrates or effusions. Heart size is normal.     Chronic scarring in the lungs.      CT HEAD WO CONTRAST    Result Date: 5/24/2022  EXAMINATION: CT OF THE HEAD WITHOUT CONTRAST  5/24/2022 2:44 pm TECHNIQUE: CT of the head was performed without the administration of intravenous contrast. Automated exposure control, iterative reconstruction, joints are aligned. ORBITAL CONTENTS: The globes appear intact. The extraocular muscles, optic nerve sheath complexes and lacrimal glands appear unremarkable. No retrobulbar hematoma or mass is seen. SINUSES: There is no evidence of acute sinusitis, such as air fluid level. The mastoid air cells are clear. SOFT TISSUES: No superficial facial soft tissue swelling is seen. No acute facial bone trauma. XR CHEST 1 VIEW    Result Date: 5/24/2022  EXAMINATION: ONE XRAY VIEW OF THE CHEST 5/24/2022 12:08 pm COMPARISON: 04/17/2022 HISTORY: ORDERING SYSTEM PROVIDED HISTORY: cp TECHNOLOGIST PROVIDED HISTORY: Reason for exam:->cp FINDINGS: Single AP upright portable chest demonstrate suboptimal inspiratory effort with increased markings diffusely throughout both lung fields. There is no evidence of a pneumothorax. There is mild blunting of the costophrenic angles. There are atherosclerotic changes of the aorta with cardiomegaly. Persistent chronic lung changes diffusely throughout both lung fields. No focal acute infiltrate. Assessment and Plan  Patient is a 80 y.o. male on consult for anemia. 1.  Anemia  -Chronic  -Macrocytic  -Baseline hgb 7s-9s  -History of MDS  -No iron deficiency previously  -FOBT positive - no overt GI bleeding  -EGD 4/13/2022 showing normal examined esophagus, no varices, gastritis, and small duodenal diverticulum - no bleeding source identified  -PPI twice daily  -Monitor hemoglobin every 8 hours  -Keep PRBCs on hold  -Defer transfusion to admitting  -Consider patient for colonoscopy if patient agreeable - will require cardiac clearance prior to endoscopic evaluation    2. 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   Constipation  -Chronic per patient description  -Complete bowel movements  -Start MiraLAX daily  -Senokot nightly as needed    4. CRC screen  -No prior colonoscopy  -No known family history of colon cancer  -Colonoscopy inpatient versus outpatient if patient agreeable    5. Comorbidities  -CAD, hypertension, dyslipidemia, diabetes, thyroid disease  -Per admitting/consultants        PPI twice daily. Monitor hemoglobin every 8 hours. Consider colonoscopy. Will require cardiac clearance prior to endoscopic evaluation. Further orders will depend on patient course and results of testing.   Thank you for the opportunity to participate in the care of Mr. Overton Nimisha, MIK - CNP  9:15 AM  5/28/2022

## 2022-05-28 NOTE — PROGRESS NOTES
Critical BS of 506, Dr. Clare Schultz notified. New orders received at this time. Will continue to monitor per orders.

## 2022-05-29 NOTE — PROGRESS NOTES
Physical Therapy Treatment Note/Plan of Care    Room #:  2511/0804-35  Patient Name: Jaden Lai  YOB: 1933  MRN: [id number]    Date of Service: 5/29/2022     Tentative placement recommendation: Subacute vs Home Health Physical Therapy if patient meets goals  Equipment recommendation: To be determined and Patient has needed equipment       Evaluating Physical Therapist: Luann Smith #[id number]      Specific Provider Orders/Date/Referring Provider :  05/26/22 1130   PT eval and treat Start: 05/26/22 1130, End: 05/26/22 1130, ONE TIME, Standing Count: 1 Occurrences, R    Delio Mora DO      Admitting Diagnosis:   Orthostatic hypotension [I95.1]       Surgery:         Patient Active Problem List   Diagnosis    Incomplete tear of right rotator cuff    Long biceps tear    Injury of tendon of long head of right biceps    Tear of right glenoid labrum    Bursitis of right shoulder    Essential hypertension    Acquired hypothyroidism    Dyslipidemia    Type 2 diabetes mellitus without complication, without long-term current use of insulin (HCC)    Normocytic anemia    NSTEMI (non-ST elevated myocardial infarction) ([address]    Chronic renal disease, stage III ([address] [807293]    Thrombocytopenia, unspecified    MDS (myelodysplastic syndrome) ([address]    Orthostatic hypotension        ASSESSMENT of Current Deficits Patient exhibits decreased strength, balance and endurance impairing functional mobility, transfers, gait , gait distance and tolerance to activity Patient now on 3L O2, experiences shortness of breath with exertion, cues for pursed lip breathing throughout session. Patient ambulates into hallway, however limited in distance due to shortness of breath and SPO2 dropping to 75%. Patient immediately inc to 6L O2 and amb back to bedside at 82% and recovered to 95% within 2min of seated rest and pursed lip breathing. Patient returned to 2.5L at 93% at end of session.  Patient requires inc time to recover throughout session due to desat. Patient requires continued skilled physical therapy to address concerns listed above for increased safety and function at discharge. PHYSICAL THERAPY  PLAN OF CARE       Physical therapy plan of care is established based on physician order,  patient diagnosis and clinical assessment    Current Treatment Recommendations:    -Sitting Balance: Incorporate reaching activities to activate trunk muscles   -Standing Balance: Perform strengthening exercises in standing to promote motor control with or without upper extremity support   -Transfers: Provide instruction on proper hand and foot position for adequate transfer of weight onto lower extremities and use of gait device if needed and Cues for hand placement, technique and safety. Provide stabilization to prevent fall   -Gait: Gait training, Standing activities to improve: base of support, weight shift, weight bearing  and Pregait training to emphasize: Sequencing , Device control, Upright and Safety   -Endurance: Utilize Supervised activities to increase level of endurance to allow for safe functional mobility including transfers and gait   -Stairs: Stair training with instruction on proper technique and hand placement on rail  -Instruction in independent management of O2 line    PT long term treatment goals are located in below grid    Patient and or family understand(s) diagnosis, prognosis, and plan of care. Frequency of treatments: Patient will be seen  daily.          Prior Level of Function: Patient ambulated with rollator or wheeled walker     Rehab Potential: good    for baseline    Past medical history:   Past Medical History:   Diagnosis Date    Diabetes mellitus ([address]     H/O cardiovascular stress test 12/01/2021    Lexiscan    History of blood transfusion     History of Holter monitoring 02/11/2022    Hyperlipidemia     Hypertension     Lower back pain     Thyroid disease      Past Surgical History:   Procedure Laterality Date    BACK SURGERY  2000    fusion  lumbar area,      CARDIAC CATHETERIZATION  04/19/2022    JOINT REPLACEMENT Bilateral [id number]    knee     LITHOTRIPSY      SHOULDER ARTHROSCOPY Right 01/30/2020    with treatment    SHOULDER ARTHROSCOPY Right 1/30/2020    ARTHROSCOPIC EXAM AND TREATMENT RIGHT SHOULDER (CPT 05393,85740) RIGHT ROTATOR CUFF REPAIR, SUBACHROMIAL DECOMPRESSION, DEBRIDEMENT OF HUMERAL performed by Maximo Meckel, DO at [address] N/A 4/18/2022    EGD ESOPHAGOGASTRODUODENOSCOPY performed by Celi Wasserman DO at [address]:    Precautions: Activity as tolerated, falls and O2 , 3 liters  CT OF THE HEAD WITHOUT CONTRAST 5/24/2022 2:44 pm  No acute intracranial abnormality. Cortical atrophy and periventricular leukomalacia. Social history: Patient lives alone in a two story home resides first  with 2 steps  to enter without Rail  Walk in shower grab bars, built in shower chair [employer] (Dr. Harriett Baumgarten)    Equipment owned: Wheelchair, Rina Newell, [address], Standard Walker and Crutches,       [id number] [facility]  Mobility Inpatient   How much difficulty turning over in bed?: None  How much difficulty sitting down on / standing up from a chair with arms?: A Little  How much difficulty moving from lying on back to sitting on side of bed?: A Little  How much help from another person moving to and from a bed to a chair?: A Little  How much help from another person needed to walk in hospital room?: A Little  How much help from another person for climbing 3-5 steps with a railing?: A Lot  AM-PAC Inpatient Mobility Raw Score : 18  AM-PAC Inpatient T-Scale Score : 43.63  Mobility Inpatient CMS 0-100% Score: 46.58  Mobility Inpatient CMS G-Code Modifier : CK    Nursing cleared patient for PT treatment. Patient seated in bedside chair at start of session.    OBJECTIVE; Initial Evaluation  Date: 5/26/2022 Treatment Date:  5/29/2022     Short Term/ Long Term   Goals   Was pt agreeable to Eval/treatment? Yes yes To be met in 3 days   Pain level   0/10    0/10     Bed Mobility    Rolling: Supervision     Supine to sit: Minimal assist of 1    Sit to supine: Not assessed     Scooting: Supervision    Rolling: Not assessed patient in chair   Supine to sit: Not assessed patient in chair   Sit to supine: Supervision    Scooting: Supervision   Rolling: Independent    Supine to sit:  Independent    Sit to supine: Independent    Scooting: Independent     Transfers Sit to stand: Minimal assist of 1 Cues for hand placement and safety  Sit to stand: Supervision  cues for safety, multiple reps performed   Sit to stand: Modified Independent     Ambulation    1 x 4 feet 2 x 20 feet using  wheeled walker with Minimal assist of 1   for balance and O2 line management  and cues for upright posture, safety, pacing and pursed lip breathing 55 feet, 30 feet using  wheeled walker with Supervision    assist with O2 tank cues for upright posture, safety, pacing and pursed lip breathing     100 feet using  wheeled walker with Modified Independent indep O2 line management    Stair negotiation: ascended and descended   Not assessed     2 steps  with supervision    ROM Within functional limits    Increase range of motion 10% of affected joints    Strength BUE:  refer to OT eval  RLE:  3+/5  LLE:  3+/5  Increase strength in affected mm groups by 1/3 grade   Balance Sitting EOB:  good -  Dynamic Standing:  fair wheeled walker  Sitting EOB: good   Dynamic Standing: fair + with wheeled walker   Sitting EOB:  good    Dynamic Standing: good wheeled walker      Patient is Alert & Oriented x person, place, time and situation and follows directions    Sensation:  Patient  reports numbness/tingling right 4-5th digits getting better     Edema:  no   Endurance: fair  -    Vitals: 2.5 liters nasal cannula at start of session Blood Pressure at rest  Blood Pressure during session    Heart Rate at rest  Heart Rate during session    SPO2 at rest 97%  SPO2 during session 75-95%     Patient education  Patient educated on role of Physical Therapy, risks of immobility, safety and plan of care, energy conservation,  importance of mobility while in hospital , purse lip breathing, ankle pumps, quad set and glut set for edema control, blood clot prevention, safety  and seated exercises     Patient response to education:   Pt verbalized understanding Pt demonstrated skill Pt requires further education in this area   Yes Partial Yes      Treatment:  Patient practiced and was instructed/facilitated in the following treatment: Patient assisted up from chair to bedside commode. Patient assisted to changing depends and hygiene. Patient took steps to bedside chair and took seated rest. Patient stood to amb in hallway, returned to bedside for pursed lip breathing and recovery as noted above. Patient transferred back to supine position in bed. Therapeutic Exercises:  not performed  x  reps. At end of session, patient in bed with   call light and phone within reach,  all lines and tubes intact, Dr Mi Cui present. Patient would benefit from continued skilled Physical Therapy to improve functional independence and quality of life.          Patient's/ family goals   feel better    Time in  1038  Time out  1116    Total Treatment Time  38 minutes    CPT codes:  Therapeutic activities (06956)   28 minutes  2 unit(s)  Gait Training (98901) 10 minutes 1 unit(s)    Juan Miguel Bearden, SYL   #[id number]

## 2022-05-29 NOTE — PROGRESS NOTES
Dasha Harley M.D. Patient Name: Jonas Walsh  YOB: 1933  PCP: Toyin Laureano DO   Referring Provider: [address] / [address]  [id number]     Reason for Consultation: No chief complaint on file. Subjective:  No overt bleeding. Has felt slightly better each day. 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 Scheduled to start treatment with Dionna Sabins today. Admitted with orthostatic hypotension. ED notes H&P not available at this time. Patient is being hydrated feels better. Labs remarkable for sodium 129, creatinine 1.7, glucose 400, hemoglobin 7.4. No new imaging. Review of systems: Over 10 systems were reviewed and all were negative except as mentioned above.     Diagnostic Data:     Past Medical History:   Diagnosis Date    Diabetes mellitus ([address]     H/O cardiovascular stress test 12/01/2021    Lexiscan    History of blood transfusion     History of Holter monitoring 02/11/2022    Hyperlipidemia     Hypertension     Lower back pain     Thyroid disease        Patient Active Problem List    Diagnosis Date Noted    Orthostatic hypotension 05/26/2022    Thrombocytopenia, unspecified 05/19/2022    MDS (myelodysplastic syndrome) ([address] 05/19/2022    Chronic renal disease, stage III ([address] [153810] 05/11/2022    NSTEMI (non-ST elevated myocardial infarction) ([address] 04/13/2022    Essential hypertension 08/17/2021    Acquired hypothyroidism 08/17/2021    Dyslipidemia 08/17/2021    Type 2 diabetes mellitus without complication, without long-term current use of insulin ([address] 08/17/2021    Normocytic anemia 08/17/2021    Incomplete tear of right rotator cuff 01/30/2020    Long biceps tear 01/30/2020    Injury of tendon of long head of right biceps 01/30/2020    Tear of right glenoid labrum 01/30/2020    Bursitis of right shoulder 01/30/2020        Past Surgical History:   Procedure Laterality Date    BACK SURGERY  2000    fusion  lumbar area,      CARDIAC CATHETERIZATION  04/19/2022    JOINT REPLACEMENT Bilateral [id number]    knee     LITHOTRIPSY      SHOULDER ARTHROSCOPY Right 01/30/2020    with treatment    SHOULDER ARTHROSCOPY Right 1/30/2020    ARTHROSCOPIC EXAM AND TREATMENT RIGHT SHOULDER (CPT 41066,45340) RIGHT ROTATOR CUFF REPAIR, SUBACHROMIAL DECOMPRESSION, DEBRIDEMENT OF HUMERAL performed by Goran Robert DO at [address] [facility] ENDOSCOPY N/A 4/18/2022    EGD ESOPHAGOGASTRODUODENOSCOPY performed by Burnice Fleischer, DO at [address] [place] [address] History  Family History   Problem Relation Age of Onset    No Known Problems Mother     Diabetes Father        Social History  Social History     Socioeconomic History    Marital status:      Spouse name: Not on file    Number of children: Not on file    Years of education: Not on file    Highest education level: Not on file   Occupational History    Not on file   Tobacco Use    Smoking status: Never Smoker    Smokeless tobacco: Never Used   Vaping Use    Vaping Use: Never used   Substance and Sexual Activity    Alcohol use: No    Drug use: No    Sexual activity: Not Currently     Partners: Female   Other Topics Concern    Not on file   Social History Narrative    Not on file     Social Determinants of Health     Financial Resource Strain: Low Risk     Difficulty of Paying Living Expenses: Not hard at all   Food Insecurity: No Food Insecurity    Worried About [address] in the Last Year: Never true    [address] [facility] [address] in the Last Year: Never true   Transportation Needs:     Lack of Transportation (Medical): Not on file    Lack of Transportation (Non-Medical):  Not on file   Physical Activity:     Days of Exercise per Week: Not on file    Minutes of Exercise per Session: Not on file   Stress:     Feeling of Stress : Not on file   Social Connections:     Frequency of Communication with Friends and Family: Not on file    Frequency of Social Gatherings with Friends and Family: Not on file    Attends Restorationist Services: Not on file    Active Member of [address] [place] [address] [employer] or Organizations: Not on file    Attends Club or Organization Meetings: Not on file    Marital Status: Not on file   Intimate Partner Violence:     Fear of Current or Ex-Partner: Not on file    Emotionally Abused: Not on file    Physically Abused: Not on file    Sexually Abused: Not on file   Housing Stability:     Unable to Pay for Housing in the Last Year: Not on file    Number of [place] in the Last Year: Not on file    Unstable Housing in the Last Year: Not on file        TOBACCO:   reports that he has never smoked. He has never used smokeless tobacco.  ETOH:   reports no history of alcohol use. Home Medications  Prior to Admission medications    Medication Sig Start Date End Date Taking?  Authorizing Provider   levothyroxine (SYNTHROID) 75 MCG tablet Take 1 tablet by mouth Daily 5/22/22   Toña Mcguire DO   finasteride (PROSCAR) 5 MG tablet Take 1 tablet by mouth daily 5/22/22   Toña Mcguire DO   isosorbide mononitrate (IMDUR) 30 MG extended release tablet Take 1 tablet by mouth daily 5/22/22   Toña Mcguire DO   carvedilol (COREG) 6.25 MG tablet Take 1 tablet by mouth 2 times daily (with meals) 5/22/22   Toña Mcguire DO   predniSONE (DELTASONE) 5 MG tablet Take 1 tablet by mouth daily Daily at 3 pm 5/22/22   Leobardo Mcguire DO   predniSONE (DELTASONE) 10 MG tablet Take 1 tablet by mouth every morning 5/22/22   Toña Mcguire DO   potassium citrate (UROCIT-K) 10 MEQ (1080 MG) extended release tablet Take 1 tablet by mouth 2 times daily 5/22/22   Leobardo Mcguire DO   insulin lispro, 1 Unit Dial, (HUMALOG KWIKPEN) 100 UNIT/ML SOPN Inject 30 Units into the skin 3 times daily (before meals) Patient is to take 10 units at breakfast and 10 units lunch and dinner 5/16/22 8/14/22  Orlin Goode, DO   furosemide (LASIX) 40 MG tablet Take 1 tablet by mouth daily 5/7/22 11/3/22  Carmelo Mcguire, DO   aspirin 81 MG chewable tablet Take 1 tablet by mouth daily 4/23/22   Fredie Ivanoff, DO   atorvastatin (LIPITOR) 40 MG tablet Take 1 tablet by mouth nightly 4/22/22   Fredie Ivanoff, DO   insulin glargine (LANTUS SOLOSTAR) 100 UNIT/ML injection pen Inject 10 Units into the skin nightly  Patient taking differently: Inject 25 Units into the skin nightly  4/19/22   Fredie Ivanoff, DO   Insulin Pen Needle 29G X 12MM MISC 1 each by Does not apply route 4 times daily (before meals and nightly) 4/19/22   Fredie Ivanocarlos, DO   ONETOUCH ULTRA strip 1 each by Other route 4 times daily As needed. 2/13/22 5/14/22  Carmelo Mcguire, DO   Lancets [facility] PLUS SAHEMY37S) MISC 1 strip by Other route 4 times daily 2/13/22 5/14/22  Carmelo Mcguire DO   therapeutic multivitamin-minerals [facility]) tablet Take 1 tablet by mouth daily. Historical Provider, MD       Allergies  Allergies   Allergen Reactions    No Known Allergies            Objective  BP (!) 105/57   Pulse 96   Temp 98.9 °F (37.2 °C) (Oral)   Resp 18   SpO2 100%     Physical Exam:   Performance Status:  General: AAO to person, place, time, in no acute distress, pleasant   Head and neck : PERRLA, EOMI . Sclera non icteric. Oropharynx : Clear  Neck: no JVD,  no adenopathy, no carotid bruit  LYMPHATICS : No peripheral lymphadenopathy. Heart: Regular rate and regular rhythm, no murmur  Lungs: Clear to auscultation   Extremities: No edema,no cyanosis, no clubbing. Abdomen: Soft, non-tender;no masses, no organomegaly  Skin:  No rash. Neurologic:Cranial nerves grossly intact. No focal motor or sensory deficits .     Recent Laboratory Data-   Lab Results   Component Value Date    WBC 4.3 (L) 05/29/2022    HGB 8.1 (L) 05/29/2022    HCT 24.5 (L) 05/29/2022    .5 (H) 05/29/2022     05/29/2022    LYMPHOPCT 22.0 05/29/2022    RBC 2.28 (L) 05/29/2022    MCH 35.5 (H) 05/29/2022    MCHC 33.1 05/29/2022    RDW 22.9 (H) 05/29/2022    NEUTOPHILPCT 57.0 05/29/2022    MONOPCT 18.0 (H) 05/29/2022    BASOPCT 0.0 05/29/2022    NEUTROABS 2.49 05/29/2022    LYMPHSABS 1.03 (L) 05/29/2022    MONOSABS 0.77 05/29/2022    EOSABS 0.00 (L) 05/29/2022    BASOSABS 0.00 05/29/2022       Lab Results   Component Value Date     05/29/2022    K 4.5 05/29/2022     05/29/2022    CO2 29 05/29/2022    BUN 23 05/29/2022    CREATININE 1.1 05/29/2022    GLUCOSE 177 (H) 05/29/2022    CALCIUM 8.5 (L) 05/29/2022    PROT 5.9 (L) 05/29/2022    LABALBU 2.9 (L) 05/29/2022    BILITOT 0.3 05/29/2022    ALKPHOS 57 05/29/2022    AST 20 05/29/2022    ALT 27 05/29/2022    LABGLOM >60 05/29/2022    GFRAA >60 05/29/2022       Lab Results   Component Value Date    IRON 122 04/14/2022    TIBC 140 (L) 04/14/2022    FERRITIN 3,037 04/14/2022           Radiology-    CT ABDOMEN PELVIS WO CONTRAST Additional Contrast? Oral   Final Result   1. Signs of urolithiasis but no evidence of obstructive uropathy   2. Diverticulosis but no signs of diverticulitis. 3. Dense appearance of the liver. 4. Signs of idiopathic pulmonary fibrosis at the lung bases         XR KNEE LEFT (1-2 VIEWS)   Final Result   No acute osseous findings in the left knee         XR CHEST (2 VW)   Final Result   Chronic scarring in the lungs. CT FACIAL BONES WO CONTRAST   Final Result   No acute facial bone trauma. 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 Scheduled to start treatment with Sherine Forbesmaker today. Admitted with orthostatic hypotension. Feels better with hydration. Medical management by Dr. Whitney Andre.   Macrocytic anemia due to MDS. Transfuse for hemoglobin less than 7. We will continue to follow. 5/27/22  - CBC with Hgb 8.5, s/p 1 unit pRBC yesterday. WBC and platelets WNL  - Macrocytosis due to MDS-MLD (high risk category)  - Cr improved to 1.4 today  - Continues on IVF.  BP improved  - Will rearranged scheduled start date for HMA therapy after DC    5/29/22  - Hgb holding stable  - CEA was 12  - GI consulted, planning colonoscopy   - BP seems to be improved, most recent reading on the lower end  - HMA therapy outpatient as above with Dr. Dee Graham    Electronically signed by Edmond Turner MD on 5/29/2022 at 2:50 PM

## 2022-05-29 NOTE — PROGRESS NOTES
Internal Medicine Progress Note    [facility]    [facility]. fEraín Yun, F.A.CAngelinaOAngelinaIAngelina Blakely D.O., IOANA Fuentes D.O. Iris Owens, MSN, APRN, NP-C  Mansi Rodriguez. Jerel Herman, MSN, APRN-CNP     Primary Care Physician: Pako Fuentes DO   Admitting Physician:  Zak Sawyer DO  Admission date and time: 5/26/2022 10:52 AM    Room:  [facility]8547-  Admitting diagnosis: Orthostatic hypotension [I95.1]    Patient Name: Sotero Magaña  MRN: [id number]    Date of Service: 5/29/2022     Subjective:  Josue Tse is a 80 y.o. male who was seen and examined today,5/29/2022, at the bedside. Patient is feeling better today. He denies any complaints of dizziness or lightheadedness. His hemoglobin has remained stable at 8.2. Unfortunately we did obtain a CEA which is elevated and the patient will need a coloscopic exam to exclude malignancy. CT obtained did not show any abnormalities. Due to the recent MI and his hemodynamic instability we would recommend inpatient colonoscopic exam.  I have discussed this with GI and the patient is agreeable      No family member present      Review of System:   Constitutional:   Denies fever or chills, weight loss or gain. Appears less fatigue  HEENT:   Denies ear pain, sore throat, sinus or eye problems. Complains of nose pain secondary to fall nasal abrasion  Cardiovascular:   Denies any chest pain, irregular heartbeats, or palpitations. Respiratory:   Denies shortness of breath, coughing, sputum production, hemoptysis, or wheezing. Gastrointestinal:   Denies nausea, vomiting, diarrhea, or constipation. Denies any abdominal pain. Genitourinary:    Denies any urgency, frequency, hematuria. Voiding  without difficulty. Extremities:   Denies lower extremity swelling, edema or cyanosis. Neurology:    Denies any headache or focal neurological deficits, Denies generalized weakness or memory difficulty.   Improved dizziness  Psch:   Denies being anxious or depressed. Musculoskeletal:    Denies  myalgias, joint complaints or back pain. Integumentary:   Denies any rashes, ulcers, or excoriations. Denies bruising. Hematologic/Lymphatic:  Denies bruising or bleeding. Physical Exam:  I/O this shift:  In: 240 [P.O.:240]  Out: -     Intake/Output Summary (Last 24 hours) at 5/29/2022 1633  Last data filed at 5/29/2022 0850  Gross per 24 hour   Intake 240 ml   Output    Net 240 ml   I/O last 3 completed shifts: In: 230 [P.O.:230]  Out: -   No data found. Vital Signs:   Blood pressure (!) 105/57, pulse 96, temperature 98.9 °F (37.2 °C), temperature source Oral, resp. rate 18, SpO2 100 %. General appearance:  Alert, responsive, oriented to person, place, and time. Well preserved, alert, no distress. Head:  Normocephalic. No masses, lesions or tenderness. Eyes:  PERRLA. EOMI. Sclera clear. Buccal mucosa moist.  ENT:  Ears normal. Mucosa normal.  Abrasion on nose  Neck:    Supple. Trachea midline. No thyromegaly. No JVD. No bruits. Heart:    Rhythm regular. Rate controlled. No murmurs. Lungs:    Symmetrical. Clear to auscultation bilaterally. No wheezes. No rhonchi. No rales. Abdomen:   Soft. Non-tender. Non-distended. Bowel sounds positive. No organomegaly or masses. No pain on palpation. Extremities:    Peripheral pulses present. No peripheral edema. No ulcers. No cyanosis. No clubbing. Neurologic:    Alert x 3. No focal deficit. Cranial nerves grossly intact. Weakness improved  Psych:   Behavior is normal. Mood appears normal. Speech is not rapid and/or pressured. Musculoskeletal:   Spine ROM normal. Muscular strength intact. Gait not assessed. Integumentary:  No rashes  Skin normal color and texture.   Genitalia/Breast:  Deferred    Medication:  Scheduled Meds:   insulin lispro  5 Units SubCUTAneous TID WC    polyethylene glycol  17 g Oral Daily    metoprolol succinate  12.5 mg Oral Daily    pantoprazole (PROTONIX) 40 mg injection  40 mg IntraVENous Q12H    insulin lispro  0-18 Units SubCUTAneous TID WC    insulin lispro  0-9 Units SubCUTAneous Nightly    insulin glargine  25 Units SubCUTAneous Nightly    atorvastatin  40 mg Oral Nightly    finasteride  5 mg Oral Daily    isosorbide mononitrate  30 mg Oral Daily    levothyroxine  75 mcg Oral Daily    therapeutic multivitamin-minerals  1 tablet Oral Daily    midodrine  5 mg Oral TID WC    potassium chloride  10 mEq Oral TID    predniSONE  15 mg Oral Daily with breakfast    And    predniSONE  5 mg Oral Dinner     Continuous Infusions:   dextrose         Objective Data:  CBC with Differential:    Lab Results   Component Value Date    WBC 4.3 05/29/2022    RBC 2.28 05/29/2022    HGB 8.1 05/29/2022    HCT 24.5 05/29/2022     05/29/2022    .5 05/29/2022    MCH 35.5 05/29/2022    MCHC 33.1 05/29/2022    RDW 22.9 05/29/2022    NRBC 3.0 05/29/2022    SEGSPCT 48 07/06/2013    BLASTSPCT 0.9 05/16/2022    METASPCT 0.9 05/16/2022    LYMPHOPCT 22.0 05/29/2022    MONOPCT 18.0 05/29/2022    MYELOPCT 1.0 05/29/2022    BASOPCT 0.0 05/29/2022    MONOSABS 0.77 05/29/2022    LYMPHSABS 1.03 05/29/2022    EOSABS 0.00 05/29/2022    BASOSABS 0.00 05/29/2022     CMP:    Lab Results   Component Value Date     05/29/2022    K 4.5 05/29/2022    K 3.5 05/24/2022     05/29/2022    CO2 29 05/29/2022    BUN 23 05/29/2022    CREATININE 1.1 05/29/2022    GFRAA >60 05/29/2022    LABGLOM >60 05/29/2022    GLUCOSE 177 05/29/2022    GLUCOSE 111 03/29/2012    PROT 5.9 05/29/2022    LABALBU 2.9 05/29/2022    LABALBU 4.4 03/29/2012    CALCIUM 8.5 05/29/2022    BILITOT 0.3 05/29/2022    ALKPHOS 57 05/29/2022    AST 20 05/29/2022    ALT 27 05/29/2022              Assessment:    · Symptomatic orthostasis possibly secondary to medications, but not exclude that of secondary adrenal insufficiency.   · Coronary artery disease with cardiac catheterization in 04/2022 showing small to moderate multiple vessel disease with elevated troponin suggesting demand ischemia. · Macrocytic anemia with recent bone marrow performed in 04/2022 showing probable myelodysplastic syndrome. · Chronic renal insufficiency stage III, aggravated by dehydration. · Type 2 diabetes mellitus, aggravated by steroids. 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 · Primary hypothyroidism, on Synthroid. · Hyperlipidemia, on statin agent. · BPH on Proscar  · Anemia with transfusion with stools positive for occult blood  · Elevated CEA of 12.1      Plan:     · Blood pressure remained stable and orthostatic abnormality has resolved  · Hemoglobin remained stable at the present time  · Elevated CEA will require colonoscopic exam prior to discharge. I have discussed this with gastroenterology and they will coordinate this  · Cardiology for clearance for endoscopy  · Increased bowel resume  · Continue work with physical therapy      More than 50% of my  time was spent at the bedside counseling/coordinating care with the patient and/or family with face to face contact. This time was spent reviewing notes and laboratory data as well as instructing and counseling the patient. Time I spent with the family or surrogate(s) is included only if the patient was incapable of providing the necessary information or participating in medical decisions. I also discussed the differential diagnosis and all of the proposed management plans with the patient and individuals accompanying the patient. Shani Arana requires this high level of physician care and nursing on the Telemetry unit due the complexity of decision management and chance of rapid decline or death. Continued cardiac monitoring and higher level of nursing are required. I am readily available for any further decision-making and intervention. Samantha Valencia DO, F.A.C.O.I.  5/29/2022  4:33 PM

## 2022-05-29 NOTE — PROGRESS NOTES
therapeutic multivitamin-minerals  1 tablet Oral Daily    midodrine  5 mg Oral TID WC    potassium chloride  10 mEq Oral TID    predniSONE  15 mg Oral Daily with breakfast    And    predniSONE  5 mg Oral Dinner     Infusion Meds    dextrose       PRN Meds senna, acetaminophen, glucose, dextrose bolus **OR** dextrose bolus, glucagon (rDNA), dextrose    Laboratory Data  Recent Results (from the past 24 hour(s))   Hemoglobin and Hematocrit    Collection Time: 05/28/22  4:01 PM   Result Value Ref Range    Hemoglobin 7.9 (L) 12.5 - 16.5 g/dL    Hematocrit 23.5 (L) 37.0 - 54.0 %   POCT Glucose    Collection Time: 05/28/22  4:54 PM   Result Value Ref Range    Meter Glucose 275 (H) 74 - 99 mg/dL   POCT Glucose    Collection Time: 05/28/22  9:09 PM   Result Value Ref Range    Meter Glucose 276 (H) 74 - 99 mg/dL   CBC with Auto Differential    Collection Time: 05/29/22  5:58 AM   Result Value Ref Range    WBC 4.3 (L) 4.5 - 11.5 E9/L    RBC 2.28 (L) 3.80 - 5.80 E12/L    Hemoglobin 8.1 (L) 12.5 - 16.5 g/dL    Hematocrit 24.5 (L) 37.0 - 54.0 %    .5 (H) 80.0 - 99.9 fL    MCH 35.5 (H) 26.0 - 35.0 pg    MCHC 33.1 32.0 - 34.5 %    RDW 22.9 (H) 11.5 - 15.0 fL    Platelets 713 379 - 689 E9/L    MPV 10.4 7.0 - 12.0 fL    Neutrophils % 57.0 43.0 - 80.0 %    Lymphocytes % 22.0 20.0 - 42.0 %    Monocytes % 18.0 (H) 2.0 - 12.0 %    Eosinophils % 0.0 0.0 - 6.0 %    Basophils % 0.0 0.0 - 2.0 %    Neutrophils Absolute 2.49 1.80 - 7.30 E9/L    Lymphocytes Absolute 1.03 (L) 1.50 - 4.00 E9/L    Monocytes Absolute 0.77 0.10 - 0.95 E9/L    Eosinophils Absolute 0.00 (L) 0.05 - 0.50 E9/L    Basophils Absolute 0.00 0.00 - 0.20 E9/L    Atypical Lymphocytes Relative 2.0 0.0 - 4.0 %    Myelocyte Percent 1.0 0 - 0 %    nRBC 3.0 /100 WBC    Anisocytosis 3+     Polychromasia 1+    Comprehensive Metabolic Panel    Collection Time: 05/29/22  5:58 AM   Result Value Ref Range    Sodium 138 132 - 146 mmol/L    Potassium 4.5 3.5 - 5.0 mmol/L Chloride 101 98 - 107 mmol/L    CO2 29 22 - 29 mmol/L    Anion Gap 8 7 - 16 mmol/L    Glucose 177 (H) 74 - 99 mg/dL    BUN 23 6 - 23 mg/dL    CREATININE 1.1 0.7 - 1.2 mg/dL    GFR Non-African American >60 >=60 mL/min/1.73    GFR African American >60     Calcium 8.5 (L) 8.6 - 10.2 mg/dL    Total Protein 5.9 (L) 6.4 - 8.3 g/dL    Albumin 2.9 (L) 3.5 - 5.2 g/dL    Total Bilirubin 0.3 0.0 - 1.2 mg/dL    Alkaline Phosphatase 57 40 - 129 U/L    ALT 27 0 - 40 U/L    AST 20 0 - 39 U/L   OCCULT BLOOD DIAGNOSTIC    Collection Time: 05/29/22  6:00 AM   Result Value Ref Range    Occult Blood Diagnostic       Occult Blood test result is negative. *  *  Normal range: negative     POCT Glucose    Collection Time: 05/29/22  6:19 AM   Result Value Ref Range    Meter Glucose 183 (H) 74 - 99 mg/dL   POCT Glucose    Collection Time: 05/29/22  8:21 AM   Result Value Ref Range    Meter Glucose 265 (H) 74 - 99 mg/dL       Imaging  CT ABDOMEN PELVIS WO CONTRAST Additional Contrast? Oral    Result Date: 5/29/2022  EXAMINATION: CT OF THE ABDOMEN AND PELVIS WITHOUT CONTRAST 5/28/2022 8:31 pm TECHNIQUE: CT of the abdomen and pelvis was performed without the administration of intravenous contrast. Multiplanar reformatted images are provided for review. Automated exposure control, iterative reconstruction, and/or weight based adjustment of the mA/kV was utilized to reduce the radiation dose to as low as reasonably achievable. Total DLP: 723.98 mGy COMPARISON: April 14, 2022 HISTORY: ORDERING SYSTEM PROVIDED HISTORY: r/o mets TECHNOLOGIST PROVIDED HISTORY: Reason for exam:->r/o mets Additional Contrast?->Oral FINDINGS: Lower Chest: There signs of extensive subpleural honeycombing. Ground-glass opacities are observed. There are bronchiectatic changes. These findings collectively a concerning for idiopathic pulmonary fibrosis or usual interstitial pneumonitis. No effusions are identified. The heart does not appear enlarged.   There are no signs of pleural or pericardial effusion. The ascending thoracic aorta measures 3.2 cm that is within the normal range. Organs: The liver appears dense. There are no signs of enhancing mass or duct dilation. Gallbladder is not distended. The area the pancreas reveals no signs of a discrete mass or duct dilation. The spleen and adrenal glands appear unremarkable otherwise The right kidney appears normal in size shape and position. It reveals a 3 mm nonobstructing calculus in the interpolar region. The left kidney reveals several tiny calculi measuring 1 and 2 mm respectively. No large stones or signs of hydronephrosis were observed. The visualized ureters appear normal in course and caliber. GI/Bowel: The stomach appears food filled. The small bowel pattern is within the normal range and reveals no evidence of mechanical obstruction. The region of the terminal ileum and cecum appears within the normal range. There is a large volume of retained fecal material within the colon. There are scattered sigmoid diverticula but no signs of diverticulitis. Pelvis: The urinary bladder reveals a rounded contour without focal wall thickening or stones. There is a small left bladder diverticulum measuring 8 mm. The prostate gland measures 3.5 cm that is within the normal range. No lymphadenopathy or ascites were observed within the pelvis. Peritoneum/Retroperitoneum: There are no signs of retroperitoneal lymphadenopathy aneurysm or hemorrhage present. Bones/Soft Tissues: There is a 14 mm anterolisthesis of L4 on L5. 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 There is significant disc height loss and vacuum phenomena at the L5-S1 level if patient is status post laminectomy involving the posterior elements at L4 and L5.     1. Signs of urolithiasis but no evidence of obstructive uropathy 2. Diverticulosis but no signs of diverticulitis. 3. Dense appearance of the liver.  4. Signs of idiopathic pulmonary fibrosis at the lung bases     XR CHEST (2 VW)    Result Date: 5/27/2022  EXAMINATION: TWO XRAY VIEWS OF THE CHEST 5/27/2022 10:15 am COMPARISON: 05/24/2022 HISTORY: ORDERING SYSTEM PROVIDED HISTORY: Dyspnea TECHNOLOGIST PROVIDED HISTORY: Reason for exam:->Dyspnea FINDINGS: There are linear densities throughout both lungs which may reflect some chronic scarring. These are unchanged. There are no acute infiltrates or effusions. Heart size is normal.     Chronic scarring in the lungs. XR KNEE LEFT (1-2 VIEWS)    Result Date: 5/28/2022  EXAMINATION: TWO XRAY VIEWS OF THE LEFT KNEE 5/28/2022 10:39 am COMPARISON: None. HISTORY: ORDERING SYSTEM PROVIDED HISTORY: fall, pain, TECHNOLOGIST PROVIDED HISTORY: Reason for exam:->fall, pain, FINDINGS: Patient is status post left knee arthroplasty without interval radiographic complications. No periprosthetic lucencies. No aggressive osseous lesions. No abnormal soft tissue edema. No acute osseous findings in the left knee     CT HEAD WO CONTRAST    Result Date: 5/24/2022  EXAMINATION: CT OF THE HEAD WITHOUT CONTRAST  5/24/2022 2:44 pm TECHNIQUE: CT of the head was performed without the administration of intravenous contrast. Automated exposure control, iterative reconstruction, and/or weight based adjustment of the mA/kV was utilized to reduce the radiation dose to as low as reasonably achievable. COMPARISON: May 16, 2016 HISTORY: ORDERING SYSTEM PROVIDED HISTORY: dizziness TECHNOLOGIST PROVIDED HISTORY: Has a \"code stroke\" or \"stroke alert\" been called? ->No Reason for exam:->dizziness Decision Support Exception - unselect if not a suspected or confirmed emergency medical condition->Emergency Medical Condition (MA) FINDINGS: BRAIN/VENTRICLES: There are age related cortical atrophy and periventricular white matter ischemic changes. There is no acute intracranial hemorrhage, mass effect or midline shift.   No abnormal extra-axial fluid chest demonstrate suboptimal inspiratory effort with increased markings diffusely throughout both lung fields. There is no evidence of a pneumothorax. There is mild blunting of the costophrenic angles. There are atherosclerotic changes of the aorta with cardiomegaly. Persistent chronic lung changes diffusely throughout both lung fields. No focal acute infiltrate. Assessment and Plan  Patient is a 80 y.o. male on consult for anemia.     1. Anemia  -Chronic  -Macrocytic  -Baseline hgb 7s-9s  -History of MDS  -No iron deficiency previously  -FOBT positive - no overt GI bleeding  -EGD 4/13/2022 showing normal examined esophagus, no varices, gastritis, and small duodenal diverticulum - no bleeding source identified  -PPI twice daily  -Monitor hemoglobin every 8 hours  -Keep PRBCs on hold  -Defer transfusion to admitting  -Consider patient for colonoscopy if patient agreeable - will require cardiac clearance prior to endoscopic evaluation     2.  Elevated troponin  -Cardiac catheterization 4/19/2022 showing multivessel disease involving small to midsize first diagonal branch, small first OM branch, second OM branch, and RCA  -Per admitting/cardiology     3. Constipation  -Chronic per patient description  -Complete bowel movements  -Start MiraLAX daily  -Senokot nightly as needed     4. CRC screen / elevated CEA  -Elevated CEA 12.1 (5/28/2022)  -No prior colonoscopy  -No known family history of colon cancer  -Colonoscopy inpatient versus outpatient if patient agreeable     5. Comorbidities  -CAD, hypertension, dyslipidemia, diabetes, thyroid disease  -Per admitting/consultants        Plan tentatively for colonoscopy on Wednesday. Discussed with admitting. Will discuss further with attending. Will follow.     Yamilex Hemphills, APRN - CNP  12:22 PM  5/29/2022

## 2022-05-30 NOTE — PROGRESS NOTES
INPATIENT CARDIOLOGY FOLLOW-UP    Name: Joshua Boyer    Age: 80 y.o. Date of Admission: 5/26/2022 10:52 AM    Date of Service: 5/30/2022    Chief Complaint: Follow-up for orthostatic hypotension, CAD    Interim History:  No new overnight cardiac complaints. Currently with no complaints of CP, SOB, palpitations, dizziness, or lightheadedness. SR on telemetry. Review of Systems:   Cardiac: As per HPI  General: No fever, chills  Pulmonary: As per HPI  HEENT: No visual disturbances, difficult swallowing  GI: No nausea, vomiting  : No dysuria, hematuria  Endocrine: +hypothyroidism, +DM  Musculoskeletal: STEELE x 4, no focal motor deficits  Skin: Intact, no rashes  Neuro: No headache, seizures  Psych: Currently with no depression, anxiety    Problem List:  Patient Active Problem List   Diagnosis    Incomplete tear of right rotator cuff    Long biceps tear    Injury of tendon of long head of right biceps    Tear of right glenoid labrum    Bursitis of right shoulder    Essential hypertension    Acquired hypothyroidism    Dyslipidemia    Type 2 diabetes mellitus without complication, without long-term current use of insulin ([facility])    Normocytic anemia    NSTEMI (non-ST elevated myocardial infarction) ([facility])    Chronic renal disease, stage III ([facility]) [756414]    Thrombocytopenia, unspecified    MDS (myelodysplastic syndrome) ([facility])    Orthostatic hypotension       Allergies:   Allergies   Allergen Reactions    No Known Allergies        Current Medications:  Current Facility-Administered Medications   Medication Dose Route Frequency Provider Last Rate Last Admin    pantoprazole (PROTONIX) tablet 40 mg  40 mg Oral QAM AC Tawana Ibrahim, DO   40 mg at 05/30/22 0901    phenazopyridine (PYRIDIUM) tablet 100 mg  100 mg Oral Q6H PRN Deonte Flowers DO        insulin lispro (HUMALOG) injection vial 5 Units  5 Units SubCUTAneous TID  Leobardo Mcguire DO   5 Units at 05/30/22 0908    polyethylene glycol (GLYCOLAX) packet 17 g  17 g Oral Daily MIK Wilson - CNP   17 g at 05/30/22 0900    senna (SENOKOT) tablet 8.6 mg  1 tablet Oral Nightly PRN MIK Wilson CNP        acetaminophen (TYLENOL) tablet 650 mg  650 mg Oral Q6H PRN Ines Groom, DO        metoprolol succinate (TOPROL XL) extended release tablet 12.5 mg  12.5 mg Oral Daily Aleta Boothe MD   12.5 mg at 05/30/22 0900    insulin lispro (HUMALOG) injection vial 0-18 Units  0-18 Units SubCUTAneous TID  Leobardo Mcguire, DO   3 Units at 05/30/22 0904    insulin lispro (HUMALOG) injection vial 0-9 Units  0-9 Units SubCUTAneous Nightly Ines [place], DO   6 Units at 05/29/22 2125    insulin glargine (LANTUS) injection vial 25 Units  25 Units SubCUTAneous Nightly Leandra Collet Bisel, DO   25 Units at 05/29/22 2124    glucose chewable tablet 16 g  4 tablet Oral PRN Leobardo Mcguire DO        dextrose bolus 10% 125 mL  125 mL IntraVENous PRN Leobardo Mcguire DO        Or    dextrose bolus 10% 250 mL  250 mL IntraVENous PRN Leobardo Mcguire DO        glucagon (rDNA) injection 1 mg  1 mg IntraMUSCular PRN Leobardo Mcguire DO        dextrose 5 % solution  100 mL/hr IntraVENous PRN Leobardo Mcguire DO        atorvastatin (LIPITOR) tablet 40 mg  40 mg Oral Nightly Leobardo Mcguire, DO   40 mg at 05/29/22 2122    finasteride (PROSCAR) tablet 5 mg  5 mg Oral Daily Leobardo Mcguire, DO   5 mg at 05/30/22 0901    isosorbide mononitrate (IMDUR) extended release tablet 30 mg  30 mg Oral Daily Leobardo Mcguire DO   30 mg at 05/30/22 4568    levothyroxine (SYNTHROID) tablet 75 mcg  75 mcg Oral Daily Leandra Collet Bisel, DO   75 mcg at 05/30/22 0549    therapeutic multivitamin-minerals 1 tablet  1 tablet Oral Daily Leobardo Mcguire, DO   1 tablet at 05/30/22 0901    midodrine (PROAMATINE) tablet 5 mg  5 mg Oral TID  Leobardo Mcguire DO   5 mg at 05/30/22 8269    potassium chloride (KLOR-CON M) extended release tablet 10 mEq  10 mEq Oral TID [facility]   10 mEq at 05/29/22 2122    predniSONE (DELTASONE) tablet 15 mg  15 mg Oral Daily with breakfast Ibrahima Mcguire, DO   15 mg at 05/30/22 0901    And    predniSONE (DELTASONE) tablet 5 mg  5 mg Oral Dinner Leobardo Mcguire, DO   5 mg at 05/29/22 1703      dextrose         Physical Exam:  BP (!) 119/58   Pulse 87   Temp 98 °F (36.7 °C) (Infrared)   Resp 18   SpO2 98%   Wt Readings from Last 3 Encounters:   05/19/22 155 lb 12.8 oz (70.7 kg)   05/11/22 151 lb (68.5 kg)   04/21/22 160 lb (72.6 kg)     Appearance: Awake, alert, no acute respiratory distress  Skin: Intact, no rash  Head: Normocephalic, atraumatic  Eyes: EOMI, no conjunctival erythema  ENMT: No pharyngeal erythema, MMM, no rhinorrhea  Neck: Supple, no carotid bruits  Lungs: Clear to auscultation bilaterally. No wheezes, rales, or rhonchi. Cardiac: Regular rate and rhythm, +S1S2, no murmurs apparent  Abdomen: Soft, nontender, +bowel sounds  Extremities: Moves all extremities x 4, no lower extremity edema  Neurologic: No focal motor deficits apparent, normal mood and affect    Intake/Output:    Intake/Output Summary (Last 24 hours) at 5/30/2022 1029  Last data filed at 5/29/2022 2323  Gross per 24 hour   Intake    Output 0 ml   Net 0 ml     No intake/output data recorded.     Laboratory Tests:  Recent Labs     05/28/22  0603 05/29/22  0558 05/30/22  0549    138 136   K 4.2 4.5 4.4    101 100   CO2 30* 29 30*   BUN 28* 23 22   CREATININE 1.1 1.1 1.2   GLUCOSE 227* 177* 150*   CALCIUM 8.2* 8.5* 8.3*     Lab Results   Component Value Date    MG 1.7 05/26/2022     Recent Labs     05/28/22  0603 05/29/22  0558 05/30/22  0549   ALKPHOS 58 57 57   ALT 20 27 34   AST 17 20 21   PROT 5.9* 5.9* 5.9*   BILITOT 0.3 0.3 0.4   LABALBU 3.0* 2.9* 3.1*     Recent Labs     05/28/22  0603 05/28/22  0603 05/28/22  1601 05/29/22  0558 05/30/22  0549   WBC 4.6  --   --  4.3* 3.7*   RBC 2.21*  --   --  2.28* 2.27*   HGB 7.9*   < > 7.9* 8.1* 8.2*   HCT 23.0*   < > 23.5* 24.5* 24.3*   .1*  --   --  107.5* 107.0*   MCH 35.7*  --   --  35.5* 36.1*   MCHC 34.3  --   --  33.1 33.7   RDW 23.2*  --   --  22.9* 22.1*     --   --  147 142   MPV 10.7  --   --  10.4 10.7    < > = values in this interval not displayed. Lab Results   Component Value Date    CKTOTAL 256 (H) 04/15/2022    TROPONINI <0.01 05/16/2016     No results for input(s): CKTOTAL, CKMB, CKMBINDEX, TROPHS in the last 72 hours. Lab Results   Component Value Date    INR 1.2 05/28/2022    INR 1.9 04/14/2022    INR 2.3 04/14/2022    PROTIME 13.5 (H) 05/28/2022    PROTIME 22.4 (H) 04/14/2022    PROTIME 27.0 (H) 04/14/2022     Lab Results   Component Value Date    TSH 0.685 05/26/2022     Lab Results   Component Value Date    LABA1C 8.9 (H) 05/26/2022     No results found for: EAG  Lab Results   Component Value Date    CHOL 75 05/26/2022    CHOL 70 04/14/2022    CHOL 110 03/30/2022     Lab Results   Component Value Date    TRIG 55 05/26/2022    TRIG 70 04/14/2022    TRIG 77 03/30/2022     Lab Results   Component Value Date    HDL 38 05/26/2022    HDL 25 04/14/2022    HDL 46 03/30/2022     Lab Results   Component Value Date    LDLCALC 26 05/26/2022    LDLCALC 31 04/14/2022    LDLCALC 49 03/30/2022     Lab Results   Component Value Date    LABVLDL 11 05/26/2022    LABVLDL 14 04/14/2022    LABVLDL 15 03/30/2022     No results found for: CHOLHDLRATIO  No results for input(s): PROBNP in the last 72 hours. Cardiac Tests:  Telemetry personally reviewed (date: 5/30/2022): SR, rate 80's    Echocardiogram reviewed: 4/14/22 (Dr. Jayne Garcia)  Technically sub-optimal images. Normal left ventricular chamber size. Inferior wall hypokinesis with normal overall LV systolic function, EF   32%. Mild left ventricular concentric hypertrophy noted. Normal diastolic function. Interatrial septum not well visualized but appears intact. Normal right ventricle size and function.    There is trace to mild mitral regurgitation. No mitral valve prolapse. No hemodynamically significant aortic stenosis. There is trace tricuspid regurgitation. Mild pulmonary hypertension, RVSP 38mmHg. Normal aortic root size. No evidence of pericardial effusion. No intra cardiac mass or thrombus. Compared to prior echo from 1/26/2020 - Inferior wall motion abnormality   is new. Cardiac catheterization reviewed: 4/19/22 (Dr. Trell Wall)  ANGIOGRAPHIC FINDINGS:  Moderate calcifications involving the coronary  tree.     The left main coronary artery arises normally from the left sinus of  Valsalva. It is a mid-size vessel with mild disease. It bifurcates  into left anterior descending artery and left circumflex artery.     The left anterior descending artery extends down to the apex. It gives  off two diagonal branches. The first diagonal branch is small in  diameter, long that has subtotal occlusion in the proximal segment. The  LAD itself has mild-to-moderate disease.     The left circumflex artery is a large vessel, gives off high OM branch  that has 70% stenosis in the mid segment. However, the vessel is a  1.5-mm vessel. The left circumflex artery has 40% calcified lesion in  the proximal segment. Then, it gives off a large OM branch. The whole  proximal and mid segment of that OM branch is mildly to moderately  diseased, at worst point estimated at 50% stenosis. There were faint  left-to-right collaterals noted.     The right coronary artery has mendoza's crook takeoff. The right  coronary artery is severely diseased in the mid segment. There is  heavily calcified lesion involving the mid segment. There is another  sequential lesion estimated at 80% at the junction of the mid and distal  segment. The vessel itself is a 2.5-mm vessel. There is distal  competitive flow noted in the right PDA.     IMPRESSION:  Multivessel disease involving small- to mid-size first  diagonal branch, small first OM branch, second OM branch and RCA. ASSESSMENT / PLAN:  1. Multivessel CAD -- medically managed, no chest pain  2. Paroxysmal atrial fibrillation -- maintaining SR, OAC with eliquis stopped earlier this year in the setting of anemia  3. History of HTN / recent orthostatic hypotension -- improved, most recent /58, on midodrine  4. MDS -- heme/onc following, most recent Hgb 8.2, WBC 3.7, plt 142, +history of PRBC's  5. HLD -- on statin  6. DM -- Hgb A1c 8.9  7. Chronically elevated hs-troponin  8. CKD -- most recent Cr 1.2  9. ILD/pulmonary fibrosis with exertional desaturation -- on O2  10. BPH -- on proscar  11. Hypothyroidism -- on synthroid, normal TSH  12. Heme positive stool  a.  EGD 4/15/2022 demonstrated normal esophagus, mild gastritis, small duodenal diverticulum, no bleeding source identified.    - He may proceed with colonoscopy from a cardiology standpoint  - Maintain adequate hydration  - Continue current medications (BB switched from coreg to low dose Toprol XL this admission)  - Monitor labs  - Telemetry personally reviewed (SR)      Greater than 30 minutes was spent counseling the patient, reviewing the rationale for the above recommendations and reviewing the patient's current medication list, problem list and results of all previously ordered testing.     Coronary artery disease       Nasreen Liang MD  [facility]) Cardiology

## 2022-05-30 NOTE — PROGRESS NOTES
PROGRESS NOTE  By Jeremy Lockett M.D. [facility]  Dr. Toña Yeh M.D.,  Dr. Mauro Durbin M.D.,   Dr. Valarie Durbin D.O.,  Dr. Kelle Schaumann, M.D.,  Dr. Will Cameron D.O.,  Dharmesh Purdy D.O. Orlin COONEY Prest  80 y.o.  male    SUBJECTIVE:  Denies abdominal pain. Denies nausea vomiting    OBJECTIVE:    BP (!) 164/74   Pulse 85   Temp 98.1 °F (36.7 °C) (Infrared)   Resp 18   SpO2 98%     General: NAD/elderly  male. AAOx3  HEENT: Anicteric sclera/moist oral mucosa  Neck: Supple with trachea midline  Chest: Symmetric excursion/nonlabored respirations  Cor: Regular  Abd.: Soft and nontender  Extr.:  No peripheral edema  Skin: Warm and dry/anicteric  Other: Stool visually inspected  -light brown soft stool without blood or melena      DATA:    Monitor data reviewed -sinus rhythm noted.     Stool (measured) : 0 mL  Lab Results   Component Value Date    WBC 3.7 05/30/2022    RBC 2.27 05/30/2022    HGB 8.2 05/30/2022    HCT 24.3 05/30/2022    .0 05/30/2022    MCH 36.1 05/30/2022    MCHC 33.7 05/30/2022    RDW 22.1 05/30/2022     05/30/2022    MPV 10.7 05/30/2022     Lab Results   Component Value Date     05/30/2022    K 4.4 05/30/2022    K 3.5 05/24/2022     05/30/2022    CO2 30 05/30/2022    BUN 22 05/30/2022    CREATININE 1.2 05/30/2022    CALCIUM 8.3 05/30/2022    PROT 5.9 05/30/2022    LABALBU 3.1 05/30/2022    LABALBU 4.4 03/29/2012    BILITOT 0.4 05/30/2022    ALKPHOS 57 05/30/2022    AST 21 05/30/2022    ALT 34 05/30/2022     No results found for: LIPASE  No results found for: AMYLASE      ASSESSMENT/PLAN:  Patient Active Problem List   Diagnosis    Incomplete tear of right rotator cuff    Long biceps tear    Injury of tendon of long head of right biceps    Tear of right glenoid labrum    Bursitis of right shoulder    Essential hypertension    Acquired hypothyroidism    Dyslipidemia    Type 2 diabetes mellitus without complication, without long-term current use of insulin ([facility])    Normocytic anemia    NSTEMI (non-ST elevated [facility] infarction) ([facility])    Chronic renal disease, stage III ([facility]) [058025]    Thrombocytopenia, unspecified    MDS (myelodysplastic syndrome) ([facility])    Orthostatic hypotension     1.  Anemia  -Chronic  -Macrocytic  -Baseline hgb 7s-9s  -History of MDS  -No iron deficiency previously  -FOBT positive - no overt GI bleeding/no melena or blood per stool  -EGD 4/13/2022 showing normal examined esophagus, no varices, gastritis, and small duodenal diverticulum - no bleeding source identified  -PPI twice daily  -Continue to monitor H&H  -Keep PRBCs on hold  -Defer transfusion to admitting  -Consider patient for colonoscopy if patient agreeable - will require cardiac clearance prior to endoscopic evaluation     2.  Elevated troponin  -Cardiac catheterization 4/19/2022 showing multivessel disease involving small to midsize first diagonal branch, small first OM branch, second OM branch, and RCA  -Per admitting/cardiology     3.  Constipation  -Chronic per patient description  -Complete bowel movements  -Start MiraLAX daily  -Senokot nightly as needed     4. [id number] screen / elevated CEA  -Elevated CEA 12.1 (5/28/2022)  -No prior colonoscopy  -No known family history of colon cancer  -Colonoscopy inpatient versus outpatient if patient agreeable -consider colonoscopy this week     5.  Comorbidities  -CAD, hypertension, dyslipidemia, diabetes, thyroid disease  -Per admitting/consultants        Wilfred Kam MD  5/30/2022  8:26 AM    NOTE:  This report was transcribed using voice recognition software. Every effort was made to ensure accuracy; however, inadvertent computerized transcription errors may be present.

## 2022-05-30 NOTE — PLAN OF CARE
Problem: Safety - Adult  Goal: Free from fall injury  5/29/2022 1325 by [place] RN  Outcome: Progressing

## 2022-05-30 NOTE — PROGRESS NOTES
Pt ambulated approx. 300 '. O2  At 86 % on 3L upon completion. HR in the mid 120's . Recovery took approx 3-4 min. HR at 107,POX at 94 % on 3L. No dizziness per pt.

## 2022-05-30 NOTE — PROGRESS NOTES
Internal Medicine Progress Note    [facility]    Joline Angelucci. Stanislav Quintana., JAYNEOAngelinaIAngelina Wells D.O., IOANA Vásquez D.O. Arthur Armendariz, MSN, APRN, NP-C  Fei Jacobo. Leticia Kinsey, MSN, APRN-CNP     Primary Care Physician: Cathy Vásquez DO   Admitting Physician:  Rey Burgess DO  Admission date and time: 5/26/2022 10:52 AM    Room:  [facility]/9406-  Admitting diagnosis: Orthostatic hypotension [I95.1]    Patient Name: Alirio Chauhan  MRN: [id number]    Date of Service: 5/30/2022     Subjective:  Oneal Sanderson is a 80 y.o. male who was seen and examined today,5/30/2022, at the bedside. Oneal Sanderson is seated at the bedside chair resting comfortably today. He states he was far more ambulatory yesterday and is otherwise tolerating a diet. He has no overt pain or discomfort. He is less dizzy with activity. We discussed the plans for colonoscopy evaluation on Wednesday. Review of System:   Constitutional:   Denies any fever or chills. More energized today. HEENT:   Denies ear pain, sore throat, sinus or eye problems. Complains of nose pain secondary to fall nasal abrasion  Cardiovascular:   Denies any chest pain, irregular heartbeats, or palpitations. Respiratory:   Denies shortness of breath, coughing, sputum production, hemoptysis, or wheezing. Gastrointestinal:   Denies nausea, vomiting, diarrhea, or constipation. Denies any abdominal pain. Tolerating a diet without complication. Genitourinary:    Denies any urgency, frequency, hematuria. Voiding  without difficulty. Extremities:   Denies lower extremity swelling, edema or cyanosis. Neurology:    Denies any headache or focal neurological deficits, improving strength with increased activity yesterday. Dizziness seems resolved. Psch:   Denies being anxious or depressed. Musculoskeletal:    Denies  myalgias, joint complaints or back pain.    Integumentary:   Denies any rashes, ulcers, or excoriations. Denies bruising. Hematologic/Lymphatic:  Denies bruising or bleeding. Physical Exam:  No intake/output data recorded. Intake/Output Summary (Last 24 hours) at 5/30/2022 0836  Last data filed at 5/29/2022 2323  Gross per 24 hour   Intake 240 ml   Output 0 ml   Net 240 ml   I/O last 3 completed shifts: In: 240 [P.O.:240]  Out: 0   No data found. Vital Signs:   Blood pressure (!) 164/74, pulse 85, temperature 98.1 °F (36.7 °C), temperature source Infrared, resp. rate 18, SpO2 98 %. General appearance:  Alert, responsive, oriented to person, place, and time. Well preserved, alert, no distress. Head:  Normocephalic. No masses, lesions or tenderness. Eyes:  PERRLA. EOMI. Sclera clear. Buccal mucosa moist.  ENT:  Ears normal. Mucosa normal.  Abrasion on nose  Neck:    Supple. Trachea midline. No thyromegaly. No JVD. No bruits. Heart:    Rhythm regular. Rate controlled. No murmurs. Lungs:    Symmetrical. Clear to auscultation bilaterally. No wheezes. No rhonchi. No rales. Abdomen:   Soft. Non-tender. Non-distended. Bowel sounds positive. No organomegaly or masses. No pain on palpation. Extremities:    Peripheral pulses present. No peripheral edema. No ulcers. No cyanosis. No clubbing. Neurologic:    Alert x 3. No focal deficit. Cranial nerves grossly intact. Weakness improved  Psych:   Behavior is normal. Mood appears normal. Speech is not rapid and/or pressured. Musculoskeletal:   Spine ROM normal. Muscular strength intact. Gait not assessed. Integumentary:  No rashes  Skin normal color and texture.   Genitalia/Breast:  Deferred    Medication:  Scheduled Meds:   pantoprazole (PROTONIX) 40 mg injection  40 mg IntraVENous Daily    insulin lispro  5 Units SubCUTAneous TID     polyethylene glycol  17 g Oral Daily    metoprolol succinate  12.5 mg Oral Daily    insulin lispro  0-18 Units SubCUTAneous TID     insulin lispro  0-9 Units SubCUTAneous Nightly    insulin glargine  25 Units SubCUTAneous Nightly    atorvastatin  40 mg Oral Nightly    finasteride  5 mg Oral Daily    isosorbide mononitrate  30 mg Oral Daily    levothyroxine  75 mcg Oral Daily    therapeutic multivitamin-minerals  1 tablet Oral Daily    midodrine  5 mg Oral TID WC    potassium chloride  10 mEq Oral TID    predniSONE  15 mg Oral Daily with breakfast    And    predniSONE  5 mg Oral Dinner     Continuous Infusions:   dextrose         Objective Data:  CBC with Differential:    Lab Results   Component Value Date    WBC 3.7 05/30/2022    RBC 2.27 05/30/2022    HGB 8.2 05/30/2022    HCT 24.3 05/30/2022     05/30/2022    .0 05/30/2022    MCH 36.1 05/30/2022    MCHC 33.7 05/30/2022    RDW 22.1 05/30/2022    NRBC 1.0 05/30/2022    SEGSPCT 48 07/06/2013    BLASTSPCT 0.9 05/16/2022    METASPCT 0.9 05/16/2022    LYMPHOPCT 44.0 05/30/2022    MONOPCT 12.0 05/30/2022    MYELOPCT 2.0 05/30/2022    BASOPCT 0.0 05/30/2022    MONOSABS 0.44 05/30/2022    LYMPHSABS 1.63 05/30/2022    EOSABS 0.00 05/30/2022    BASOSABS 0.00 05/30/2022     CMP:    Lab Results   Component Value Date     05/30/2022    K 4.4 05/30/2022    K 3.5 05/24/2022     05/30/2022    CO2 30 05/30/2022    BUN 22 05/30/2022    CREATININE 1.2 05/30/2022    GFRAA >60 05/30/2022    LABGLOM 57 05/30/2022    GLUCOSE 150 05/30/2022    GLUCOSE 111 03/29/2012    PROT 5.9 05/30/2022    LABALBU 3.1 05/30/2022    LABALBU 4.4 03/29/2012    CALCIUM 8.3 05/30/2022    BILITOT 0.4 05/30/2022    ALKPHOS 57 05/30/2022    AST 21 05/30/2022    ALT 34 05/30/2022       Assessment:  1. Symptomatic orthostasis that is multifactorial in nature. 2. Coronary artery disease with cardiac catheterization in 04/2022 showing small to moderate multiple vessel disease with elevated troponin suggesting demand ischemia. 3. Macrocytic anemia with bone marrow biopsy suggesting myelodysplastic syndrome with multilineage dysplasia  4.  Chronic kidney disease stage III  5. Insulin-dependent diabetes mellitus type 2  6. 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 7. Primary hypothyroidism, on Synthroid. 8. Hyperlipidemia, on statin agent. 9. BPH on Proscar    Plan:   Clement Pryor seems to be resting very comfortably today and will continue working with the therapy teams. There has been substantial improvement in his subjective dizziness and orthostasis has improved as well. Plans are for colonoscopic preparation tomorrow to be followed by colonoscopy on Wednesday. Oncology recommendations have been reviewed as well with plans for [address] therapy as an outpatient. He has no overt chest pain or chest discomfort at this point in the setting of known multivessel coronary artery disease. We will continue our current treatment strategy as it stands. More than 50% of my  time was spent at the bedside counseling/coordinating care with the patient and/or family with face to face contact. This time was spent reviewing notes and laboratory data as well as instructing and counseling the patient. Time I spent with the family or surrogate(s) is included only if the patient was incapable of providing the necessary information or participating in medical decisions. I also discussed the differential diagnosis and all of the proposed management plans with the patient and individuals accompanying the patient. Clement Pryor requires this high level of physician care and nursing on the Telemetry unit due the complexity of decision management and chance of rapid decline or death. Continued cardiac monitoring and higher level of nursing are required. I am readily available for any further decision-making and intervention.      Miguel A Hector DO, F.A.C.O.I.  5/30/2022  8:36 AM

## 2022-05-30 NOTE — PLAN OF CARE
Problem: Pain  Goal: Verbalizes/displays adequate comfort level or baseline comfort level  5/30/2022 1013 by Jorje Perez RN  Outcome: Progressing  5/30/2022 1011 by Jorje Perez RN  Outcome: Progressing  5/30/2022 0315 by Chapis Mclean RN  Outcome: Progressing

## 2022-05-30 NOTE — PLAN OF CARE
Problem: Pain  Goal: Verbalizes/displays adequate comfort level or baseline comfort level  5/30/2022 1011 by Sebastian Henry RN  Outcome: Progressing  5/30/2022 0315 by Bernard Linton RN  Outcome: Progressing

## 2022-05-30 NOTE — PLAN OF CARE
Problem: Pain  Goal: Verbalizes/displays adequate comfort level or baseline comfort level  5/30/2022 0315 by Sulma Ignacio, RN  Outcome: Progressing     Problem: Safety - Adult  Goal: Free from fall injury  5/30/2022 0315 by Sulma Ignacio, RN  Outcome: Progressing

## 2022-05-31 NOTE — PROGRESS NOTES
Internal Medicine Progress Note    [facility]    Reza Barkley. Lucho Peña., F.A.CAngelinaOAngelinaI. Tami Colvin D.O., JAYNEOAngelinaI. Cathi Marin D.O. Shawn Ortiz, MSN, APRN, NP-C  Lizzie Redman. Dominic Da Silva, MSN, APRN-CNP     Primary Care Physician: Cathi Marin DO   Admitting Physician:  Radha Salas DO  Admission date and time: 5/26/2022 10:52 AM    Room:  [facility]  Admitting diagnosis: Orthostatic hypotension [I95.1]    Patient Name: Holly Martinez  MRN: [id number]    Date of Service: 5/31/2022     Subjective:  Nandini Jessica is a 80 y.o. male who was seen and examined today,5/31/2022, at the bedside. Nandini Jessica is resting comfortably during my examination today. Plans are for colonoscopic preparation today with colonoscopy tomorrow. He remains extremely anxious for discharge home. He is maintained on nasal cannula oxygen and has become increasingly ambulatory. His orthostasis has resolved. Review of System:   Constitutional:   Ongoing improvement on a daily basis. Remains motivated. HEENT:   Denies ear pain, sore throat, sinus or eye problems. Complains of nose pain secondary to fall nasal abrasion  Cardiovascular:   Denies any chest pain, irregular heartbeats, or palpitations. Respiratory:   Denies shortness of breath, coughing, sputum production, hemoptysis, or wheezing. Gastrointestinal:   Denies nausea, vomiting, diarrhea, or constipation. Denies any abdominal pain. Tolerating a diet without complication. Genitourinary:    Denies any urgency, frequency, hematuria. Voiding  without difficulty. Extremities:   Denies lower extremity swelling, edema or cyanosis. Neurology:    Denies any headache or focal neurological deficits, improving strength with increased activity yesterday. Dizziness seems resolved. Psch:   Denies being anxious or depressed. Musculoskeletal:    Denies  myalgias, joint complaints or back pain.    Integumentary:   Denies any rashes, ulcers, or excoriations. Denies bruising. Hematologic/Lymphatic:  Denies bruising or bleeding. Physical Exam:  I/O this shift:  In: -   Out: 675 [Urine:675]    Intake/Output Summary (Last 24 hours) at 5/31/2022 0742  Last data filed at 5/31/2022 0703  Gross per 24 hour   Intake 480 ml   Output 675 ml   Net -195 ml   I/O last 3 completed shifts: In: 480 [P.O.:480]  Out: 0   No data found. Vital Signs:   Blood pressure 122/60, pulse 100, temperature 97.5 °F (36.4 °C), temperature source Oral, resp. rate 16, SpO2 96 %. General appearance:  Alert, responsive, oriented to person, place, and time. Well preserved, alert, no distress. Head:  Normocephalic. No masses, lesions or tenderness. Eyes:  PERRLA. EOMI. Sclera clear. Buccal mucosa moist.  ENT:  Ears normal. Mucosa normal.  Abrasion on nose  Neck:    Supple. Trachea midline. No thyromegaly. No JVD. No bruits. Heart:    Rhythm regular. Rate controlled. No murmurs. Lungs:    Symmetrical. Clear to auscultation bilaterally. No wheezes. No rhonchi. No rales. Abdomen:   Soft. Non-tender. Non-distended. Bowel sounds positive. No organomegaly or masses. No pain on palpation. Extremities:    Peripheral pulses present. No peripheral edema. No ulcers. No cyanosis. No clubbing. Neurologic:    Alert x 3. No focal deficit. Cranial nerves grossly intact. Weakness improved  Psych:   Behavior is normal. Mood appears normal. Speech is not rapid and/or pressured. Musculoskeletal:   Spine ROM normal. Muscular strength intact. Gait not assessed. Integumentary:  No rashes  Skin normal color and texture.   Genitalia/Breast:  Deferred    Medication:  Scheduled Meds:   pantoprazole  40 mg Oral QAM AC    insulin lispro  5 Units SubCUTAneous TID WC    polyethylene glycol  17 g Oral Daily    metoprolol succinate  12.5 mg Oral Daily    insulin lispro  0-18 Units SubCUTAneous TID WC    insulin lispro  0-9 Units SubCUTAneous Nightly    insulin glargine  25 Units SubCUTAneous Nightly    atorvastatin  40 mg Oral Nightly    finasteride  5 mg Oral Daily    isosorbide mononitrate  30 mg Oral Daily    levothyroxine  75 mcg Oral Daily    therapeutic multivitamin-minerals  1 tablet Oral Daily    midodrine  5 mg Oral TID WC    potassium chloride  10 mEq Oral TID    predniSONE  15 mg Oral Daily with breakfast    And    predniSONE  5 mg Oral Dinner     Continuous Infusions:   dextrose         Objective Data:  CBC with Differential:    Lab Results   Component Value Date    WBC 3.4 05/31/2022    RBC 2.32 05/31/2022    HGB 8.2 05/31/2022    HCT 25.0 05/31/2022     05/31/2022    .8 05/31/2022    MCH 35.3 05/31/2022    MCHC 32.8 05/31/2022    RDW 22.2 05/31/2022    NRBC 0.9 05/31/2022    SEGSPCT 48 07/06/2013    BLASTSPCT 0.9 05/16/2022    METASPCT 0.9 05/31/2022    LYMPHOPCT 46.5 05/31/2022    MONOPCT 15.8 05/31/2022    MYELOPCT 0.9 05/31/2022    BASOPCT 0.0 05/31/2022    MONOSABS 0.54 05/31/2022    LYMPHSABS 1.60 05/31/2022    EOSABS 0.00 05/31/2022    BASOSABS 0.00 05/31/2022     CMP:    Lab Results   Component Value Date     05/31/2022    K 4.8 05/31/2022    K 3.5 05/24/2022     05/31/2022    CO2 28 05/31/2022    BUN 25 05/31/2022    CREATININE 1.2 05/31/2022    GFRAA >60 05/31/2022    LABGLOM 57 05/31/2022    GLUCOSE 148 05/31/2022    GLUCOSE 111 03/29/2012    PROT 6.1 05/31/2022    LABALBU 2.9 05/31/2022    LABALBU 4.4 03/29/2012    CALCIUM 8.4 05/31/2022    BILITOT 0.3 05/31/2022    ALKPHOS 60 05/31/2022    AST 19 05/31/2022    ALT 33 05/31/2022       Assessment:  1. Symptomatic orthostasis that is multifactorial in nature. 2. Coronary artery disease with cardiac catheterization in 04/2022 showing small to moderate multiple vessel disease with elevated troponin suggesting demand ischemia. 3. Macrocytic anemia with bone marrow biopsy suggesting myelodysplastic syndrome with multilineage dysplasia  4.  Chronic kidney disease stage III  5. Insulin-dependent diabetes mellitus type 2  6. 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 7. Primary hypothyroidism, on Synthroid. 8. Hyperlipidemia, on statin agent. 9. BPH on Proscar    Plan:   As stated previously, orthostasis has entirely resolved at this point. Blood sugars are better controlled as well. He will undergo colonoscopy preparation today with colonoscopy tomorrow. He is extremely anxious for discharge home and will likely be discharged following the procedure tomorrow. Oncology recommendations have been reviewed with plans for outpatient follow-up in the setting of his myelodysplastic syndrome. Cardiology recommendations have been reviewed as well. He remains ambulatory and in good spirits. More than 50% of my  time was spent at the bedside counseling/coordinating care with the patient and/or family with face to face contact. This time was spent reviewing notes and laboratory data as well as instructing and counseling the patient. Time I spent with the family or surrogate(s) is included only if the patient was incapable of providing the necessary information or participating in medical decisions. I also discussed the differential diagnosis and all of the proposed management plans with the patient and individuals accompanying the patient. Carina Patel requires this high level of physician care and nursing on the Telemetry unit due the complexity of decision management and chance of rapid decline or death. Continued cardiac monitoring and higher level of nursing are required. I am readily available for any further decision-making and intervention.      Javier Mcmahon DO, NIRALIA.C.O.I.  5/31/2022  7:42 AM

## 2022-05-31 NOTE — PROGRESS NOTES
Blood and Brendan Claude Dr. Graydon Reges, M.D. Patient Name: Felicity Darby  YOB: 1933  PCP: David Antunez DO   Referring Provider: Richard Via [address] [address]     Reason for Consultation: No chief complaint on file. Subjective:  No overt bleeding. Undergoing prep today with plans for cscope tomorrow. Anxious for DC    History of Present Illness:  80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 Scheduled to start treatment with [facility] today. Admitted with orthostatic hypotension. ED notes H&P not available at this time. Patient is being hydrated feels better. Labs remarkable for sodium 129, creatinine 1.7, glucose 400, hemoglobin 7.4. No new imaging. Review of systems: Over 10 systems were reviewed and all were negative except as mentioned above.     Diagnostic Data:     Past Medical History:   Diagnosis Date    Diabetes mellitus ([address]     H/O cardiovascular stress test 12/01/2021    Lexiscan    History of blood transfusion     History of Holter monitoring 02/11/2022    Hyperlipidemia     Hypertension     Lower back pain     Thyroid disease        Patient Active Problem List    Diagnosis Date Noted    Orthostatic hypotension 05/26/2022    Thrombocytopenia, unspecified 05/19/2022    MDS (myelodysplastic syndrome) ([address] 05/19/2022    Chronic renal disease, stage III ([address] [855938] 05/11/2022    NSTEMI (non-ST elevated myocardial infarction) ([address] 04/13/2022    Essential hypertension 08/17/2021    Acquired hypothyroidism 08/17/2021    Dyslipidemia 08/17/2021    Type 2 diabetes mellitus without complication, without long-term current use of insulin ([address] 08/17/2021    Normocytic anemia 08/17/2021    Incomplete tear of right rotator cuff 01/30/2020    Long biceps tear 01/30/2020    Injury of tendon of long head of right biceps 01/30/2020    Tear of right glenoid labrum 01/30/2020    Bursitis of right shoulder 01/30/2020        Past Surgical History:   Procedure Laterality Date    BACK SURGERY  2000    fusion  lumbar area,      CARDIAC CATHETERIZATION  04/19/2022    JOINT REPLACEMENT Bilateral [id number]    knee     LITHOTRIPSY      SHOULDER ARTHROSCOPY Right 01/30/2020    with treatment    SHOULDER ARTHROSCOPY Right 1/30/2020    ARTHROSCOPIC EXAM AND TREATMENT RIGHT SHOULDER (CPT 77290,85696) RIGHT ROTATOR CUFF REPAIR, SUBACHROMIAL DECOMPRESSION, DEBRIDEMENT OF HUMERAL performed by Destin Carrera DO at [facility] ENDOSCOPY N/A 4/18/2022    EGD ESOPHAGOGASTRODUODENOSCOPY performed by Halina Galeazzi, DO at [address] [place] [address] History  Family History   Problem Relation Age of Onset    No Known Problems Mother     Diabetes Father        Social History  Social History     Socioeconomic History    Marital status:      Spouse name: Not on file    Number of children: Not on file    Years of education: Not on file    Highest education level: Not on file   Occupational History    Not on file   Tobacco Use    Smoking status: Never Smoker    Smokeless tobacco: Never Used   Vaping Use    Vaping Use: Never used   Substance and Sexual Activity    Alcohol use: No    Drug use: No    Sexual activity: Not Currently     Partners: Female   Other Topics Concern    Not on file   Social History Narrative    Not on file     Social Determinants of Health     Financial Resource Strain: Low Risk     Difficulty of Paying Living Expenses: Not hard at all   Food Insecurity: No Food Insecurity    Worried About [address] in the Last Year: Never true    [address] in the Last Year: Never true   Transportation Needs:     Lack of Transportation (Medical): Not on file    Lack of Transportation (Non-Medical):  Not on file   Physical Activity:     Days of 05/31/2022    HGB 8.2 (L) 05/31/2022    HCT 25.0 (L) 05/31/2022    .8 (H) 05/31/2022     05/31/2022    LYMPHOPCT 46.5 (H) 05/31/2022    RBC 2.32 (L) 05/31/2022    MCH 35.3 (H) 05/31/2022    MCHC 32.8 05/31/2022    RDW 22.2 (H) 05/31/2022    NEUTOPHILPCT 36.0 (L) 05/31/2022    MONOPCT 15.8 (H) 05/31/2022    BASOPCT 0.0 05/31/2022    NEUTROABS 1.29 (L) 05/31/2022    LYMPHSABS 1.60 05/31/2022    MONOSABS 0.54 05/31/2022    EOSABS 0.00 (L) 05/31/2022    BASOSABS 0.00 05/31/2022       Lab Results   Component Value Date     05/31/2022    K 4.8 05/31/2022     05/31/2022    CO2 28 05/31/2022    BUN 25 (H) 05/31/2022    CREATININE 1.2 05/31/2022    GLUCOSE 148 (H) 05/31/2022    CALCIUM 8.4 (L) 05/31/2022    PROT 6.1 (L) 05/31/2022    LABALBU 2.9 (L) 05/31/2022    BILITOT 0.3 05/31/2022    ALKPHOS 60 05/31/2022    AST 19 05/31/2022    ALT 33 05/31/2022    LABGLOM 57 05/31/2022    GFRAA >60 05/31/2022       Lab Results   Component Value Date    IRON 122 04/14/2022    TIBC 140 (L) 04/14/2022    FERRITIN 3,037 04/14/2022           Radiology-    CT ABDOMEN PELVIS WO CONTRAST Additional Contrast? Oral   Final Result   1. Signs of urolithiasis but no evidence of obstructive uropathy   2. Diverticulosis but no signs of diverticulitis. 3. Dense appearance of the liver. 4. Signs of idiopathic pulmonary fibrosis at the lung bases         XR KNEE LEFT (1-2 VIEWS)   Final Result   No acute osseous findings in the left knee         XR CHEST (2 VW)   Final Result   Chronic scarring in the lungs. CT FACIAL BONES WO CONTRAST   Final Result   No acute facial bone trauma. 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 Scheduled to start treatment with [facility] today. Admitted with orthostatic hypotension. Feels better with hydration. Medical management by Dr. Dante Linn.   Macrocytic anemia due to MDS. Transfuse for hemoglobin less than 7. We will continue to follow. 5/27/22  - CBC with Hgb 8.5, s/p 1 unit pRBC yesterday. WBC and platelets WNL  - Macrocytosis due to MDS-MLD (high risk category)  - Cr improved to 1.4 today  - Continues on IVF. BP improved  - Will rearranged scheduled start date for HMA therapy after DC    5/29/22  - Hgb holding stable  - CEA was 12  - GI consulted, planning colonoscopy   - BP seems to be improved, most recent reading on the lower end  - HMA therapy outpatient as above with Dr. Khalida Momin    5/31/22  - CBC and Hgb holding stable  - CEA was 12.  Planning colonoscopy tomorrow  - Orthostatic hypotension resolved  - HMA therapy outpatient as above with Dr. Khalida Momin  - OK to DC after colonoscopy from hem/onc POV    Electronically signed by Marsha Bishop MD on 5/31/2022 at 12:41 PM

## 2022-05-31 NOTE — PROGRESS NOTES
PROGRESS NOTE  By Sade Olson M.D. [facility]  Dr. Rambo Marcum M.D.,  Dr. Conor Gutierrez M.D.,   Dr. Celi Wasserman D.O.,  Dr. Claria Scheuermann, M.D.,  Dr. Kimberley Galvez D.O.,  Fredrick Patino D.O. Orlin COONEY Prest  80 y.o.  male    SUBJECTIVE:  Denies abdominal pain. Denies nausea vomiting    OBJECTIVE:    BP (!) 115/59   Pulse 83   Temp 97.2 °F (36.2 °C) (Infrared)   Resp 18   SpO2 98%     General: NAD/ male  HEENT: Anicteric sclera/moist oral mucosa  Neck: Supple/trachea midline  Chest: Symmetric excursion/nonlabored respirations  Cor: Regular  Abd.: Soft and nontender  Extr.:  Decreased muscle tone and bulk, consistent with age and condition  Skin: Warm and dry        DATA:    Monitor data reviewed -sinus rhythm noted.     Stool (measured) : 0 mL  Lab Results   Component Value Date    WBC 3.4 05/31/2022    RBC 2.32 05/31/2022    HGB 8.2 05/31/2022    HCT 25.0 05/31/2022    .8 05/31/2022    MCH 35.3 05/31/2022    MCHC 32.8 05/31/2022    RDW 22.2 05/31/2022     05/31/2022    MPV 10.5 05/31/2022     Lab Results   Component Value Date     05/31/2022    K 4.8 05/31/2022    K 3.5 05/24/2022     05/31/2022    CO2 28 05/31/2022    BUN 25 05/31/2022    CREATININE 1.2 05/31/2022    CALCIUM 8.4 05/31/2022    PROT 6.1 05/31/2022    LABALBU 2.9 05/31/2022    LABALBU 4.4 03/29/2012    BILITOT 0.3 05/31/2022    ALKPHOS 60 05/31/2022    AST 19 05/31/2022    ALT 33 05/31/2022     No results found for: LIPASE  No results found for: AMYLASE      ASSESSMENT/PLAN:  Patient Active Problem List   Diagnosis    Incomplete tear of right rotator cuff    Long biceps tear    Injury of tendon of long head of right biceps    Tear of right glenoid labrum    Bursitis of right shoulder    Essential hypertension    Acquired hypothyroidism    Dyslipidemia    Type 2 diabetes mellitus without complication, without long-term current use of insulin ([facility])    Normocytic anemia  NSTEMI (non-ST elevated myocardial infarction) (HCC)    Chronic renal disease, stage III (HCC) [533249]    Thrombocytopenia, unspecified    MDS (myelodysplastic syndrome) ([facility]    Orthostatic hypotension     1.  Anemia  -Chronic  -Macrocytic  -Baseline hgb 7s-9s  -History of MDS  -No iron deficiency previously  -FOBT positive - no overt GI bleeding/no melena or blood per stool  -EGD 4/13/2022 showing normal examined esophagus, no varices, gastritis, and small duodenal diverticulum - no bleeding source identified  -PPI twice daily  -Continue to monitor H&H  -Keep PRBCs on hold  -Defer transfusion to admitting  -Colonoscopy tomorrow    2.  Elevated troponin  -Cardiac catheterization 4/19/2022 showing multivessel disease involving small to midsize first diagonal branch, small first OM branch, second OM branch, and RCA  -Per admitting/cardiology     3.  Constipation  -Chronic per patient description  -Continue bowel regimen on discharge  -Colonoscopy tomorrow     4. [address] screen / elevated CEA  -Elevated CEA 12.1 (5/28/2022)  -No prior colonoscopy  -No known family history of colon cancer  -Colonoscopy tomorrow      5.  Comorbidities  -CAD, hypertension, dyslipidemia, diabetes, thyroid disease  -Per admitting/consultants     Marlin Mejia MD  5/31/2022  11:53 AM    NOTE:  This report was transcribed using voice recognition software. Every effort was made to ensure accuracy; however, inadvertent computerized transcription errors may be present.

## 2022-05-31 NOTE — CARE COORDINATION
Ss note:5/31/2022.3:36 PM Neg PCR test on 5-26-22. Pt resides home alone, has rollator, w/c, walk in shower with shower chair and home oxygen to include portable tank, unsure of DME co. Pt does not drives. Per IDR pt for scope tomorrow and probable discharge home. Pt is ACTIVE with [facility] will need RESUME order, family to transport home.  BALDO Hua

## 2022-05-31 NOTE — CONSULTS
5/31/2022 8:58 AM  Service: Urology  Group: [facility] ([facility])    Esperanza Ortiz  [id number]     Chief Complaint:    Dysuria    History of Present Illness: The patient is a 80 y.o. male patient who presented to the hospital 5 days ago for orthostasis. He is scheduled for coloscopy tomorrow. We were asked to evaluate him for dysuria. He reports he has never seen a Urologist in the past. He does describe lower urinary tract symptoms at baseline that include hesitancy, nocturia, frequency, and feelings of incomplete emptying. Currently denies dysuria.        Past Medical History:   Diagnosis Date    Diabetes mellitus ([facility] [address]     H/O cardiovascular stress test 12/01/2021    Lexiscan    History of blood transfusion     History of Holter monitoring 02/11/2022    Hyperlipidemia     Hypertension     Lower back pain     Thyroid disease          Past Surgical History:   Procedure Laterality Date    BACK SURGERY  2000    fusion  lumbar area,      CARDIAC CATHETERIZATION  04/19/2022    JOINT REPLACEMENT Bilateral [id number]    knee     LITHOTRIPSY      SHOULDER ARTHROSCOPY Right 01/30/2020    with treatment    SHOULDER ARTHROSCOPY Right 1/30/2020    ARTHROSCOPIC EXAM AND TREATMENT RIGHT SHOULDER (CPT 70656,04394) RIGHT ROTATOR CUFF REPAIR, SUBACHROMIAL DECOMPRESSION, DEBRIDEMENT OF HUMERAL performed by Mariana Sanchez DO at [address] 4/18/2022    EGD ESOPHAGOGASTRODUODENOSCOPY performed by Justin Melchor DO at [place] [id number]       Medications Prior to Admission:    Medications Prior to Admission: levothyroxine (SYNTHROID) 75 MCG tablet, Take 1 tablet by mouth Daily  finasteride (PROSCAR) 5 MG tablet, Take 1 tablet by mouth daily  isosorbide mononitrate (IMDUR) 30 MG extended release tablet, Take 1 tablet by mouth daily  carvedilol (COREG) 6.25 MG tablet, Take 1 tablet by mouth 2 times daily (with meals)  predniSONE (DELTASONE) 5 MG tablet, Take 1 tablet by mouth daily Daily at 3 pm  predniSONE (DELTASONE) 10 MG tablet, Take 1 tablet by mouth every morning  potassium citrate (UROCIT-K) 10 MEQ (1080 MG) extended release tablet, Take 1 tablet by mouth 2 times daily  insulin lispro, 1 Unit Dial, (HUMALOG KWIKPEN) 100 UNIT/ML SOPN, Inject 30 Units into the skin 3 times daily (before meals) Patient is to take 10 units at breakfast and 10 units lunch and dinner  furosemide (LASIX) 40 MG tablet, Take 1 tablet by mouth daily  [DISCONTINUED] metOLazone (ZAROXOLYN) 5 MG tablet, Take 1 tablet by mouth daily (Patient taking differently: Take 5 mg by mouth daily as needed )  aspirin 81 MG chewable tablet, Take 1 tablet by mouth daily  atorvastatin (LIPITOR) 40 MG tablet, Take 1 tablet by mouth nightly  insulin glargine (LANTUS SOLOSTAR) 100 UNIT/ML injection pen, Inject 10 Units into the skin nightly (Patient taking differently: Inject 25 Units into the skin nightly )  Insulin Pen Needle 29G X 12MM MISC, 1 each by Does not apply route 4 times daily (before meals and nightly)  ONETOUCH ULTRA strip, 1 each by Other route 4 times daily As needed. Lancets (ONETOUCH DELICA PLUS [id number]) MISC, 1 strip by Other route 4 times daily  therapeutic multivitamin-minerals (THERAGRAN-M) tablet, Take 1 tablet by mouth daily. Allergies:    No known allergies    Social History:    reports that he has never smoked. He has never used smokeless tobacco. He reports that he does not drink alcohol and does not use drugs. Family History:   Non-contributory to this Urological problem  family history includes Diabetes in his father; No Known Problems in his mother.     Review of Systems:  Constitutional: No fever or chills   Respiratory: negative for cough and hemoptysis  Cardiovascular: negative for chest pain and dyspnea  Gastrointestinal: negative for abdominal pain, diarrhea, nausea and vomiting   Derm: negative for rash and skin lesion(s)  Neurological: negative for seizures and tremors  Musculoskeletal: Negative    Psychiatric: Negative   : As above in the HPI, otherwise negative  All other reviews are negative    Physical Exam:     Vitals:  BP (!) 118/57   Pulse 88   Temp 97.2 °F (36.2 °C) (Infrared)   Resp 17   SpO2 98%     General:  Awake, alert, oriented X 3. No apparent distress. HEENT:  Normocephalic, atraumatic. Lungs:  Respirations symmetric and non-labored. Abdomen:  soft, nontender, no masses  Extremities:  No clubbing, cyanosis, or edema  Skin:  Warm and dry, no open lesions or rashes  Neuro: There are no motor or sensory deficits in the 4 quadrant extremities   Rectal: deferred  Genitourinary:  No looney     Labs:     Recent Labs     05/29/22  0558 05/30/22  0549 05/31/22  0509   WBC 4.3* 3.7* 3.4*   RBC 2.28* 2.27* 2.32*   HGB 8.1* 8.2* 8.2*   HCT 24.5* 24.3* 25.0*   .5* 107.0* 107.8*   MCH 35.5* 36.1* 35.3*   MCHC 33.1 33.7 32.8   RDW 22.9* 22.1* 22.2*    142 136   MPV 10.4 10.7 10.5         Recent Labs     05/29/22  0558 05/30/22  0549 05/31/22  0509   CREATININE 1.1 1.2 1.2       Lab Results   Component Value Date    PSA 0.34 04/07/2015    PSA 0.24 07/03/2014       Imaging:   Narrative   EXAMINATION:   CT OF THE ABDOMEN AND PELVIS WITHOUT CONTRAST 5/28/2022 8:31 pm       TECHNIQUE:   CT of the abdomen and pelvis was performed without the administration of   intravenous contrast. Multiplanar reformatted images are provided for review. Automated exposure control, iterative reconstruction, and/or weight based   adjustment of the mA/kV was utilized to reduce the radiation dose to as low   as reasonably achievable.  Total DLP: 723.98 mGy       COMPARISON:   April 14, 2022       HISTORY:   ORDERING SYSTEM PROVIDED HISTORY: r/o mets   TECHNOLOGIST PROVIDED HISTORY:   Reason for exam:->r/o mets   Additional Contrast?->Oral       FINDINGS:   Lower Chest: There signs of extensive subpleural honeycombing.  Ground-glass   opacities are observed.  There are bronchiectatic changes.  These findings   collectively a concerning for idiopathic pulmonary fibrosis or usual   interstitial pneumonitis.  No effusions are identified.  The heart does not   appear enlarged.  There are no signs of pleural or pericardial effusion.  The   ascending thoracic aorta measures 3.2 cm that is within the normal range.       Organs: The liver appears dense.  There are no signs of enhancing mass or   duct dilation.  Gallbladder is not distended.  The area the pancreas reveals   no signs of a discrete mass or duct dilation.  The spleen and adrenal glands   appear unremarkable otherwise       The right kidney appears normal in size shape and position.  It reveals a 3   mm nonobstructing calculus in the interpolar region.  The left kidney reveals   several tiny calculi measuring 1 and 2 mm respectively.  No large stones or   signs of hydronephrosis were observed.  The visualized ureters appear normal   in course and caliber.       GI/Bowel: The stomach appears food filled.  The small bowel pattern is within   the normal range and reveals no evidence of mechanical obstruction.  The   region of the terminal ileum and cecum appears within the normal range. There is a large volume of retained fecal material within the colon.  There   are scattered sigmoid diverticula but no signs of diverticulitis.       Pelvis: The urinary bladder reveals a rounded contour without focal wall   thickening or stones. [place] Gentleman is a small left bladder diverticulum measuring 8   mm.  The prostate gland measures 3.5 cm that is within the normal range.  No   lymphadenopathy or ascites were observed within the pelvis.       Peritoneum/Retroperitoneum: There are no signs of retroperitoneal   lymphadenopathy aneurysm or hemorrhage present.       Bones/Soft Tissues:  There is a 14 mm anterolisthesis of L4 on L5.  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  There   is significant disc height loss and vacuum phenomena at the L5-S1 level if   patient is status post laminectomy involving the posterior elements at L4 and   L5.           Impression   1. Signs of urolithiasis but no evidence of obstructive uropathy   2. Diverticulosis but no signs of diverticulitis. 3. Dense appearance of the liver.    4. Signs of idiopathic pulmonary fibrosis at the lung bases                 Assessment/plan:  Dysuria  Bilateral renal calculi   Bladder Diverticulum  BPH     Cont to watch the creatinine   CTAP reviewed shows bilateral renal calculi, no hydronephrosis, and bladder diverticulum   Will obtain some PVRs   Hold on Flomax, was admitted for orthostasis   Cont the Proscar   UA unremarkable  Urine culture has not been sent  Antibiotics per primary   Will hold on looney at this time unless PVRs are extremely elevated  His dysuria has resolved   Will need GRICEL and PSA outpatient     Electronically signed by MIK Christiansen CNP on 5/31/2022 at 8:58 AM     Agree with above  Seen and examined  Agree with the plan and treatment    Sangeeta De Santiago DO

## 2022-05-31 NOTE — PROGRESS NOTES
INPATIENT CARDIOLOGY FOLLOW-UP    Name: Mason Ortega    Age: 80 y.o. Date of Admission: 5/26/2022 10:52 AM    Date of Service: 5/31/2022    Chief Complaint: Follow-up for orthostatic hypotension, CAD    Interim History:  No new overnight cardiac complaints. Currently with no complaints of CP, SOB, palpitations, dizziness, or lightheadedness. SR on telemetry. Review of Systems:   Cardiac: As per HPI  General: No fever, chills  Pulmonary: As per HPI  HEENT: No visual disturbances, difficult swallowing  GI: No nausea, vomiting  : No dysuria, hematuria  Endocrine: +hypothyroidism, +DM  Musculoskeletal: STEELE x 4, no focal motor deficits  Skin: Intact, no rashes  Neuro: No headache, seizures  Psych: Currently with no depression, anxiety    Problem List:  Patient Active Problem List   Diagnosis    Incomplete tear of right rotator cuff    Long biceps tear    Injury of tendon of long head of right biceps    Tear of right glenoid labrum    Bursitis of right shoulder    Essential hypertension    Acquired hypothyroidism    Dyslipidemia    Type 2 diabetes mellitus without complication, without long-term current use of insulin ([facility])    Normocytic anemia    NSTEMI (non-ST elevated myocardial infarction) ([facility])    Chronic renal disease, stage III ([facility]) [888523]    Thrombocytopenia, unspecified    MDS (myelodysplastic syndrome) ([facility])    Orthostatic hypotension       Allergies:   Allergies   Allergen Reactions    No Known Allergies        Current Medications:  Current Facility-Administered Medications   Medication Dose Route Frequency Provider Last Rate Last Admin    polyethylene glycol-electrolytes (NULYTELY) solution 4,000 mL  4,000 mL Oral Once Ace MD Carlos Alberto        pantoprazole (PROTONIX) tablet 40 mg  40 mg Oral QAM AC Javier Floresall, DO   40 mg at 05/31/22 2123    phenazopyridine (PYRIDIUM) tablet 100 mg  100 mg Oral Q6H PRN Deonte A Lionel, DO   100 mg at 05/30/22 1719    insulin lispro (HUMALOG) injection vial 5 Units  5 Units SubCUTAneous TID  Leobardo Mcguire, DO   5 Units at 05/31/22 0802    polyethylene glycol (GLYCOLAX) packet 17 g  17 g Oral Daily MIK Sanabria - CNP   17 g at 05/31/22 0757    senna (SENOKOT) tablet 8.6 mg  1 tablet Oral Nightly PRN Bert Houston, APRN - JUANJOSE        acetaminophen (TYLENOL) tablet 650 mg  650 mg Oral Q6H PRN Katerine Bianchi, DO        metoprolol succinate (TOPROL XL) extended release tablet 12.5 mg  12.5 mg Oral Daily Ronald Boothe MD   12.5 mg at 05/31/22 0757    insulin lispro (HUMALOG) injection vial 0-18 Units  0-18 Units SubCUTAneous TID  Leobardo Mcguire, DO   3 Units at 05/31/22 0758    insulin lispro (HUMALOG) injection vial 0-9 Units  0-9 Units SubCUTAneous Nightly Katerinecarlyn Bianchi, DO   3 Units at 05/30/22 2113    insulin glargine (LANTUS) injection vial 25 Units  25 Units SubCUTAneous Nightly Windy Mcugire, DO   25 Units at 05/30/22 2111    glucose chewable tablet 16 g  4 tablet Oral PRN Leobardo Mcguire, DO        dextrose bolus 10% 125 mL  125 mL IntraVENous PRN Leobardo Mcguire DO        Or    dextrose bolus 10% 250 mL  250 mL IntraVENous PRN Windy Mcguire, DO        glucagon (rDNA) injection 1 mg  1 mg IntraMUSCular PRN Leobardo Mcguire, DO        dextrose 5 % solution  100 mL/hr IntraVENous PRN Windy Mcguire, DO        atorvastatin (LIPITOR) tablet 40 mg  40 mg Oral Nightly Leobardo Mcguire, DO   40 mg at 05/30/22 2110    finasteride (PROSCAR) tablet 5 mg  5 mg Oral Daily Leobardo Mcguire, DO   5 mg at 05/31/22 0756    isosorbide mononitrate (IMDUR) extended release tablet 30 mg  30 mg Oral Daily Leobardo Mcguire, DO   30 mg at 05/31/22 0756    levothyroxine (SYNTHROID) tablet 75 mcg  75 mcg Oral Daily Windy Mcguire, DO   75 mcg at 05/31/22 0607    therapeutic multivitamin-minerals 1 tablet  1 tablet Oral Daily Leobardo Mcguire DO   1 tablet at 05/31/22 0756    midodrine (PROAMATINE) tablet 5 mg  5 mg Oral TID WC Sharon Beans Bisel, DO   5 mg at 05/31/22 0757    potassium chloride (KLOR-CON M) extended release tablet 10 mEq  10 mEq Oral TID Gregory Leong, DO   10 mEq at 05/30/22 2110    predniSONE (DELTASONE) tablet 15 mg  15 mg Oral Daily with breakfast Sharon Beans Bisel, DO   15 mg at 05/31/22 2289    And    predniSONE (DELTASONE) tablet 5 mg  5 mg Oral Dinner Leobardo CESAR Bisel, DO   5 mg at 05/30/22 1656      dextrose         Physical Exam:  BP (!) 118/57   Pulse 88   Temp 97.2 °F (36.2 °C) (Infrared)   Resp 17   SpO2 98%   Wt Readings from Last 3 Encounters:   05/19/22 155 lb 12.8 oz (70.7 kg)   05/11/22 151 lb (68.5 kg)   04/21/22 160 lb (72.6 kg)     Appearance: Awake, alert, no acute respiratory distress  Skin: Intact, no rash  Head: Normocephalic, atraumatic  Eyes: EOMI, no conjunctival erythema  ENMT: No pharyngeal erythema, MMM, no rhinorrhea  Neck: Supple, no carotid bruits  Lungs: Clear to auscultation bilaterally. No wheezes, rales, or rhonchi. Cardiac: Regular rate and rhythm, +S1S2, no murmurs apparent  Abdomen: Soft, nontender, +bowel sounds  Extremities: Moves all extremities x 4, no lower extremity edema  Neurologic: No focal motor deficits apparent, normal mood and affect    Intake/Output:    Intake/Output Summary (Last 24 hours) at 5/31/2022 1109  Last data filed at 5/31/2022 0826  Gross per 24 hour   Intake 840 ml   Output 675 ml   Net 165 ml     I/O this shift:   In: 480 [P.O.:480]  Out: 675 [Urine:675]    Laboratory Tests:  Recent Labs     05/29/22  0558 05/30/22  0549 05/31/22  0509    136 139   K 4.5 4.4 4.8    100 103   CO2 29 30* 28   BUN 23 22 25*   CREATININE 1.1 1.2 1.2   GLUCOSE 177* 150* 148*   CALCIUM 8.5* 8.3* 8.4*     Lab Results   Component Value Date    MG 1.7 05/26/2022     Recent Labs     05/29/22  0558 05/30/22  0549 05/31/22  0509   ALKPHOS 57 57 60   ALT 27 34 33   AST 20 21 19   PROT 5.9* 5.9* 6.1*   BILITOT 0.3 0.4 0.3   LABALBU 2.9* 3.1* 2.9*     Recent Labs 05/29/22  0558 05/30/22  0549 05/31/22  0509   WBC 4.3* 3.7* 3.4*   RBC 2.28* 2.27* 2.32*   HGB 8.1* 8.2* 8.2*   HCT 24.5* 24.3* 25.0*   .5* 107.0* 107.8*   MCH 35.5* 36.1* 35.3*   MCHC 33.1 33.7 32.8   RDW 22.9* 22.1* 22.2*    142 136   MPV 10.4 10.7 10.5     Lab Results   Component Value Date    CKTOTAL 256 (H) 04/15/2022    TROPONINI <0.01 05/16/2016     No results for input(s): CKTOTAL, CKMB, CKMBINDEX, TROPHS in the last 72 hours. Lab Results   Component Value Date    INR 1.2 05/28/2022    INR 1.9 04/14/2022    INR 2.3 04/14/2022    PROTIME 13.5 (H) 05/28/2022    PROTIME 22.4 (H) 04/14/2022    PROTIME 27.0 (H) 04/14/2022     Lab Results   Component Value Date    TSH 0.685 05/26/2022     Lab Results   Component Value Date    LABA1C 8.9 (H) 05/26/2022     No results found for: EAG  Lab Results   Component Value Date    CHOL 75 05/26/2022    CHOL 70 04/14/2022    CHOL 110 03/30/2022     Lab Results   Component Value Date    TRIG 55 05/26/2022    TRIG 70 04/14/2022    TRIG 77 03/30/2022     Lab Results   Component Value Date    HDL 38 05/26/2022    HDL 25 04/14/2022    HDL 46 03/30/2022     Lab Results   Component Value Date    LDLCALC 26 05/26/2022    LDLCALC 31 04/14/2022    LDLCALC 49 03/30/2022     Lab Results   Component Value Date    LABVLDL 11 05/26/2022    LABVLDL 14 04/14/2022    LABVLDL 15 03/30/2022     No results found for: CHOLHDLRATIO  No results for input(s): PROBNP in the last 72 hours. Cardiac Tests:  Telemetry personally reviewed (date: 5/31/2022): SR, rate 80's    Echocardiogram reviewed: 4/14/22 (Dr. Imer George)  Technically sub-optimal images. Normal left ventricular chamber size. Inferior wall hypokinesis with normal overall LV systolic function, EF   00%. Mild left ventricular concentric hypertrophy noted. Normal diastolic function. Interatrial septum not well visualized but appears intact. Normal right ventricle size and function.    There is trace to mild mitral regurgitation. No mitral valve prolapse. No hemodynamically significant aortic stenosis. There is trace tricuspid regurgitation. Mild pulmonary hypertension, RVSP 38mmHg. Normal aortic root size. No evidence of pericardial effusion. No intra cardiac mass or thrombus. Compared to prior echo from 1/26/2020 - Inferior wall motion abnormality   is new. Cardiac catheterization reviewed: 4/19/22 (Dr. Lyla Brock)  ANGIOGRAPHIC FINDINGS:  Moderate calcifications involving the coronary  tree.     The left main coronary artery arises normally from the left sinus of  Valsalva. It is a mid-size vessel with mild disease. It bifurcates  into left anterior descending artery and left circumflex artery.     The left anterior descending artery extends down to the apex. It gives  off two diagonal branches. The first diagonal branch is small in  diameter, long that has subtotal occlusion in the proximal segment. The  LAD itself has mild-to-moderate disease.     The left circumflex artery is a large vessel, gives off high OM branch  that has 70% stenosis in the mid segment. However, the vessel is a  1.5-mm vessel. The left circumflex artery has 40% calcified lesion in  the proximal segment. Then, it gives off a large OM branch. The whole  proximal and mid segment of that OM branch is mildly to moderately  diseased, at worst point estimated at 50% stenosis. There were faint  left-to-right collaterals noted.     The right coronary artery has mendoza's crook takeoff. The right  coronary artery is severely diseased in the mid segment. There is  heavily calcified lesion involving the mid segment. There is another  sequential lesion estimated at 80% at the junction of the mid and distal  segment. The vessel itself is a 2.5-mm vessel. There is distal  competitive flow noted in the right PDA.     IMPRESSION:  Multivessel disease involving small- to mid-size first  diagonal branch, small first OM branch, second OM branch and RCA. ASSESSMENT / PLAN:  1. Multivessel CAD -- medically managed, no chest pain  2. Paroxysmal atrial fibrillation -- maintaining SR, OAC with eliquis stopped earlier this year in the setting of anemia  3. History of HTN / recent orthostatic hypotension -- improved, most recent /58, on midodrine  4. MDS -- heme/onc following, most recent Hgb 8.2, WBC 3.4, plt 136, +history of PRBC's  5. HLD -- on statin  6. DM -- Hgb A1c 8.9  7. Chronically elevated hs-troponin  8. CKD -- most recent Cr 1.1 --> 1.2 --> 1.2  9. ILD/pulmonary fibrosis with exertional desaturation -- on O2  10. BPH -- on proscar  11. Hypothyroidism -- on synthroid, normal TSH  12. Heme positive stool  a.  EGD 4/15/2022 demonstrated normal esophagus, mild gastritis, small duodenal diverticulum, no bleeding source identified.    - He may proceed with colonoscopy from a cardiology standpoint (scheduled for 6/1/22)  - Maintain adequate hydration  - Continue current medications (BB switched from coreg to low dose Toprol XL this admission)  - Monitor labs  - Telemetry personally reviewed (SR)  - Will follow JESUS Mays MD  [facility]) Cardiology

## 2022-06-01 NOTE — ANESTHESIA PRE PROCEDURE
Department of Anesthesiology  Preprocedure Note       Name:  Alirio Chauhan   Age:  80 y.o.  :  1933                                          MRN:  [id number]         Date:  2022      Surgeon: Edwin Garcia):  Wendy Hernandez MD    Procedure: Procedure(s):  COLONOSCOPY    Medications prior to admission:   Prior to Admission medications    Medication Sig Start Date End Date Taking?  Authorizing Provider   levothyroxine (SYNTHROID) 75 MCG tablet Take 1 tablet by mouth Daily 22   Joline Angelucci Bisel, DO   finasteride (PROSCAR) 5 MG tablet Take 1 tablet by mouth daily 22   Joline Angelucci Bisel, DO   isosorbide mononitrate (IMDUR) 30 MG extended release tablet Take 1 tablet by mouth daily 22   Joline Angelucci Bisel, DO   carvedilol (COREG) 6.25 MG tablet Take 1 tablet by mouth 2 times daily (with meals) 22   Joline Angelucci Bisel, DO   predniSONE (DELTASONE) 5 MG tablet Take 1 tablet by mouth daily Daily at 3 pm 22   Joline Angelucci Bisel, DO   predniSONE (DELTASONE) 10 MG tablet Take 1 tablet by mouth every morning 22   Joline Angelucci Bisel, DO   potassium citrate (UROCIT-K) 10 MEQ (1080 MG) extended release tablet Take 1 tablet by mouth 2 times daily 22   Leobardo Mcguire DO   insulin lispro, 1 Unit Dial, (HUMALOG KWIKPEN) 100 UNIT/ML SOPN Inject 30 Units into the skin 3 times daily (before meals) Patient is to take 10 units at breakfast and 10 units lunch and dinner 22  Orlin Goode,    furosemide (LASIX) 40 MG tablet Take 1 tablet by mouth daily 5/7/22 11/3/22  Joline Angelucci Bisel, DO   aspirin 81 MG chewable tablet Take 1 tablet by mouth daily 22   Melody Nix DO   atorvastatin (LIPITOR) 40 MG tablet Take 1 tablet by mouth nightly 22   Melody Nix DO   insulin glargine (LANTUS SOLOSTAR) 100 UNIT/ML injection pen Inject 10 Units into the skin nightly  Patient taking differently: Inject 25 Units into the skin nightly  22   Melody Nix, DO   Insulin Pen Needle 29G X 12MM MISC 1 each by Does not apply route 4 times daily (before meals and nightly) 4/19/22   Lorena Bar, DO   ONETOUCH ULTRA strip 1 each by Other route 4 times daily As needed. 2/13/22 5/14/22  Ashwini Mcguire, DO   Lancdemetrio [facility] DELICA PLUS [id number]) MISC 1 strip by Other route 4 times daily 2/13/22 5/14/22  Ashwini Mcguire, DO   therapeutic multivitamin-minerals [facility]) tablet Take 1 tablet by mouth daily.       Historical Provider, MD       Current medications:    Current Facility-Administered Medications   Medication Dose Route Frequency Provider Last Rate Last Admin    pantoprazole (PROTONIX) tablet 40 mg  40 mg Oral QAM AC Collin Black, DO   40 mg at 05/31/22 0607    phenazopyridine (PYRIDIUM) tablet 100 mg  100 mg Oral Q6H PRN Deonte Flowers, DO   100 mg at 05/30/22 1719    insulin lispro (HUMALOG) injection vial 5 Units  5 Units SubCUTAneous TID  Leobardo Mcguire, DO   5 Units at 05/31/22 1708    polyethylene glycol (GLYCOLAX) packet 17 g  17 g Oral Daily Abbi Houston, APRN - CNP   17 g at 05/31/22 0757    senna (SENOKOT) tablet 8.6 mg  1 tablet Oral Nightly PRN MIK Jackson - JUANJOSE        acetaminophen (TYLENOL) tablet 650 mg  650 mg Oral Q6H PRN Ashwini Mcguire, DO   650 mg at 05/31/22 1517    metoprolol succinate (TOPROL XL) extended release tablet 12.5 mg  12.5 mg Oral Daily Ronald Boothe MD   12.5 mg at 05/31/22 0757    insulin lispro (HUMALOG) injection vial 0-18 Units  0-18 Units SubCUTAneous TID  Leobardo Mcguire, DO   3 Units at 05/31/22 1701    insulin lispro (HUMALOG) injection vial 0-9 Units  0-9 Units SubCUTAneous Nightly Ashwini Mcguire, DO   3 Units at 05/30/22 2113    insulin glargine (LANTUS) injection vial 25 Units  25 Units SubCUTAneous Nightly Ashwini Mcguier, DO   25 Units at 05/30/22 2111    glucose chewable tablet 16 g  4 tablet Oral PRN Leobardo Mcguire DO   16 g at 06/01/22 0529    dextrose bolus 10% 125 mL  125 mL IntraVENous PRN Ashwini Mcguire DO        Or   [facility] dextrose bolus 10% 250 mL  250 mL IntraVENous PRN Claudeen Canard Bisel, DO        glucagon (rDNA) injection 1 mg  1 mg IntraMUSCular PRN Claudeen Canard Bisel, DO        dextrose 5 % solution  100 mL/hr IntraVENous PRN Claudeen Canard Bisel, DO        atorvastatin (LIPITOR) tablet 40 mg  40 mg Oral Nightly Leobardo Mcguire, DO   40 mg at 05/31/22 1953    finasteride (PROSCAR) tablet 5 mg  5 mg Oral Daily Leobardo Mcguire, DO   5 mg at 05/31/22 0756    isosorbide mononitrate (IMDUR) extended release tablet 30 mg  30 mg Oral Daily Leobardo TALI Maynor, DO   30 mg at 05/31/22 0756    levothyroxine (SYNTHROID) tablet 75 mcg  75 mcg Oral Daily Claudeen Canard Bisel, DO   75 mcg at 05/31/22 0607    therapeutic multivitamin-minerals 1 tablet  1 tablet Oral Daily Leobardo Mcguire, DO   1 tablet at 05/31/22 0756    midodrine (PROAMATINE) tablet 5 mg  5 mg Oral TID  Leobardo CESAR Maynor, DO   5 mg at 05/31/22 1659    potassium chloride (KLOR-CON M) extended release tablet 10 mEq  10 mEq Oral TID Claudeen David Maynor, DO   10 mEq at 05/31/22 1953    predniSONE (DELTASONE) tablet 15 mg  15 mg Oral Daily with breakfast Claudeen Canard Bisel, DO   15 mg at 05/31/22 0757    And    predniSONE (DELTASONE) tablet 5 mg  5 mg Oral Dinner Leobardo TALI Maynor, DO   5 mg at 05/31/22 1659       Allergies:     Allergies   Allergen Reactions    No Known Allergies        Problem List:    Patient Active Problem List   Diagnosis Code    Incomplete tear of right rotator cuff M75.111    Long biceps tear M75.41    Injury of tendon of long head of right biceps S46.101A    Tear of right glenoid labrum S43.431A    Bursitis of right shoulder M75.51    Essential hypertension I10    Acquired hypothyroidism E03.9    Dyslipidemia E78.5    Type 2 diabetes mellitus without complication, without long-term current use of insulin (HCC) E11.9    Normocytic anemia D64.9    NSTEMI (non-ST elevated myocardial infarction) ([facility] [address] I21.4    Chronic renal disease, stage III ([facility] [address] [617839] N18.30    Thrombocytopenia, unspecified D69.6    MDS (myelodysplastic syndrome) (HCC) D46.9    Orthostatic hypotension I95.1       Past Medical History:        Diagnosis Date    Diabetes mellitus ([address]     H/O cardiovascular stress test 12/01/2021    Lexiscan    History of blood transfusion     History of Holter monitoring 02/11/2022    Hyperlipidemia     Hypertension     Lower back pain     Thyroid disease        Past Surgical History:        Procedure Laterality Date    BACK SURGERY  2000    fusion  lumbar area,      CARDIAC CATHETERIZATION  04/19/2022    JOINT REPLACEMENT Bilateral [id number]    knee     LITHOTRIPSY      SHOULDER ARTHROSCOPY Right 01/30/2020    with treatment    SHOULDER ARTHROSCOPY Right 1/30/2020    ARTHROSCOPIC EXAM AND TREATMENT RIGHT SHOULDER (CPT 10140,41706) RIGHT ROTATOR CUFF REPAIR, SUBACHROMIAL DECOMPRESSION, DEBRIDEMENT OF HUMERAL performed by Tab Arzola DO at [address] [facility] N/A 4/18/2022    EGD ESOPHAGOGASTRODUODENOSCOPY performed by Lauryn Knowles DO at [address] [facility] History:    Social History     Tobacco Use    Smoking status: Never Smoker    Smokeless tobacco: Never Used   Substance Use Topics    Alcohol use:  No                                Counseling given: Not Answered      Vital Signs (Current):   Vitals:    05/31/22 1123 05/31/22 1516 05/31/22 1945 06/01/22 0130   BP: (!) 115/59 121/65 (!) 128/57 (!) 150/55   Pulse: 83 80 70 75   Resp: 18 18 20 18   Temp: 36.2 °C (97.2 °F) 36.2 °C (97.2 °F) 36.6 °C (97.8 °F) 36.8 °C (98.2 °F)   TempSrc: Infrared Infrared Oral Oral   SpO2: 98% 100% 100% 100%                                              BP Readings from Last 3 Encounters:   06/01/22 (!) 150/55   05/24/22 (!) 111/54   05/19/22 107/63       NPO Status:                                                                                 BMI:   Wt Readings from Last 3 Encounters:   05/19/22 155 lb 12.8 oz (70.7 kg)   05/11/22 151 lb (68.5 kg)   04/21/22 160 lb (72.6 kg)     There is no height or weight on file to calculate BMI.    CBC:   Lab Results   Component Value Date    WBC 3.5 06/01/2022    RBC 2.63 06/01/2022    HGB 9.4 06/01/2022    HCT 28.6 06/01/2022    .7 06/01/2022    RDW 21.8 06/01/2022     06/01/2022       CMP:   Lab Results   Component Value Date     06/01/2022    K 4.8 06/01/2022    K 3.5 05/24/2022     06/01/2022    CO2 29 06/01/2022    BUN 20 06/01/2022    CREATININE 1.1 06/01/2022    GFRAA >60 06/01/2022    LABGLOM >60 06/01/2022    GLUCOSE 76 06/01/2022    GLUCOSE 111 03/29/2012    PROT 6.6 06/01/2022    CALCIUM 8.7 06/01/2022    BILITOT 0.6 06/01/2022    ALKPHOS 61 06/01/2022    AST 21 06/01/2022    ALT 34 06/01/2022       POC Tests: No results for input(s): POCGLU, POCNA, POCK, POCCL, POCBUN, POCHEMO, POCHCT in the last 72 hours. Coags:   Lab Results   Component Value Date    PROTIME 13.5 05/28/2022    INR 1.2 05/28/2022    APTT 27.0 04/14/2022       HCG (If Applicable): No results found for: PREGTESTUR, PREGSERUM, HCG, HCGQUANT     ABGs: No results found for: PHART, PO2ART, EDS0CSJ, KRN1ESC, BEART, E6DLDXWK     Type & Screen (If Applicable):  No results found for: LABABO, LABRH    Drug/Infectious Status (If Applicable):  No results found for: HIV, HEPCAB    COVID-19 Screening (If Applicable):   Lab Results   Component Value Date    COVID19 Not Detected 05/26/2022    COVID19 Non-Reactive 05/26/2022     ECHO 4/14/2022  Findings      Left Ventricle   Normal left ventricular chamber size. Inferior wall hypokinesis with normal overall LV systolic function, EF   26%. Mild left ventricular concentric hypertrophy noted. Normal diastolic function. Right Ventricle   Normal right ventricle size and function. Left Atrium   Left atrium is of normal size. Interatrial septum not well visualized but appears intact.       Right Atrium   Normal right atrium. Mitral Valve   Normal mitral valve structure. There is trace to mild mitral regurgitation. No mitral valve prolapse. Tricuspid Valve   Normal tricuspid valve structure. There is trace tricuspid regurgitation. Mild pulmonary hypertension, RVSP 38mmHg. Aortic Valve   Normal aortic valve structure and function. No hemodynamically significant aortic stenosis. Pulmonic Valve   The pulmonic valve was not well visualized. Pericardial Effusion   No evidence of pericardial effusion. Pericardium appears normal.      Pleural Effusion   No evidence of pleural effusion. Aorta   Normal aortic root size and ascending aorta. Miscellaneous   The inferior vena cava not well visualized. Conclusions      Summary   Technically sub-optimal images. Normal left ventricular chamber size. Inferior wall hypokinesis with normal overall LV systolic function, EF   77%. Mild left ventricular concentric hypertrophy noted. Normal diastolic function. Interatrial septum not well visualized but appears intact. Normal right ventricle size and function. There is trace to mild mitral regurgitation. No mitral valve prolapse. No hemodynamically significant aortic stenosis. There is trace tricuspid regurgitation. Mild pulmonary hypertension, RVSP 38mmHg. Normal aortic root size. No evidence of pericardial effusion. No intra cardiac mass or thrombus. Compared to prior echo from 1/26/2020 - Inferior wall motion abnormality   is new.       Signature      ----------------------------------------------------------------   Electronically signed by Blane Green MD(Interpreting   physician) on 04/14/2022 07:37 PM   ----------------------------------------------------------------        Anesthesia Evaluation  Patient summary reviewed no history of anesthetic complications:   Airway: Mallampati: II  TM distance: >3 FB   Neck ROM: full  Mouth opening: > = 3 FB   Dental: Pulmonary:Negative Pulmonary ROS and normal exam  breath sounds clear to auscultation      (-) not a current smoker          Patient did not smoke on day of surgery. Cardiovascular:    (+) hypertension:, past MI (Non-ST elevated myocardial infarction: patient scheduled for heart catherization tomorrow 4/19/22): < 1 month, hyperlipidemia      ECG reviewed  Rhythm: regular  Rate: normal  Echocardiogram reviewed    Cleared by cardiology     Beta Blocker:  Dose within 24 Hrs      ROS comment: Normal sinus rhythm  Normal ECG  When compared with ECG of 24-MAY-2022 11:39,  No significant change was found     Neuro/Psych:   Negative Neuro/Psych ROS              GI/Hepatic/Renal: Neg GI/Hepatic/Renal ROS            Endo/Other:    (+) DiabetesType II DM, using insulin, hypothyroidism, blood dyscrasia: thrombocytopenia and anemia:., .                  ROS comment: Lower back pain Abdominal:             Vascular: negative vascular ROS. Other Findings:             Anesthesia Plan      MAC     ASA 4             Anesthetic plan and risks discussed with patient. Plan discussed with attending.     Attending anesthesiologist reviewed and agrees with Preprocedure content                  Shemar Butler RN, [facility]     6:39 AM

## 2022-06-01 NOTE — ANESTHESIA PRE PROCEDURE
Department of Anesthesiology  Preprocedure Note       Name:  Endy Bocanegra   Age:  80 y.o.  :  1933                                          MRN:  [id number]         Date:  2022      Surgeon: Elieser James):  Betty Kendrick MD    Procedure: Procedure(s):  COLONOSCOPY    Medications prior to admission:   Prior to Admission medications    Medication Sig Start Date End Date Taking?  Authorizing Provider   levothyroxine (SYNTHROID) 75 MCG tablet Take 1 tablet by mouth Daily 22   Rachelejan Mcguire DO   finasteride (PROSCAR) 5 MG tablet Take 1 tablet by mouth daily 22   [facility]   isosorbide mononitrate (IMDUR) 30 MG extended release tablet Take 1 tablet by mouth daily 22   [facility]   carvedilol (COREG) 6.25 MG tablet Take 1 tablet by mouth 2 times daily (with meals) 22   Rachelejan Mcguire DO   predniSONE (DELTASONE) 5 MG tablet Take 1 tablet by mouth daily Daily at 3 pm 22   [facility]   predniSONE (DELTASONE) 10 MG tablet Take 1 tablet by mouth every morning 22   [facility]   potassium citrate (UROCIT-K) 10 MEQ (1080 MG) extended release tablet Take 1 tablet by mouth 2 times daily 22   Leobardo Mcguire DO   insulin lispro, 1 Unit Dial, (HUMALOG KWIKPEN) 100 UNIT/ML SOPN Inject 30 Units into the skin 3 times daily (before meals) Patient is to take 10 units at breakfast and 10 units lunch and dinner 22  Orlin Goode, DO   furosemide (LASIX) 40 MG tablet Take 1 tablet by mouth daily 5/7/22 11/3/22  Evon Mcguire DO   aspirin 81 MG chewable tablet Take 1 tablet by mouth daily 22   Ethan Marcano DO   atorvastatin (LIPITOR) 40 MG tablet Take 1 tablet by mouth nightly 22   Ethan Marcano DO   insulin glargine (LANTUS SOLOSTAR) 100 UNIT/ML injection pen Inject 10 Units into the skin nightly  Patient taking differently: Inject 25 Units into the skin nightly  22   Ethan Marcano, DO   Insulin Pen Needle 29G X 12MM MISC 1 each by Does not apply route 4 times daily (before meals and nightly) 4/19/22   Mimi ,    ONETOUCH ULTRA strip 1 each by Other route 4 times daily As needed. 2/13/22 5/14/22  Gisela Mcguire DO   Lancets [facility] [id number]) MISC 1 strip by Other route 4 times daily 2/13/22 5/14/22  Gisela Mcguire DO   therapeutic multivitamin-minerals [facility]) tablet Take 1 tablet by mouth daily. Historical Provider, MD       Current medications:    No current facility-administered medications for this visit. No current outpatient medications on file.      Facility-Administered Medications Ordered in Other Visits   Medication Dose Route Frequency Provider Last Rate Last Admin    pantoprazole (PROTONIX) tablet 40 mg  40 mg Oral QAM AC Collin Serrato, DO   40 mg at 05/31/22 0607    phenazopyridine (PYRIDIUM) tablet 100 mg  100 mg Oral Q6H PRN Deonte RILEY Flowers, DO   100 mg at 05/30/22 1719    insulin lispro (HUMALOG) injection vial 5 Units  5 Units SubCUTAneous TID  Leobardo Mcguire, DO   5 Units at 05/31/22 1708    polyethylene glycol (GLYCOLAX) packet 17 g  17 g Oral Daily Quirino Houston APRN - CNP   17 g at 05/31/22 0757    senna (SENOKOT) tablet 8.6 mg  1 tablet Oral Nightly PRN Quirino Houston, APRN - CNP        acetaminophen (TYLENOL) tablet 650 mg  650 mg Oral Q6H PRN Gisela Mcguire DO   650 mg at 05/31/22 1517    metoprolol succinate (TOPROL XL) extended release tablet 12.5 mg  12.5 mg Oral Daily Ronald Boothe MD   12.5 mg at 06/01/22 0850    insulin lispro (HUMALOG) injection vial 0-18 Units  0-18 Units SubCUTAneous TID  Leobardo Mcguire DO   3 Units at 05/31/22 1701    insulin lispro (HUMALOG) injection vial 0-9 Units  0-9 Units SubCUTAneous Nightly Gisela Mcguire, DO   3 Units at 05/30/22 2113    insulin glargine (LANTUS) injection vial 25 Units  25 Units SubCUTAneous Nightly Gisela Mcguire DO   25 Units at 05/30/22 2110    glucose chewable tablet 16 g  4 tablet Oral PRN NSTEMI (non-ST elevated myocardial infarction) ([facility] [address] I21.4    Chronic renal disease, stage III ([facility] [address] [542290] N18.30    Thrombocytopenia, unspecified D69.6    MDS (myelodysplastic syndrome) (HCC) D46.9    Orthostatic hypotension I95.1       Past Medical History:        Diagnosis Date    Diabetes mellitus ([facility] [address]     H/O cardiovascular stress test 12/01/2021    Lexiscan    History of blood transfusion     History of Holter monitoring 02/11/2022    Hyperlipidemia     Hypertension     Lower back pain     Thyroid disease        Past Surgical History:        Procedure Laterality Date    BACK SURGERY  2000    fusion  lumbar area,      CARDIAC CATHETERIZATION  04/19/2022    JOINT REPLACEMENT Bilateral [phone]    knee     LITHOTRIPSY      SHOULDER ARTHROSCOPY Right 01/30/2020    with treatment    SHOULDER ARTHROSCOPY Right 1/30/2020    ARTHROSCOPIC EXAM AND TREATMENT RIGHT SHOULDER (CPT 69264,73251) RIGHT ROTATOR CUFF REPAIR, SUBACHROMIAL DECOMPRESSION, DEBRIDEMENT OF HUMERAL performed by Rehan Suresh DO at [address] N/A 4/18/2022    EGD ESOPHAGOGASTRODUODENOSCOPY performed by Deonte Hodge DO at [address] History:    Social History     Tobacco Use    Smoking status: Never Smoker    Smokeless tobacco: Never Used   Substance Use Topics    Alcohol use: No                                Counseling given: Not Answered      Vital Signs (Current): There were no vitals filed for this visit.                                            BP Readings from Last 3 Encounters:   06/01/22 119/82   05/24/22 (!) 111/54   05/19/22 107/63       NPO Status:                                                                                 BMI:   Wt Readings from Last 3 Encounters:   06/01/22 145 lb (65.8 kg)   05/19/22 155 lb 12.8 oz (70.7 kg)   05/11/22 151 lb (68.5 kg)     There is no height or weight on file to calculate BMI.    CBC: Lab Results   Component Value Date    WBC 3.5 06/01/2022    RBC 2.63 06/01/2022    HGB 9.4 06/01/2022    HCT 28.6 06/01/2022    .7 06/01/2022    RDW 21.8 06/01/2022     06/01/2022       CMP:   Lab Results   Component Value Date     06/01/2022    K 4.8 06/01/2022    K 3.5 05/24/2022     06/01/2022    CO2 29 06/01/2022    BUN 20 06/01/2022    CREATININE 1.1 06/01/2022    GFRAA >60 06/01/2022    LABGLOM >60 06/01/2022    GLUCOSE 76 06/01/2022    GLUCOSE 111 03/29/2012    PROT 6.6 06/01/2022    CALCIUM 8.7 06/01/2022    BILITOT 0.6 06/01/2022    ALKPHOS 61 06/01/2022    AST 21 06/01/2022    ALT 34 06/01/2022       POC Tests: No results for input(s): POCGLU, POCNA, POCK, POCCL, POCBUN, POCHEMO, POCHCT in the last 72 hours. Coags:   Lab Results   Component Value Date    PROTIME 13.5 05/28/2022    INR 1.2 05/28/2022    APTT 27.0 04/14/2022       HCG (If Applicable): No results found for: PREGTESTUR, PREGSERUM, HCG, HCGQUANT     ABGs: No results found for: PHART, PO2ART, YKP9OEC, PRK4UZO, BEART, [id number]     Type & Screen (If Applicable):  No results found for: LABABO, LABRH    Drug/Infectious Status (If Applicable):  No results found for: HIV, HEPCAB    COVID-19 Screening (If Applicable):   Lab Results   Component Value Date    COVID19 Not Detected 05/26/2022    COVID19 Non-Reactive 05/26/2022     ECHO 4/14/2022  Findings      Left Ventricle   Normal left ventricular chamber size. Inferior wall hypokinesis with normal overall LV systolic function, EF   20%. Mild left ventricular concentric hypertrophy noted. Normal diastolic function. Right Ventricle   Normal right ventricle size and function. Left Atrium   Left atrium is of normal size. Interatrial septum not well visualized but appears intact. Right Atrium   Normal right atrium. Mitral Valve   Normal mitral valve structure. There is trace to mild mitral regurgitation. No mitral valve prolapse.       Tricuspid Valve   Normal tricuspid valve structure. There is trace tricuspid regurgitation. Mild pulmonary hypertension, RVSP 38mmHg. Aortic Valve   Normal aortic valve structure and function. No hemodynamically significant aortic stenosis. Pulmonic Valve   The pulmonic valve was not well visualized. Pericardial Effusion   No evidence of pericardial effusion. Pericardium appears normal.      Pleural Effusion   No evidence of pleural effusion. Aorta   Normal aortic root size and ascending aorta. Miscellaneous   The inferior vena cava not well visualized. Conclusions      Summary   Technically sub-optimal images. Normal left ventricular chamber size. Inferior wall hypokinesis with normal overall LV systolic function, EF   94%. Mild left ventricular concentric hypertrophy noted. Normal diastolic function. Interatrial septum not well visualized but appears intact. Normal right ventricle size and function. There is trace to mild mitral regurgitation. No mitral valve prolapse. No hemodynamically significant aortic stenosis. There is trace tricuspid regurgitation. Mild pulmonary hypertension, RVSP 38mmHg. Normal aortic root size. No evidence of pericardial effusion. No intra cardiac mass or thrombus. Compared to prior echo from 1/26/2020 - Inferior wall motion abnormality   is new.       Signature      ----------------------------------------------------------------   Electronically signed by James Mcgregor MD(Interpreting   physician) on 04/14/2022 07:37 PM   ----------------------------------------------------------------        Anesthesia Evaluation  Patient summary reviewed no history of anesthetic complications:   Airway: Mallampati: II  TM distance: >3 FB   Neck ROM: full  Mouth opening: > = 3 FB   Dental:          Pulmonary:Negative Pulmonary ROS and normal exam  breath sounds clear to auscultation      (-) not a current smoker          Patient did not smoke on day of surgery. Cardiovascular:    (+) hypertension:, past MI (Non-ST elevated myocardial infarction: patient scheduled for heart catherization tomorrow 4/19/22): < 1 month, hyperlipidemia      ECG reviewed  Rhythm: regular  Rate: normal  Echocardiogram reviewed    Cleared by cardiology     Beta Blocker:  Dose within 24 Hrs      ROS comment: Normal sinus rhythm 95, Normal ECG  When compared with ECG of 24-MAY-2022 11:39,  No significant change was found. Echo:   Technically sub-optimal images. Normal left ventricular chamber size. Inferior wall hypokinesis with normal overall LV systolic function, EF 99%. Mild left ventricular concentric hypertrophy noted. Normal diastolic function. Interatrial septum not well visualized but appears intact. Normal right ventricle size and function. There is trace to mild mitral regurgitation. No mitral valve prolapse. No hemodynamically significant aortic stenosis. There is trace tricuspid regurgitation. Mild pulmonary hypertension, RVSP 38mmHg. Normal aortic root size. No evidence of pericardial effusion. No intra cardiac mass or thrombus. Compared to prior echo from 1/26/2020 - Inferior wall motion abnormality is new. 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     RECOMMENDATIONS:  We will optimize medical therapy.   If the patient continues to be symptomatic despite optimal medical therapy, we will bring him back for possible PCI.     The patient will be observed for two hours and transferred back to [facility] to continue treatment     Neuro/Psych:   Negative Neuro/Psych ROS              GI/Hepatic/Renal: Neg GI/Hepatic/Renal ROS            Endo/Other:    (+) DiabetesType II DM, using insulin, hypothyroidism, blood dyscrasia: thrombocytopenia and anemia:., .                  ROS comment: Lower back pain Abdominal: Vascular: negative vascular ROS. Other Findings:             Anesthesia Plan      MAC     ASA 4       Induction: intravenous. continuous noninvasive hemodynamic monitor    Anesthetic plan and risks discussed with patient. Plan discussed with attending and CRNA.     Attending anesthesiologist reviewed and agrees with Jose Negron MD, SRNA     10:08 AM

## 2022-06-01 NOTE — PROGRESS NOTES
Comprehensive Nutrition Assessment    Type and Reason for Visit:  Initial,RD Nutrition Re-Screen/LOS    Nutrition Recommendations/Plan:   1. Continue current diet  2. Continue to monitor      Malnutrition Assessment:  Nutrition Assessment:    Pt dierect admit w/ Orthostatic hypotension. 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 Pt currently consuming ~ >60% meals. Will continue to monitor. Nutrition Related Findings:    I/O +3.3L, A/O x4,  Abd WDL +BS, +2 pitting BLE edema. Wound Type: None       Current Nutrition Intake & Therapies:    Average Meal Intake: 51-75%,% (varied PO)  Average Supplements Intake: None Ordered  ADULT DIET; Regular; High Fiber    Anthropometric Measures:  Height: 5' 6\" (167.6 cm)  Ideal Body Weight (IBW): 142 lbs (65 kg)       Current Body Weight: 145 lb (65.8 kg) (5/26 stated), 102.1 % IBW. Current BMI (kg/m2): 23.4  Usual Body Weight: 155 lb (70.3 kg) (5/19 actual in EMR, 4/14/222 154# actual in EMR)  % Weight Change (Calculated): -6.5        Nutrition Diagnosis:   · No nutrition diagnosis at this time       Nutrition Interventions:   Food and/or Nutrient Delivery: Continue Current Diet  Nutrition Education/Counseling: No recommendation at this time  Coordination of Nutrition Care: Continue to monitor while inpatient     Goals:     Goals: PO intake 75% or greater     Nutrition Monitoring and Evaluation:   Behavioral-Environmental Outcomes: None Identified  Food/Nutrient Intake Outcomes: Food and Nutrient Intake  Physical Signs/Symptoms Outcomes: Biochemical Data,Chewing or Swallowing,Fluid Status or Edema,Skin,Nutrition Focused Physical Findings,Weight    Discharge Planning:     Too soon to determine     Wilson Ceja RD  Contact: [phone]

## 2022-06-01 NOTE — PROGRESS NOTES
Hollis and Lorena Del Toro M.D. Patient Name: Harvey Nagel  YOB: 1933  PCP: Sean Farah DO   Referring Provider: [address] / [address] [address]     Reason for Consultation: No chief complaint on file. Subjective:  No overt bleeding. 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 Scheduled to start treatment with [address] today. Admitted with orthostatic hypotension. ED notes H&P not available at this time. Patient is being hydrated feels better. Labs remarkable for sodium 129, creatinine 1.7, glucose 400, hemoglobin 7.4. No new imaging. Review of systems: Over 10 systems were reviewed and all were negative except as mentioned above.     Diagnostic Data:     Past Medical History:   Diagnosis Date    Diabetes mellitus ([address]     H/O cardiovascular stress test 12/01/2021    Lexiscan    History of blood transfusion     History of Holter monitoring 02/11/2022    Hyperlipidemia     Hypertension     Lower back pain     Thyroid disease        Patient Active Problem List    Diagnosis Date Noted    Orthostatic hypotension 05/26/2022    Thrombocytopenia, unspecified 05/19/2022    MDS (myelodysplastic syndrome) ([address] 05/19/2022    Chronic renal disease, stage III ([address] [504226] 05/11/2022    NSTEMI (non-ST elevated myocardial infarction) ([address] 04/13/2022    Essential hypertension 08/17/2021    Acquired hypothyroidism 08/17/2021    Dyslipidemia 08/17/2021    Type 2 diabetes mellitus without complication, without long-term current use of insulin ([address] 08/17/2021    Normocytic anemia 08/17/2021    Incomplete tear of right rotator cuff 01/30/2020    Long biceps tear 01/30/2020    Injury of tendon of long head of right biceps 01/30/2020    Tear of right glenoid labrum 01/30/2020    Bursitis of right shoulder 01/30/2020        Past Surgical History:   Procedure Laterality Date    BACK SURGERY  2000    fusion  lumbar area,      CARDIAC CATHETERIZATION  04/19/2022    JOINT REPLACEMENT Bilateral [id number]    knee     LITHOTRIPSY      SHOULDER ARTHROSCOPY Right 01/30/2020    with treatment    SHOULDER ARTHROSCOPY Right 1/30/2020    ARTHROSCOPIC EXAM AND TREATMENT RIGHT SHOULDER (CPT 41402,68206) RIGHT ROTATOR CUFF REPAIR, SUBACHROMIAL DECOMPRESSION, DEBRIDEMENT OF HUMERAL performed by Mariana Sanchez DO at [facility] ENDOSCOPY N/A 4/18/2022    EGD ESOPHAGOGASTRODUODENOSCOPY performed by Justin Melchor DO at [address] [place] [address] History  Family History   Problem Relation Age of Onset    No Known Problems Mother     Diabetes Father        Social History  Social History     Socioeconomic History    Marital status:      Spouse name: Not on file    Number of children: Not on file    Years of education: Not on file    Highest education level: Not on file   Occupational History    Not on file   Tobacco Use    Smoking status: Never Smoker    Smokeless tobacco: Never Used   Vaping Use    Vaping Use: Never used   Substance and Sexual Activity    Alcohol use: No    Drug use: No    Sexual activity: Not Currently     Partners: Female   Other Topics Concern    Not on file   Social History Narrative    Not on file     Social Determinants of Health     Financial Resource Strain: Low Risk     Difficulty of Paying Living Expenses: Not hard at all   Food Insecurity: No Food Insecurity    Worried About [address] in the Last Year: Never true    [facility] in the Last Year: Never true   Transportation Needs:     Lack of Transportation (Medical): Not on file    Lack of Transportation (Non-Medical):  Not on file   Physical Activity:     Days of Exercise per Week: Not on file    Minutes of Exercise per Session: Not on file   Stress:     Feeling of Stress : Not on file   Social Connections:     Frequency of Communication with Friends and Family: Not on file    Frequency of Social Gatherings with Friends and Family: Not on file    Attends Jainism Services: Not on file    Active Member of Clubs or Organizations: Not on file    Attends Club or Organization Meetings: Not on file    Marital Status: Not on file   Intimate Partner Violence:     Fear of Current or Ex-Partner: Not on file    Emotionally Abused: Not on file    Physically Abused: Not on file    Sexually Abused: Not on file   Housing Stability:     Unable to Pay for Housing in the Last Year: Not on file    Number of [place] in the Last Year: Not on file    Unstable Housing in the Last Year: Not on file        TOBACCO:   reports that he has never smoked. He has never used smokeless tobacco.  ETOH:   reports no history of alcohol use. Home Medications  Prior to Admission medications    Medication Sig Start Date End Date Taking?  Authorizing Provider   levothyroxine (SYNTHROID) 75 MCG tablet Take 1 tablet by mouth Daily 5/22/22   Jatinaine Plant Maynor, DO   finasteride (PROSCAR) 5 MG tablet Take 1 tablet by mouth daily 5/22/22   Jatinaine Plant Maynor, DO   isosorbide mononitrate (IMDUR) 30 MG extended release tablet Take 1 tablet by mouth daily 5/22/22   Radha Plant Maynor, DO   carvedilol (COREG) 6.25 MG tablet Take 1 tablet by mouth 2 times daily (with meals) 5/22/22   Radha Plant Maynor, DO   predniSONE (DELTASONE) 5 MG tablet Take 1 tablet by mouth daily Daily at 3 pm 5/22/22   Leobardo Mcguire DO   predniSONE (DELTASONE) 10 MG tablet Take 1 tablet by mouth every morning 5/22/22   Radha Plant Maynor, DO   potassium citrate (UROCIT-K) 10 MEQ (1080 MG) extended release tablet Take 1 tablet by mouth 2 times daily 5/22/22   Leobardo Mcguire DO   insulin lispro, 1 Unit Dial, (HUMALOG KWIKPEN) 100 UNIT/ML SOPN Inject 30 Units into the skin 3 times daily (before meals) Patient is to take 10 units at breakfast and 10 units lunch and dinner 5/16/22 8/14/22  James Sandoval,    furosemide (LASIX) 40 MG tablet Take 1 tablet by mouth daily 5/7/22 11/3/22  Windy Mcguire DO   aspirin 81 MG chewable tablet Take 1 tablet by mouth daily 4/23/22   Rudi Abdul, DO   atorvastatin (LIPITOR) 40 MG tablet Take 1 tablet by mouth nightly 4/22/22   Rudi Abdul, DO   insulin glargine (LANTUS SOLOSTAR) 100 UNIT/ML injection pen Inject 10 Units into the skin nightly  Patient taking differently: Inject 25 Units into the skin nightly  4/19/22   Rudi Abdul, DO   Insulin Pen Needle 29G X 12MM MISC 1 each by Does not apply route 4 times daily (before meals and nightly) 4/19/22   Rudi Abdul, DO   ONETOUCH ULTRA strip 1 each by Other route 4 times daily As needed. 2/13/22 5/14/22  Windy Mcguire DO   Lancets [facility] PLUS [id number]) MISC 1 strip by Other route 4 times daily 2/13/22 5/14/22  Windy Mcguire DO   therapeutic multivitamin-minerals [facility]) tablet Take 1 tablet by mouth daily. Historical Provider, MD       Allergies  Allergies   Allergen Reactions    No Known Allergies            Objective  /73   Pulse 85   Temp 97.6 °F (36.4 °C) (Infrared)   Resp 22   Ht 5' 6\" (1.676 m)   Wt 145 lb (65.8 kg)   SpO2 100%   BMI 23.40 kg/m²     Physical Exam:   Performance Status:  General: AAO to person, place, time, in no acute distress, pleasant   Head and neck : PERRLA, EOMI . Sclera non icteric. Oropharynx : Clear  Neck: no JVD,  no adenopathy, no carotid bruit  LYMPHATICS : No peripheral lymphadenopathy. Heart: Regular rate and regular rhythm, no murmur  Lungs: Clear to auscultation   Extremities: No edema,no cyanosis, no clubbing. Abdomen: Soft, non-tender;no masses, no organomegaly  Skin:  No rash. Neurologic:Cranial nerves grossly intact. No focal motor or sensory deficits .     Recent Laboratory Data-   Lab Results   Component Value Date    WBC 3.5 (L) 06/01/2022    HGB 9.4 (L) 06/01/2022    HCT 28.6 (L) 06/01/2022    .7 (H) 06/01/2022     06/01/2022    LYMPHOPCT 55.3 (H) 06/01/2022    RBC 2.63 (L) 06/01/2022    MCH 35.7 (H) 06/01/2022    MCHC 32.9 06/01/2022    RDW 21.8 (H) 06/01/2022    NEUTOPHILPCT 32.5 (L) 06/01/2022    MONOPCT 11.4 06/01/2022    BASOPCT 0.0 06/01/2022    NEUTROABS 1.16 (L) 06/01/2022    LYMPHSABS 1.93 06/01/2022    MONOSABS 0.38 06/01/2022    EOSABS 0.00 (L) 06/01/2022    BASOSABS 0.00 06/01/2022       Lab Results   Component Value Date     06/01/2022    K 4.8 06/01/2022     06/01/2022    CO2 29 06/01/2022    BUN 20 06/01/2022    CREATININE 1.1 06/01/2022    GLUCOSE 76 06/01/2022    CALCIUM 8.7 06/01/2022    PROT 6.6 06/01/2022    LABALBU 3.2 (L) 06/01/2022    BILITOT 0.6 06/01/2022    ALKPHOS 61 06/01/2022    AST 21 06/01/2022    ALT 34 06/01/2022    LABGLOM >60 06/01/2022    GFRAA >60 06/01/2022       Lab Results   Component Value Date    IRON 122 04/14/2022    TIBC 140 (L) 04/14/2022    FERRITIN 3,037 04/14/2022           Radiology-    CT ABDOMEN PELVIS WO CONTRAST Additional Contrast? Oral   Final Result   1. Signs of urolithiasis but no evidence of obstructive uropathy   2. Diverticulosis but no signs of diverticulitis. 3. Dense appearance of the liver. 4. Signs of idiopathic pulmonary fibrosis at the lung bases         XR KNEE LEFT (1-2 VIEWS)   Final Result   No acute osseous findings in the left knee         XR CHEST (2 VW)   Final Result   Chronic scarring in the lungs. CT FACIAL BONES WO CONTRAST   Final Result   No acute facial bone trauma. 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 Scheduled to start treatment with Yayo Howell today. Admitted with orthostatic hypotension. Feels better with hydration.   Medical management by  Daphney Jeans. Macrocytic anemia due to MDS. Transfuse for hemoglobin less than 7. We will continue to follow. 5/27/22  - CBC with Hgb 8.5, s/p 1 unit pRBC yesterday. WBC and platelets WNL  - Macrocytosis due to MDS-MLD (high risk category)  - Cr improved to 1.4 today  - Continues on IVF. BP improved  - Will rearrange scheduled start date for HMA therapy after DC    5/29/22  - Hgb holding stable  - CEA was 12  - GI consulted, planning colonoscopy   - BP seems to be improved, most recent reading on the lower end  - HMA therapy outpatient as above with Dr. Khalida Momin    5/31/22  - CBC and Hgb holding stable  - CEA was 12. Planning colonoscopy tomorrow  - Orthostatic hypotension resolved  - HMA therapy outpatient as above with Dr. Khalida Momin  - OK to DC after colonoscopy from hem/onc POV    6/1/22  Status post colonoscopy earlier today with findings of moderate sigmoid diverticulosis. There was no evidence of malignancy.   Hemoglobin improved to 9.4 status post transfusion few days earlier  CBC is stable at 3.5 with adequate ANC  May be discharged from heme-onc standpoint  He will follow with Dr. Khalida Momin next week for initiating of HMA for MDS        Electronically signed by Herlinda Mckenna MD on 6/1/2022 at 10:59 AM

## 2022-06-01 NOTE — H&P
Interval H&P    Patient tolerated bowel prep overnight patient denies any blood with the prep having clear bowel movements. Stable at 9.4 this a.m. Vitals:    06/01/22 0130   BP: (!) 150/55   Pulse: 75   Resp: 18   Temp: 98.2 °F (36.8 °C)   SpO2: 100%         Plan: Proceed with colonoscopy this a.m. for evaluations of patient's macrocytic anemia.     Candice Levine DO   GI Fellow  7:56 AM

## 2022-06-01 NOTE — CARE COORDINATION
Ss note:6/1/2022.9:26 AM Neg PCR on 5-26-22. Resides home alone, has home o2 and portable tank. Per IDR rounds pt for scope this morning and possible discharge home today. Pt active with At home with Ravi Ga, resume order noted and liaison Lorena notified of orders and possible discharge, family to transport.  BALDO Bellamy

## 2022-06-01 NOTE — OP NOTE
Operative Note      Patient: Marni Adam  YOB: 1933  MRN: [id number]    Date of Procedure: 6/1/2022    Pre-Op Diagnosis: Acute on chronic anemia [D64.9], elevated CEA    Post-Op Diagnosis: Moderate sigmoid diverticulosis, normal colonoscopy       Procedure(s):  COLONOSCOPY    Surgeon(s):  Conor Gutierrez MD    Assistant:   Surgical Assistant: Reanna Sanchez RN  Fellow: Judy Meyers DO    Anesthesia: Monitor Anesthesia Care    Estimated Blood Loss (mL): Minimal    Complications: None    Specimens:   * No specimens in log *    Implants:  * No implants in log *      Drains: * No LDAs found *      Colonoscopy Note    Indication: Acute on chronic anemia, elevated CEA    Sedation:  MAC    Withdrawal time: 8 minutes    Estimated Blood Loss: 0 cc    Endoscope was advanced through anus to cecum and terminal ileum, ileocecal valve and appendiceal orifice were identified and pictures were taken. Preparation is good. Patient tolerated procedure well. Cecum is normal  Ascending colon scattered diverticula is normal  Transverse colon is normal  Descending colon is normal  Sigmoid colon moderate diverticula diverticula no fresh or old blood seen  Rectum direct views are normal  Retroflexion in rectum shows normal mucosa and dentate line    No Fresh or  old blood present    IMPRESSION AND PLAN:   1. Normal colonoscopy with moderate sigmoid diverticulosis. 2.  No fresh or old blood seen. No masses or polyps seen to correlate with elevated CEA. 3.  Patient okay for diet from GI point of view patient okay to DC to home from GI point of view. Consider small bowel endoscopy for evaluation of small bowel if clinically warranted. 4.  Follow up as outpatient in office, call [phone] to schedule for appointment if needed. Repeat screening colonoscopy PRN , earlier if alarm symptoms (pain, bleeding, weight loss, diarrhea or sudden onset constipation) occur.       Pt was seen and procedure was performed with Dr. Ynoi Celaya present for the entire procedure.       Javier Rodriguez DO  GI Fellow  10:30 AM    I was present for entire duration of procedure; discussed findings with resident and agree with recommendations above    Kimberley Barney MD  Gastroenterology

## 2022-06-02 NOTE — DISCHARGE SUMMARY
[address]                               DISCHARGE SUMMARY    PATIENT NAME: Vero Haines                    :        1933  MED REC NO:   [id number]                            ROOM:       [id number]  ACCOUNT NO:   [de-identified]                           ADMIT DATE: 2022  PROVIDER:     Komal Burnett DO                  DISCHARGE DATE:  2022    ADMITTING DIAGNOSIS:  Symptomatic orthostasis. FINAL DISCHARGE DIAGNOSES:  Symptomatic orthostasis secondary to  underlying medication with mild superimposed secondary adrenal  insufficiency with superimposed dehydration. SECONDARY DISCHARGE DIAGNOSES:  Coronary artery disease with cardiac  catheterization in 2022 showing mild-to-moderate multivessel disease  with elevated troponin suggesting chronic demand ischemia; macrocytic  anemia with recent bone marrow  in 2022 showing some underlying  myelodysplastic syndrome; chronic renal stage III, aggravated by  dehydration; type 2 diabetes mellitus, aggravated by steroids; abnormal  CAT scan with exercise-induced desaturation showing idiopathic lung  disease; primary hypothyroidism, on Synthroid; hyperlipidemia, on statin  agent; benign prostatic hypertrophy, on Proscar. COMPLICATIONS:  Anemia with transfusion with post positive occult blood. Elevated CEA at 12.1 with negative colonoscopic exam.    OPERATION:  Include colonoscopic exam.    CONSULTATION OBTAINED:  With [facility], [facility]. No OMT was given. ADMITTING PROVIDERS:  Dr. Filemon Rosario and Dr. Sina Crow. CHIEF COMPLAINT AND HISTORY OF CHIEF COMPLAINT:  This is a pleasant  45-year-old white male, who was admitted to [address] [place] [address]. The  patient presented to the hospital on 2022. The patient was  transported here as a direct admission under the service of Dr. Filemon Rosario  and Dr. Sina Crow.   The patient has been having symptomatic orthostasis. The patient was doing well until 11/2021, at which time, he received a  third COVID vaccine. At that time, he began to have multiple problems. In 04/2022, he did have a myocardial infarction and underwent a  catheterization. The patient at this time has been doing well. Over  the past several days, the patient has been doing well with physical  therapy, but developed significant orthostasis. The patient presented  to hospital here where he was symptomatic at that time. He was admitted  under the service of Dr. Clare Schultz and Dr. Alfredo Masters. PAST MEDICAL HISTORY:  Positive for usual childhood diseases; history of  type 2 diabetes mellitus, aggravated by steroids; history of stress test  in 12/2021 that was negative; catheterization in 04/2022 showing  multivessel disease; history of blood transfusion in the past;  hyperlipidemia; hypertension; low back pain; and hypothyroidism. PHYSICAL EXAMINATION:  VITAL SIGNS:  Physical exam at this time showed evidence of significant  orthostasis. Blood pressure 100/55 lying, standing dropped down to  80/70. GENERAL APPEARANCE:  This is a pleasant 80-year-old white male at this  time is alert, responsive, and oriented to person, place, and time. HEENT:  Normal.  NECK:  With adequate carotid upstrokes without associated bruits. Trachea is midline. Thyroid is not palpable. LUNGS:  Clear. HEART:  Fairly regular. Grade 1/6 murmur. No S3, no S4. ABDOMEN:  Soft. No guarding, rebound, or rigidity. EXTREMITIES:  Without any cyanosis, clubbing, or edema. While the patient was in the hospital, he had extensive lab work  performed:  CEA was elevated at 12.1. Glucose 112. Hemoglobin and  hematocrit were 9.4 and 28.6 at the time of discharge. White count was  3.5 and platelet count 708. BUN and creatinine were normal.    HOSPITAL COURSE OF STAY:  The patient was admitted directly to the  hospital to the general telemetry floor.   The patient was admitted under  the service of Dr. Jeanette Valdez and Dr. Briana Louis. The patient clinically at this  time presented with what appeared to be symptomatic orthostasis. In  view of this, we ran a cortisol level, increased steroids were  administered at this time with IV fluids administration. With IV fluid  therapy, his hemoglobin did drop down to 7.2. Because of recent  coronary artery disease, the patient was transfused one unit of packed  cells. We adjusted the medication with the addition of midodrine and  his blood pressure began to improve. The patient did relatively well. We did check stools for occult blood and one came back positive. The  next two came back negative. The patient had a physical and  occupational therapy. Clinically, he also improved with change made in  beta blockers. The patient at this time did relatively well. We did  obtain a CEA level, which came back elevated at 12.1. In view of this  and the patient's cardiac history, we did obtain cardiac clearance. The  patient underwent a colonoscopic exam on 06/01/2022. Findings at this  time did show a normal colonic exam with moderate sigmoid  diverticulosis. No fresh blood was noted. The patient otherwise did  well. The patient has clinically improved at this time and optimal care  was given  and the patient was felt stable enough to be discharged home  on 06/01/2022. The patient at this time was discharged home with family  by private car. His vitals at this time did reflect the following:  His  blood pressure was 118/62, temperature 97.6, pulse 102, respirations 23,  and O2 sat 100% on room air. Height 5 feet 6 inches and weight 145. DISCHARGE MEDICATIONS:  The patient was discharged on,  1. Lantus 25 units subcu at bedtime. 2.  Humalog 5 units subcu a.c. with meals. 3.  Metoprolol XL 25 mg half a tab daily. 4.  Midodrine 5 mg t.i.d.  5.  Protonix 40 daily. 6.  GlycoLax 17 gm daily.     The patient will continue potassium 10 mEq daily, prednisone 15 in the  morning and 5 in the afternoon, aspirin 81 mg daily, atorvastatin 40  daily, finasteride 5 mg daily, isosorbide mononitrate 30 mg daily,  Synthroid 75 mcg daily, and multivitamin daily. The patient will take  diuretics at home as prescribed. The patient will resume with home  nursing and physical therapy. More than 40 minutes was spent on the day of discharge with more than  50% of the time spent face-to-face with the patient discussing plan of  care and treatment at this time. The patient was discharged home by  private car in a stable condition with O2 at 2 liters. The patient will  follow up with Dr. Eliecer Schwartz in one week.         Luz Elena Diehl DO    D: 06/01/2022 14:56:31       T: 06/02/2022 4:26:55     [email or web address]  Job#: [id number]     Doc#: [id number]    CC:

## 2022-06-08 PROBLEM — I25.119 CORONARY ARTERY DISEASE INVOLVING NATIVE CORONARY ARTERY OF NATIVE HEART WITH ANGINA PECTORIS (HCC): Status: ACTIVE | Noted: 2022-01-01

## 2022-06-08 PROBLEM — E27.40 ADRENAL INSUFFICIENCY (HCC): Status: ACTIVE | Noted: 2022-01-01

## 2022-06-08 PROBLEM — E11.22 TYPE 2 DIABETES MELLITUS WITH CHRONIC KIDNEY DISEASE (HCC): Status: ACTIVE | Noted: 2022-01-01

## 2022-06-08 PROBLEM — R35.0 BENIGN PROSTATIC HYPERPLASIA WITH URINARY FREQUENCY: Status: ACTIVE | Noted: 2022-01-01

## 2022-06-08 PROBLEM — N40.1 BENIGN PROSTATIC HYPERPLASIA WITH URINARY FREQUENCY: Status: ACTIVE | Noted: 2022-01-01

## 2022-06-08 PROBLEM — E11.65 TYPE 2 DIABETES MELLITUS WITH HYPERGLYCEMIA (HCC): Status: ACTIVE | Noted: 2022-01-01

## 2022-06-09 PROBLEM — I50.22 CHRONIC SYSTOLIC (CONGESTIVE) HEART FAILURE (HCC): Status: ACTIVE | Noted: 2022-01-01

## 2022-06-09 NOTE — PROGRESS NOTES
[address]  [facility]   Hematology/Oncology  Consult      Patient Name: Adwoa Coleman  YOB: 1933  PCP: Brody Berry DO   Referring Provider:      Reason for Consultation:   Chief Complaint   Patient presents with    Anemia      Interval history: Patient admitted 2 weeks ago with orthostatic hypotension. Hemoglobin was 7. S/p 1 unit of blood. FOBT was positive. S/p colonoscopy which was unremarkable. History of Present Illness:  80years old male with history of pulmonary fibrosis, diabetes mellitus, coronary artery disease admitted with NSTEMI in 4/2022 s/p diagnostic cardiac cath showing diffuse disease in the right coronary system with recommendation for medical therapy. We were consulted for possible hemolysis. Hemoglobin was normal up until last year. Over the past few months his hemoglobin dropped to his current level of 7-8. MCV has increased in the same timeframe and is 122 right now. Bilirubin  0.9. , absolute reticulocyte count was 35,000. Total white count 6.4 with normal differential and platelet count of 381,820. B12 folate were normal.  Peripheral smear evaluation showed dysplastic neutrophils and less than 1% blasts. CT chest showed worsening of his interstitial lung disease pattern. CT abdomen did not show any acute findings.     S/p bone marrow biopsy in April 2022. It showed maturing trilineage hematopoiesis and atypia and myeloid and erythroid elements. Blasts 2%. Flow cytometry showed atypical myeloid maturation, cytogenetics showed multiple abnormalities including deletion of Y, loss of chromosomes 5 and 7, 10,18, additional material of unknown origin on 17 and 3. NGS showed T p53 mutation. Consistent with a diagnosis of MDS with multilineage dysplasia, high risk based on R- IPSS score of 5.5. Discussed the diagnosis and prognosis with the patient.   High risk MDS have a high risk of turning into acute myeloid leukemia and median survival is 12 to 18 months in absence of any treatment. Treatment with hyper methylating agent improved survival and reduces chances of progression to AML. Discussed risks (decreased blood counts, risk for infection, risk of bleeding, death) and benefits (prolonged survival) of azacitidine with the patient. He understood and agreed for treatment.     Review of systems: Over 10 systems were reviewed and all were negative except as mentioned above.       Diagnostic Data:     Past Medical History:   Diagnosis Date    Diabetes mellitus ([address]     H/O cardiovascular stress test 12/01/2021    Lexiscan    History of blood transfusion     History of Holter monitoring 02/11/2022    Hyperlipidemia     Hypertension     Lower back pain     Thyroid disease        Patient Active Problem List    Diagnosis Date Noted    Chronic systolic (congestive) heart failure 06/09/2022    Coronary artery disease involving native coronary artery of native heart with angina pectoris ([address] 06/08/2022    Adrenal insufficiency ([address] 06/08/2022    Type 2 diabetes mellitus with hyperglycemia 06/08/2022    Type 2 diabetes mellitus with chronic kidney disease 06/08/2022    Benign prostatic hyperplasia with urinary frequency 06/08/2022    Orthostatic hypotension 05/26/2022    Thrombocytopenia, unspecified 05/19/2022    MDS (myelodysplastic syndrome) ([address] 05/19/2022    Chronic renal disease, stage III ([address] [381542] 05/11/2022    NSTEMI (non-ST elevated myocardial infarction) ([address] 04/13/2022    Essential hypertension 08/17/2021    Acquired hypothyroidism 08/17/2021    Dyslipidemia 08/17/2021    Type 2 diabetes mellitus without complication, without long-term current use of insulin ([address] 08/17/2021    Normocytic anemia 08/17/2021    Incomplete tear of right rotator cuff 01/30/2020    Long biceps tear 01/30/2020    Injury of tendon of long head of right biceps 01/30/2020    Tear of right glenoid labrum 01/30/2020    Bursitis of right shoulder 01/30/2020        Past Surgical History:   Procedure Laterality Date    BACK SURGERY  2000    fusion  lumbar area,      CARDIAC CATHETERIZATION  04/19/2022    COLONOSCOPY N/A 6/1/2022    COLONOSCOPY performed by Nikolay Brooke MD at [address] [facility] [address] [facility] [address] Bilateral [phone]    knee     LITHOTRIPSY      SHOULDER ARTHROSCOPY Right 01/30/2020    with treatment    SHOULDER ARTHROSCOPY Right 1/30/2020    ARTHROSCOPIC EXAM AND TREATMENT RIGHT SHOULDER (CPT 56000,96032) RIGHT ROTATOR CUFF REPAIR, SUBACHROMIAL DECOMPRESSION, DEBRIDEMENT OF HUMERAL performed by Vic Londono DO at [facility] ENDOSCOPY N/A 4/18/2022    EGD ESOPHAGOGASTRODUODENOSCOPY performed by Camelia Cruz DO at [address] [place] [address] History  Family History   Problem Relation Age of Onset    No Known Problems Mother     Diabetes Father        Social History    TOBACCO:   reports that he has never smoked. He has never used smokeless tobacco.  ETOH:   reports no history of alcohol use. Home Medications  Prior to Admission medications    Medication Sig Start Date End Date Taking?  Authorizing Provider   metoprolol succinate (TOPROL XL) 25 MG extended release tablet Take 0.5 tablets by mouth daily 6/6/22  Yes Orlin Goode, DO   insulin glargine (LANTUS) 100 UNIT/ML injection vial Inject 25 Units into the skin nightly 6/1/22  Yes Leobardo Mcguire, DO   potassium citrate (UROCIT-K) 10 MEQ (1080 MG) extended release tablet Take 1 tablet by mouth daily 6/1/22  Yes Leobardo Mcguire DO   predniSONE (DELTASONE) 5 MG tablet Take 3 tablets by mouth daily (with breakfast) 6/2/22  Yes Evangelist Mcguire, DO   predniSONE (DELTASONE) 5 MG tablet Take 1 tablet by mouth Daily with supper 6/1/22  Yes Leobardo Mcguire DO   insulin lispro (HUMALOG) 100 UNIT/ML SOLN injection vial Inject 5 Units into the skin 3 times daily (with meals) 6/1/22  Yes Leobardo Mcguire DO   midodrine (PROAMATINE) 5 MG tablet Take 1 tablet by mouth 3 times daily (with meals) 6/1/22  Yes Leobardo Mcguire DO   pantoprazole (PROTONIX) 40 MG tablet Take 1 tablet by mouth every morning (before breakfast) 6/2/22  Yes Maicol Mcguire DO   polyethylene glycol (GLYCOLAX) 17 g packet Take 17 g by mouth daily 6/2/22  Yes Leobardo Mcguire DO   levothyroxine (SYNTHROID) 75 MCG tablet Take 1 tablet by mouth Daily 5/22/22  Yes Maicol Mcguire DO   finasteride (PROSCAR) 5 MG tablet Take 1 tablet by mouth daily 5/22/22  Yes Leobardo Mcguire DO   isosorbide mononitrate (IMDUR) 30 MG extended release tablet Take 1 tablet by mouth daily 5/22/22  Yes Leobardo Mcguire DO   aspirin 81 MG chewable tablet Take 1 tablet by mouth daily 4/23/22  Yes Scarlet Horn DO   atorvastatin (LIPITOR) 40 MG tablet Take 1 tablet by mouth nightly 4/22/22  Yes Scarlet Horn DO   Insulin Pen Needle 29G X 12MM MISC 1 each by Does not apply route 4 times daily (before meals and nightly) 4/19/22  Yes Scarlet Horn DO   therapeutic multivitamin-minerals (THERAGRAN-M) tablet Take 1 tablet by mouth daily. Yes Historical Provider, MD   [facility] ULTRA strip 1 each by Other route 4 times daily As needed. 2/13/22 5/14/22  Maicol Mcguire DO   Lancets (ONETOUCH DELICA PLUS [id number]) MISC 1 strip by Other route 4 times daily 2/13/22 5/14/22  Maicol Mcguire DO       Allergies  Allergies   Allergen Reactions    No Known Allergies        Review of Systems:      Objective  BP (!) 89/47   Pulse (!) 118   Temp 97.7 °F (36.5 °C)   Ht 5' 6\" (1.676 m)   Wt 161 lb (73 kg)   SpO2 91%   BMI 25.99 kg/m²     Physical Performance Status    Physical Exam:  General: AAO to person, place, time, and purpose, appears stated age, cooperative, no acute distress, pleasant   Head and neck : PERRLA, EOMI . Sclera non icteric.   Oropharynx : Clear  Neck: no JVD,  no adenopathy, no carotid bruit  LYMPHATICS : No peripheral lymphadenopathy. Lungs: Clear to auscultation   Heart: Regular rate and regular rhythm, no murmur, normal S1, S2  Abdomen: Soft, non-tender;no masses, no organomegaly  Extremities: No edema,no cyanosis, no clubbing. Skin:  No rash. Neurologic:Cranial nerves grossly intact. No focal motor or sensory deficits . Recent Laboratory Data-   Lab Results   Component Value Date    WBC 5.0 06/08/2022    HGB 8.3 (L) 06/08/2022    HCT 25.8 (L) 06/08/2022    .7 (H) 06/08/2022     (L) 06/08/2022    LYMPHOPCT 29.2 06/08/2022    RBC 2.33 (L) 06/08/2022    MCH 35.6 (H) 06/08/2022    MCHC 32.2 06/08/2022    RDW 22.0 (H) 06/08/2022    NEUTOPHILPCT 44.2 06/08/2022    MONOPCT 24.8 (H) 06/08/2022    BASOPCT 0.2 06/08/2022    NEUTROABS 2.25 06/08/2022    LYMPHSABS 1.45 (L) 06/08/2022    MONOSABS 1.25 (H) 06/08/2022    EOSABS 0.05 06/08/2022    BASOSABS 0.00 06/08/2022       Lab Results   Component Value Date     06/08/2022    K 4.5 06/08/2022    CL 99 06/08/2022    CO2 26 06/08/2022    BUN 35 (H) 06/08/2022    CREATININE 1.4 (H) 06/08/2022    GLUCOSE 254 (H) 06/08/2022    CALCIUM 8.7 06/08/2022    PROT 6.4 06/08/2022    LABALBU 3.4 (L) 06/08/2022    BILITOT 0.6 06/08/2022    ALKPHOS 62 06/08/2022    AST 15 06/08/2022    ALT 21 06/08/2022    LABGLOM 48 06/08/2022    GFRAA 58 06/08/2022       Lab Results   Component Value Date    IRON 122 04/14/2022    TIBC 140 (L) 04/14/2022    FERRITIN 3,037 04/14/2022           Radiology-    No results found. ASSESSMENT/PLAN :    Karen Armendariz was seen today for anemia. Diagnoses and all orders for this visit:    MDS (myelodysplastic syndrome) ([facility]  -     CBC with Auto Differential; Future  -     CBC With Auto Differential; Future  -     Comprehensive metabolic panel;  Future    Chronic systolic (congestive) heart failure    80years old male with multiple medical comorbidities including coronary artery disease, s/p recent NSTEMI, pulmonary fibrosis on supplemental oxygen, diagnosed with MDS with multilineage dysplasia in 4/2022, high risk, R IPSS score of 5.5 due to complex karyotype. Patient was admitted 2 weeks ago with orthostatic hypotension. FOBT was positive. Colonoscopy was done which was unremarkable. Continues to have hypotension but is asymptomatic. No blood in stools. Will start 75 mg per metered squared azacitidine daily for 7 days every 28 days from next week  Will continue as needed transfusions and check CBC once a week. Return to clinic in a week. Return in about 1 week (around 6/16/2022).      Juan Elam MD   Electronically signed 6/9/2022 at 2:14 PM

## 2022-06-14 NOTE — PROGRESS NOTES
Patient tolerated injection well. Patient alert and oriented x3. No distress noted. Vital signs stable. Patient denies any new or worsening pain. Patient educated on signs and symptoms of reaction to medication. Educated patient on possible side effects and treatment of medication. Patient verbalized understanding. Offered patient education an/or discharge material.  Patient declined. Patient denies any needs. All questions answered. none

## 2022-06-16 NOTE — DISCHARGE INSTR - COC
Continuity of Care Form    Patient Name: Esperanza Ortiz   :  1933  MRN:  [id number]    Admit date:  2022  Discharge date:  ***    Code Status Order: Prior   Advance Directives:      Admitting Physician:  No admitting provider for patient encounter. PCP: Gerard Guardado DO    Discharging Nurse: [facility] Unit/Room#: No information available for this encounter.   Discharging Unit Phone Number: ***    Emergency Contact:   Extended Emergency Contact Information  Primary Emergency Contact: [facility] [address] Phone: [phone]  Mobile Phone: [phone]  Relation: Child    Past Surgical History:  Past Surgical History:   Procedure Laterality Date    BACK SURGERY      fusion  lumbar area,      CARDIAC CATHETERIZATION  2022    COLONOSCOPY N/A 2022    COLONOSCOPY performed by Arvin Gomez MD at [address] [facility]      Bilateral Cataract    JOINT REPLACEMENT Bilateral [id number]    knee     KNEE ARTHROPLASTY      R 1996    KNEE SURGERY      L knee revision ; T/A R knee    LITHOTRIPSY      SHOULDER ARTHROSCOPY Right 2020    with treatment    SHOULDER ARTHROSCOPY Right 2020    ARTHROSCOPIC EXAM AND TREATMENT RIGHT SHOULDER (CPT 09248,34162) RIGHT ROTATOR CUFF REPAIR, SUBACHROMIAL DECOMPRESSION, DEBRIDEMENT OF HUMERAL performed by Mariana Sanchez DO at [address]  2008    TONSILLECTOMY      UPPER GASTROINTESTINAL ENDOSCOPY N/A 2022    EGD ESOPHAGOGASTRODUODENOSCOPY performed by Justin Melchor DO at [facility] ENDOSCOPY       Immunization History:   Immunization History   Administered Date(s) Administered    COVID-19, [place], Primary or Immunocompromised, PF, 100mcg/0.5mL 2021, 2021    Influenza, High-dose, Quadv, 65 yrs +, IM (Fluzone) 2021    Influenza, Quadv, IM, PF (6 mo and older Fluzone, Flulaval, Fluarix, and 3 yrs and older Afluria) 2020    Influenza, Triv, inactivated, subunit, adjuvanted, IM (Fluad 65 yrs and older) 10/02/2018    Tdap (Boostrix, Adacel) 05/16/2016       Active Problems:  Patient Active Problem List   Diagnosis Code    Incomplete tear of right rotator cuff M75.111    Long biceps tear M75.41    Injury of tendon of long head of right biceps S46.101A    Tear of right glenoid labrum S43.431A    Bursitis of right shoulder M75.51    Essential hypertension I10    Acquired hypothyroidism E03.9    Dyslipidemia E78.5    Type 2 diabetes mellitus without complication, without long-term current use of insulin ([facility]) E11.9    Normocytic anemia D64.9    NSTEMI (non-ST elevated myocardial infarction) ([facility] I21.4    Chronic renal disease, stage III ([facility] [551940] N18.30    Thrombocytopenia, unspecified D69.6    MDS (myelodysplastic syndrome) ([facility]) D46.9    Orthostatic hypotension I95.1    Coronary artery disease involving native coronary artery of native heart with angina pectoris ([facility]) I25.119    Adrenal insufficiency ([facility]) E27.40    Type 2 diabetes mellitus with hyperglycemia E11.65    Type 2 diabetes mellitus with chronic kidney disease E11.22    Benign prostatic hyperplasia with urinary frequency N40.1, R35.0    Chronic systolic (congestive) heart failure I50.22       Isolation/Infection:   Isolation            No Isolation          Patient Infection Status       Infection Onset Added Last Indicated Last Indicated By Review Planned Expiration Resolved Resolved By    None active    Resolved    COVID-19 (Rule Out) 05/26/22 05/26/22 05/26/22 Respiratory Panel, Molecular, with COVID-19 (Restricted: peds pts or suitable admitted adults) (Ordered)   05/26/22 Rule-Out Test Resulted    COVID-19 (Rule Out) 04/20/22 04/20/22 04/20/22 COVID-19 (Ordered)   04/21/22 Rule-Out Test Resulted    COVID-19 (Rule Out) 04/14/22 04/14/22 04/14/22 Respiratory Panel, Molecular, with COVID-19 (Restricted: peds pts or suitable admitted adults) (Ordered)   04/14/22 Rule-Out Test Resulted            Nurse Assessment:  Last Vital Signs: There were no vitals taken for this visit. Last documented pain score (0-10 scale):    Last Weight:   Wt Readings from Last 1 Encounters:   22 156 lb 14.4 oz (71.2 kg)     Mental Status:  {IP PT MENTAL STATUS:06369}    IV Access:  { SANDRA IV ACCESS:922206952}    Nursing Mobility/ADLs:  Walking   {P DME JFST:705716740}  Transfer  {P DME DMWS:164337053}  Bathing  {CHP DME AKAC:067898104}  Dressing  {CHP DME PODU:059126922}  Toileting  {CHP DME BMFW:289506149}  Feeding  {[facility] DME WHGJ:100980681}  Med Admin  {P DME IQTY:664481847}  Med Delivery   { SANDRA MED Delivery:170676013}    Wound Care Documentation and Therapy:        Elimination:  Continence: Bowel: {YES / KO:14636}  Bladder: {YES / KH:87304}  Urinary Catheter: {Urinary Catheter:916177793}   Colostomy/Ileostomy/Ileal Conduit: {YES / QT:00357}       Date of Last BM: ***  No intake or output data in the 24 hours ending 22 1556  No intake/output data recorded.     Safety Concerns:     [address] Safety Concerns:311846468}    Impairments/Disabilities:      [address] Impairments/Disabilities:505181944}    Nutrition Therapy:  Current Nutrition Therapy:   [address] Diet List:614787853}    Routes of Feeding: {[facility] DME Other Feedings:772297803}  Liquids: {Slp liquid thickness:60858}  Daily Fluid Restriction: {CHP DME Yes amt example:764667018}  Last Modified Barium Swallow with Video (Video Swallowing Test): {Done Not Done MDWC:846994405}    Treatments at the Time of Hospital Discharge:   Respiratory Treatments: ***  Oxygen Therapy:  {Therapy; copd oxygen:26159}  Ventilator:    {[facility] Vent MVXE:749594016}    Rehab Therapies: {THERAPEUTIC INTERVENTION:2170732375}  Weight Bearing Status/Restrictions: [address]  Weight Bearin}  Other Medical Equipment (for information only, NOT a DME order):  {EQUIPMENT:026447152}  Other Treatments: ***    Patient's personal belongings (please select all that are sent with patient):  {P DME Belongings:482659530}    RN SIGNATURE:  {Esignature:555958958}    CASE MANAGEMENT/SOCIAL WORK SECTION    Inpatient Status Date: ***    Readmission Risk Assessment Score:  Readmission Risk              Risk of Unplanned Readmission:  0           Discharging to Facility/ Agency   Name:   Address:  Phone:  Fax:    Dialysis Facility (if applicable)   Name:  Address:  Dialysis Schedule:  Phone:  Fax:    / signature: {Esignature:450947265}    PHYSICIAN SECTION    Prognosis: {Prognosis:4918496958}    Condition at Discharge: [address] [place] [address] Patient Condition:707999975}    Rehab Potential (if transferring to Rehab): {Prognosis:4165874444}    Recommended Labs or Other Treatments After Discharge: ***    Physician Certification: I certify the above information and transfer of Alirio Chauhan  is necessary for the continuing treatment of the diagnosis listed and that he requires {Admit to Appropriate Level of Care:40434} for {GREATER/LESS:975616707} 30 days.      Update Admission H&P: {CHP DME Changes in FXJJS:664080051}    PHYSICIAN SIGNATURE:  {Esignature:877681521}

## 2022-06-16 NOTE — PROGRESS NOTES
[address]  [place] , [facility]   Hematology/Oncology  Consult      Patient Name: Sumaya Sanderson  YOB: 1933  PCP: Neela Riggins DO   Referring Provider:      Reason for Consultation:   Chief Complaint   Patient presents with    Other     mds      Interval history:   Started Vidaza this week. History of Present Illness:  80years old male with history of pulmonary fibrosis, diabetes mellitus, coronary artery disease admitted with NSTEMI in 4/2022 s/p diagnostic cardiac cath showing diffuse disease in the right coronary system with recommendation for medical therapy. We were consulted for possible hemolysis. Hemoglobin was normal up until last year. Over the past few months his hemoglobin dropped to his current level of 7-8. MCV has increased in the same timeframe and is 122 right now. Bilirubin  0.9. , absolute reticulocyte count was 35,000. Total white count 6.4 with normal differential and platelet count of 530,185. B12 folate were normal.  Peripheral smear evaluation showed dysplastic neutrophils and less than 1% blasts. CT chest showed worsening of his interstitial lung disease pattern. CT abdomen did not show any acute findings.     S/p bone marrow biopsy in April 2022. It showed maturing trilineage hematopoiesis and atypia and myeloid and erythroid elements. Blasts 2%. Flow cytometry showed atypical myeloid maturation, cytogenetics showed multiple abnormalities including deletion of Y, loss of chromosomes 5 and 7, 10,18, additional material of unknown origin on 17 and 3. NGS showed T p53 mutation. Consistent with a diagnosis of MDS with multilineage dysplasia, high risk based on R- IPSS score of 5.5. Discussed the diagnosis and prognosis with the patient. High risk MDS have a high risk of turning into acute myeloid leukemia and median survival is 12 to 18 months in absence of any treatment.   Treatment with hyper methylating agent improved survival and reduces chances of progression to AML. Discussed risks (decreased blood counts, risk for infection, risk of bleeding, death) and benefits (prolonged survival) of azacitidine with the patient. He understood and agreed for treatment. Started azacitidine in June 2022.     Review of systems: Over 10 systems were reviewed and all were negative except as mentioned above.       Diagnostic Data:     Past Medical History:   Diagnosis Date    Chronic kidney disease     Stage 3    Degenerative disc disease, lumbar     Diabetes mellitus ([address]     Diverticulosis     H/O cardiovascular stress test 12/01/2021    Lexiscan    History of blood transfusion     History of Holter monitoring 02/11/2022    HNP (herniated nucleus pulposus), cervical     C5-6, C6-7    Hyperlipidemia     Hypertension     Lower back pain     Nephrolithiasis     Presbyesophagus     Thyroid disease     Tinnitus        Patient Active Problem List    Diagnosis Date Noted    Chronic systolic (congestive) heart failure 06/09/2022    Coronary artery disease involving native coronary artery of native heart with angina pectoris ([address] 06/08/2022    Adrenal insufficiency ([address] 06/08/2022    Type 2 diabetes mellitus with hyperglycemia 06/08/2022    Type 2 diabetes mellitus with chronic kidney disease 06/08/2022    Benign prostatic hyperplasia with urinary frequency 06/08/2022    Orthostatic hypotension 05/26/2022    Thrombocytopenia, unspecified 05/19/2022    MDS (myelodysplastic syndrome) ([address] 05/19/2022    Chronic renal disease, stage III ([address] [396840] 05/11/2022    NSTEMI (non-ST elevated myocardial infarction) ([address] 04/13/2022    Essential hypertension 08/17/2021    Acquired hypothyroidism 08/17/2021    Dyslipidemia 08/17/2021    Type 2 diabetes mellitus without complication, without long-term current use of insulin ([address] 08/17/2021    Normocytic anemia 08/17/2021    Incomplete tear of right rotator cuff 01/30/2020    Long biceps tear 01/30/2020    Injury of tendon of long head of right biceps 01/30/2020    Tear of right glenoid labrum 01/30/2020    Bursitis of right shoulder 01/30/2020        Past Surgical History:   Procedure Laterality Date    BACK SURGERY  2000    fusion  lumbar area,      CARDIAC CATHETERIZATION  04/19/2022    COLONOSCOPY N/A 6/1/2022    COL[facility]COPY performed by Brady Ko [facility] at [facility]  2012    Bilateral Cataract    JOINT REPLACEMENT Bilateral [id number]    knee     KNEE ARTHROPLASTY      R 1996    KNEE SURGERY      L knee revision 2004; T/A R knee    LITHOTRIPSY      SHOULDER ARTHROSCOPY Right 01/30/2020    with treatment    SHOULDER ARTHROSCOPY Right 1/30/2020    ARTHROSCOPIC EXAM AND TREATMENT RIGHT SHOULDER (CPT 88807,38347) RIGHT ROTATOR CUFF REPAIR, SUBACHROMIAL DECOMPRESSION, DEBRIDEMENT OF HUMERAL performed by Siena López DO at [address]  2008    TONSILLECTOMY      UPPER GASTROINTESTINAL ENDOSCOPY N/A 4/18/2022    EGD ESOPHAGOGASTRODUODENOSCOPY performed by Mckinley Merritt DO at [facility] ENDOSCOPY       Family History  Family History   Problem Relation Age of Onset    Stroke Mother     Diabetes Father     Diabetes Sister     Diabetes Brother        Social History    TOBACCO:   reports that he has never smoked. He has never used smokeless tobacco.  ETOH:   reports no history of alcohol use. Home Medications  Prior to Admission medications    Medication Sig Start Date End Date Taking?  Authorizing Provider   Insulin Pen Needle 29G X 12MM MISC 1 each by Does not apply route 4 times daily (before meals and nightly) 6/14/22  Yes Orlin Goode DO   pravastatin (PRAVACHOL) 20 MG tablet Take 20 mg by mouth daily   Yes Historical Provider, MD   metFORMIN (GLUCOPHAGE) 500 MG tablet Take 500 mg by mouth daily   Yes Historical Provider, MD   fluconazole (DIFLUCAN) 100 MG tablet Take 2 tablets by mouth daily for 7 days 6/10/22 6/17/22 Yes Leobardo Mcguire DO   mirabegron (MYRBETRIQ) 25 MG TB24 Take 1 tablet by mouth daily 6/10/22  Yes Leobardo Mcguire DO   metoprolol succinate (TOPROL XL) 25 MG extended release tablet Take 0.5 tablets by mouth daily 6/6/22  Yes Orlin Goode, DO   insulin glargine (LANTUS) 100 UNIT/ML injection vial Inject 25 Units into the skin nightly 6/1/22  Yes Leobardo Mcguire DO   potassium citrate (UROCIT-K) 10 MEQ (1080 MG) extended release tablet Take 1 tablet by mouth daily 6/1/22  Yes Leobardo Mcguire DO   predniSONE (DELTASONE) 5 MG tablet Take 3 tablets by mouth daily (with breakfast) 6/2/22  Yes Laquita Mcguire DO   predniSONE (DELTASONE) 5 MG tablet Take 1 tablet by mouth Daily with supper 6/1/22  Yes Leobardo Mcguire DO   insulin lispro (HUMALOG) 100 UNIT/ML SOLN injection vial Inject 5 Units into the skin 3 times daily (with meals) 6/1/22  Yes Leobardo Mcguire DO   midodrine (PROAMATINE) 5 MG tablet Take 1 tablet by mouth 3 times daily (with meals) 6/1/22  Yes Leobardo Mcguire DO   pantoprazole (PROTONIX) 40 MG tablet Take 1 tablet by mouth every morning (before breakfast) 6/2/22  Yes Laquita Mcguire DO   polyethylene glycol (GLYCOLAX) 17 g packet Take 17 g by mouth daily 6/2/22  Yes Leobardo Mcguire DO   levothyroxine (SYNTHROID) 75 MCG tablet Take 1 tablet by mouth Daily 5/22/22  Yes Laquita Mcguire DO   finasteride (PROSCAR) 5 MG tablet Take 1 tablet by mouth daily 5/22/22  Yes Leobardo Mcguire DO   isosorbide mononitrate (IMDUR) 30 MG extended release tablet Take 1 tablet by mouth daily 5/22/22  Yes Leobardo Mcguire DO   aspirin 81 MG chewable tablet Take 1 tablet by mouth daily 4/23/22  Yes Niki Grayson DO   atorvastatin (LIPITOR) 40 MG tablet Take 1 tablet by mouth nightly 4/22/22  Yes Ferol Kenan, DO   therapeutic multivitamin-minerals (THERAGRAN-M) tablet Take 1 tablet by mouth daily.      Yes Historical Provider, MD   [facility] ULTRA strip 1 each by Other route 4 times 06/08/2022    AST 15 06/08/2022    ALT 21 06/08/2022    LABGLOM 48 06/08/2022    GFRAA 58 06/08/2022       Lab Results   Component Value Date    IRON 122 04/14/2022    TIBC 140 (L) 04/14/2022    FERRITIN 3,037 04/14/2022           Radiology-    No results found. ASSESSMENT/PLAN :    [facility] was seen today for other. Diagnoses and all orders for this visit:    MDS (myelodysplastic syndrome) ([facility]  -     CBC with Auto Differential; Future  -     CBC with Auto Differential; Future    80years old male with multiple medical comorbidities including coronary artery disease, s/p recent NSTEMI, pulmonary fibrosis on supplemental oxygen, diagnosed with MDS with multilineage dysplasia in 4/2022, high risk, R IPSS score of 5.5 due to complex karyotype. Started Yayo Dante in June 2022. Cycle 1 day 4 today. Patient tolerated treatment well. Reports fatigue. Afebrile. We will check a CBC and transfuse for hemoglobin less than 7. Continue 75 mg per metered squared azacitidine daily for 7 days every 28 days. Will continue as needed transfusions and check CBC once a week. Return to clinic in a week with CBC. Return in about 1 week (around 6/23/2022).      Moraima Galeas MD   Electronically signed 6/16/2022 at 2:21 PM

## 2022-06-17 NOTE — DISCHARGE INSTR - COC
Continuity of Care Form    Patient Name: Felicity Darby   :  1933  MRN:  [id number]    Admit date:  2022  Discharge date:  ***    Code Status Order: Prior   Advance Directives:      Admitting Physician:  No admitting provider for patient encounter. PCP: David Antunez DO    Discharging Nurse: [facility] Unit/Room#: No information available for this encounter.   Discharging Unit Phone Number: ***    Emergency Contact:   Extended Emergency Contact Information  Primary Emergency Contact: [facility] Phone: [phone]  Mobile Phone: [phone]  Relation: Child    Past Surgical History:  Past Surgical History:   Procedure Laterality Date    BACK SURGERY      fusion  lumbar area,      CARDIAC CATHETERIZATION  2022    COLONOSCOPY N/A 2022    COLONOSCOPY performed by Sandhya Faria MD at [facility]      Bilateral Cataract    JOINT REPLACEMENT Bilateral [id number]    knee     KNEE ARTHROPLASTY      R 1996    KNEE SURGERY      L knee revision ; T/A R knee    LITHOTRIPSY      SHOULDER ARTHROSCOPY Right 2020    with treatment    SHOULDER ARTHROSCOPY Right 2020    ARTHROSCOPIC EXAM AND TREATMENT RIGHT SHOULDER (CPT 87462,68647) RIGHT ROTATOR CUFF REPAIR, SUBACHROMIAL DECOMPRESSION, DEBRIDEMENT OF HUMERAL performed by Lillie Manriquez DO at [address]  2008    TONSILLECTOMY      UPPER GASTROINTESTINAL ENDOSCOPY N/A 2022    EGD ESOPHAGOGASTRODUODENOSCOPY performed by Musa Myles DO at [facility] ENDOSCOPY       Immunization History:   Immunization History   Administered Date(s) Administered    [facility] Primary or Immunocompromised, PF, 100mcg/0.5mL 2021, 2021    Influenza, High-dose, Quadv, 65 yrs +, IM (Fluzone) 2021    Influenza, Quadv, IM, PF (6 mo and older Fluzone, Flulaval, Fluarix, and 3 yrs and older Afluria) 2020    Influenza, Triv, inactivated, subunit, adjuvanted, IM (Fluad 65 yrs and older) 10/02/2018    Tdap (Boostrix, Adacel) 05/16/2016       Active Problems:  Patient Active Problem List   Diagnosis Code    Incomplete tear of right rotator cuff M75.111    Long biceps tear M75.41    Injury of tendon of long head of right biceps S46.101A    Tear of right glenoid labrum S43.431A    Bursitis of right shoulder M75.51    Essential hypertension I10    Acquired hypothyroidism E03.9    Dyslipidemia E78.5    Type 2 diabetes mellitus without complication, without long-term current use of insulin ([facility]) E11.9    Normocytic anemia D64.9    NSTEMI (non-ST elevated myocardial infarction) ([facility] I21.4    Chronic renal disease, stage III ([facility] [325510] N18.30    Thrombocytopenia, unspecified D69.6    MDS (myelodysplastic syndrome) ([facility]) D46.9    Orthostatic hypotension I95.1    Coronary artery disease involving native coronary artery of native heart with angina pectoris ([facility]) I25.119    Adrenal insufficiency ([facility]) E27.40    Type 2 diabetes mellitus with hyperglycemia E11.65    Type 2 diabetes mellitus with chronic kidney disease E11.22    Benign prostatic hyperplasia with urinary frequency N40.1, R35.0    Chronic systolic (congestive) heart failure I50.22       Isolation/Infection:   Isolation            No Isolation          Patient Infection Status       Infection Onset Added Last Indicated Last Indicated By Review Planned Expiration Resolved Resolved By    None active    Resolved    COVID-19 (Rule Out) 05/26/22 05/26/22 05/26/22 Respiratory Panel, Molecular, with COVID-19 (Restricted: peds pts or suitable admitted adults) (Ordered)   05/26/22 Rule-Out Test Resulted    COVID-19 (Rule Out) 04/20/22 04/20/22 04/20/22 COVID-19 (Ordered)   04/21/22 Rule-Out Test Resulted    COVID-19 (Rule Out) 04/14/22 04/14/22 04/14/22 Respiratory Panel, Molecular, with COVID-19 (Restricted: peds pts or suitable admitted adults) (Ordered)   04/14/22 Rule-Out Test Resulted            Nurse

## 2022-06-18 PROBLEM — R57.9 SHOCK (HCC): Status: ACTIVE | Noted: 2022-01-01

## 2022-06-18 NOTE — ED NOTES
[facility] signed off, Reference number [id number]     Juliann Babcock, REBEKA  06/18/22 [id number]

## 2022-06-18 NOTE — ED NOTES
TOD 2:50, Dr. Bill Racer at bedside, confirmed death, and will sign death certificate.       Deanne Mcallister, RN  06/18/22 8193

## 2022-06-18 NOTE — ED NOTES
Patient became bradycardic shortly after this RN called report to ICU, family at bedside, decision made to stop levophed and proceed with comfort care measures only, patient medicated with morphine and patient placed in position of comfort.        Bill Jean-Baptiste RN  06/18/22 4907

## 2022-06-18 NOTE — ED NOTES
[facility]     Khoi Nj RN  22 1500

## 2022-06-18 NOTE — ED PROVIDER NOTES
SARS-CoV-2, NAAT Not Detected Not Detected   CBC with Auto Differential   Result Value Ref Range    WBC 4.7 4.5 - 11.5 E9/L    RBC 2.02 (L) 3.80 - 5.80 E12/L    Hemoglobin 7.2 (L) 12.5 - 16.5 g/dL    Hematocrit 22.7 (L) 37.0 - 54.0 %    .4 (H) 80.0 - 99.9 fL    MCH 35.6 (H) 26.0 - 35.0 pg    MCHC 31.7 (L) 32.0 - 34.5 %    RDW NOT CALC 11.5 - 15.0 fL    Platelets 57 (L) 361 - 450 E9/L    MPV 11.9 7.0 - 12.0 fL    Neutrophils % 66.0 43.0 - 80.0 %    Lymphocytes % 24.0 20.0 - 42.0 %    Monocytes % 10.0 2.0 - 12.0 %    Eosinophils % 0.0 0.0 - 6.0 %    Basophils % 0.0 0.0 - 2.0 %    Neutrophils Absolute 3.10 1.80 - 7.30 E9/L    Lymphocytes Absolute 1.13 (L) 1.50 - 4.00 E9/L    Monocytes Absolute 0.47 0.10 - 0.95 E9/L    Eosinophils Absolute 0.00 (L) 0.05 - 0.50 E9/L    Basophils Absolute 0.00 0.00 - 0.20 E9/L    nRBC 40.0 /100 WBC    Anisocytosis 2+     Polychromasia 1+     Poikilocytes 1+     Elizabeth Cells 1+     Ovalocytes 1+    Comprehensive Metabolic Panel w/ Reflex to MG   Result Value Ref Range    Sodium 135 132 - 146 mmol/L    Potassium reflex Magnesium 5.5 (H) 3.5 - 5.0 mmol/L    Chloride 95 (L) 98 - 107 mmol/L    CO2 14 (L) 22 - 29 mmol/L    Anion Gap 26 (H) 7 - 16 mmol/L    Glucose 155 (H) 74 - 99 mg/dL    BUN 56 (H) 6 - 23 mg/dL    CREATININE 2.9 (H) 0.7 - 1.2 mg/dL    GFR Non-African American 21 >=60 mL/min/1.73    GFR African American 25     Calcium 8.5 (L) 8.6 - 10.2 mg/dL    Total Protein 6.2 (L) 6.4 - 8.3 g/dL    Albumin 3.2 (L) 3.5 - 5.2 g/dL    Total Bilirubin 1.9 (H) 0.0 - 1.2 mg/dL    Alkaline Phosphatase 71 40 - 129 U/L    ALT 3,543 (H) 0 - 40 U/L    AST 3,348 (H) 0 - 39 U/L   Troponin   Result Value Ref Range    Troponin, High Sensitivity 226 (H) 0 - 11 ng/L   Protime-INR   Result Value Ref Range    Protime 21.7 (H) 9.3 - 12.4 sec    INR 1.9    APTT   Result Value Ref Range    aPTT 24.3 (L) 24.5 - 35.1 sec   Urinalysis with Microscopic   Result Value Ref Range    Color, UA Yellow Straw/Yellow Atrophy and periventricular leukomalacia,         CT CERVICAL SPINE WO CONTRAST   Final Result   1. There is no acute compression fracture or subluxation of the cervical   spine. 2. Advanced multilevel degenerative disc and degenerative joint disease. XR CHEST 1 VIEW   Final Result   1. Cardiomegaly with findings of CHF. 2. Underlying interstitial pulmonary fibrosis               EKG:  This EKG is signed and interpreted by me. Sinus tachycardia rate of 109, ST segment elevations in inferior leads as well as in V2 with changes in leads I and aVL, changes compared to patient's prior EKG  Interpreted by me      ------------------------- NURSING NOTES AND VITALS REVIEWED ---------------------------   The nursing notes within the ED encounter and vital signs as below have been reviewed by myself. BP (!) 78/57   Pulse 96   Temp 97.8 °F (36.6 °C) (Axillary)   Resp 24   SpO2 93%   Oxygen Saturation Interpretation: Abnormal - but at baseline    The patients available past medical records and past encounters were reviewed.         ------------------------------ ED COURSE/MEDICAL DECISION MAKING----------------------  Medications   0.9 % sodium chloride infusion (has no administration in time range)   sodium bicarbonate 150 mEq in dextrose 5 % 1,000 mL infusion (has no administration in time range)   pantoprazole (PROTONIX) 80 mg in sodium chloride 0.9 % 100 mL infusion (has no administration in time range)   glucose chewable tablet 16 g (has no administration in time range)   dextrose bolus 10% 125 mL (has no administration in time range)     Or   dextrose bolus 10% 250 mL (has no administration in time range)   glucagon (rDNA) injection 1 mg (has no administration in time range)   dextrose 5 % solution (has no administration in time range)   insulin glargine (LANTUS) injection vial 15 Units (has no administration in time range)   hydrocortisone sodium succinate PF (SOLU-CORTEF) injection 50 mg (has no administration in time range)   norepinephrine (LEVOPHED) 16 mg in sodium chloride 0.9 % 250 mL infusion (5 mcg/min IntraVENous New Bag 6/18/22 1316)   insulin lispro (HUMALOG) injection vial 0-6 Units (has no administration in time range)   insulin lispro (HUMALOG) injection vial 0-3 Units (has no administration in time range)   cefepime (MAXIPIME) 1000 mg IVPB minibag (0 mg IntraVENous Stopped 6/18/22 1349)     Followed by   cefepime (MAXIPIME) 1000 mg IVPB minibag (has no administration in time range)   finasteride (PROSCAR) tablet 5 mg (has no administration in time range)   levothyroxine (SYNTHROID) tablet 75 mcg (has no administration in time range)   midodrine (PROAMATINE) tablet 10 mg (has no administration in time range)   therapeutic multivitamin-minerals 1 tablet (has no administration in time range)   vancomycin (VANCOCIN) intermittent dosing (placeholder) (has no administration in time range)   atropine 1 MG/10ML injection (has no administration in time range)   morphine injection 4 mg (4 mg IntraVENous Given 6/18/22 1413)   hydrocortisone sodium succinate PF (SOLU-CORTEF) injection 100 mg (100 mg IntraVENous Given 6/18/22 1147)   0.9 % sodium chloride bolus (0 mLs IntraVENous Stopped 6/18/22 1307)   midodrine (PROAMATINE) tablet 10 mg (10 mg Oral Given 6/18/22 1146)   0.9 % sodium chloride bolus (0 mLs IntraVENous Stopped 6/18/22 1349)   morphine injection 10 mg (10 mg IntraVENous Given 6/18/22 1330)             Medical Decision Making:   IDr. Surekha am the primary physician of record. Jesus Sierra is a 80 y.o. male who presents to the ED for fall and fatigue. The patient did arrive profoundly hypotensive. The patient was given IV fluids. Patient did have labs and imaging which were reviewed. Patient was found to have metabolic acidosis as well as transaminitis.   While in the emergency department the patient was admitted to ICU and awaiting transport to ICU became suddenly bradycardic as well as having agonal respirations. The patient was a DNR CCA at this time with no intubation and no chest compressions. The patient was given atropine as well as push dose epinephrine. He did have improvement in blood pressure as well as heart rate. Due to patient's sudden decline family chose to pursue comfort measures. Time of death [phone]. Re-Evaluations/Consultations:             ED Course as of 22 1731   Sat 2022   1138 Spoke with Dr. Elian Mason. No plan for intervention at this time as patient has had similar EKG changes in the past and does appear critically ill and is not complaining of chest pain. [MT]   7000 [facility] with Dr. Alonso Weir He will accept the patient for admission. At this time we will continue with IV fluids he would like 3 A of bicarb in D5W. At this time we will not place central line in the emergency department. He like the patient to the ICU and Levophed will be started in ICU. [MT]   1330 Patient began to become bradycardic and hypotensive. The patient was given atropine as well as push dose epinephrine. The patient is a DNR CCA, no intubation, no compressions. Family is at bedside. [MT]   8311 Time of Death [phone] [MT]      ED Course User Index  [MT] Simin Garcia,                This patient's ED course included: History, physical examination, reevaluation prior to disposition, labs, imaging, telemetry monitoring, EKG, IV medications,    Critical care:  Critical Care: Please note that the withdrawal or failure to initiate urgent interventions for this patient would likely result in a life threatening deterioration or permanent disability. Accordingly this patient received 32 minutes of critical care time, excluding separately billable procedures. This patient has became  during their ED course. Counseling:    The emergency provider has spoken with the patient and discussed todays results, in addition to providing specific details for the plan of care and counseling regarding the diagnosis and prognosis. Questions are answered at this time and they are agreeable with the plan.       --------------------------------- IMPRESSION AND DISPOSITION ---------------------------------    IMPRESSION  1. Shock ([address]    2. Metabolic acidosis    3. Pancytopenia ([address]    4. Cardiac ischemia    5. Acute renal failure, unspecified acute renal failure type ([address]    6. Transaminitis        DISPOSITION  Disposition: Other Disposition:   Patient condition is         NOTE: This report was transcribed using voice recognition software.  Every effort was made to ensure accuracy; however, inadvertent computerized transcription errors may be present          Kaitlyn Kyle DO  22 1738

## 2022-06-18 NOTE — TELEPHONE ENCOUNTER
Writer contacted Dr Yrn Calderón to inform of 30 day readmission risk. ED provider informed writer of readmission.     Call Back: If you need to call back to inform of disposition you can contact me at [phone]

## 2022-06-18 NOTE — Clinical Note
Discharge Plan[de-identified] Other/[facility])  Telemetry/Cardiac Monitoring Required?: Yes

## 2022-06-18 NOTE — H&P
[address]                              HISTORY AND PHYSICAL    PATIENT NAME: Esequiel Marte                    :        1933  MED REC NO:   [id number]                            ROOM:       06  ACCOUNT NO:   [de-identified]                           ADMIT DATE: 2022  PROVIDER:     Shamir Lim DO    CHIEF COMPLAINT AND HISTORY OF CHIEF COMPLAINT:  This is a pleasant  26-year-old white male who presented to [address] [place] [address]. The  patient presented to the hospital here by ambulance at approximately  10:15 on 2022. The patient is well known to myself, this  26-year-old white male has been hospitalized several times to our  institution since 2001. His most recent hospitalization was on  2022 where he was admitted here with symptomatic orthostasis  secondary to underlying medication with mild superimposed secondary  adrenal insufficiency with superimposed dehydration. The patient does  have a known history of coronary artery disease with cardiac  catheterization in 2022 showing mild-to-moderate multivessel disease  with elevated troponin, suggesting chronic demand ischemia along with  macrocytic anemia with recent bone suppression. The patient's bone  marrow done on 2022 did show myelodysplastic syndrome, and the  patient was recently started on chemotherapy in the form of Althea Pounds on  the 2022. The patient at this time has been doing relatively  well. The first three treatments he did satisfactorily. He was seen on  Thursday by Dr. Kee Flowers, at which time his hemoglobin was 8.2. He  received another treatment on Thursday and Friday and felt rather weak  yesterday, apparently early this morning, the time was uncertain, he  went to get up to go to bathroom at which time he fell. He is uncertain  of the time.   He did call his medical alert button, at which time  ambulance ill, but moving all extremities. ALLERGIES:  No known drug allergies. MEDICATIONS:  Prior to admission include the following:  He has been on  metformin 500 daily, Myrbetriq 25 daily, Toprol-XL 12.5 mg daily, Lantus  25 units at bedtime, Humalog before meals and at bedtime, prednisone 15  in the morning and 5 in the evening, ProAmatine 5 mg with meals three  times a day, Protonix 40 daily, Synthroid 75 mcg, Proscar 5 daily. He  also has been on Imdur 30 daily, Lipitor 40 at bedtime. PAST MEDICAL HISTORY:  Positive for usual childhood diseases; history of  chronic kidney disease stage 3, but now worsening acute kidney injury;  type 2 diabetes mellitus, priorly been well controlled but steroids had  aggravated; diverticulosis; stress test in 2021 which was normal with  abnormal catheterization in April; recently diagnosed myelodysplastic  syndrome; hyperlipidemia; hypertension; low back pain; nephrolithiasis;  presbyesophagus; hypothyroidism; prostatic hypertrophy with dysuria and  tenderness. PAST SURGICAL HISTORY:  Includes back surgery in , cardiac  catheterization in 2022, colonoscopy exam on 2022, eye  surgery, bilateral cataracts in , bilateral knee replacements in   and , _____ therapy, right shoulder surgery in 2020,  temporal artery biopsy in , tonsillectomy, and upper GI  panendoscopy. FAMILY HISTORY:  Parents are . SOCIAL HISTORY:  The patient is retired . Does not drink. Does not smoke. No recreations drugs. Wife recently passed away on  09/10/2021. Father of four children. REVIEW OF THE CHART:  CBC showed hemoglobin and hematocrit of 7.2 and  22.7 respectively, white count 4000, platelet count is 57. EKG shows  sinus tachycardia. BUN and creatinine were 56 and 2.9 respectively. Electrolytes are satisfactory. Bicarb is 14. AST is elevated at 3348  and ALT is elevated at 3543.   Chest x-ray showed cardiomegaly without  CHF, finding of IPF. CT of the neck was unremarkable. CT abdomen was  normal.  ProBNP was 2078. Troponin level was 226. CPK is 360. Lactic  acid level of 14.9. PHYSICAL EXAMINATION:  VITAL SIGNS:  Show height to be 5 feet 6 inches, weight is 156 pounds,  BMI of 25.32. Blood pressure was low at 83/37, pulse 103, and afebrile. GENERAL APPEARANCE:  This is an 25-year-old white male who appears to be  acutely ill, frail looking. HEENT:  Revealed normal hair distribution. NECK:  Revealed flat JVD. Trachea is midline. Thyroid not palpable. LUNGS:  Diminished with fibrotic rales posteriorly. HEART:  Sinus tachycardic. Grade 1/6 murmur. No S3, no S4. ABDOMEN:  Soft. No guarding, rebound, or rigidity. GENITOURINARY:  Morales catheter in place. EXTREMITIES:  Revealed trace amount of edema. NEUROLOGIC:  Grossly intact. BREASTS:  Normal male. IMPRESSION:  At this time include:  1. Shock, multifactorial in nature, possibly secondary to hypovolemic  shock from recent chemotherapy, could not exclude that of underlying  sepsis versus cardiogenic. 2.  Coronary artery disease with elevated troponin, possibly secondary  to demand ischemia  3. Acute-on-chronic disease with associated metabolic acidosis,  exacerbated by that of hypotension. 3.  Elevated transaminases, probably secondary to recent Vidaza  administration. 4.  Type 2 diabetes mellitus without DKA. 5.  Pancytopenia, secondary to recent therapy with myelodysplastic  syndrome, started on Vidaza on 06/12/2022. 6.  Hyperlipidemia. 7.  Urinary frequency without outflow obstruction, stable on Proscar. 8.  Essential hypertension historically. 9.  Primary hypothyroidism, on Synthroid. PLAN AND RECOMMENDATIONS:  Currently, the patient appeared to be doing  very poorly.   I have discussed the condition with his family along with  the patient and he does request no intubation, no chest compression, but  is agreeable for aggressive care in the intensive care unit. Surprising  until the institution of Norejare[facility], the patient has been doing relatively  well, this was just started on Monday. His lab work in the past two  days does show significant development of pancytopenia since that period  of time. He is hypotensive. This could be a combination of septic  shock versus hypovolemia versus cardiogenic. His last ejection fraction  done was normal, and troponin level does show what appeared to be demand  ischemia. The patient does have evidence of metabolic acidosis with  associated lactic acidosis. He will be restarted on bicarb infusion. He will be transfused. Panculture has been obtained along with  institution of antibiotic therapy, and he will be admitted to the  intensive coronary care unit. The patient did show sign of adrenal  insufficiency before. He will be started on hydrocortisone at this time  and midodrine will be continued. The patient will be started on  Levophed if clinically indicated. Otherwise, I have discussed the  condition with Dr. Natalia Trevino. He will be admitted to the intensive care  unit at this time. His condition is guarded and prognosis is poor.         [address], DO    D: 06/18/2022 13:07:57       T: 06/18/2022 15:22:54     BERNIE/[email or web address]_[email or web address]  Job#: [id number]     Doc#: [id number]    CC:

## 2022-06-18 NOTE — [facility] REMOTE MONITORING
Spoke with primary RN, Tova Carlson regarding 3/6 hour sepsis bundle.     Thanks  Chela Aayla, KAMIN, Omkar Tantommy 20 [phone]

## 2022-06-20 ENCOUNTER — HOSPITAL ENCOUNTER (OUTPATIENT)
Dept: INFUSION THERAPY | Age: 87
End: 2022-06-20

## 2022-06-23 LAB
BLOOD CULTURE, ROUTINE: NORMAL
CULTURE, BLOOD 2: NORMAL

## 2022-07-13 NOTE — PROGRESS NOTES
Physician Progress Note      PATIENT:               Cydney Berkowitz  CSN #:                  [id number]  :                       1933  ADMIT DATE:       2022 10:46 AM  DISCH DATE:        2022 5:38 PM  RESPONDING  PROVIDER #:        Lavina Fabry BISEL DO          QUERY TEXT:    Pt admitted with myelodysplastic syndrome and shock documented per ED   physician. If possible, please document in progress notes and discharge   summary further specificity regarding the type of shock: The medical record reflects the following:  Risk Factors: Myelodysplastic syndrome, Chemotherapy  Clinical Indicators: Shock, possibly secondary to hypovolemic shock from   recent chemotherapy, could not exclude underlying sepsis versus cardiogenic,    60/40 - 83/47  H/H 7.2/22.7  LA 14.9  troponin 226  Treatment: IVF bolus, SoluCortef, Maxipime, Blood c/s,  Levophed - transition   to comfort care measures    Thank you,  Ashely Ware RN BSN CCDS  Options provided:  -- Cardiogenic Shock  -- Septic Shock  -- Hypovolemic Shock  -- Other - I will add my own diagnosis  -- Disagree - Not applicable / Not valid  -- Disagree - Clinically unable to determine / Unknown  -- Refer to Clinical Documentation Reviewer    PROVIDER RESPONSE TEXT:    This patient is in hypovolemic shock.     Query created by: Pablo Montano on 2022 9:53 AM      Electronically signed by:  Goyo Bates DO 2022 4:16 PM
Work

## 2024-03-19 NOTE — CARE COORDINATION
4-18-Cm note: ( covid neg 4-14) spoke to liaison for [facility] , she is following pt for admit, however, she will not be able to start precert until the heart cath is performed, so plan is for pt to return to [address] [place] [address] for pending Insurance precert for [facility]. Pt will require a BD MAX covid test, can go with pending results. CM/SS will follow with dc needs, Pt will require updated PT/OT when he returns from [address] [place] [address], Dr. Paramjit Sullivan aware of above.   Electronically signed by Cassidy Mejia RN on 4/18/2022 at 1:28 PM Matt Choi, DO:  patient seen and evaluated with the resident.  I was present for key portions of the History & Physical, and I agree with the Impression & Plan. 56 yo m No reported PMH, PW facial swelling.  PW son bedside provides collateral translation, official  declined.  Reports that patient has had left jaw pain, upper mandible, upper molar for several days, worsening last few days.  Received amoxicillin, took 3 doses.  States swelling is getting worse, bilateral upper face.  Worse on left side.  Denies F/C,/V, sour taste in mouth.  Patient arrives HDS well-appearing, tolerating secretions, speaking full sentences.  Do note poor dentition and multiple dental caries on the left side.  No anterior cervical LAD.  Full ROM of neck.  Evaluate for abscess.  Treat symptoms, reassess.  For initial evaluation concern for more infectious etiology rather than reaction from amoxicillin.  Patient does not have diffuse neuritis, nor urticaria. Matt Choi, DO:  patient seen and evaluated with the resident.  I was present for key portions of the History & Physical, and I agree with the Impression & Plan. 58 yo m No reported PMH, PW facial swelling.  PW son bedside provides collateral translation, official  declined.  Reports that patient has had left jaw pain, upper mandible, upper molar for several days, worsening last few days.  Received amoxicillin, took 3 doses.  States swelling is getting worse, bilateral upper face.  Worse on left side.  Denies F/C,/V, sour taste in mouth.  Patient arrives HDS well-appearing, tolerating secretions, speaking full sentences.  Do note poor dentition and multiple dental caries on the left side.  No anterior cervical LAD.  Full ROM of neck.  Evaluate for abscess.  Treat symptoms, reassess.  For initial evaluation concern for more infectious etiology rather than reaction from amoxicillin.  Patient does not have diffuse neuritis, nor urticaria..

## (undated) DEVICE — GOWN ISOLATN REG YEL M WT MULTIPLY SIDETIE LEV 2

## (undated) DEVICE — NEEDLE HYPO 18GA L1.5IN PNK POLYPR HUB S STL THN WALL FILL

## (undated) DEVICE — SYRINGE MED 10ML LUERLOCK TIP W/O SFTY DISP

## (undated) DEVICE — PADDING CAST 4 YDX3 IN COTTON NS WBRL

## (undated) DEVICE — MASTISOL ADHESIVE LIQ 2/3ML

## (undated) DEVICE — TUBING PMP L16FT MAIN DISP FOR AR-6400 AR-6475

## (undated) DEVICE — PROBE ABLAT 90DEG ASPIR MULTIPORT BPLR RF 1 PC ELECTRD ERGO

## (undated) DEVICE — CONTAINER SPEC COLL 960ML POLYPR TRIANG GRAD INTAKE/OUTPUT

## (undated) DEVICE — GLOVE SURG 7 LTX STRL TRIFLEX LNG FINGER

## (undated) DEVICE — GAUZE,SPONGE,4"X4",16PLY,STRL,LF,10/TRAY: Brand: MEDLINE

## (undated) DEVICE — DRAPE 100X60IN SHOULDER ARTHROSCOPY

## (undated) DEVICE — MASK,FACE,MAXFLUIDPROTECT,SHIELD/ERLPS: Brand: MEDLINE

## (undated) DEVICE — SUPER TURBOVAC 90 INTEGRATED CABLE WAND ICW: Brand: COBLATION

## (undated) DEVICE — FORCEPS BX L240CM JAW DIA2.8MM L CAP W/ NDL MIC MESH TOOTH

## (undated) DEVICE — SPONGE GZ 4IN 4IN 4 PLY N WVN AVANT

## (undated) DEVICE — GAUZE,SPONGE,4"X4",16PLY,XRAY,STRL,LF: Brand: MEDLINE

## (undated) DEVICE — TOWEL,OR,DSP,ST,BLUE,STD,6/PK,12PK/CS: Brand: MEDLINE

## (undated) DEVICE — SOLUTION IRRIG 1000ML 09% SOD CHL USP PIC PLAS CONTAINER

## (undated) DEVICE — Device: Brand: DEFENDO VALVE AND CONNECTOR KIT

## (undated) DEVICE — STRIP TRAC W3XL40IN SELF ADH SKIN TRAC

## (undated) DEVICE — BLOCK BITE 60FR CAREGUARD

## (undated) DEVICE — BASIC PACK: Brand: CONVERTORS

## (undated) DEVICE — KENDALL 450 SERIES MONITORING FOAM ELECTRODE - RECTANGULAR SHAPE ( 3/PK): Brand: KENDALL

## (undated) DEVICE — STANDARD HYPODERMIC NEEDLE,POLYPROPYLENE HUB: Brand: MONOJECT

## (undated) DEVICE — KIT BEDSIDE REVITAL OX 500ML

## (undated) DEVICE — SOLUTION SURG PREP ANTIMICROBIAL 4 OZ SKIN WND EXIDINE

## (undated) DEVICE — 6 X 9  1.75MIL 4-WALL LABGUARD: Brand: MINIGRIP COMMERCIAL LLC

## (undated) DEVICE — 5-IN-1 BARBED CONNECTOR POLYPROPYLENE 3/16 - 9/16 IN. (5 - 14.3 MM): Brand: ARGYLE

## (undated) DEVICE — SOLUTION IV IRRIG POUR BRL 0.9% SODIUM CHL 2F7124

## (undated) DEVICE — TOWEL OR BLUEE 16X26IN ST 8 PACK ORB08 16X26ORTWL

## (undated) DEVICE — NEEDLE SPNL L3.5IN PNK HUB S STL REG WALL FIT STYL W/ QNCKE

## (undated) DEVICE — TUBING, SUCTION, 1/4" X 10', STRAIGHT: Brand: MEDLINE

## (undated) DEVICE — HOOK LOCK LATEX FREE ELASTIC BANDAGE 3INX5YD

## (undated) DEVICE — GOWN SURG XL LNG LEN SPUNBOND REINF VELC TIE LEV 4 IMPERV

## (undated) DEVICE — DRAPE SURG W88XL116IN SMS BODY SPL ORTH N FEN REINF FLD PCH

## (undated) DEVICE — CART DISP LID TRANSPOSAL

## (undated) DEVICE — PAD ABD CURITY TENDERSORB 5X9IN

## (undated) DEVICE — LUBRICANT SURG JELLY ST BACTER TUBE 4.25OZ

## (undated) DEVICE — 3M™ STERI-STRIP™ REINFORCED ADHESIVE SKIN CLOSURES, R1547, 1/2 IN X 4 IN (12 MM X 100 MM), 6 STRIPS/ENVELOPE: Brand: 3M™ STERI-STRIP™

## (undated) DEVICE — CHLORAPREP 26ML ORANGE

## (undated) DEVICE — 3M™ STERI-DRAPE™ U-DRAPE, LONG 1019: Brand: STERI-DRAPE™

## (undated) DEVICE — 1010 S-DRAPE TOWEL DRAPE 10/BX: Brand: STERI-DRAPE™

## (undated) DEVICE — 3M™ IOBAN™ 2 ANTIMICROBIAL INCISE DRAPE 6640EZ: Brand: IOBAN™ 2

## (undated) DEVICE — COVER,LIGHT HANDLE,FLX,1/PK: Brand: MEDLINE INDUSTRIES, INC.

## (undated) DEVICE — NEEDLE SUT PASS FOR ROT CUF LABRAL REP MULTFI SCORPION

## (undated) DEVICE — DRESSING GZ XRFRM 4X4(25/BX 6BX/CS)

## (undated) DEVICE — GLOVE ORANGE PI 8   MSG9080

## (undated) DEVICE — YANKAUER,BULB TIP,W/O VENT,RIGID,STERILE: Brand: MEDLINE

## (undated) DEVICE — 1810 FOAM BLOCK NEEDLE COUNTER: Brand: DEVON

## (undated) DEVICE — MEDI-VAC NON-CONDUCTIVE SUCTION TUBING: Brand: CARDINAL HEALTH

## (undated) DEVICE — PEN: MARKING STD 100/CS: Brand: MEDICAL ACTION INDUSTRIES

## (undated) DEVICE — BLADE SHV L13CM DIA4MM DBL CUT COOLCUT

## (undated) DEVICE — INTENDED FOR TISSUE SEPARATION, AND OTHER PROCEDURES THAT REQUIRE A SHARP SURGICAL BLADE TO PUNCTURE OR CUT.: Brand: BARD-PARKER ® STAINLESS STEEL BLADES

## (undated) DEVICE — 3M™ MEDIPORE™ SOFT CLOTH TAPE, 4 INCH X 10 YARDS, 12 ROLLS/CASE, 2964: Brand: 3M™ MEDIPORE™